# Patient Record
Sex: FEMALE | Race: WHITE | Employment: OTHER | URBAN - METROPOLITAN AREA
[De-identification: names, ages, dates, MRNs, and addresses within clinical notes are randomized per-mention and may not be internally consistent; named-entity substitution may affect disease eponyms.]

---

## 2017-01-04 ENCOUNTER — OFFICE VISIT (OUTPATIENT)
Dept: PHARMACY | Facility: CLINIC | Age: 62
End: 2017-01-04
Payer: COMMERCIAL

## 2017-01-04 VITALS
BODY MASS INDEX: 26.39 KG/M2 | HEART RATE: 72 BPM | DIASTOLIC BLOOD PRESSURE: 70 MMHG | WEIGHT: 122 LBS | SYSTOLIC BLOOD PRESSURE: 122 MMHG

## 2017-01-04 DIAGNOSIS — Z79.4 TYPE 2 DIABETES MELLITUS WITH DIABETIC NEPHROPATHY, WITH LONG-TERM CURRENT USE OF INSULIN (H): ICD-10-CM

## 2017-01-04 DIAGNOSIS — K21.9 GASTROESOPHAGEAL REFLUX DISEASE, ESOPHAGITIS PRESENCE NOT SPECIFIED: ICD-10-CM

## 2017-01-04 DIAGNOSIS — E11.21 TYPE 2 DIABETES MELLITUS WITH DIABETIC NEPHROPATHY, WITH LONG-TERM CURRENT USE OF INSULIN (H): ICD-10-CM

## 2017-01-04 DIAGNOSIS — E78.5 HYPERLIPIDEMIA LDL GOAL <100: Primary | ICD-10-CM

## 2017-01-04 DIAGNOSIS — I15.9 SECONDARY HYPERTENSION WITH GOAL BLOOD PRESSURE LESS THAN 140/90: ICD-10-CM

## 2017-01-04 PROCEDURE — 99607 MTMS BY PHARM ADDL 15 MIN: CPT | Mod: GY | Performed by: PHARMACIST

## 2017-01-04 PROCEDURE — 99606 MTMS BY PHARM EST 15 MIN: CPT | Mod: GY | Performed by: PHARMACIST

## 2017-01-04 RX ORDER — LISINOPRIL 20 MG/1
TABLET ORAL
Qty: 90 TABLET | Refills: 3 | Status: SHIPPED | OUTPATIENT
Start: 2017-01-04 | End: 2017-01-12 | Stop reason: ALTCHOICE

## 2017-01-04 RX ORDER — ATORVASTATIN CALCIUM 40 MG/1
TABLET, FILM COATED ORAL
Qty: 90 TABLET | Refills: 3 | Status: SHIPPED | OUTPATIENT
Start: 2017-01-04 | End: 2018-01-04

## 2017-01-04 NOTE — MR AVS SNAPSHOT
After Visit Summary   1/4/2017    Nathalie Harp    MRN: [id number]           Patient Information     Date Of Birth          1955        Visit Information        Provider Department      1/4/2017 9:00 AM Jovana Loo [facility] MT        Today's Diagnoses     Hyperlipidemia LDL goal <100    -  1     Secondary hypertension with goal blood pressure less than 140/90         Gastroesophageal reflux disease, esophagitis presence not specified         Type 2 diabetes mellitus with diabetic nephropathy, with long-term current use of insulin (H)           Care Instructions    Recommendations from today's MT visit:                                                        1. Please start the Humulin U500 Kwikpen 145units every morning with breakfast and 95units every evening with dinner. Remember that 1 pen will last you 6.3 days (so 4 pens will last you 25.5 days, 5 pens will last you 32 days). Stop the Levemir and Novolog when you start the Humulin U500.    2. I sent refills for lisinopril, omeprazole and atorvastatin to our pharmacy downstairs. The pharmacy will transfer the remaining medications from [facility], please be sure to give the pharmacy [place] address and phone number. Please also be sure to provide the pharmacy with your insurance card.    3. Test your blood sugar in the morning before breakfast and in the evening before dinner (goal of 80-130mg/dL) and anytime you feel it may be low (feeling shaky, dizzy, sweaty, weak). If your blood sugar is below 70mg/dL, have something to eat to bring it up. If your blood sugar is NOT below 70mg/dL, take a 5 minute break, have a glass water, and let the feeling pass. This is your body adjusting to lower and more normal blood sugar levels.    Next MTM visit: Wednesday, February 1st at 9am.    To schedule another MT appointment, please call the clinic directly or you may call the [facility] scheduling line at [phone] or  toll-free at [phone].     My Clinical Pharmacist's contact information:                                                      It was a pleasure seeing you today!  Please feel free to contact me with any questions or concerns you have.      Jovana Loo, Pharm.D., [facility]  Medication Therapy Management Pharmacist  [phone]     You may receive a survey about the [facility] services you received.  I would appreciate your feedback to help me serve you better in the future. Please fill it out and return it when you can. Your comments will be anonymous.      My healthcare goals:                                                      Diabetes Goals:    Home Monitoring of Blood Sugars: Before meals  mg/dL.    Hemoglobin A1C: Less than 7%. Yours is A1C     12.3   12/21/2016.    Blood Pressure: Less than 140/90mmHg. Yours is /70 mmHg  Pulse 72  Wt 122 lb (55.339 kg)  LMP  (LMP Unknown).    Cholesterol: You are taking atorvastatin to help decrease the risk of heart disease.    Things you can do to help lower your blood sugars:    Diet: 3 well balanced meals per day with protein (lean meats, seafood, eggs), healthy fats (olive oil and olives, coconut oil, avacado, nuts and nut butters, seeds) and carbohydrates mostly from fruits and vegetables.    Exercise: 30 minutes per day of anything that will increase your heart rate and make you break a sweat! Gardening, walking, cleaning the house, changing the oil in your car, etc. If you feel like 30 minutes per day is too much, start small. Even lifting canned foods or working your arms with a resistance band in front of the TV can help.                  Follow-ups after your visit        Your next 10 appointments already scheduled     Jan 12, 2017  9:00 AM   [facility] Lockhart MD   [facility]    [address] [place] [id number] [phone]            Bring a current list of meds and any records pertaining to this visit.  For Physicals, please bring immunization records and any forms needing to be filled out.  Please arrive 10 minutes early to complete paperwork.            Feb 01, 2017  9:00 AM   Office Visit with Jovana Loo RPH   [facility] MTM ([facility])    [address] [id number] [phone]           Bring a current list of meds and any records pertaining to this visit.  For Physicals, please bring immunization records and any forms needing to be filled out.  Please arrive 10 minutes early to complete paperwork.              Who to contact     If you have questions or need follow up information about today's clinic visit or your schedule please contact [facility] directly at [phone].  Normal or non-critical lab and imaging results will be communicated to you by MyChart, letter or phone within 4 business days after the clinic has received the results. If you do not hear from us within 7 days, please contact the clinic through MyChart or phone. If you have a critical or abnormal lab result, we will notify you by phone as soon as possible.  Submit refill requests through [employer] or call your pharmacy and they will forward the refill request to us. Please allow 3 business days for your refill to be completed.          Additional Information About Your Visit        Care EveryWhere ID     This is your Care EveryWhere ID. This could be used by other organizations to access your [facility] medical records  [id number]        Your Vitals Were     Pulse Last Period                72 (LMP Unknown)           Blood Pressure from Last 3 Encounters:   01/04/17 122/70   12/21/16 140/78   12/14/16 130/74    Weight from Last 3 Encounters:   01/04/17 122 lb (55.339 kg)   12/21/16 127 lb (57.607 kg)   11/14/16 124 lb (56.246 kg)              Today,  you had the following     No orders found for display         Today's Medication Changes          These changes are accurate as of: 1/4/17 10:00 AM.  If you have any questions, ask your nurse or doctor.               Start taking these medicines.        Dose/Directions    insulin reg HIGH CONC 500 UNIT/ML PEN soln   Commonly known as:  HumuLIN R U-500 KwikPen   Used for:  Type 2 diabetes mellitus with diabetic nephropathy, with long-term current use of insulin (H)        Inject 145units every morning with breakfast and 95units every evening with dinner. Please set patient up with Med Sync.   Quantity:  5 pen   Refills:  11         These medicines have changed or have updated prescriptions.        Dose/Directions    atorvastatin 40 MG tablet   Commonly known as:  LIPITOR   This may have changed:  See the new instructions.   Used for:  Hyperlipidemia LDL goal <100        TAKE 1 TABLET (40 MG) BY MOUTH DAILY   Quantity:  90 tablet   Refills:  3       lisinopril 20 MG tablet   Commonly known as:  PRINIVIL/ZESTRIL   This may have changed:  See the new instructions.   Used for:  Secondary hypertension with goal blood pressure less than 140/90        TAKE 1 TABLET (20 MG) BY MOUTH DAILY   Quantity:  90 tablet   Refills:  3       omeprazole 20 MG CR capsule   Commonly known as:  priLOSEC   This may have changed:  See the new instructions.   Used for:  Gastroesophageal reflux disease, esophagitis presence not specified        TAKE 1 CAPSULE (20 MG) BY MOUTH DAILY   Quantity:  90 capsule   Refills:  1         Stop taking these medicines if you haven't already. Please contact your care team if you have questions.     insulin aspart 100 UNIT/ML injection   Commonly known as:  NovoLOG FLEXPEN           insulin detemir 100 UNIT/ML injection   Commonly known as:  LEVEMIR FLEXPEN/FLEXTOUCH                Where to get your medicines      These medications were sent to [facility] [place], [place] - [facility]  [address]  [address], [facility] [address]     Phone:  [phone]    - atorvastatin 40 MG tablet  - insulin reg HIGH CONC 500 UNIT/ML PEN soln  - lisinopril 20 MG tablet  - omeprazole 20 MG CR capsule             Primary Care Provider Office Phone # Fax #    Eva Lockhart [phone]       [facility] [address]  [place], [place]. \[facility].\"" [id number]        Goals        Patient-Stated    I will check my blood sugars four times per day     Goal Comments - Note created  3/10/2014 10:41 AM by Priti Orozco, RN    As of today's date 3/10/2014 goal is met at 26 - 50%.   Goal Status:  Active      I will continue to work with Diabetic Educator     Goal Comments - Note created  3/10/2014 10:42 AM by Priti Orozco, RN    As of today's date 3/10/2014 goal is met at 26 - 50%.   Goal Status:  Active      I will take insulins as prescribed, including sliding scale novolog     Goal Comments - Note created  3/10/2014 10:43 AM by Priti Orozco, RN    As of today's date 3/10/2014 goal is met at 51 - 75%.   Goal Status:  Active        Thank you!     Thank you for choosing [facility]  for your care. Our goal is always to provide you with excellent care. Hearing back from our patients is one way we can continue to improve our services. Please take a few minutes to complete the written survey that you may receive in the mail after your visit with us. Thank you!             Your Updated Medication List - Protect others around you: Learn how to safely use, store and throw away your medicines at www.disposemymeds.org.          This list is accurate as of: 1/4/17 10:00 AM.  Always use your most recent med list.                   Brand Name Dispense Instructions for use    albuterol 108 (90 BASE) MCG/ACT Inhaler    PROAIR HFA/PROVENTIL HFA/VENTOLIN HFA    3 Inhaler    Inhale 2 puffs into the lungs every 6 hours as needed for shortness of breath / dyspnea  ((Ventolin or preferred albuterol equivalent))       aspirin 81 MG tablet      Take 1 tablet by mouth daily.       atorvastatin 40 MG tablet    LIPITOR    90 tablet    TAKE 1 TABLET (40 MG) BY MOUTH DAILY       blood glucose monitoring test strip    ONE TOUCH ULTRA    300 each    TEST BLOOD SUGARS 3 TIMES DAILY.       celecoxib 200 MG capsule    celeBREX    180 capsule    Take 1 capsule (200 mg) by mouth 2 times daily as needed for moderate pain       EPIPEN 2-MELVIN 0.3 MG/0.3ML injection   Generic drug:  EPINEPHrine     2 each    INJECT 0.3 MLS (0.3 MG) INTO THE MUSCLE ONCE AS NEEDED FOR ANAPHYLAXIS       insulin pen needle 31G X 5 MM    B-D U/F    200 each    Use twice daily as directed.       insulin reg HIGH CONC 500 UNIT/ML PEN soln    HumuLIN R U-500 KwikPen    5 pen    Inject 145units every morning with breakfast and 95units every evening with dinner. Please set patient up with Med Sync.       lisinopril 20 MG tablet    PRINIVIL/ZESTRIL    90 tablet    TAKE 1 TABLET (20 MG) BY MOUTH DAILY       omeprazole 20 MG CR capsule    priLOSEC    90 capsule    TAKE 1 CAPSULE (20 MG) BY MOUTH DAILY       ONETOUCH DELICA LANCETS 33G Misc     300 each    TEST 3 TIMES DAILY.       order for DME     1 each    Equipment being ordered: bath tub grab bars

## 2017-01-04 NOTE — PROGRESS NOTES
SUBJECTIVE/OBJECTIVE:                                                    Nathalie Harp is a 61 year old female coming in for a follow-up visit for Medication Therapy Management.  She was referred to me Eva Lockhart    Chief Complaint: Follow up from our visit on 12/14 and 12/22/2016. Requests refills on lisinopril, atorvastatin, and omeprazole.    Medication Adherence: no issues reported by patient    Diabetes:  Pt currently taking Levemir 80-90units BID, Novolog 40-50units 2-3x/day. Still has not started the Humulin U500 despite our conversation 12/22/2016 that she does have a full months supply at home. She tells me today that she only has 4 pens of Humulin U500 and that this is only an 8 days supply. She continues to use [facility] and is dissatisfied with their service. She declined to use [facility] because she did not want to keep a credit card or checking account information on file. We are trying to find ways to get her medications more convenient. She would be interested in using [facility] if she can get set up with [facility] and  her medications when she is in clinic for visits.  SMBG Ranges (from patient's glucose log): see below.  Symptoms of low blood sugar? none.   Symptoms of high blood sugar? none  ACEi/ARB: Yes: lisinopril 20mg QD. Microalbumin is not < 30 mg/g.   Aspirin: Taking 81mg daily and denies side effects  Statin: Yes: atorvastatin 40mg QD and denies side effects  Date FBG/ 2hours post Dinner /2hours post   1/4 197    1/3 443    1/2 430    1/1 423    12/31 365    12/30 450    12/29 474      415         Current labs include:  BP Readings from Last 3 Encounters:   01/04/17 122/70   12/21/16 140/78   12/14/16 130/74       A1C     12.3   12/21/2016  A1C     11.3   10/7/2015  A1C     11.4   8/31/2015  A1C     12.8   11/4/2014  A1C     12.8   7/11/2014    CHOL      218   11/14/2016  TRIG      380   11/14/2016  HDL       52   11/14/2016  LDL        90   11/14/2016  LDL       85   7/11/2014    Lab Results   Component Value Date    UCRR 22 11/14/2016    MICROL 12 11/14/2016    UMALCR 56.82* 11/14/2016       Last Basic Metabolic Panel:  NA      135   11/14/2016   POTASSIUM      5.0   11/14/2016  CHLORIDE       95   11/14/2016  BUN       24   11/14/2016  BUN     18.8   7/16/2012  CR     0.92   11/14/2016  GFR ESTIMATE   Date Value Ref Range Status   11/14/2016 62 >60 mL/min/1.7m2 Final   10/07/2015 63 >60 mL/min/1.7m2 Final   08/31/2015 73 >60 mL/min/1.7m2 Final     TSH   Date Value Ref Range Status   11/14/2016 0.35* 0.40 - 5.00 mU/L Final     T4 FREE   Date Value Ref Range Status   11/14/2016 1.28 0.70 - 1.85 ng/dL Final     /70 mmHg  Pulse 72  Wt 122 lb (55.339 kg)  LMP  (LMP Unknown)    Most Recent Immunizations   Administered Date(s) Administered     Influenza (High Dose) 3 valent vaccine 10/03/2013     Influenza (IIV3) 08/30/2016     Pneumococcal (PCV 13) 11/14/2016     Pneumococcal 23 valent 11/14/2008     TD (ADULT, 7+) 09/01/2011          ASSESSMENT:                                                    Current medications were reviewed with her today.      Medication Adherence: no issues identified    Diabetes: Needs Improvement. Patient is not meeting A1c goal of < 7%. Self monitoring of blood glucose is not at goal of fasting or pre-prandial  mg/dL. As previously discussed, Pt would benefit from switching to U500 insulin as her current total daily dose of insulin is >200units/day. We reviewed, again, that she currently has at least a 25.5 day supply at home and that each pen will last her 6.3 days. She is interested in switching to [facility] if she can be set up with [facility], as this will be more convenient for her and will allow us to ensure she is provided exactly what she is prescribed.         PLAN:                                                      1. Start Humulin U500 Kwikpen 145units QAM with breakfast and 95units QPM  with dinner. Appropriate use, risks and benefits discussed at length. Rx sent to [facility]. Patient will work with pharmacy to get set up on Med-Sync.  2. Pt will SMBG fasting, prior to dinner, and with symptoms of hypoglycemia.  3. Refills for omeprazole, lisinopril, and atorvastatin sent to pharmacy.    I spent 60 minutes with this patient today.  All changes were made via collaborative practice agreement with Eva Lockhart. A copy of the visit note was provided to the patient's primary care provider.     Will follow up in 1 month, she is seeing PCP next week.    The patient was given a summary of these recommendations as an after visit summary.    Jovana Loo, Pharm.D., [facility]  Medication Therapy Management Pharmacist  [phone]

## 2017-01-04 NOTE — PATIENT INSTRUCTIONS
Recommendations from today's MTM visit:                                                        1. Please start the Humulin U500 Kwikpen 145units every morning with breakfast and 95units every evening with dinner. Remember that 1 pen will last you 6.3 days (so 4 pens will last you 25.5 days, 5 pens will last you 32 days). Stop the Levemir and Novolog when you start the Humulin U500.    2. I sent refills for lisinopril, omeprazole and atorvastatin to our pharmacy downstairs. The pharmacy will transfer the remaining medications from [facility], please be sure to give the pharmacy [place] address and phone number. Please also be sure to provide the pharmacy with your insurance card.    3. Test your blood sugar in the morning before breakfast and in the evening before dinner (goal of 80-130mg/dL) and anytime you feel it may be low (feeling shaky, dizzy, sweaty, weak). If your blood sugar is below 70mg/dL, have something to eat to bring it up. If your blood sugar is NOT below 70mg/dL, take a 5 minute break, have a glass water, and let the feeling pass. This is your body adjusting to lower and more normal blood sugar levels.    Next MTM visit: Wednesday, February 1st at 9am.    To schedule another MTM appointment, please call the clinic directly or you may call the MTM scheduling line at [phone] or toll-free at [phone].     My Clinical Pharmacist's contact information:                                                      It was a pleasure seeing you today!  Please feel free to contact me with any questions or concerns you have.      Jovana Loo, Pharm.D., [facility]  Medication Therapy Management Pharmacist  [phone]     You may receive a survey about the MTM services you received.  I would appreciate your feedback to help me serve you better in the future. Please fill it out and return it when you can. Your comments will be anonymous.      My healthcare goals:                                                       Diabetes Goals:    Home Monitoring of Blood Sugars: Before meals  mg/dL.    Hemoglobin A1C: Less than 7%. Yours is A1C     12.3   12/21/2016.    Blood Pressure: Less than 140/90mmHg. Yours is /70 mmHg  Pulse 72  Wt 122 lb (55.339 kg)  LMP  (LMP Unknown).    Cholesterol: You are taking atorvastatin to help decrease the risk of heart disease.    Things you can do to help lower your blood sugars:    Diet: 3 well balanced meals per day with protein (lean meats, seafood, eggs), healthy fats (olive oil and olives, coconut oil, avacado, nuts and nut butters, seeds) and carbohydrates mostly from fruits and vegetables.    Exercise: 30 minutes per day of anything that will increase your heart rate and make you break a sweat! Gardening, walking, cleaning the house, changing the oil in your car, etc. If you feel like 30 minutes per day is too much, start small. Even lifting canned foods or working your arms with a resistance band in front of the TV can help.

## 2017-01-12 ENCOUNTER — OFFICE VISIT (OUTPATIENT)
Dept: INTERNAL MEDICINE | Facility: CLINIC | Age: 62
End: 2017-01-12
Payer: MEDICARE

## 2017-01-12 VITALS
HEART RATE: 93 BPM | HEIGHT: 56 IN | OXYGEN SATURATION: 96 % | DIASTOLIC BLOOD PRESSURE: 94 MMHG | TEMPERATURE: 96.9 F | WEIGHT: 124 LBS | SYSTOLIC BLOOD PRESSURE: 152 MMHG | BODY MASS INDEX: 27.9 KG/M2

## 2017-01-12 DIAGNOSIS — T46.4X5A ACE-INHIBITOR COUGH: ICD-10-CM

## 2017-01-12 DIAGNOSIS — E11.21 TYPE 2 DIABETES MELLITUS WITH DIABETIC NEPHROPATHY, WITH LONG-TERM CURRENT USE OF INSULIN (H): ICD-10-CM

## 2017-01-12 DIAGNOSIS — J45.20 MILD INTERMITTENT ASTHMA WITHOUT COMPLICATION: ICD-10-CM

## 2017-01-12 DIAGNOSIS — R05.8 ACE-INHIBITOR COUGH: ICD-10-CM

## 2017-01-12 DIAGNOSIS — N18.2 CKD (CHRONIC KIDNEY DISEASE) STAGE 2, GFR 60-89 ML/MIN: ICD-10-CM

## 2017-01-12 DIAGNOSIS — Z79.4 TYPE 2 DIABETES MELLITUS WITH DIABETIC NEPHROPATHY, WITH LONG-TERM CURRENT USE OF INSULIN (H): ICD-10-CM

## 2017-01-12 DIAGNOSIS — Z12.31 VISIT FOR SCREENING MAMMOGRAM: ICD-10-CM

## 2017-01-12 PROCEDURE — 99214 OFFICE O/P EST MOD 30 MIN: CPT | Performed by: INTERNAL MEDICINE

## 2017-01-12 RX ORDER — LOSARTAN POTASSIUM 50 MG/1
50 TABLET ORAL DAILY
Qty: 90 TABLET | Refills: 3 | Status: SHIPPED | OUTPATIENT
Start: 2017-01-12 | End: 2018-01-04

## 2017-01-12 NOTE — MR AVS SNAPSHOT
After Visit Summary   1/12/2017    Nathalie Harp    MRN: [id number]           Patient Information     Date Of Birth          1955        Visit Information        Provider Department      1/12/2017 9:00 AM Eva Lockhart MD [facility]        Today's Diagnoses     Uncontrolled type 2 diabetes mellitus without complication, with long-term current use of insulin (H)    -  1     Mild intermittent asthma without complication         Visit for screening mammogram         ACE-inhibitor cough         CKD (chronic kidney disease) stage 2, GFR 60-89 ml/min         Type 2 diabetes mellitus with diabetic nephropathy, with long-term current use of insulin (H)           Care Instructions    Stop Lisinopril:  This is contributing to your cough    Start Losartan 50 mg daily (instead of lisinopril)    -------------------------    Change the insulin to 120 Units twice daily (morning and supper)    Check sugars 4 time a day     _____________________    Return to clinic 2 weeks :  Follow up cough and see if more inhalers are indicated.  Blood Sugars        Follow-ups after your visit        Your next 10 appointments already scheduled     Feb 01, 2017  9:00 AM   Office Visit with Jovana Loo RPH   [facility] ([facility])    [address] [place] [id number] [phone]           Bring a current list of meds and any records pertaining to this visit.  For Physicals, please bring immunization records and any forms needing to be filled out.  Please arrive 10 minutes early to complete paperwork.              Who to contact     If you have questions or need follow up information about today's clinic visit or your schedule please contact [facility] directly at [phone].  Normal or non-critical lab and imaging results will be communicated to you by MyChart, letter or phone  "within 4 business days after the clinic has received the results. If you do not hear from us within 7 days, please contact the clinic through [employer] or phone. If you have a critical or abnormal lab result, we will notify you by phone as soon as possible.  Submit refill requests through [employer] or call your pharmacy and they will forward the refill request to us. Please allow 3 business days for your refill to be completed.          Additional Information About Your Visit        Care EveryWhere ID     This is your Care EveryWhere ID. This could be used by other organizations to access your [facility] medical records  [id number]        Your Vitals Were     Pulse Temperature Height BMI (Body Mass Index) Pulse Oximetry Last Period    93 96.9  F (36.1  C) (Oral) 4' 8.25\" (1.429 m) 27.54 kg/m2 96% (LMP Unknown)       Blood Pressure from Last 3 Encounters:   01/12/17 152/94   01/04/17 122/70   12/21/16 140/78    Weight from Last 3 Encounters:   01/12/17 124 lb (56.246 kg)   01/04/17 122 lb (55.339 kg)   12/21/16 127 lb (57.607 kg)              Today, you had the following     No orders found for display         Today's Medication Changes          These changes are accurate as of: 1/12/17  9:52 AM.  If you have any questions, ask your nurse or doctor.               Start taking these medicines.        Dose/Directions    losartan 50 MG tablet   Commonly known as:  COZAAR   Used for:  Uncontrolled type 2 diabetes mellitus without complication, with long-term current use of insulin (H), CKD (chronic kidney disease) stage 2, GFR 60-89 ml/min   Started by:  Eva Lockhart MD        Dose:  50 mg   Take 1 tablet (50 mg) by mouth daily   Quantity:  90 tablet   Refills:  3         These medicines have changed or have updated prescriptions.        Dose/Directions    insulin reg HIGH CONC 500 UNIT/ML PEN soln   Commonly known as:  HumuLIN R U-500 KwikPen   This may have changed:  additional instructions   Used for:  Type 2 " diabetes mellitus with diabetic nephropathy, with long-term current use of insulin (H)   Changed by:  Eva Lockhart MD        Inject 120 units every morning with breakfast and 120 units every evening with dinner. Please set patient up with Med Sync.   Refills:  0         Stop taking these medicines if you haven't already. Please contact your care team if you have questions.     lisinopril 20 MG tablet   Commonly known as:  PRINIVIL/ZESTRIL   Stopped by:  Eva Lockhatr MD                Where to get your medicines      These medications were sent to [facility] - [place], [place] - [address]  [address], [facility] [address]     Phone:  [phone]    - losartan 50 MG tablet      Some of these will need a paper prescription and others can be bought over the counter.  Ask your nurse if you have questions.     You don't need a prescription for these medications    - insulin reg HIGH CONC 500 UNIT/ML PEN soln             Primary Care Provider Office Phone # Fax #    Eva Lockhart [phone]       [facility] [address]  [place], [place]. \[facility] Outlook.\"" [id number]        Goals        Patient-Stated    I will check my blood sugars four times per day     Goal Comments - Note created  3/10/2014 10:41 AM by Priti Orozco RN    As of today's date 3/10/2014 goal is met at 26 - 50%.   Goal Status:  Active      I will continue to work with [employer]abetic Educ[employer]     Goal Comments - Note created  3/10/2014 10:42 AM by Priti Orozco RN    As of today's date 3/10/2014 goal is met at 26 - 50%.   Goal Status:  Active      I will take insulins as prescribed, including sliding scale novolog     Goal Comments - Note created  3/10/2014 10:43 AM by Priti Orozco RN    As of today's date 3/10/2014 goal is met at 51 - 75%.   Goal Status:  Active        Thank you!     Thank you for choosing [facility]  for your care. Our goal is  always to provide you with excellent care. Hearing back from our patients is one way we can continue to improve our services. Please take a few minutes to complete the written survey that you may receive in the mail after your visit with us. Thank you!             Your Updated Medication List - Protect others around you: Learn how to safely use, store and throw away your medicines at www.disposemymeds.org.          This list is accurate as of: 1/12/17  9:52 AM.  Always use your most recent med list.                   Brand Name Dispense Instructions for use    albuterol 108 (90 BASE) MCG/ACT Inhaler    PROAIR HFA/PROVENTIL HFA/VENTOLIN HFA    3 Inhaler    Inhale 2 puffs into the lungs every 6 hours as needed for shortness of breath / dyspnea ((Ventolin or preferred albuterol equivalent))       aspirin 81 MG tablet      Take 1 tablet by mouth daily.       atorvastatin 40 MG tablet    LIPITOR    90 tablet    TAKE 1 TABLET (40 MG) BY MOUTH DAILY       blood glucose monitoring test strip    ONE TOUCH ULTRA    300 each    TEST BLOOD SUGARS 3 TIMES DAILY.       celecoxib 200 MG capsule    celeBREX    180 capsule    Take 1 capsule (200 mg) by mouth 2 times daily as needed for moderate pain       EPIPEN 2-MELVIN 0.3 MG/0.3ML injection   Generic drug:  EPINEPHrine     2 each    INJECT 0.3 MLS (0.3 MG) INTO THE MUSCLE ONCE AS NEEDED FOR ANAPHYLAXIS       insulin pen needle 31G X 5 MM    B-D U/F    200 each    Use twice daily as directed.       insulin reg HIGH CONC 500 UNIT/ML PEN soln    HumuLIN R U-500 KwikPen     Inject 120 units every morning with breakfast and 120 units every evening with dinner. Please set patient up with Med Sync.       losartan 50 MG tablet    COZAAR    90 tablet    Take 1 tablet (50 mg) by mouth daily       omeprazole 20 MG CR capsule    priLOSEC    90 capsule    TAKE 1 CAPSULE (20 MG) BY MOUTH DAILY       ONETOUCH DELICA LANCETS 33G Misc     300 each    TEST 3 TIMES DAILY.       order for DME     1  each    Equipment being ordered: bath tub grab bars

## 2017-01-12 NOTE — PATIENT INSTRUCTIONS
Stop Lisinopril:  This is contributing to your cough    Start Losartan 50 mg daily (instead of lisinopril)    -------------------------    Change the insulin to 120 Units twice daily (morning and supper)    Check sugars 4 time a day     _____________________    Return to clinic 2 weeks :  Follow up cough and see if more inhalers are indicated.  Blood Sugars

## 2017-01-12 NOTE — PROGRESS NOTES
SUBJECTIVE:                                                    Nathalie Harp is a 61 year old female who presents to clinic today for the following health issues:      Medication Followup of all meds    Taking Medication as prescribed: yes    Side Effects:  Jittery from the insulin?    Medication Helping Symptoms:  Yes - now has the new insulin and is taking regularly.   Had changed her insulin (lowered in am and pm) due to low BS during afternoon.  But her BS in am are very high (300 - 400's)     Recent tel call reveals she is not using her inhalers correctly. She now realizes the albuterol is for prn. She is not currently wheezing and does not feel she needs to add therapy now that her symptoms have abated.  But still coughs a lot.           Problem list and histories reviewed & adjusted, as indicated.  Additional history: as documented    Patient Active Problem List   Diagnosis     menometrorrhagia     Esophageal reflux     Irritable bowel syndrome     Female stress incontinence     Migraine without aura     Arthritis of knee, right     Disorder of bursae and tendons in shoulder region     Degeneration of thoracic or thoracolumbar intervertebral disc     Degeneration of cervical intervertebral disc     Other hammer toe (acquired)     Allergic rhinitis due to pollen     Menopausal symptoms     Need for SBE (subacute bacterial endocarditis) prophylaxis     Preventive measure     Hyperlipidemia LDL goal <100     Health Care Home     Anemia     Dizziness     Hammer toe     Microalbuminuria     ACP (advance care planning)     Allergic to shellfish     Falls frequently     Balance problems     CKD (chronic kidney disease) stage 2, GFR 60-89 ml/min     Type 2 diabetes mellitus with diabetic nephropathy (H)     Benign essential hypertension     Diarrhea     Iron deficiency anemia, unspecified iron deficiency anemia type     Right axillary hidradenitis     Uncontrolled type 2 diabetes mellitus without  complication, with long-term current use of insulin (H)     Mild intermittent asthma without complication     Past Surgical History   Procedure Laterality Date     Hc excis primary ganglion wrist  1985 and 1987     right wrist     Knee surgery  2008 approx     arthroscopic; Dr. Rivera       Social History   Substance Use Topics     Smoking status: Never Smoker      Smokeless tobacco: Never Used     Alcohol Use: No      Comment: rarely     Family History   Problem Relation Age of Onset     Hypertension Sister      Hypertension Brother      CEREBROVASCULAR DISEASE Maternal Grandmother      Arthritis Maternal Grandmother      Circulatory Sister      Asthma Mother      GASTROINTESTINAL DISEASE Mother      IBS     Alcohol/Drug Mother      Depression Mother      Neurologic Disorder Mother      migraines     Asthma Sister      Asthma Sister      Asthma Brother      GASTROINTESTINAL DISEASE Father      IBS     DIABETES Father      Alcohol/Drug Father      Neurologic Disorder Father      migraines     Obesity Father      GASTROINTESTINAL DISEASE Maternal Grandfather      Ulcers     Alcohol/Drug Maternal Grandfather      Thyroid Disease Sister      Thyroid Disease Sister      DIABETES Paternal Grandmother      Alcohol/Drug Paternal Grandmother      Arthritis Paternal Grandmother      Obesity Paternal Grandmother      Obesity Sister          Current Outpatient Prescriptions   Medication Sig Dispense Refill     losartan (COZAAR) 50 MG tablet Take 1 tablet (50 mg) by mouth daily 90 tablet 3     insulin reg HIGH CONC (HUMULIN R U-500 KWIKPEN) 500 UNIT/ML PEN soln Inject 120 units every morning with breakfast and 120 units every evening with dinner. Please set patient up with Med Sync.       atorvastatin (LIPITOR) 40 MG tablet TAKE 1 TABLET (40 MG) BY MOUTH DAILY 90 tablet 3     omeprazole (PRILOSEC) 20 MG CR capsule TAKE 1 CAPSULE (20 MG) BY MOUTH DAILY 90 capsule 1     albuterol (PROAIR HFA/PROVENTIL HFA/VENTOLIN HFA) 108 (90  BASE) MCG/ACT Inhaler Inhale 2 puffs into the lungs every 6 hours as needed for shortness of breath / dyspnea ((Ventolin or preferred albuterol equivalent)) 3 Inhaler 3     celecoxib (CELEBREX) 200 MG capsule Take 1 capsule (200 mg) by mouth 2 times daily as needed for moderate pain 180 capsule 1     insulin pen needle (B-D U/F) 31G X 5 MM Use twice daily as directed. 200 each 3     blood glucose monitoring (ONE TOUCH ULTRA) test strip TEST BLOOD SUGARS 3 TIMES DAILY. 300 each 1     ONETOUCH DELICA LANCETS 33G MISC TEST 3 TIMES DAILY. 300 each 1     order for DME Equipment being ordered: bath tub grab bars 1 each 0     EPIPEN 2-MELVIN 0.3 MG/0.3ML injection INJECT 0.3 MLS (0.3 MG) INTO THE MUSCLE ONCE AS NEEDED FOR ANAPHYLAXIS 2 each 9     aspirin 81 MG tablet Take 1 tablet by mouth daily.       [DISCONTINUED] insulin reg HIGH CONC (HUMULIN R U-500 KWIKPEN) 500 UNIT/ML PEN soln Inject 145units every morning with breakfast and 95units every evening with dinner. Please set patient up with Med Sync. 5 pen 11     Allergies   Allergen Reactions     Augmentin      Benadryl [Acetaminophen]      Ceclor [Cefaclor]      Codeine      Detrol [Tolterodine Tartrate]      Dust Mites      Latex      Metformin GI Disturbance     Severe diarrhea     Pollen Extract      Septra [Bactrim]      Septra [Sulfamethoxazole W-Trimethoprim]      Shellfish Allergy      Crab       Simvastatin      HA     Sulfa Drugs      Sulfonamide Derivatives      Recent Labs   Lab Test  12/21/16   1003  11/14/16   1004  10/07/15   0931  08/31/15   1144  11/04/14   1119  07/11/14   1038  03/03/14   0906   07/05/13   1159   A1C  12.3*   --   11.3*  11.4*  12.8*  12.8*  12.3*   --   11.2*   LDL   --   90   --   83   --   85   --    --   Cannot estimate LDL when triglyceride exceeds 400 mg/dL  132*   HDL   --   52   --   65   --    --    --    --   47*   TRIG   --   380*   --   301*   --    --    --    --   463*   ALT   --   29   --   31  19  22   --    < >  22  "  CR   --   0.92  0.90  0.80  0.80  0.90   --    < >  0.80   GFRESTIMATED   --   62  63  73  73  64   --    < >  73   GFRESTBLACK   --   75  77  88  88  77   --    < >  89   POTASSIUM   --   5.0  4.1  4.7  4.2  4.2   --    < >  4.5   TSH   --   0.35*   --    --    --    --   0.54   --    --     < > = values in this interval not displayed.      BP Readings from Last 3 Encounters:   01/12/17 152/94   01/04/17 122/70   12/21/16 140/78    Wt Readings from Last 3 Encounters:   01/12/17 124 lb (56.246 kg)   01/04/17 122 lb (55.339 kg)   12/21/16 127 lb (57.607 kg)                  Labs reviewed in EPIC  Problem list, Medication list, Allergies, and Medical/Social/Surgical histories reviewed in [facility] and updated as appropriate.    ROS:  Constitutional, HEENT, cardiovascular, pulmonary, gi and gu systems are negative, except as otherwise noted.    OBJECTIVE:                                                    /94 mmHg  Pulse 93  Temp(Src) 96.9  F (36.1  C) (Oral)  Ht 4' 8.25\" (1.429 m)  Wt 124 lb (56.246 kg)  BMI 27.54 kg/m2  SpO2 96%  LMP  (LMP Unknown)  Body mass index is 27.54 kg/(m^2).  GENERAL: healthy, alert and no distress  EYES: Eyes grossly normal to inspection, PERRL and conjunctivae and sclerae normal  NECK: no adenopathy, no asymmetry, masses, or scars and thyroid normal to palpation  RESP: lungs clear to auscultation - no rales, rhonchi or wheezes  RESP: coughs a lot, dry  CV: regular rate and rhythm, normal S1 S2, no S3 or S4, no murmur, click or rub, no peripheral edema and peripheral pulses strong  MS: no gross musculoskeletal defects noted, no edema  PSYCH: mentation appears normal, affect normal/bright    Diagnostic Test Results:  Results for orders placed or performed in visit on 12/21/16   Hepatitis C Screen Reflex to HCV RNA Quant and Genotype   Result Value Ref Range    Hepatitis C Antibody  NR     Nonreactive   Assay performance characteristics have not been established for newborns,   " infants, and children     Hemoglobin A1c   Result Value Ref Range    Hemoglobin A1C 12.3 (H) 4.3 - 6.0 %        ASSESSMENT/PLAN:                                                            ICD-10-CM    1. Uncontrolled type 2 diabetes mellitus without complication, with long-term current use of insulin (H) E11.65 losartan (COZAAR) 50 MG tablet    Z79.4    2. Mild intermittent asthma without complication J45.20    3. ACE-inhibitor cough R05     T46.4X5A    4. CKD (chronic kidney disease) stage 2, GFR 60-89 ml/min N18.2 losartan (COZAAR) 50 MG tablet   5. Type 2 diabetes mellitus with diabetic nephropathy, with long-term current use of insulin (H) E11.21 insulin reg HIGH CONC (HUMULIN R U-500 KWIKPEN) 500 UNIT/ML PEN soln    Z79.4    6. Visit for screening mammogram Z12.31        Likely ACE Inh cough:  Will change to losartan  Will increase pm insulin to improve her morning BS.  Decrease her am insulin to diminish the lows she has during afternoon.        See Patient Instructions    Eva Lockhart MD  [facility]

## 2017-01-12 NOTE — NURSING NOTE
"Chief Complaint   Patient presents with     Recheck Medication     Health Maintenance     eye,mammo,ACT        Initial /94 mmHg  Pulse 93  Temp(Src) 96.9  F (36.1  C) (Oral)  Ht 4' 8.25\" (1.429 m)  Wt 124 lb (56.246 kg)  BMI 27.54 kg/m2  SpO2 96%  LMP  (LMP Unknown) Estimated body mass index is 27.54 kg/(m^2) as calculated from the following:    Height as of this encounter: 4' 8.25\" (1.429 m).    Weight as of this encounter: 124 lb (56.246 kg).  BP completed using cuff size: regular  Althea Dunham ma      "

## 2017-01-26 ENCOUNTER — OFFICE VISIT (OUTPATIENT)
Dept: INTERNAL MEDICINE | Facility: CLINIC | Age: 62
End: 2017-01-26
Payer: MEDICARE

## 2017-01-26 VITALS
TEMPERATURE: 97.5 F | SYSTOLIC BLOOD PRESSURE: 130 MMHG | DIASTOLIC BLOOD PRESSURE: 70 MMHG | OXYGEN SATURATION: 99 % | HEART RATE: 109 BPM | WEIGHT: 125 LBS | BODY MASS INDEX: 27.77 KG/M2

## 2017-01-26 DIAGNOSIS — E11.21 TYPE 2 DIABETES MELLITUS WITH DIABETIC NEPHROPATHY, WITH LONG-TERM CURRENT USE OF INSULIN (H): Primary | ICD-10-CM

## 2017-01-26 DIAGNOSIS — Z13.5 SCREENING FOR DIABETIC RETINOPATHY: ICD-10-CM

## 2017-01-26 DIAGNOSIS — Z79.4 TYPE 2 DIABETES MELLITUS WITH DIABETIC NEPHROPATHY, WITH LONG-TERM CURRENT USE OF INSULIN (H): Primary | ICD-10-CM

## 2017-01-26 PROCEDURE — 99214 OFFICE O/P EST MOD 30 MIN: CPT | Performed by: INTERNAL MEDICINE

## 2017-01-26 NOTE — MR AVS SNAPSHOT
After Visit Summary   1/26/2017    Nathalie Harp    MRN: [id number]           Patient Information     Date Of Birth          1955        Visit Information        Provider Department      1/26/2017 9:00 AM Eva Lockhart MD [facility]        Today's Diagnoses     Screening for diabetic retinopathy    -  1     Type 2 diabetes mellitus with diabetic nephropathy, with long-term current use of insulin (H)           Care Instructions    Add mealtime insulin at mealtimes:  15 - 25 units at each meal (two or three times daily)    Return to clinic one month (late February)            Follow-ups after your visit        Your next 10 appointments already scheduled     Feb 01, 2017  9:00 AM   Office Visit with Jovana Loo RPH   [facility] ([facility])    [address] [place] [id number] [phone]           Bring a current list of meds and any records pertaining to this visit.  For Physicals, please bring immunization records and any forms needing to be filled out.  Please arrive 10 minutes early to complete paperwork.              Who to contact     If you have questions or need follow up information about today's clinic visit or your schedule please contact [facility] directly at [phone].  Normal or non-critical lab and imaging results will be communicated to you by MyChart, letter or phone within 4 business days after the clinic has received the results. If you do not hear from us within 7 days, please contact the clinic through MyChart or phone. If you have a critical or abnormal lab result, we will notify you by phone as soon as possible.  Submit refill requests through [employer] or call your pharmacy and they will forward the refill request to us. Please allow 3 business days for your refill to be completed.          Additional Information About Your  Visit        Care EveryWhere ID     This is your Care EveryWhere ID. This could be used by other organizations to access your [facility] medical records  [id number]        Your Vitals Were     Pulse Temperature Pulse Oximetry Last Period          109 97.5  F (36.4  C) (Oral) 99% (LMP Unknown)         Blood Pressure from Last 3 Encounters:   01/26/17 130/70   01/12/17 152/94   01/04/17 122/70    Weight from Last 3 Encounters:   01/26/17 125 lb (56.7 kg)   01/12/17 124 lb (56.246 kg)   01/04/17 122 lb (55.339 kg)              Today, you had the following     No orders found for display         Today's Medication Changes          These changes are accurate as of: 1/26/17  9:36 AM.  If you have any questions, ask your nurse or doctor.               Start taking these medicines.        Dose/Directions    insulin lispro 100 UNIT/ML injection   Commonly known as:  HumaLOG KWIKpen   Used for:  Type 2 diabetes mellitus with diabetic nephropathy, with long-term current use of insulin (H)   Started by:  Eva Lockhart MD        15-25 units before breakfast, 15-25 units before lunch, 15-25 units before dinner   Quantity:  30 mL   Refills:  3         These medicines have changed or have updated prescriptions.        Dose/Directions    insulin reg HIGH CONC 500 UNIT/ML PEN soln   Commonly known as:  HumuLIN R U-500 KwikPen   This may have changed:  additional instructions   Used for:  Type 2 diabetes mellitus with diabetic nephropathy, with long-term current use of insulin (H)   Changed by:  Eva Lockhart MD        Inject 100 units every morning with breakfast and 100 units every evening with dinner. Please set patient up with [facility].   Refills:  0            Where to get your medicines      These medications were sent to [facility] - [place], [place] - [address]  [address], [facility] [address]     Phone:  [phone]    - insulin lispro 100 UNIT/ML injection       Some of these will need a paper prescription and others can be bought over the counter.  Ask your nurse if you have questions.     You don't need a prescription for these medications    - insulin reg HIGH CONC 500 UNIT/ML PEN soln             Primary Care Provider Office Phone # Fax #    Eva Lockhart [phone]       [facility] [address]  [facility] [place] [address]        Goals        Patient-Stated    I will check my blood sugars four times per day     Goal Comments - Note created  3/10/2014 10:41 AM by Priti Orozco RN    As of today's date 3/10/2014 goal is met at 26 - 50%.   Goal Status:  Active      I will continue to work with Diabetic Educator     Goal Comments - Note created  3/10/2014 10:42 AM by Priti Orozco RN    As of today's date 3/10/2014 goal is met at 26 - 50%.   Goal Status:  Active      I will take insulins as prescribed, including sliding scale novolog     Goal Comments - Note created  3/10/2014 10:43 AM by Priti Orozco RN    As of today's date 3/10/2014 goal is met at 51 - 75%.   Goal Status:  Active        Thank you!     Thank you for choosing [facility]  for your care. Our goal is always to provide you with excellent care. Hearing back from our patients is one way we can continue to improve our services. Please take a few minutes to complete the written survey that you may receive in the mail after your visit with us. Thank you!             Your Updated Medication List - Protect others around you: Learn how to safely use, store and throw away your medicines at www.disposemymeds.org.          This list is accurate as of: 1/26/17  9:36 AM.  Always use your most recent med list.                   Brand Name Dispense Instructions for use    albuterol 108 (90 BASE) MCG/ACT Inhaler    PROAIR HFA/PROVENTIL HFA/VENTOLIN HFA    3 Inhaler    Inhale 2 puffs into the lungs every 6 hours as needed for shortness of breath /  dyspnea ((Ventolin or preferred albuterol equivalent))       aspirin 81 MG tablet      Take 1 tablet by mouth daily.       atorvastatin 40 MG tablet    LIPITOR    90 tablet    TAKE 1 TABLET (40 MG) BY MOUTH DAILY       blood glucose monitoring test strip    ONE TOUCH ULTRA    300 each    TEST BLOOD SUGARS 3 TIMES DAILY.       celecoxib 200 MG capsule    celeBREX    180 capsule    Take 1 capsule (200 mg) by mouth 2 times daily as needed for moderate pain       EPIPEN 2-MELVIN 0.3 MG/0.3ML injection   Generic drug:  EPINEPHrine     2 each    INJECT 0.3 MLS (0.3 MG) INTO THE MUSCLE ONCE AS NEEDED FOR ANAPHYLAXIS       insulin lispro 100 UNIT/ML injection    HumaLOG KWIKpen    30 mL    15-25 units before breakfast, 15-25 units before lunch, 15-25 units before dinner       insulin pen needle 31G X 5 MM    B-D U/F    200 each    Use twice daily as directed.       insulin reg HIGH CONC 500 UNIT/ML PEN soln    HumuLIN R U-500 KwikPen     Inject 100 units every morning with breakfast and 100 units every evening with dinner. Please set patient up with [facility].       losartan 50 MG tablet    COZAAR    90 tablet    Take 1 tablet (50 mg) by mouth daily       omeprazole 20 MG CR capsule    priLOSEC    90 capsule    TAKE 1 CAPSULE (20 MG) BY MOUTH DAILY       ONETOUCH DELICA LANCETS 33G Misc     300 each    TEST 3 TIMES DAILY.       order for DME     1 each    Equipment being ordered: bath tub grab bars

## 2017-01-26 NOTE — PROGRESS NOTES
SUBJECTIVE:                                                    Nathalie Harp is a 61 year old female who presents to clinic today for the following health issues:    recent MTM visit suggests pt still had not started her new insulin ..  MTM got her set up with [facility] at this pharmacy in order to get around pt preferences (does not want credit card or bank card on file)  The intent was for her to take the high concentration U500 regular at 120 bid... However, patient was compelled to reduce dose to 100 bid. Her sugars are still a bit high, like 300.    She is eating 2 - 3 meals a day, no snacking.      Medication Followup of all meds    Taking Medication as prescribed: NO-cut back on onsulin    Side Effects:  Yes to insulin     Medication Helping Symptoms:  yes       Diabetes Follow-up    Patient is checking blood sugars: twice daily.  Results are as follows:         am - 200-300         Before dinner-     Diabetic concerns: Discuss kidneys     Symptoms of hypoglycemia (low blood sugar): shaky, disoriented     Paresthesias (numbness or burning in feet) or sores: No     Date of last diabetic eye exam: 6/16       Problem list and histories reviewed & adjusted, as indicated.  Additional history: as documented    Patient Active Problem List   Diagnosis     menometrorrhagia     Esophageal reflux     Irritable bowel syndrome     Female stress incontinence     Migraine without aura     Arthritis of knee, right     Disorder of bursae and tendons in shoulder region     Degeneration of thoracic or thoracolumbar intervertebral disc     Degeneration of cervical intervertebral disc     Other hammer toe (acquired)     Allergic rhinitis due to pollen     Menopausal symptoms     Need for SBE (subacute bacterial endocarditis) prophylaxis     Preventive measure     Hyperlipidemia LDL goal <100     Health Care Home     Anemia     Dizziness     Hammer toe     Microalbuminuria     ACP (advance care planning)     Allergic to  shellfish     Falls frequently     Balance problems     CKD (chronic kidney disease) stage 2, GFR 60-89 ml/min     Type 2 diabetes mellitus with diabetic nephropathy (H)     Benign essential hypertension     Diarrhea     Iron deficiency anemia, unspecified iron deficiency anemia type     Right axillary hidradenitis     Uncontrolled type 2 diabetes mellitus without complication, with long-term current use of insulin (H)     Mild intermittent asthma without complication     Past Surgical History   Procedure Laterality Date     Hc excis primary ganglion wrist  1985 and 1987     right wrist     Knee surgery  2008 approx     arthroscopic; Dr. Rivera       Social History   Substance Use Topics     Smoking status: Never Smoker      Smokeless tobacco: Never Used     Alcohol Use: Yes      Comment: rarely     Family History   Problem Relation Age of Onset     Hypertension Sister      Hypertension Brother      CEREBROVASCULAR DISEASE Maternal Grandmother      Arthritis Maternal Grandmother      Circulatory Sister      Asthma Mother      GASTROINTESTINAL DISEASE Mother      IBS     Alcohol/Drug Mother      Depression Mother      Neurologic Disorder Mother      migraines     Asthma Sister      Asthma Sister      Asthma Brother      GASTROINTESTINAL DISEASE Father      IBS     DIABETES Father      Alcohol/Drug Father      Neurologic Disorder Father      migraines     Obesity Father      GASTROINTESTINAL DISEASE Maternal Grandfather      Ulcers     Alcohol/Drug Maternal Grandfather      Thyroid Disease Sister      Thyroid Disease Sister      DIABETES Paternal Grandmother      Alcohol/Drug Paternal Grandmother      Arthritis Paternal Grandmother      Obesity Paternal Grandmother      Obesity Sister          Current Outpatient Prescriptions   Medication Sig Dispense Refill     losartan (COZAAR) 50 MG tablet Take 1 tablet (50 mg) by mouth daily 90 tablet 3     insulin reg HIGH CONC (HUMULIN R U-500 KWIKPEN) 500 UNIT/ML PEN soln  Inject 120 units every morning with breakfast and 120 units every evening with dinner. Please set patient up with [facility]. (Patient taking differently: Inject 100 units every morning with breakfast and 100 units every evening with dinner. Please set patient up with [facility].)       atorvastatin (LIPITOR) 40 MG tablet TAKE 1 TABLET (40 MG) BY MOUTH DAILY 90 tablet 3     omeprazole (PRILOSEC) 20 MG CR capsule TAKE 1 CAPSULE (20 MG) BY MOUTH DAILY 90 capsule 1     albuterol (PROAIR HFA/PROVENTIL HFA/VENTOLIN HFA) 108 (90 BASE) MCG/ACT Inhaler Inhale 2 puffs into the lungs every 6 hours as needed for shortness of breath / dyspnea ((Ventolin or preferred albuterol equivalent)) 3 Inhaler 3     celecoxib (CELEBREX) 200 MG capsule Take 1 capsule (200 mg) by mouth 2 times daily as needed for moderate pain 180 capsule 1     insulin pen needle (B-D U/F) 31G X 5 MM Use twice daily as directed. 200 each 3     blood glucose monitoring (ONE TOUCH ULTRA) test strip TEST BLOOD SUGARS 3 TIMES DAILY. 300 each 1     ONETOUCH DELICA LANCETS 33G MISC TEST 3 TIMES DAILY. 300 each 1     order for DME Equipment being ordered: bath tub grab bars 1 each 0     EPIPEN 2-MELVIN 0.3 MG/0.3ML injection INJECT 0.3 MLS (0.3 MG) INTO THE MUSCLE ONCE AS NEEDED FOR ANAPHYLAXIS 2 each 9     aspirin 81 MG tablet Take 1 tablet by mouth daily.       Allergies   Allergen Reactions     Augmentin      Benadryl [Acetaminophen]      Ceclor [Cefaclor]      Codeine      Detrol [Tolterodine Tartrate]      Dust Mites      Latex      Metformin GI Disturbance     Severe diarrhea     Pollen Extract      Septra [Bactrim]      Septra [Sulfamethoxazole W-Trimethoprim]      Shellfish Allergy      Crab       Simvastatin      HA     Sulfa Drugs      Sulfonamide Derivatives      Recent Labs   Lab Test  12/21/16   1003  11/14/16   1004  10/07/15   0931  08/31/15   1144  11/04/14   1119  07/11/14   1038  03/03/14   0906   07/05/13   1159   A1C  12.3*   --   11.3*  11.4*  12.8*   12.8*  12.3*   --   11.2*   LDL   --   90   --   83   --   85   --    --   Cannot estimate LDL when triglyceride exceeds 400 mg/dL  132*   HDL   --   52   --   65   --    --    --    --   47*   TRIG   --   380*   --   301*   --    --    --    --   463*   ALT   --   29   --   31  19  22   --    < >  22   CR   --   0.92  0.90  0.80  0.80  0.90   --    < >  0.80   GFRESTIMATED   --   62  63  73  73  64   --    < >  73   GFRESTBLACK   --   75  77  88  88  77   --    < >  89   POTASSIUM   --   5.0  4.1  4.7  4.2  4.2   --    < >  4.5   TSH   --   0.35*   --    --    --    --   0.54   --    --     < > = values in this interval not displayed.      BP Readings from Last 3 Encounters:   01/26/17 130/70   01/12/17 152/94   01/04/17 122/70    Wt Readings from Last 3 Encounters:   01/26/17 125 lb (56.7 kg)   01/12/17 124 lb (56.246 kg)   01/04/17 122 lb (55.339 kg)                  Labs reviewed in EPIC  Problem list, Medication list, Allergies, and Medical/Social/Surgical histories reviewed in [facility] and updated as appropriate.    ROS:  Constitutional, HEENT, cardiovascular, pulmonary, gi and gu systems are negative, except as otherwise noted.    OBJECTIVE:                                                    /70 mmHg  Pulse 109  Temp(Src) 97.5  F (36.4  C) (Oral)  Wt 125 lb (56.7 kg)  SpO2 99%  LMP  (LMP Unknown)  Body mass index is 27.77 kg/(m^2).  GENERAL: healthy, alert and no distress. Short stature  ABDOMEN: central obesity  MS: no gross musculoskeletal defects noted, no edema  SKIN: no suspicious lesions or rashes  PSYCH: mentation appears normal, affect normal/bright    Diagnostic Test Results:  Results for orders placed or performed in visit on 12/21/16   Hepatitis C Screen Reflex to HCV RNA Quant and Genotype   Result Value Ref Range    Hepatitis C Antibody  NR     Nonreactive   Assay performance characteristics have not been established for newborns,   infants, and children     Hemoglobin A1c   Result Value  Ref Range    Hemoglobin A1C 12.3 (H) 4.3 - 6.0 %        ASSESSMENT/PLAN:                                                            ICD-10-CM    1. Screening for diabetic retinopathy Z13.5    2. Type 2 diabetes mellitus with diabetic nephropathy, with long-term current use of insulin (H) E11.21 insulin reg HIGH CONC (HUMULIN R U-500 KWIKPEN) 500 UNIT/ML PEN soln    Z79.4 insulin aspart (NOVOLOG FLEXPEN) 100 UNIT/ML injection     DISCONTINUED: insulin lispro (HUMALOG KWIKPEN) 100 UNIT/ML injection       *Add mealtime insulin at mealtimes:  15 - 25 units at each meal (two or three times daily)    Return to clinic one month (late February)    25 minutes face to face time, > 50 %  counseling and coordination of care    Eva Lockhart MD  [facility]

## 2017-01-26 NOTE — Clinical Note
Hi, Jovana.  Still not much improvement.  See my HPI in this note. I did add mealtime insulin.  Please review at your upcoming appointment .  Feel free to make changes you see fit, and I will see her again at end of Feb.  If you could see her in mid Feb again, that would be great.

## 2017-01-26 NOTE — PATIENT INSTRUCTIONS
Add mealtime insulin at mealtimes:  15 - 25 units at each meal (two or three times daily)    Return to clinic one month (late February)

## 2017-01-26 NOTE — NURSING NOTE
"Chief Complaint   Patient presents with     Recheck Medication     Health Maintenance     eye        Initial /87 mmHg  Pulse 109  Temp(Src) 97.5  F (36.4  C) (Oral)  Wt 125 lb (56.7 kg)  SpO2 99%  LMP  (LMP Unknown) Estimated body mass index is 27.77 kg/(m^2) as calculated from the following:    Height as of 1/12/17: 4' 8.25\" (1.429 m).    Weight as of this encounter: 125 lb (56.7 kg).  BP completed using cuff size: regular  Althea Dunham ma      "

## 2017-02-01 ENCOUNTER — OFFICE VISIT (OUTPATIENT)
Dept: PHARMACY | Facility: CLINIC | Age: 62
End: 2017-02-01
Payer: COMMERCIAL

## 2017-02-01 VITALS
WEIGHT: 124 LBS | BODY MASS INDEX: 27.54 KG/M2 | DIASTOLIC BLOOD PRESSURE: 82 MMHG | SYSTOLIC BLOOD PRESSURE: 124 MMHG | HEART RATE: 82 BPM

## 2017-02-01 DIAGNOSIS — Z79.4 TYPE 2 DIABETES MELLITUS WITHOUT COMPLICATION, WITH LONG-TERM CURRENT USE OF INSULIN (H): ICD-10-CM

## 2017-02-01 DIAGNOSIS — E11.21 TYPE 2 DIABETES MELLITUS WITH DIABETIC NEPHROPATHY, WITH LONG-TERM CURRENT USE OF INSULIN (H): Primary | ICD-10-CM

## 2017-02-01 DIAGNOSIS — Z79.4 TYPE 2 DIABETES MELLITUS WITH DIABETIC NEPHROPATHY, WITH LONG-TERM CURRENT USE OF INSULIN (H): Primary | ICD-10-CM

## 2017-02-01 DIAGNOSIS — E11.9 TYPE 2 DIABETES MELLITUS WITHOUT COMPLICATION, WITH LONG-TERM CURRENT USE OF INSULIN (H): ICD-10-CM

## 2017-02-01 PROCEDURE — 99607 MTMS BY PHARM ADDL 15 MIN: CPT | Mod: GY | Performed by: PHARMACIST

## 2017-02-01 PROCEDURE — 99606 MTMS BY PHARM EST 15 MIN: CPT | Mod: GY | Performed by: PHARMACIST

## 2017-02-01 NOTE — MR AVS SNAPSHOT
After Visit Summary   2/1/2017    Nathalie Harp    MRN: [id number]           Patient Information     Date Of Birth          1955        Visit Information        Provider Department      2/1/2017 9:00 AM Jovana Loo [facility]        Today's Diagnoses     Type 2 diabetes mellitus with diabetic nephropathy, with long-term current use of insulin (H)    -  1     Type 2 diabetes mellitus without complication, with long-term current use of insulin (H)           Care Instructions    Recommendations from today's MT visit:                                                        1. Don't worry about starting the meal-time insulin - as we discussed today, you don't need it when you take U500, because the higher concentration actually acts as both a long and short acting insulin.    2. Increase your evening dose of U500 to 110units with dinner. Continue taking 100units every morning. We are already starting to see progress with the new insulin -- awesome!    3. I sent an updated prescription for your insulin and also refill of test strips to the pharmacy downstairs.    4. Test your blood sugar in the morning before breakfast and the evening before dinner (goal of 80-130mg/dL) and anytime you feel it may be low (feeling shaky, dizzy, sweaty, weak). If your blood sugar is below 70mg/dL, have something to eat to bring it up. If your blood sugar is NOT below 70mg/dL, take a 5 minute break, have a glass water, and let the feeling pass. This is your body adjusting to lower and more normal blood sugar levels. We discussed that drinking a full bottle of the DEX solution is reasonable to increase your blood sugar when it is low.      Next MTM visit: Friday, February 24th at 9:30am with Jovana and 10:20am with Dr. Lockhart.    To schedule another MT appointment, please call the clinic directly or you may call the MT scheduling line at [phone] or toll-free at  [phone].     My Clinical Pharmacist's contact information:                                                      It was a pleasure seeing you today!  Please feel free to contact me with any questions or concerns you have.      Jovana Loo, Pharm.D., [facility]  Medication Therapy Management Pharmacist  [phone]      You may receive a survey about the [facility] services you received.  I would appreciate your feedback to help me serve you better in the future. Please fill it out and return it when you can. Your comments will be anonymous.      My healthcare goals:                                                      Diabetes Goals:    Home Monitoring of Blood Sugars:Fasting  mg/dL and 2 hours after a meal less than 180 mg/dL.    Hemoglobin A1C: Less than 8%. Yours is A1C     12.3   12/21/2016.    Blood Pressure: Less than 140/90mmHg. Yours is /82 mmHg  Pulse 82  Wt 124 lb (56.246 kg)  LMP  (LMP Unknown).    Cholesterol: You are taking atorvastatin to help decrease the risk of heart disease.    Things you can do to help lower your blood sugars:    Diet: 3 well balanced meals per day with protein (lean meats, seafood, eggs), healthy fats (olive oil and olives, coconut oil, avacado, nuts and nut butters, seeds) and carbohydrates mostly from fruits and vegetables.    Exercise: 30 minutes per day of anything that will increase your heart rate and make you break a sweat! Gardening, walking, cleaning the house, changing the oil in your car, etc. If you feel like 30 minutes per day is too much, start small. Even lifting canned foods or working your arms with a resistance band in front of the TV can help.                  Follow-ups after your visit        Your next 10 appointments already scheduled     Feb 24, 2017  9:30 AM   Office Visit with Jovana Loo [facility] ([facility])    [address] [place] [address]  [id number] [phone]           Bring a current list of meds and any records pertaining to this visit.  For Physicals, please bring immunization records and any forms needing to be filled out.  Please arrive 10 minutes early to complete paperwork.            Feb 24, 2017 10:20 AM   [facility]    [address] [facility] [id number] [phone]           Bring a current list of meds and any records pertaining to this visit.  For Physicals, please bring immunization records and any forms needing to be filled out.  Please arrive 10 minutes early to complete paperwork.              Who to contact     If you have questions or need follow up information about today's clinic visit or your schedule please contact [facility] MTM directly at [phone].  Normal or non-critical lab and imaging results will be communicated to you by MyChart, letter or phone within 4 business days after the clinic has received the results. If you do not hear from us within 7 days, please contact the clinic through MyChart or phone. If you have a critical or abnormal lab result, we will notify you by phone as soon as possible.  Submit refill requests through [employer] or call your pharmacy and they will forward the refill request to us. Please allow 3 business days for your refill to be completed.          Additional Information About Your Visit        Care EveryWhere ID     This is your Care EveryWhere ID. This could be used by other organizations to access your [place] medical records  [id number]        Your Vitals Were     Pulse Last Period                82 (LMP Unknown)           Blood Pressure from Last 3 Encounters:   02/01/17 124/82   01/26/17 130/70   01/12/17 152/94    Weight from Last 3 Encounters:   02/01/17 124 lb (56.246 kg)   01/26/17 125 lb (56.7 kg)   01/12/17 124 lb  (56.246 kg)              Today, you had the following     No orders found for display         Today's Medication Changes          These changes are accurate as of: 2/1/17  9:41 AM.  If you have any questions, ask your nurse or doctor.               These medicines have changed or have updated prescriptions.        Dose/Directions    insulin reg HIGH CONC 500 UNIT/ML PEN soln   Commonly known as:  HumuLIN R U-500 KwikPen   This may have changed:  additional instructions   Used for:  Type 2 diabetes mellitus with diabetic nephropathy, with long-term current use of insulin (H)        Inject 100 units every morning with breakfast and 110 units every evening with dinner.   Quantity:  60 mL   Refills:  3            Where to get your medicines      These medications were sent to [facility] [place], [place] - [address]  [address], [facility] [address]     Phone:  [phone]    - blood glucose monitoring test strip  - insulin reg HIGH CONC 500 UNIT/ML PEN soln             Primary Care Provider Office Phone # Fax #    Eva Lockhart [phone]       [facility] [address] [facility] [address]        Goals        Patient-Stated    I will check my blood sugars four times per day     Goal Comments - Note created  3/10/2014 10:41 AM by Priti Orozco RN    As of today's date 3/10/2014 goal is met at 26 - 50%.   Goal Status:  Active      I will continue to work with Diabetic Educator     Goal Comments - Note created  3/10/2014 10:42 AM by Priti Orozco RN    As of today's date 3/10/2014 goal is met at 26 - 50%.   Goal Status:  Active      I will take insulins as prescribed, including sliding scale novolog     Goal Comments - Note created  3/10/2014 10:43 AM by Priti Orozco RN    As of today's date 3/10/2014 goal is met at 51 - 75%.   Goal Status:  Active        Thank you!     Thank you for choosing [facility]  [facility]  for your care. Our goal is always to provide you with excellent care. Hearing back from our patients is one way we can continue to improve our services. Please take a few minutes to complete the written survey that you may receive in the mail after your visit with us. Thank you!             Your Updated Medication List - Protect others around you: Learn how to safely use, store and throw away your medicines at www.disposemymeds.org.          This list is accurate as of: 2/1/17  9:41 AM.  Always use your most recent med list.                   Brand Name Dispense Instructions for use    albuterol 108 (90 BASE) MCG/ACT Inhaler    PROAIR HFA/PROVENTIL HFA/VENTOLIN HFA    3 Inhaler    Inhale 2 puffs into the lungs every 6 hours as needed for shortness of breath / dyspnea ((Ventolin or preferred albuterol equivalent))       aspirin 81 MG tablet      Take 1 tablet by mouth daily.       atorvastatin 40 MG tablet    LIPITOR    90 tablet    TAKE 1 TABLET (40 MG) BY MOUTH DAILY       blood glucose monitoring test strip    ONE TOUCH ULTRA    300 each    TEST BLOOD SUGARS 3 TIMES DAILY.       celecoxib 200 MG capsule    celeBREX    180 capsule    Take 1 capsule (200 mg) by mouth 2 times daily as needed for moderate pain       EPIPEN 2-MELVIN 0.3 MG/0.3ML injection   Generic drug:  EPINEPHrine     2 each    INJECT 0.3 MLS (0.3 MG) INTO THE MUSCLE ONCE AS NEEDED FOR ANAPHYLAXIS       insulin pen needle 31G X 5 MM    B-D U/F    200 each    Use twice daily as directed.       insulin reg HIGH CONC 500 UNIT/ML PEN soln    HumuLIN R U-500 KwikPen    60 mL    Inject 100 units every morning with breakfast and 110 units every evening with dinner.       losartan 50 MG tablet    COZAAR    90 tablet    Take 1 tablet (50 mg) by mouth daily       omeprazole 20 MG CR capsule    priLOSEC    90 capsule    TAKE 1 CAPSULE (20 MG) BY MOUTH DAILY       ONETOUCH DELICA LANCETS 33G Misc     300 each    TEST 3 TIMES DAILY.       order  for DME     1 each    Equipment being ordered: bath tub grab bars

## 2017-02-01 NOTE — PROGRESS NOTES
SUBJECTIVE/OBJECTIVE:                                                    Nathalie Harp is a 61 year old female coming in for a follow-up visit for Medication Therapy Management.  She was referred to me from Eva Lockhart     Chief Complaint: Follow up from our visit on 1/4/2017.  Diabetes follow-up. Also saw Dr. Lockhart 1/12 and 1/26 and was started on Novolog.    Tobacco: No tobacco use   Alcohol: rarely    Medication Adherence: issues found and discussed below    Diabetes:  Pt currently taking Humulin U500 100units BID, usually before meals, sometimes during or immediately after. Was prescribed meal time insulin at last visit with CS, but pharmacy has not been able to fill this for her.  SMBG Ranges (based on glucometer readings): see below  Symptoms of low blood sugar? shaky, sweaty when she first started the U500. On 1/19 had 73, 156, 63, 67mg/dL; on 1/12 had a 78mg/dL after dinner, 1/9 had a 152mg/dL after dinner and had symptoms of low  Symptoms of high blood sugar? None, maybe blurry vision but also reports she has cataracts  ACEi/ARB: Yes: losartan 25mg QD. Microalbumin is not < 30 mg/g.   Aspirin: Taking 81mg daily and denies side effects  Statin: Yes: atorvastatin 40mg and denies side effects  Date FBG/ 2hours post Lunch/2hours post Dinner /2hours post   2/1 261     1/31 382 202 116   1/30 258 437    1/29 258     1/28 334     1/27 335     1/26 310 265          Current labs include:  BP Readings from Last 3 Encounters:   01/26/17 130/70   01/12/17 152/94   01/04/17 122/70     Today's Vitals: /82 mmHg  Pulse 82  Wt 124 lb (56.246 kg)  LMP  (LMP Unknown)    A1C     12.3   12/21/2016  A1C     11.3   10/7/2015  A1C     11.4   8/31/2015  A1C     12.8   11/4/2014  A1C     12.8   7/11/2014    CHOL      218   11/14/2016  TRIG      380   11/14/2016  HDL       52   11/14/2016  LDL       90   11/14/2016  LDL       85   7/11/2014    Liver Function Studies -   Recent Labs   Lab Test  11/14/16    1004   PROTTOTAL  7.5   ALBUMIN  4.2   BILITOTAL  0.6   ALKPHOS  109   AST  14   ALT  29       Lab Results   Component Value Date    UCRR 22 11/14/2016    MICROL 12 11/14/2016    UMALCR 56.82* 11/14/2016       Last Basic Metabolic Panel:  NA      135   11/14/2016   POTASSIUM      5.0   11/14/2016  CHLORIDE       95   11/14/2016  BUN       24   11/14/2016  BUN     18.8   7/16/2012  CR     0.92   11/14/2016  GFR ESTIMATE   Date Value Ref Range Status   11/14/2016 62 >60 mL/min/1.7m2 Final   10/07/2015 63 >60 mL/min/1.7m2 Final   08/31/2015 73 >60 mL/min/1.7m2 Final     TSH   Date Value Ref Range Status   11/14/2016 0.35* 0.40 - 5.00 mU/L Final     T4 FREE   Date Value Ref Range Status   11/14/2016 1.28 0.70 - 1.85 ng/dL Final      Most Recent Immunizations   Administered Date(s) Administered     Influenza (High Dose) 3 valent vaccine 10/03/2013     Influenza (IIV3) 08/30/2016     Pneumococcal (PCV 13) 11/14/2016     Pneumococcal 23 valent 11/14/2008     TD (ADULT, 7+) 09/01/2011     ASSESSMENT:                                                    Current medications were reviewed today as discussed above.      Medication Adherence: needs improvement - see below    Diabetes: Needs Improvement. Patient is not meeting A1c goal of < 8%. Self monitoring of blood glucose is not at goal of fasting and pre-prandial  mg/dL, however is much improved since starting Humulin U500. She wasn't able to start Novolog - but this is ok given the high concentration insulin actually acts as both prandial and basal insulin (mean time of onset of action of less than 15 minutes at both doses and a mean duration of action of 21 hours (range 13-24 hours). The time action characteristics reflect both prandial and basal activity, consistent with clinical experience). Pt would benefit from Basal Insulin (U500) :  increase dose prior to dinner, which will help to lower fasting BG levels; continue on current morning dose due to some experiences  of hypoglycemia later in the day.       PLAN:                                                      1. Increase the evening dose of U500 to 110units and continue taking 100units every morning.  2. D/C Novolog.  3. Continue to SMBG fasting, pre-prandial, and with symptoms of hypoglycemia.      I spent 45 minutes with this patient today.  All changes were made via collaborative practice agreement with Eva Lockhart. A copy of the visit note was provided to the patient's primary care provider.     Will follow up in 3 weeks.    The patient was given a summary of these recommendations as an after visit summary.    Jovana Loo, Pharm.D., BCACP  Medication Therapy Management Pharmacist  [phone]

## 2017-02-01 NOTE — PATIENT INSTRUCTIONS
Recommendations from today's MTM visit:                                                        1. Don't worry about starting the meal-time insulin - as we discussed today, you don't need it when you take U500, because the higher concentration actually acts as both a long and short acting insulin.    2. Increase your evening dose of U500 to 110units with dinner. Continue taking 100units every morning. We are already starting to see progress with the new insulin -- awesome!    3. I sent an updated prescription for your insulin and also refill of test strips to the pharmacy downstairs.    4. Test your blood sugar in the morning before breakfast and the evening before dinner (goal of 80-130mg/dL) and anytime you feel it may be low (feeling shaky, dizzy, sweaty, weak). If your blood sugar is below 70mg/dL, have something to eat to bring it up. If your blood sugar is NOT below 70mg/dL, take a 5 minute break, have a glass water, and let the feeling pass. This is your body adjusting to lower and more normal blood sugar levels. We discussed that drinking a full bottle of the DEX solution is reasonable to increase your blood sugar when it is low.      Next MTM visit: Friday, February 24th at 9:30am with Jovana and 10:20am with Dr. Lockhart.    To schedule another MTM appointment, please call the clinic directly or you may call the MTM scheduling line at [phone] or toll-free at [phone].     My Clinical Pharmacist's contact information:                                                      It was a pleasure seeing you today!  Please feel free to contact me with any questions or concerns you have.      Jovana Loo, Pharm.D., [facility]  Medication Therapy Management Pharmacist  [phone]      You may receive a survey about the MTM services you received.  I would appreciate your feedback to help me serve you better in the future. Please fill it out and return it when you can. Your comments will be anonymous.       My healthcare goals:                                                      Diabetes Goals:    Home Monitoring of Blood Sugars:Fasting  mg/dL and 2 hours after a meal less than 180 mg/dL.    Hemoglobin A1C: Less than 8%. Yours is A1C     12.3   12/21/2016.    Blood Pressure: Less than 140/90mmHg. Yours is /82 mmHg  Pulse 82  Wt 124 lb (56.246 kg)  LMP  (LMP Unknown).    Cholesterol: You are taking atorvastatin to help decrease the risk of heart disease.    Things you can do to help lower your blood sugars:    Diet: 3 well balanced meals per day with protein (lean meats, seafood, eggs), healthy fats (olive oil and olives, coconut oil, avacado, nuts and nut butters, seeds) and carbohydrates mostly from fruits and vegetables.    Exercise: 30 minutes per day of anything that will increase your heart rate and make you break a sweat! Gardening, walking, cleaning the house, changing the oil in your car, etc. If you feel like 30 minutes per day is too much, start small. Even lifting canned foods or working your arms with a resistance band in front of the TV can help.

## 2017-02-24 ENCOUNTER — OFFICE VISIT (OUTPATIENT)
Dept: PHARMACY | Facility: CLINIC | Age: 62
End: 2017-02-24
Payer: COMMERCIAL

## 2017-02-24 ENCOUNTER — OFFICE VISIT (OUTPATIENT)
Dept: INTERNAL MEDICINE | Facility: CLINIC | Age: 62
End: 2017-02-24
Payer: MEDICARE

## 2017-02-24 VITALS
HEART RATE: 93 BPM | WEIGHT: 118 LBS | SYSTOLIC BLOOD PRESSURE: 127 MMHG | TEMPERATURE: 97.1 F | DIASTOLIC BLOOD PRESSURE: 81 MMHG | BODY MASS INDEX: 26.22 KG/M2 | OXYGEN SATURATION: 95 %

## 2017-02-24 VITALS
HEART RATE: 93 BPM | BODY MASS INDEX: 26.22 KG/M2 | WEIGHT: 118 LBS | DIASTOLIC BLOOD PRESSURE: 80 MMHG | SYSTOLIC BLOOD PRESSURE: 144 MMHG

## 2017-02-24 DIAGNOSIS — Z79.4 TYPE 2 DIABETES MELLITUS WITH DIABETIC NEPHROPATHY, WITH LONG-TERM CURRENT USE OF INSULIN (H): Primary | ICD-10-CM

## 2017-02-24 DIAGNOSIS — E11.21 TYPE 2 DIABETES MELLITUS WITH DIABETIC NEPHROPATHY, WITH LONG-TERM CURRENT USE OF INSULIN (H): Primary | ICD-10-CM

## 2017-02-24 DIAGNOSIS — H04.123 DRY EYES: ICD-10-CM

## 2017-02-24 DIAGNOSIS — Z79.4 TYPE 2 DIABETES MELLITUS WITH DIABETIC NEPHROPATHY, WITH LONG-TERM CURRENT USE OF INSULIN (H): ICD-10-CM

## 2017-02-24 DIAGNOSIS — H81.10 BPPV (BENIGN PAROXYSMAL POSITIONAL VERTIGO), UNSPECIFIED LATERALITY: Primary | ICD-10-CM

## 2017-02-24 DIAGNOSIS — E11.21 TYPE 2 DIABETES MELLITUS WITH DIABETIC NEPHROPATHY, WITH LONG-TERM CURRENT USE OF INSULIN (H): ICD-10-CM

## 2017-02-24 PROCEDURE — 99606 MTMS BY PHARM EST 15 MIN: CPT | Performed by: PHARMACIST

## 2017-02-24 PROCEDURE — 99607 MTMS BY PHARM ADDL 15 MIN: CPT | Performed by: PHARMACIST

## 2017-02-24 PROCEDURE — 99214 OFFICE O/P EST MOD 30 MIN: CPT | Performed by: INTERNAL MEDICINE

## 2017-02-24 RX ORDER — MECLIZINE HYDROCHLORIDE 25 MG/1
25 TABLET ORAL EVERY 6 HOURS PRN
Qty: 30 TABLET | Refills: 1 | Status: SHIPPED | OUTPATIENT
Start: 2017-02-24 | End: 2018-03-14

## 2017-02-24 NOTE — NURSING NOTE
"Chief Complaint   Patient presents with     Diabetes     Health Maintenance     eye      Referrals/continued Care/healthcare     ALLERGY,ent, dERMATOLOGY      Dizziness       Initial /81 (BP Location: Right arm, Patient Position: Chair, Cuff Size: Adult Regular)  Pulse 93  Temp 97.1  F (36.2  C) (Oral)  Wt 118 lb (53.5 kg)  LMP  (LMP Unknown)  SpO2 95%  BMI 26.22 kg/m2 Estimated body mass index is 26.22 kg/(m^2) as calculated from the following:    Height as of 1/12/17: 4' 8.25\" (1.429 m).    Weight as of this encounter: 118 lb (53.5 kg).  Medication Reconciliation: complete   Althea Dunham ma      "

## 2017-02-24 NOTE — PATIENT INSTRUCTIONS
Recommendations from today's MTM visit:                                                        1. Increase your Humulin U500 insulin by 5units at breakfast and dinner every 5 days. When you start seeing fasting and pre-dinner blood sugar levels between 80-130mg/dL, you can stop increasing your dose.    2. Test your blood sugar in the morning before breakfast and dinner (goal of 80-130mg/dL) and anytime you feel it may be low (feeling shaky, dizzy, sweaty, weak). If your blood sugar is below 70mg/dL, have something to eat to bring it up. If your blood sugar is NOT below 70mg/dL, take a 5 minute break, have a glass water, and let the feeling pass. This is your body adjusting to lower and more normal blood sugar levels.    3. Talk to Dr. Lockhart today about the dizziness you have been feeling the last 2 weeks.      Next MTM visit: Friday, March 24th at 10:30am. Please bring your blood sugar meter with you. We may also check your A1c before the visit. You do not need to be fasting for that lab.    To schedule another MTM appointment, please call the clinic directly or you may call the MTM scheduling line at [phone] or toll-free at [phone].     My Clinical Pharmacist's contact information:                                                      It was a pleasure seeing you today!  Please feel free to contact me with any questions or concerns you have.      Jovana Loo, Pharm.D., [facility]  Medication Therapy Management Pharmacist  [phone]      You may receive a survey about the MT services you received.  I would appreciate your feedback to help me serve you better in the future. Please fill it out and return it when you can. Your comments will be anonymous.

## 2017-02-24 NOTE — PROGRESS NOTES
"SUBJECTIVE/OBJECTIVE:                                                    Nathalie Harp is a 61 year old female coming in for a follow-up visit for Medication Therapy Management.  She was referred to me from Eva Lockhart     Chief Complaint: Follow up from our visit on 2/1/2017.  Has been really dizzy lately, thinks maybe having vertigo over the last 2 weeks. States she has had it off and on for a long time, but recently has been worse. Is seeing PCP immediately after our visit today.    Tobacco: No tobacco use   Alcohol: none    Medication Adherence: no issues reported    Diabetes:  Pt currently taking Humulin U500 100units QAM and 110units QPM, usually before meals, sometimes during or immediately after.  SMBG Ranges (per patient report): says 300-500's in last 2 weeks; prior to that says it was getting better. Doesn't think she has missed any doses of insulin.   Symptoms of low blood sugar? None since last visit.  Symptoms of high blood sugar? None, maybe blurry vision but also reports she has cataracts. Actually feels she is having less \"brain fog\" since starting Humulin U500.  ACEi/ARB: Yes: losartan 50mg QD. Microalbumin is not < 30 mg/g.   Aspirin: Taking 81mg daily and denies side effects  Statin: Yes: atorvastatin 40mg and denies side effects      Current labs include:  BP Readings from Last 3 Encounters:   02/01/17 124/82   01/26/17 130/70   01/12/17 (!) 152/94     Today's Vitals: /80  Pulse 93  Wt 118 lb (53.5 kg)  LMP  (LMP Unknown)  BMI 26.22 kg/m2     Lab Results   Component Value Date    A1C 12.3 12/21/2016    A1C 11.3 10/07/2015    A1C 11.4 08/31/2015    A1C 12.8 11/04/2014    A1C 12.8 07/11/2014       Lab Results   Component Value Date    CHOL 218 11/14/2016     Lab Results   Component Value Date    TRIG 380 11/14/2016     Lab Results   Component Value Date    HDL 52 11/14/2016     Lab Results   Component Value Date    LDL 90 11/14/2016    LDL 85 07/11/2014       Liver Function " Studies -   Recent Labs   Lab Test  11/14/16   1004   PROTTOTAL  7.5   ALBUMIN  4.2   BILITOTAL  0.6   ALKPHOS  109   AST  14   ALT  29       Lab Results   Component Value Date    UCRR 22 11/14/2016    MICROL 12 11/14/2016    UMALCR 56.82 (H) 11/14/2016       Last Basic Metabolic Panel:  Lab Results   Component Value Date     11/14/2016      Lab Results   Component Value Date    POTASSIUM 5.0 11/14/2016     Lab Results   Component Value Date    CHLORIDE 95 11/14/2016     Lab Results   Component Value Date    BUN 24 11/14/2016    BUN 18.8 07/16/2012     Lab Results   Component Value Date    CR 0.92 11/14/2016     GFR Estimate   Date Value Ref Range Status   11/14/2016 62 >60 mL/min/1.7m2 Final   10/07/2015 63 >60 mL/min/1.7m2 Final   08/31/2015 73 >60 mL/min/1.7m2 Final     GFR Estimate If Black   Date Value Ref Range Status   11/14/2016 75 >60 mL/min/1.7m2 Final   10/07/2015 77 >60 mL/min/1.7m2 Final   08/31/2015 88 >60 mL/min/1.7m2 Final     TSH   Date Value Ref Range Status   11/14/2016 0.35 (L) 0.40 - 5.00 mU/L Final   ]    Most Recent Immunizations   Administered Date(s) Administered     Influenza (High Dose) 3 valent vaccine 10/03/2013     Influenza (IIV3) 08/30/2016     Pneumococcal (PCV 13) 11/14/2016     Pneumococcal 23 valent 11/14/2008     TD (ADULT, 7+) 09/01/2011     ASSESSMENT:                                                    Current medications were reviewed today as discussed above.      Medication Adherence: no issues identified    Diabetes: Needs Improvement. Patient is not meeting A1c goal of < 8%. Self monitoring of blood glucose is not at goal of fasting and pre-prandial  mg/dL per her report. Pt would benefit from Basal Insulin (Humulin U500) :  increase dose.       PLAN:                                                      1. Increase Humulin U500 by 5units at breakfast and dinner every 5 days until FBG and pre-prandial BG at goal 80-130mg/dL.  2. CS also added 50units before  lunch.    I spent 30 minutes with this patient today.  All changes were made via collaborative practice agreement with Eva Lockhart. A copy of the visit note was provided to the patient's primary care provider.     Will follow up in 1 month.    The patient was given a summary of these recommendations as an after visit summary.    Jovana Loo, Pharm.D., [facility]  Medication Therapy Management Pharmacist  [phone]

## 2017-02-24 NOTE — PATIENT INSTRUCTIONS
Therapy will call you to schedule Vestibular Therapy for your vertigo    Meclizine trial :  Take at bedtime, see if it helps with your vertigo.     Increase the U-500 to 100 Units with breakfast, 50 Units at lunch, 110 Units at dinner.       : [facility] [address] [phone] -- call to schedule eye exam.  You are due.     Return to clinic 6 weeks (early/mid April).

## 2017-02-24 NOTE — MR AVS SNAPSHOT
After Visit Summary   2/24/2017    Nathalie Harp    MRN: [id number]           Patient Information     Date Of Birth          1955        Visit Information        Provider Department      2/24/2017 9:30 AM Jovana Loo, [facility]        Care Instructions    Recommendations from today's MTM visit:                                                        1. Increase your Humulin U500 insulin by 5units at breakfast and dinner every 5 days. When you start seeing fasting and pre-dinner blood sugar levels between 80-130mg/dL, you can stop increasing your dose.    2. Test your blood sugar in the morning before breakfast and dinner (goal of 80-130mg/dL) and anytime you feel it may be low (feeling shaky, dizzy, sweaty, weak). If your blood sugar is below 70mg/dL, have something to eat to bring it up. If your blood sugar is NOT below 70mg/dL, take a 5 minute break, have a glass water, and let the feeling pass. This is your body adjusting to lower and more normal blood sugar levels.    3. Talk to Dr. Lockhart today about the dizziness you have been feeling the last 2 weeks.      Next MTM visit: Friday, March 24th at 10:30am. Please bring your blood sugar meter with you. We may also check your A1c before the visit. You do not need to be fasting for that lab.    To schedule another MTM appointment, please call the clinic directly or you may call the MTM scheduling line at [phone] or toll-free at [phone].     My Clinical Pharmacist's contact information:                                                      It was a pleasure seeing you today!  Please feel free to contact me with any questions or concerns you have.      Jovana Loo, Pharm.D., [facility]  Medication Therapy Management Pharmacist  [phone]      You may receive a survey about the MT services you received.  I would appreciate your feedback to help me serve you better in the future.  "Please fill it out and return it when you can. Your comments will be anonymous.                Follow-ups after your visit        Your next 10 appointments already scheduled     Feb 24, 2017 10:20 AM CST   [facility]    [address] [id number] [phone]           Bring a current list of meds and any records pertaining to this visit.  For Physicals, please bring immunization records and any forms needing to be filled out.  Please arrive 10 minutes early to complete paperwork.            Mar 24, 2017 10:30 AM CDT   SHORT with Jovana Loo RPH   [facility] ([facility])    [address] [address] [phone]              Who to contact     If you have questions or need follow up information about today's clinic visit or your schedule please contact [facility] directly at [phone].  Normal or non-critical lab and imaging results will be communicated to you by MyChart, letter or phone within 4 business days after the clinic has received the results. If you do not hear from us within 7 days, please contact the clinic through [employer] or phone. If you have a critical or abnormal lab result, we will notify you by phone as soon as possible.  Submit refill requests through [employer] or call your pharmacy and they will forward the refill request to us. Please allow 3 business days for your refill to be completed.          Additional Information About Your Visit        [employer] Information     [employer] lets you send messages to your doctor, view your test results, renew your prescriptions, schedule appointments and more. To sign up, go to [email or web address] . Click on \"Log in\" on the left side of the screen, which will take you to the Welcome page. Then " "click on \"Sign up Now\" on the right side of the page.     You will be asked to enter the access code listed below, as well as some personal information. Please follow the directions to create your username and password.     Your access code is: [email or web address]  Expires: 2017 10:05 AM     Your access code will  in 90 days. If you need help or a new code, please call your [facility] or [phone].        Care EveryWhere ID     This is your Care EveryWhere ID. This could be used by other organizations to access your [place] medical records  [id number]        Your Vitals Were     Pulse Last Period BMI (Body Mass Index)             93 (LMP Unknown) 26.22 kg/m2          Blood Pressure from Last 3 Encounters:   17 144/80   17 124/82   17 130/70    Weight from Last 3 Encounters:   17 118 lb (53.5 kg)   17 124 lb (56.2 kg)   17 125 lb (56.7 kg)              Today, you had the following     No orders found for display       Primary Care Provider Office Phone # Fax #    Eva Lockhart [phone]       [address] [address]        Goals        General    I will check my blood sugars four times per day (pt-stated)     Notes - Note created  3/10/2014 10:41 AM by Priti Orozco RN    As of today's date 3/10/2014 goal is met at 26 - 50%.   Goal Status:  Active      I will continue to work with Diabetic Educator (pt-stated)     Notes - Note created  3/10/2014 10:42 AM by Priti Orozco RN    As of today's date 3/10/2014 goal is met at 26 - 50%.   Goal Status:  Active      I will take insulins as prescribed, including sliding scale novolog (pt-stated)     Notes - Note created  3/10/2014 10:43 AM by Priti Orozco, RN    As of today's date 3/10/2014 goal is met at 51 - 75%.   Goal Status:  Active        Thank you!     Thank you for choosing [facility]  for your care. Our goal is always to " provide you with excellent care. Hearing back from our patients is one way we can continue to improve our services. Please take a few minutes to complete the written survey that you may receive in the mail after your visit with us. Thank you!             Your Updated Medication List - Protect others around you: Learn how to safely use, store and throw away your medicines at www.disposemymeds.org.          This list is accurate as of: 2/24/17 10:05 AM.  Always use your most recent med list.                   Brand Name Dispense Instructions for use    albuterol 108 (90 BASE) MCG/ACT Inhaler    PROAIR HFA/PROVENTIL HFA/VENTOLIN HFA    3 Inhaler    Inhale 2 puffs into the lungs every 6 hours as needed for shortness of breath / dyspnea ((Ventolin or preferred albuterol equivalent))       aspirin 81 MG tablet      Take 1 tablet by mouth daily.       atorvastatin 40 MG tablet    LIPITOR    90 tablet    TAKE 1 TABLET (40 MG) BY MOUTH DAILY       blood glucose monitoring test strip    ONE TOUCH ULTRA    300 each    TEST BLOOD SUGARS 3 TIMES DAILY.       celecoxib 200 MG capsule    celeBREX    180 capsule    Take 1 capsule (200 mg) by mouth 2 times daily as needed for moderate pain       EPIPEN 2-MELVIN 0.3 MG/0.3ML injection   Generic drug:  EPINEPHrine     2 each    INJECT 0.3 MLS (0.3 MG) INTO THE MUSCLE ONCE AS NEEDED FOR ANAPHYLAXIS       insulin pen needle 31G X 5 MM    B-D U/F    200 each    Use twice daily as directed.       insulin reg HIGH CONC 500 UNIT/ML PEN soln    HumuLIN R U-500 KwikPen    60 mL    Inject 100 units every morning with breakfast and 110 units every evening with dinner.       losartan 50 MG tablet    COZAAR    90 tablet    Take 1 tablet (50 mg) by mouth daily       omeprazole 20 MG CR capsule    priLOSEC    90 capsule    TAKE 1 CAPSULE (20 MG) BY MOUTH DAILY       ONETOUCH DELICA LANCETS 33G Misc     300 each    TEST 3 TIMES DAILY.       order for DME     1 each    Equipment being ordered: bath tub  grab bars

## 2017-02-24 NOTE — PROGRESS NOTES
SUBJECTIVE:                                                    Nathalie Harp is a 61 year old female who presents to clinic today for the following health issues:      Diabetes Follow-up    Patient is checking blood sugars: twice daily.    Blood sugar testing frequency justification:   Results are as follows:         am - 400-500    Past 2 weeks.. Prior to that was more like 200 - 400              Diabetic concerns: None and blood sugar frequently over 200     Symptoms of hypoglycemia (low blood sugar): none     Paresthesias (numbness or burning in feet) or sores: Yes      Date of last diabetic eye exam: 6/15       Amount of exercise or physical activity: None    Problems taking medications regularly: No    Medication side effects: none  Diet: regular (no restrictions)    She thinks that her BS are much better until 2 weeks ago, she has URI symptoms.     Dizziness  Duration of complaint: 2 weeks  .    Feels she has had vertigo.  Room spinning when gets out of bed.  Had this before but never this severe.   She is a lot less active due to these symptoms.         Problem list and histories reviewed & adjusted, as indicated.  Additional history: as documented    Patient Active Problem List   Diagnosis     menometrorrhagia     Esophageal reflux     Irritable bowel syndrome     Female stress incontinence     Migraine without aura     Arthritis of knee, right     Disorder of bursae and tendons in shoulder region     Degeneration of thoracic or thoracolumbar intervertebral disc     Degeneration of cervical intervertebral disc     Other hammer toe (acquired)     Allergic rhinitis due to pollen     Menopausal symptoms     Need for SBE (subacute bacterial endocarditis) prophylaxis     Preventive measure     Hyperlipidemia LDL goal <100     Health Care Home     Anemia     Dizziness     Hammer toe     Microalbuminuria     ACP (advance care planning)     Allergic to shellfish     Falls frequently     Balance problems      strip TEST BLOOD SUGARS 3 TIMES DAILY. 300 each 3     losartan (COZAAR) 50 MG tablet Take 1 tablet (50 mg) by mouth daily 90 tablet 3     atorvastatin (LIPITOR) 40 MG tablet TAKE 1 TABLET (40 MG) BY MOUTH DAILY 90 tablet 3     omeprazole (PRILOSEC) 20 MG CR capsule TAKE 1 CAPSULE (20 MG) BY MOUTH DAILY 90 capsule 1     albuterol (PROAIR HFA/PROVENTIL HFA/VENTOLIN HFA) 108 (90 BASE) MCG/ACT Inhaler Inhale 2 puffs into the lungs every 6 hours as needed for shortness of breath / dyspnea ((Ventolin or preferred albuterol equivalent)) 3 Inhaler 3     celecoxib (CELEBREX) 200 MG capsule Take 1 capsule (200 mg) by mouth 2 times daily as needed for moderate pain 180 capsule 1     insulin pen needle (B-D U/F) 31G X 5 MM Use twice daily as directed. 200 each 3     ONETOUCH DELICA LANCETS 33G MISC TEST 3 TIMES DAILY. 300 each 1     EPIPEN 2-MELVIN 0.3 MG/0.3ML injection INJECT 0.3 MLS (0.3 MG) INTO THE MUSCLE ONCE AS NEEDED FOR ANAPHYLAXIS 2 each 9     aspirin 81 MG tablet Take 1 tablet by mouth daily.       order for DME Equipment being ordered: bath tub grab bars (Patient not taking: Reported on 2/24/2017) 1 each 0     Allergies   Allergen Reactions     Augmentin      Benadryl [Acetaminophen]      Ceclor [Cefaclor]      Codeine      Detrol [Tolterodine Tartrate]      Dust Mites      Latex      Metformin GI Disturbance     Severe diarrhea     Pollen Extract      Septra [Bactrim]      Septra [Sulfamethoxazole W-Trimethoprim]      Shellfish Allergy      Crab       Simvastatin      HA     Sulfa Drugs      Sulfonamide Derivatives      Recent Labs   Lab Test  12/21/16   1003  11/14/16   1004  10/07/15   0931  08/31/15   1144  11/04/14   1119  07/11/14   1038  03/03/14   0906   07/05/13   1159   A1C  12.3*   --   11.3*  11.4*  12.8*  12.8*  12.3*   --   11.2*   LDL   --   90   --   83   --   85   --    --   Cannot estimate LDL when triglyceride exceeds 400 mg/dL  132*   HDL   --   52   --   65   --    --    --    --   47*   TRIG   --    380*   --   301*   --    --    --    --   463*   ALT   --   29   --   31  19  22   --    < >  22   CR   --   0.92  0.90  0.80  0.80  0.90   --    < >  0.80   GFRESTIMATED   --   62  63  73  73  64   --    < >  73   GFRESTBLACK   --   75  77  88  88  77   --    < >  89   POTASSIUM   --   5.0  4.1  4.7  4.2  4.2   --    < >  4.5   TSH   --   0.35*   --    --    --    --   0.54   --    --     < > = values in this interval not displayed.      BP Readings from Last 3 Encounters:   02/24/17 127/81   02/24/17 144/80   02/01/17 124/82    Wt Readings from Last 3 Encounters:   02/24/17 118 lb (53.5 kg)   02/24/17 118 lb (53.5 kg)   02/01/17 124 lb (56.2 kg)                  Labs reviewed in EPIC  Problem list, Medication list, Allergies, and Medical/Social/Surgical histories reviewed in [facility] and updated as appropriate.    ROS:  Constitutional, HEENT, cardiovascular, pulmonary, gi and gu systems are negative, except as otherwise noted.    OBJECTIVE:                                                    /81 (BP Location: Right arm, Patient Position: Chair, Cuff Size: Adult Regular)  Pulse 93  Temp 97.1  F (36.2  C) (Oral)  Wt 118 lb (53.5 kg)  LMP  (LMP Unknown)  SpO2 95%  BMI 26.22 kg/m2  Body mass index is 26.22 kg/(m^2).  GENERAL: healthy, alert and no distress  MS: no gross musculoskeletal defects noted, no edema  SKIN: no suspicious lesions or rashes  NEURO: Normal strength and tone, mentation intact and speech normal  PSYCH: mentation appears normal, affect normal/bright  Diabetic foot exam: normal DP and PT pulses, no trophic changes or ulcerative lesions and normal sensory exam  Hallpike/Baranay negative, but she is very apprehensive during exam.      Diagnostic Test Results:  Results for orders placed or performed in visit on 12/21/16   Hepatitis C Screen Reflex to HCV RNA Quant and Genotype   Result Value Ref Range    Hepatitis C Antibody  NR     Nonreactive   Assay performance characteristics have not been  established for newborns,   infants, and children     Hemoglobin A1c   Result Value Ref Range    Hemoglobin A1C 12.3 (H) 4.3 - 6.0 %        ASSESSMENT/PLAN:                                                    {          ICD-10-CM    1. BPPV (benign paroxysmal positional vertigo), unspecified laterality H81.10 ERIC PT, HAND, AND CHIROPRACTIC REFERRAL     meclizine (ANTIVERT) 25 MG tablet   2. Type 2 diabetes mellitus with diabetic nephropathy, with long-term current use of insulin (H) E11.21 insulin reg HIGH CONC (HUMULIN R U-500 KWIKPEN) 500 UNIT/ML PEN soln    Z79.4 OPHTHALMOLOGY ADULT REFERRAL   3. Dry eyes H04.123 OPHTHALMOLOGY ADULT REFERRAL     Patient Instructions   Therapy will call you to schedule Vestibular Therapy for your vertigo    Meclizine trial :  Take at bedtime, see if it helps with your vertigo.     Increase the U-500 to 100 Units with breakfast, 50 Units at lunch, 110 Units at dinner.       : [facility] [place] [phone] -- call to schedule eye exam.  You are due.     Return to clinic 6 weeks (early/mid April).       Eva Lockhart MD  [facility]

## 2017-02-24 NOTE — MR AVS SNAPSHOT
After Visit Summary   2/24/2017    Nathalie Harp    MRN: [id number]           Patient Information     Date Of Birth          1955        Visit Information        Provider Department      2/24/2017 10:20 AM Eva Lockhart MD [facility]        Today's Diagnoses     BPPV (benign paroxysmal positional vertigo), unspecified laterality    -  1    Type 2 diabetes mellitus with diabetic nephropathy, with long-term current use of insulin (H)        Dry eyes          Care Instructions    Therapy will call you to schedule Vestibular Therapy for your vertigo    Meclizine trial :  Take at bedtime, see if it helps with your vertigo.     Increase the U-500 to 100 Units with breakfast, 50 Units at lunch, 110 Units at dinner.       : [facility] [phone] -- call to schedule eye exam.  You are due.     Return to clinic 6 weeks (early/mid April).          Follow-ups after your visit        Additional Services     ERIC PT, HAND, AND CHIROPRACTIC REFERRAL       **This order will print in the [facility] Scheduling Office**    Physical Therapy, Hand Therapy and Chiropractic Care are available through:    *[facility]  *[facility]  *[facility]    Call one number to schedule at any of the above locations: [phone].    Your provider has referred you to: Physical Therapy at [facility] or [facility]    Indication/Reason for Referral: vertigo    Onset of Illness: 2 weeks.   Therapy Orders: Evaluate and Treat  Special Programs: None and vestibular therapy  Special Request:  Vestibular therapy    Praveen Morton      Additional Comments for the Therapist or Chiropractor:      Please be aware that coverage of these services is subject to the terms and limitations of your health insurance plan.  Call member services at your health plan with any benefit or coverage questions.      Please bring the following to your  appointment:    *Your personal calendar for scheduling future appointments  *Comfortable clothing            OPHTHALMOLOGY ADULT REFERRAL       Your provider has [facility] you [facility] [phone]   [email or web address]    Please be aware that coverage of these services is subject to the terms and limitations of your health insurance plan.  Call member services at your health plan with any benefit or coverage questions.      Please bring the following with you to your appointment:    (1) Any X-Rays, CTs or MRIs which have been performed.  Contact the facility where they were done to arrange for  prior to your scheduled appointment.    (2) List of current medications  (3) This referral request   (4) Any documents/labs given to you for this referral                  Follow-up notes from your care team     Return in about 6 weeks (around 4/7/2017) for diabetes follow up, recheck vertigo.      Your next 10 appointments already scheduled     Mar 24, 2017 10:30 AM CDT   SHORT with Jovana Loo RPH   [facility]    [address] [address] [phone]              Who to contact     If you have questions or need follow up information about today's clinic visit or your schedule please contact [facility] directly at [phone].  Normal or non-critical lab and imaging results will be communicated to you by MyChart, letter or phone within 4 business days after the clinic has received the results. If you do not hear from us within 7 days, please contact the clinic through MyChart or phone. If you have a critical or abnormal lab result, we will notify you by phone as soon as possible.  Submit refill requests through [employer] or call your pharmacy and they will forward the refill request to us. Please allow 3 business days for your  "refill to be completed.          Additional Information About Your Visit        [employer] Information     [employer] lets you send messages to your doctor, view your test results, renew your prescriptions, schedule appointments and more. To sign up, go to [email or web address] . Click on \"Log in\" on the left side of the screen, which will take you to the Welcome page. Then click on \"Sign up Now\" on the right side of the page.     You will be asked to enter the access code listed below, as well as some personal information. Please follow the directions to create your username and password.     Your access code is: [email or web address]  Expires: 2017 10:05 AM     Your access code will  in 90 days. If you need help or a new code, please call your [place] clinic or [phone].        Care EveryWhere ID     This is your Care EveryWhere ID. This could be used by other organizations to access your [place] medical records  [id number]        Your Vitals Were     Pulse Temperature Last Period Pulse Oximetry BMI (Body Mass Index)       93 97.1  F (36.2  C) (Oral) (LMP Unknown) 95% 26.22 kg/m2        Blood Pressure from Last 3 Encounters:   17 127/81   17 144/80   17 124/82    Weight from Last 3 Encounters:   17 118 lb (53.5 kg)   17 118 lb (53.5 kg)   17 124 lb (56.2 kg)              We Performed the Following     ERIC PT, HAND, AND CHIROPRACTIC REFERRAL     OPHTHALMOLOGY ADULT REFERRAL          Today's Medication Changes          These changes are accurate as of: 17 11:04 AM.  If you have any questions, ask your nurse or doctor.               Start taking these medicines.        Dose/Directions    meclizine 25 MG tablet   Commonly known as:  ANTIVERT   Used for:  BPPV (benign paroxysmal positional vertigo), unspecified laterality   Started by:  Eva Lockhart MD        Dose:  25 mg   Take 1 tablet (25 mg) by mouth every 6 hours as needed for dizziness   Quantity:  30 tablet "   Refills:  1         These medicines have changed or have updated prescriptions.        Dose/Directions    insulin reg HIGH CONC 500 UNIT/ML PEN soln   Commonly known as:  HumuLIN R U-500 KwikPen   This may have changed:  additional instructions   Used for:  Type 2 diabetes mellitus with diabetic nephropathy, with long-term current use of insulin (H)   Changed by:  Eva Lockhart MD        Inject 100 units every morning with breakfast, 50 Units every lunch time,  and 110 units every evening with dinner.   Quantity:  120 mL   Refills:  3            Where to get your medicines      These medications were sent to [facility] [place], [place] - [address]  [address], [facility] [address]     Phone:  [phone]     insulin reg HIGH CONC 500 UNIT/ML PEN soln    meclizine 25 MG tablet                Primary Care Provider Office Phone # Fax #    Eva Lockhart [phone]       [facility] [address]  [facility] [address]        Goals        General    I will check my blood sugars four times per day (pt-stated)     Notes - Note created  3/10/2014 10:41 AM by Priti Orozco RN    As of today's date 3/10/2014 goal is met at 26 - 50%.   Goal Status:  Active      I will continue to work with Diabetic Educator (pt-stated)     Notes - Note created  3/10/2014 10:42 AM by Priti Orozco, RN    As of today's date 3/10/2014 goal is met at 26 - 50%.   Goal Status:  Active      I will take insulins as prescribed, including sliding scale novolog (pt-stated)     Notes - Note created  3/10/2014 10:43 AM by Priti Orozco RN    As of today's date 3/10/2014 goal is met at 51 - 75%.   Goal Status:  Active        Thank you!     Thank you for choosing [facility]  for your care. Our goal is always to provide you with excellent care. Hearing back from our patients is one way we can continue to improve our services.  Please take a few minutes to complete the written survey that you may receive in the mail after your visit with us. Thank you!             Your Updated Medication List - Protect others around you: Learn how to safely use, store and throw away your medicines at www.disposemymeds.org.          This list is accurate as of: 2/24/17 11:04 AM.  Always use your most recent med list.                   Brand Name Dispense Instructions for use    albuterol 108 (90 BASE) MCG/ACT Inhaler    PROAIR HFA/PROVENTIL HFA/VENTOLIN HFA    3 Inhaler    Inhale 2 puffs into the lungs every 6 hours as needed for shortness of breath / dyspnea ((Ventolin or preferred albuterol equivalent))       aspirin 81 MG tablet      Take 1 tablet by mouth daily.       atorvastatin 40 MG tablet    LIPITOR    90 tablet    TAKE 1 TABLET (40 MG) BY MOUTH DAILY       blood glucose monitoring test strip    ONE TOUCH ULTRA    300 each    TEST BLOOD SUGARS 3 TIMES DAILY.       celecoxib 200 MG capsule    celeBREX    180 capsule    Take 1 capsule (200 mg) by mouth 2 times daily as needed for moderate pain       EPIPEN 2-MELVIN 0.3 MG/0.3ML injection   Generic drug:  EPINEPHrine     2 each    INJECT 0.3 MLS (0.3 MG) INTO THE MUSCLE ONCE AS NEEDED FOR ANAPHYLAXIS       insulin pen needle 31G X 5 MM    B-D U/F    200 each    Use twice daily as directed.       insulin reg HIGH CONC 500 UNIT/ML PEN soln    HumuLIN R U-500 KwikPen    120 mL    Inject 100 units every morning with breakfast, 50 Units every lunch time,  and 110 units every evening with dinner.       losartan 50 MG tablet    COZAAR    90 tablet    Take 1 tablet (50 mg) by mouth daily       meclizine 25 MG tablet    ANTIVERT    30 tablet    Take 1 tablet (25 mg) by mouth every 6 hours as needed for dizziness       omeprazole 20 MG CR capsule    priLOSEC    90 capsule    TAKE 1 CAPSULE (20 MG) BY MOUTH DAILY       ONETOUCH DELICA LANCETS 33G Misc     300 each    TEST 3 TIMES DAILY.       order for DME     1  each    Equipment being ordered: bath tub grab bars

## 2017-02-25 ASSESSMENT — ASTHMA QUESTIONNAIRES: ACT_TOTALSCORE: 12

## 2017-03-27 ENCOUNTER — TRANSFERRED RECORDS (OUTPATIENT)
Dept: HEALTH INFORMATION MANAGEMENT | Facility: CLINIC | Age: 62
End: 2017-03-27

## 2017-03-28 LAB — EJECTION FRACTION: 63

## 2017-03-31 ENCOUNTER — TELEPHONE (OUTPATIENT)
Dept: INTERNAL MEDICINE | Facility: CLINIC | Age: 62
End: 2017-03-31

## 2017-03-31 NOTE — TELEPHONE ENCOUNTER
Received faxed request from [facility] Ph.[phone] Fax.[phone]     Pharmacy requesting 90-Day Rx Request to Improve Adherence for Humulin R INJ U-500. Patient currently has script on file at [facility]. TC called patient's home number listed and patient's daughter answered stating that patient is hospitalized in the ICU at [facility]. TC contacted [facility] and updated them that request will not be addressed at this time due to patient being hospitalized.

## 2017-03-31 NOTE — TELEPHONE ENCOUNTER
TC called patient to clarify a refill request that was received. Patient's daughter answered and stated patient is currently hospitalized at [facility]. She states patient is in the ICU on a ventilator. She was admitted with DKA, flu, and pneumonia. Routing to provider as an FYI.

## 2017-04-21 ENCOUNTER — OFFICE VISIT (OUTPATIENT)
Dept: INTERNAL MEDICINE | Facility: CLINIC | Age: 62
End: 2017-04-21
Payer: MEDICARE

## 2017-04-21 VITALS
HEART RATE: 74 BPM | BODY MASS INDEX: 22.22 KG/M2 | DIASTOLIC BLOOD PRESSURE: 82 MMHG | SYSTOLIC BLOOD PRESSURE: 131 MMHG | WEIGHT: 100 LBS | OXYGEN SATURATION: 98 % | TEMPERATURE: 98.5 F

## 2017-04-21 DIAGNOSIS — Z71.89 ACP (ADVANCE CARE PLANNING): Chronic | ICD-10-CM

## 2017-04-21 DIAGNOSIS — Z09 HOSPITAL DISCHARGE FOLLOW-UP: Primary | ICD-10-CM

## 2017-04-21 DIAGNOSIS — E11.21 TYPE 2 DIABETES MELLITUS WITH DIABETIC NEPHROPATHY, WITH LONG-TERM CURRENT USE OF INSULIN (H): ICD-10-CM

## 2017-04-21 DIAGNOSIS — Z79.4 TYPE 2 DIABETES MELLITUS WITHOUT COMPLICATION, WITH LONG-TERM CURRENT USE OF INSULIN (H): ICD-10-CM

## 2017-04-21 DIAGNOSIS — E11.9 TYPE 2 DIABETES MELLITUS WITHOUT COMPLICATION, WITH LONG-TERM CURRENT USE OF INSULIN (H): ICD-10-CM

## 2017-04-21 DIAGNOSIS — Z79.4 TYPE 2 DIABETES MELLITUS WITH DIABETIC NEPHROPATHY, WITH LONG-TERM CURRENT USE OF INSULIN (H): ICD-10-CM

## 2017-04-21 LAB
ANION GAP SERPL CALCULATED.3IONS-SCNC: 16 MMOL/L (ref 3–14)
BUN SERPL-MCNC: 11 MG/DL (ref 7–30)
CALCIUM SERPL-MCNC: 9.7 MG/DL (ref 8.5–10.1)
CHLORIDE SERPL-SCNC: 98 MMOL/L (ref 94–109)
CO2 SERPL-SCNC: 23 MMOL/L (ref 20–32)
CREAT SERPL-MCNC: 0.55 MG/DL (ref 0.52–1.04)
ERYTHROCYTE [DISTWIDTH] IN BLOOD BY AUTOMATED COUNT: 12.9 % (ref 10–15)
GFR SERPL CREATININE-BSD FRML MDRD: ABNORMAL ML/MIN/1.7M2
GLUCOSE BLD-MCNC: 383 MG/DL (ref 70–99)
GLUCOSE SERPL-MCNC: 365 MG/DL (ref 70–99)
HCT VFR BLD AUTO: 39.6 % (ref 35–47)
HGB BLD-MCNC: 14 G/DL (ref 11.7–15.7)
MCH RBC QN AUTO: 33.1 PG (ref 26.5–33)
MCHC RBC AUTO-ENTMCNC: 35.4 G/DL (ref 31.5–36.5)
MCV RBC AUTO: 94 FL (ref 78–100)
PLATELET # BLD AUTO: 207 10E9/L (ref 150–450)
POTASSIUM SERPL-SCNC: 3.5 MMOL/L (ref 3.4–5.3)
RBC # BLD AUTO: 4.23 10E12/L (ref 3.8–5.2)
SODIUM SERPL-SCNC: 137 MMOL/L (ref 133–144)
WBC # BLD AUTO: 3.6 10E9/L (ref 4–11)

## 2017-04-21 PROCEDURE — 80048 BASIC METABOLIC PNL TOTAL CA: CPT | Performed by: NURSE PRACTITIONER

## 2017-04-21 PROCEDURE — 99214 OFFICE O/P EST MOD 30 MIN: CPT | Performed by: NURSE PRACTITIONER

## 2017-04-21 PROCEDURE — 82947 ASSAY GLUCOSE BLOOD QUANT: CPT | Performed by: NURSE PRACTITIONER

## 2017-04-21 PROCEDURE — 36415 COLL VENOUS BLD VENIPUNCTURE: CPT | Performed by: NURSE PRACTITIONER

## 2017-04-21 PROCEDURE — 85027 COMPLETE CBC AUTOMATED: CPT | Performed by: NURSE PRACTITIONER

## 2017-04-21 ASSESSMENT — PAIN SCALES - GENERAL: PAINLEVEL: NO PAIN (0)

## 2017-04-21 NOTE — MR AVS SNAPSHOT
After Visit Summary   4/21/2017    Nathalie Harp    MRN: [id number]           Patient Information     Date Of Birth          1955        Visit Information        Provider Department      4/21/2017 11:00 AM Mally Leung APRN [facility]        Today's Diagnoses     Uncontrolled type 2 diabetes mellitus without complication, with long-term current use of insulin (H)    -  1    Type 2 diabetes mellitus without complication, with long-term current use of insulin (H)        Type 2 diabetes mellitus with diabetic nephropathy, with long-term current use of insulin (H)          Care Instructions    Schedule follow up with Jovana Loo        Follow-ups after your visit        Additional Services     ERIC PT, HAND, AND CHIROPRACTIC REFERRAL       **This order will print in the [facility] Scheduling Office**    Physical Therapy, Hand Therapy and Chiropractic Care are available through:    *[facility]  *[facility]  *[facility]    Call one number to schedule at any of the above locations: [phone].    Your provider has referred you to: Physical Therapy at [facility] or [facility]    Indication/Reason for Referral: balance  Onset of Illness: Was in ICU at [facility] Orders: Evaluate and Treat  Special Programs: None  Special Request: None    Praveen Morton      Additional Comments for the Therapist or Chiropractor: concern for poor balance and weakness since discharge from hospital. Was receiving PHYSICAL THERAPY at [facility] but she left [place]    Please be aware that coverage of these services is subject to the terms and limitations of your health insurance plan.  Call member services at your health plan with any benefit or coverage questions.      Please bring the following to your appointment:    *Your personal calendar for scheduling future appointments  *Comfortable clothing                  Who to  "contact     If you have questions or need follow up information about today's clinic visit or your schedule please contact [facility] directly at [phone].  Normal or non-critical lab and imaging results will be communicated to you by MyChart, letter or phone within 4 business days after the clinic has received the results. If you do not hear from us within 7 days, please contact the clinic through MyChart or phone. If you have a critical or abnormal lab result, we will notify you by phone as soon as possible.  Submit refill requests through [employer] or call your pharmacy and they will forward the refill request to us. Please allow 3 business days for your refill to be completed.          Additional Information About Your Visit        [employer] Information     [employer] lets you send messages to your doctor, view your test results, renew your prescriptions, schedule appointments and more. To sign up, go to [email or web address] . Click on \"Log in\" on the left side of the screen, which will take you to the Welcome page. Then click on \"Sign up Now\" on the right side of the page.     You will be asked to enter the access code listed below, as well as some personal information. Please follow the directions to create your username and password.     Your access code is: [email or web address]  Expires: 2017 11:05 AM     Your access code will  in 90 days. If you need help or a new code, please call your [place] clinic or [phone].        Care EveryWhere ID     This is your Care EveryWhere ID. This could be used by other organizations to access your [place] medical records  [id number]        Your Vitals Were     Pulse Temperature Last Period Pulse Oximetry Breastfeeding? BMI (Body Mass Index)    74 98.5  F (36.9  C) (Oral) (LMP Unknown) 98% No 22.22 kg/m2       Blood Pressure from Last 3 Encounters:   17 131/82   17 127/81   17 144/80    Weight from Last 3 Encounters: "   04/21/17 100 lb (45.4 kg)   02/24/17 118 lb (53.5 kg)   02/24/17 118 lb (53.5 kg)              We Performed the Following     Basic metabolic panel     CBC with platelets     Glucose, whole blood     ERIC PT, HAND, AND CHIROPRACTIC REFERRAL          Today's Medication Changes          These changes are accurate as of: 4/21/17 11:08 AM.  If you have any questions, ask your nurse or doctor.               Start taking these medicines.        Dose/Directions    blood glucose lancets standard   Commonly known as:  no brand specified   Used for:  Type 2 diabetes mellitus without complication, with long-term current use of insulin (H)   Started by:  Mally Leung APRN CNP        Use to test blood sugar 3 times daily or as directed.   Quantity:  1 Box   Refills:  3       blood glucose monitoring meter device kit   Commonly known as:  no brand specified   Used for:  Type 2 diabetes mellitus without complication, with long-term current use of insulin (H)   Started by:  Mally Leung APRN CNP        Use to test blood sugar 3 times daily or as directed   Quantity:  1 kit   Refills:  0         These medicines have changed or have updated prescriptions.        Dose/Directions    insulin reg HIGH CONC 500 UNIT/ML PEN soln   Commonly known as:  HumuLIN R U-500 KwikPen   This may have changed:  additional instructions   Used for:  Type 2 diabetes mellitus with diabetic nephropathy, with long-term current use of insulin (H)   Changed by:  Mally Leung APRN CNP        Inject 70 units every morning with breakfast, 35 Units every lunch time,  and 75 units every evening with dinner.   Quantity:  120 mL   Refills:  3            Where to get your medicines      These medications were sent to [id number] - [place], [place] - [address] [place] [address]  [address] [place] [address]     Phone:  [phone]     blood glucose lancets standard    blood glucose monitoring meter device kit    blood  glucose monitoring test strip    insulin pen needle 31G X 5 MM    insulin reg HIGH CONC 500 UNIT/ML PEN soln                Primary Care Provider Office Phone # Fax #    Eva Lockhart [phone]       [facility] [address]  [facility] [place] [address]        Goals        General    I will check my blood sugars four times per day (pt-stated)     Notes - Note created  3/10/2014 10:41 AM by Priti Orozco RN    As of today's date 3/10/2014 goal is met at 26 - 50%.   Goal Status:  Active      I will continue to work with Diabetic Educator (pt-stated)     Notes - Note created  3/10/2014 10:42 AM by Priti Orozco RN    As of today's date 3/10/2014 goal is met at 26 - 50%.   Goal Status:  Active      I will take insulins as prescribed, including sliding scale novolog (pt-stated)     Notes - Note created  3/10/2014 10:43 AM by Priti Orozco RN    As of today's date 3/10/2014 goal is met at 51 - 75%.   Goal Status:  Active        Thank you!     Thank you for choosing [facility]  for your care. Our goal is always to provide you with excellent care. Hearing back from our patients is one way we can continue to improve our services. Please take a few minutes to complete the written survey that you may receive in the mail after your visit with us. Thank you!             Your Updated Medication List - Protect others around you: Learn how to safely use, store and throw away your medicines at www.disposemymeds.org.          This list is accurate as of: 4/21/17 11:08 AM.  Always use your most recent med list.                   Brand Name Dispense Instructions for use    albuterol 108 (90 BASE) MCG/ACT Inhaler    PROAIR HFA/PROVENTIL HFA/VENTOLIN HFA    3 Inhaler    Inhale 2 puffs into the lungs every 6 hours as needed for shortness of breath / dyspnea ((Ventolin or preferred albuterol equivalent))       aspirin 81 MG tablet      Take 1 tablet by mouth daily Reported  on 4/21/2017       atorvastatin 40 MG tablet    LIPITOR    90 tablet    TAKE 1 TABLET (40 MG) BY MOUTH DAILY       blood glucose lancets standard    no brand specified    1 Box    Use to test blood sugar 3 times daily or as directed.       blood glucose monitoring meter device kit    no brand specified    1 kit    Use to test blood sugar 3 times daily or as directed       blood glucose monitoring test strip    ONE TOUCH ULTRA    300 each    TEST BLOOD SUGARS 3 TIMES DAILY.       celecoxib 200 MG capsule    celeBREX    180 capsule    Take 1 capsule (200 mg) by mouth 2 times daily as needed for moderate pain       EPIPEN 2-MELVIN 0.3 MG/0.3ML injection   Generic drug:  EPINEPHrine     2 each    INJECT 0.3 MLS (0.3 MG) INTO THE MUSCLE ONCE AS NEEDED FOR ANAPHYLAXIS       insulin pen needle 31G X 5 MM    B-D U/F    200 each    Use twice daily as directed.       insulin reg HIGH CONC 500 UNIT/ML PEN soln    HumuLIN R U-500 KwikPen    120 mL    Inject 70 units every morning with breakfast, 35 Units every lunch time,  and 75 units every evening with dinner.       losartan 50 MG tablet    COZAAR    90 tablet    Take 1 tablet (50 mg) by mouth daily       meclizine 25 MG tablet    ANTIVERT    30 tablet    Take 1 tablet (25 mg) by mouth every 6 hours as needed for dizziness       omeprazole 20 MG CR capsule    priLOSEC    90 capsule    TAKE 1 CAPSULE (20 MG) BY MOUTH DAILY       ONETOUCH DELICA LANCETS 33G Misc     300 each    TEST 3 TIMES DAILY.       order for DME     1 each    Equipment being ordered: bath tub grab bars

## 2017-04-21 NOTE — NURSING NOTE
"Chief Complaint   Patient presents with     Hospital F/U       Initial /82 (BP Location: Right arm, Patient Position: Chair, Cuff Size: Adult Regular)  Pulse 74  Temp 98.5  F (36.9  C) (Oral)  Wt 100 lb (45.4 kg)  LMP  (LMP Unknown)  SpO2 98%  Breastfeeding? No  BMI 22.22 kg/m2 Estimated body mass index is 22.22 kg/(m^2) as calculated from the following:    Height as of 1/12/17: 4' 8.25\" (1.429 m).    Weight as of this encounter: 100 lb (45.4 kg).  Medication Reconciliation: complete.  FATOU Bardales MA      "

## 2017-04-21 NOTE — PROGRESS NOTES
SUBJECTIVE:                                                    Nathalie Harp is a 61 year old female who presents to clinic today for the following health issues:      Hospital Follow-up Visit:    Hospital/Nursing Home/IP Rehab Facility: [facility]  Date of Admission: 3/27/17  Date of Discharge: 4/10/17  Reason(s) for Admission: Pneumonia and Influenza    Admitted with DKA and influenza B. She was intubated for sepsis and respiratory failure. On pressors. Encephalopathy, CT negative. Troponins elevated, EHCO normal. Had episodes of hypoglycemia so Lantus was discontinued and she was switched to sliding scale.      Discharged to TCU. Discharged from TCU on 4/10/17. She left [facility].        Problems taking medications regularly:  Yes, her daughter has thrown most of them out.       Medication changes since discharge: Yes, she states she was told not to take her medication until she see her PCP.       Problems adhering to non-medication therapy:  Yes    Summary of hospitalization:  [facility] discharge summary reviewed  CareEverywhere information obtained and reviewed  Diagnostic Tests/Treatments reviewed.  Follow up needed: Follow up with cardiology 4 weeks post discharge with [facility], for elevated troponin  Other Healthcare Providers Involved in Patient s Care:         Physical Therapy and occupational therapy  Update since discharge: improved. Strength has improved but her balance is still poor. She is sleeping better now at home. Diarrhea resolved prior to discharge from hospital. She has not been checking her blood sugars. She lost her glucometer. She lives in a house with her daughter. She reports when she got home her insulin, test strips and glucometer were gone.   Her daughter is a vulnerable adult. She reports she is trying to get her daughter a     Post Discharge Medication Reconciliation: discharge medications reconciled and changed, per  note/orders (see AVS).  Plan of care communicated with patient     Coding guidelines for this visit:  Type of Medical   Decision Making Face-to-Face Visit       within 7 Days of discharge Face-to-Face Visit        within 14 days of discharge   Moderate Complexity [id number]   High Complexity [id number]            Problem list and histories reviewed & adjusted, as indicated.  Additional history: none    Patient Active Problem List   Diagnosis     menometrorrhagia     Esophageal reflux     Irritable bowel syndrome     Female stress incontinence     Migraine without aura     Arthritis of knee, right     Disorder of bursae and tendons in shoulder region     Degeneration of thoracic or thoracolumbar intervertebral disc     Degeneration of cervical intervertebral disc     Other hammer toe (acquired)     Allergic rhinitis due to pollen     Menopausal symptoms     Need for SBE (subacute bacterial endocarditis) prophylaxis     Preventive measure     Hyperlipidemia LDL goal <100     Health Care Home     Anemia     Dizziness     Hammer toe     Microalbuminuria     ACP (advance care planning)     Allergic to shellfish     Falls frequently     Balance problems     CKD (chronic kidney disease) stage 2, GFR 60-89 ml/min     Type 2 diabetes mellitus with diabetic nephropathy (H)     Benign essential hypertension     Diarrhea     Iron deficiency anemia, unspecified iron deficiency anemia type     Right axillary hidradenitis     Uncontrolled type 2 diabetes mellitus without complication, with long-term current use of insulin (H)     Mild intermittent asthma without complication     Past Surgical History:   Procedure Laterality Date     HC EXCIS PRIMARY GANGLION WRIST  1985 and 1987    right wrist     KNEE SURGERY  2008 approx    arthroscopic; Dr. Rivera       Social History   Substance Use Topics     Smoking status: Never Smoker     Smokeless tobacco: Never Used     Alcohol use Yes      Comment: rarely     Family History   Problem  Relation Age of Onset     CEREBROVASCULAR DISEASE Maternal Grandmother      Arthritis Maternal Grandmother      Asthma Mother      GASTROINTESTINAL DISEASE Mother      IBS     Alcohol/Drug Mother      Depression Mother      Neurologic Disorder Mother      migraines     GASTROINTESTINAL DISEASE Father      IBS     DIABETES Father      Alcohol/Drug Father      Neurologic Disorder Father      migraines     Obesity Father      GASTROINTESTINAL DISEASE Maternal Grandfather      Ulcers     Alcohol/Drug Maternal Grandfather      DIABETES Paternal Grandmother      Alcohol/Drug Paternal Grandmother      Arthritis Paternal Grandmother      Obesity Paternal Grandmother      Hypertension Sister      Hypertension Brother      Circulatory Sister      Asthma Sister      Asthma Sister      Asthma Brother      Thyroid Disease Sister      Thyroid Disease Sister      Obesity Sister          Current Outpatient Prescriptions   Medication Sig Dispense Refill     blood glucose monitoring (ONE TOUCH ULTRA) test strip TEST BLOOD SUGARS 3 TIMES DAILY. 300 each 3     insulin reg HIGH CONC (HUMULIN R U-500 KWIKPEN) 500 UNIT/ML PEN soln Inject 70 units every morning with breakfast, 35 Units every lunch time,  and 75 units every evening with dinner. 120 mL 3     insulin pen needle (B-D U/F) 31G X 5 MM Use twice daily as directed. 200 each 3     blood glucose monitoring (NO BRAND SPECIFIED) meter device kit Use to test blood sugar 3 times daily or as directed 1 kit 0     blood glucose (NO BRAND SPECIFIED) lancets standard Use to test blood sugar 3 times daily or as directed. 1 Box 3     losartan (COZAAR) 50 MG tablet Take 1 tablet (50 mg) by mouth daily 90 tablet 3     atorvastatin (LIPITOR) 40 MG tablet TAKE 1 TABLET (40 MG) BY MOUTH DAILY 90 tablet 3     omeprazole (PRILOSEC) 20 MG CR capsule TAKE 1 CAPSULE (20 MG) BY MOUTH DAILY 90 capsule 1     celecoxib (CELEBREX) 200 MG capsule Take 1 capsule (200 mg) by mouth 2 times daily as needed for  moderate pain 180 capsule 1     meclizine (ANTIVERT) 25 MG tablet Take 1 tablet (25 mg) by mouth every 6 hours as needed for dizziness (Patient not taking: Reported on 4/21/2017) 30 tablet 1     [DISCONTINUED] insulin reg HIGH CONC (HUMULIN R U-500 KWIKPEN) 500 UNIT/ML PEN soln Inject 100 units every morning with breakfast, 50 Units every lunch time,  and 110 units every evening with dinner. (Patient not taking: Reported on 4/21/2017) 120 mL 3     albuterol (PROAIR HFA/PROVENTIL HFA/VENTOLIN HFA) 108 (90 BASE) MCG/ACT Inhaler Inhale 2 puffs into the lungs every 6 hours as needed for shortness of breath / dyspnea ((Ventolin or preferred albuterol equivalent)) (Patient not taking: Reported on 4/21/2017) 3 Inhaler 3     [DISCONTINUED] insulin pen needle (B-D U/F) 31G X 5 MM Use twice daily as directed. 200 each 3     order for DME Equipment being ordered: bath tub grab bars (Patient not taking: Reported on 2/24/2017) 1 each 0     EPIPEN 2-MELVIN 0.3 MG/0.3ML injection INJECT 0.3 MLS (0.3 MG) INTO THE MUSCLE ONCE AS NEEDED FOR ANAPHYLAXIS (Patient not taking: Reported on 4/21/2017) 2 each 9     aspirin 81 MG tablet Take 1 tablet by mouth daily Reported on 4/21/2017       Recent Labs   Lab Test  12/21/16   1003  11/14/16   1004  10/07/15   0931  08/31/15   1144  11/04/14   1119  07/11/14   1038  03/03/14   0906   07/05/13   1159   A1C  12.3*   --   11.3*  11.4*  12.8*  12.8*  12.3*   --   11.2*   LDL   --   90   --   83   --   85   --    --   Cannot estimate LDL when triglyceride exceeds 400 mg/dL  132*   HDL   --   52   --   65   --    --    --    --   47*   TRIG   --   380*   --   301*   --    --    --    --   463*   ALT   --   29   --   31  19  22   --    < >  22   CR   --   0.92  0.90  0.80  0.80  0.90   --    < >  0.80   GFRESTIMATED   --   62  63  73  73  64   --    < >  73   GFRESTBLACK   --   75  77  88  88  77   --    < >  89   POTASSIUM   --   5.0  4.1  4.7  4.2  4.2   --    < >  4.5   TSH   --   0.35*   --    --     --    --   0.54   --    --     < > = values in this interval not displayed.        Reviewed and updated as needed this visit by clinical staff  Tobacco  Allergies  Meds  Med Hx  Surg Hx  Fam Hx  Soc Hx      Reviewed and updated as needed this visit by Provider         ROS:  Constitutional, HEENT, cardiovascular, pulmonary, gi and gu systems are negative, except as otherwise noted.    OBJECTIVE:                                                    /82 (BP Location: Right arm, Patient Position: Chair, Cuff Size: Adult Regular)  Pulse 74  Temp 98.5  F (36.9  C) (Oral)  Wt 100 lb (45.4 kg)  LMP  (LMP Unknown)  SpO2 98%  Breastfeeding? No  BMI 22.22 kg/m2  Body mass index is 22.22 kg/(m^2).  GENERAL: healthy, alert and no distress  RESP: lungs clear to auscultation - no rales, rhonchi or wheezes  CV: regular rate and rhythm, normal S1 S2, no S3 or S4, no murmur, click or rub, no peripheral edema and peripheral pulses strong  ABDOMEN: soft, nontender, no hepatosplenomegaly, no masses and bowel sounds normal  PSYCH: tangential, judgement and insight impaired and appearance well groomed    Diagnostic Test Results:  Gluc: 382     ASSESSMENT/PLAN:                                                        ICD-10-CM    1. Hospital discharge follow-up Z09    2. Uncontrolled type 2 diabetes mellitus without complication, with long-term current use of insulin (H) E11.65 Glucose, whole blood    Z79.4 Basic metabolic panel     CBC with platelets     ERIC PT, HAND, AND CHIROPRACTIC REFERRAL   3. Type 2 diabetes mellitus without complication, with long-term current use of insulin (H) E11.9 blood glucose monitoring (ONE TOUCH ULTRA) test strip    Z79.4 blood glucose monitoring (NO BRAND SPECIFIED) meter device kit     blood glucose (NO BRAND SPECIFIED) lancets standard   4. Type 2 diabetes mellitus with diabetic nephropathy, with long-term current use of insulin (H) E11.21 insulin reg HIGH CONC (HUMULIN R U-500 KWIKPEN)  500 UNIT/ML PEN soln    Z79.4 insulin pen needle (B-D U/F) 31G X 5 MM       Patient with poorly controlled diabetes and long standing history of non adherence. Discharge from hospital and has stopped all diabetes medications.  Will restart insulin at 75% previous doses in lieu of hypoglycemic episodes in the hospital. Glucometer ordered. She is to begin checking blood sugar three times daily before meals. Follow up with Jovana ERNANDEZ next week.   She is supposed to follow up with cardiology which was arranged through [facility]  Insight appears very impaired. She does not seem concerned about her blood pressure. She keeps talking about the meal she is going to make in her slow cooker. She reports she is safe at home. Declines referral to our care coordinator.     LÁZARO Mendez CNP  [facility]      This encounter has been reviewed.  The patient was not examined by me.  SHANITA LAM M.D.   Supervising Physician in Internal Medicine, [place].

## 2017-04-24 ENCOUNTER — TELEPHONE (OUTPATIENT)
Dept: INTERNAL MEDICINE | Facility: CLINIC | Age: 62
End: 2017-04-24

## 2017-04-24 NOTE — TELEPHONE ENCOUNTER
Forms received from: [facility]   Phone number listed: [phone]   Fax listed: [phone]  Date received: 04/24/2017  Form description: Medicare New Presription Order for Diabetic Testing Supplies  Once forms are completed, please return to [facility] via fax.  Is patient requesting to be contacted when forms are completed: NA    Form placed: In provider's basket  Sandra Mendiola

## 2017-04-28 ENCOUNTER — OFFICE VISIT (OUTPATIENT)
Dept: PHARMACY | Facility: CLINIC | Age: 62
End: 2017-04-28
Payer: COMMERCIAL

## 2017-04-28 VITALS
HEART RATE: 76 BPM | WEIGHT: 109.4 LBS | BODY MASS INDEX: 24.31 KG/M2 | SYSTOLIC BLOOD PRESSURE: 152 MMHG | DIASTOLIC BLOOD PRESSURE: 86 MMHG

## 2017-04-28 DIAGNOSIS — Z79.4 TYPE 2 DIABETES MELLITUS WITHOUT COMPLICATION, WITH LONG-TERM CURRENT USE OF INSULIN (H): ICD-10-CM

## 2017-04-28 DIAGNOSIS — E11.9 TYPE 2 DIABETES MELLITUS WITHOUT COMPLICATION, WITH LONG-TERM CURRENT USE OF INSULIN (H): ICD-10-CM

## 2017-04-28 PROCEDURE — 99606 MTMS BY PHARM EST 15 MIN: CPT | Performed by: PHARMACIST

## 2017-04-28 PROCEDURE — 99607 MTMS BY PHARM ADDL 15 MIN: CPT | Performed by: PHARMACIST

## 2017-04-28 NOTE — PROGRESS NOTES
SUBJECTIVE/OBJECTIVE:                                                    Nathalie Harp is a 61 year old female coming in for a follow-up visit for Medication Therapy Management.  She was referred to me from Eva Lockhart     Chief Complaint: Follow up from our visit on 2/24/2017.  Hospitalized at [facility] 3/27-4/8/17 with DKA and influenza B and pneumonia.    Tobacco: No tobacco use   Alcohol: none    Medication Adherence: issues found and discussed below    Diabetes:  In the hospital, she was transitioned to Lantus and Novolog. She has several episodes of hypoglycemia and Lantus was D/C'd. PTA she was taking Humulin U500. Per hospital notes it appears she was not discharged with any insulin. She saw Mally Leung CNP for hospital follow-up last week, 4/21 and restarted on 75% dose of PTA Humulin U500: 70units with breakfast, 35units with lunch, 75units with dinner. Rx was sent to [facility] pharmacy and patient has not been able to go there and  insulin or diabetes testing supplies. She tells me she has not taken insulin since discharge on 4/8 and has not been testing BG, because does not have a working meter at home. She did find a very old meter and reports her BG was 450mg/dL. She had previously been receiving her insulin and other Rx medications (besides testing supplies -- these cannot be filled at our pharmacy d/t medicare restrictions) at our pharmacy downstairs through the [facility] program. Per pharmacy, she has been picking up her meds regularly.  SMBG Ranges (patient reported): As above, has not been testing. Has not been able to  new testing supplies at [facility] pharmacy.  Symptoms of low blood sugar? none.   Symptoms of high blood sugar? vision changes and fatigue  ACEi/ARB: Yes: losartan 50mg QD. Microalbumin is not < 30 mg/g.   Aspirin: Taking 81mg daily and denies side effects  Statin: Yes: atorvastatin and denies side effects    Current labs include:  BP Readings from Last 3 Encounters:    04/21/17 131/82   02/24/17 127/81   02/24/17 144/80     Today's Vitals: /86  Pulse 76  Wt 109 lb 6.4 oz (49.6 kg)  LMP  (LMP Unknown)  BMI 24.31 kg/m2     A1c per hospital records = 15.3%  Lab Results   Component Value Date    A1C 12.3 12/21/2016    A1C 11.3 10/07/2015    A1C 11.4 08/31/2015    A1C 12.8 11/04/2014    A1C 12.8 07/11/2014       Lab Results   Component Value Date    A1C 12.3 12/21/2016   .  Lab Results   Component Value Date    CHOL 218 11/14/2016     Lab Results   Component Value Date    TRIG 380 11/14/2016     Lab Results   Component Value Date    HDL 52 11/14/2016     Lab Results   Component Value Date    LDL 90 11/14/2016       Liver Function Studies -   Recent Labs   Lab Test  11/14/16   1004   PROTTOTAL  7.5   ALBUMIN  4.2   BILITOTAL  0.6   ALKPHOS  109   AST  14   ALT  29       Lab Results   Component Value Date    UCRR 22 11/14/2016    MICROL 12 11/14/2016    UMALCR 56.82 (H) 11/14/2016       Last Basic Metabolic Panel:  Lab Results   Component Value Date     04/21/2017      Lab Results   Component Value Date    POTASSIUM 3.5 04/21/2017     Lab Results   Component Value Date    CHLORIDE 98 04/21/2017     Lab Results   Component Value Date    BUN 11 04/21/2017     Lab Results   Component Value Date    CR 0.55 04/21/2017     GFR Estimate   Date Value Ref Range Status   04/21/2017 >90  Non  GFR Calc   >60 mL/min/1.7m2 Final   11/14/2016 62 >60 mL/min/1.7m2 Final   10/07/2015 63 >60 mL/min/1.7m2 Final     TSH   Date Value Ref Range Status   11/14/2016 0.35 (L) 0.40 - 5.00 mU/L Final   ]    Most Recent Immunizations   Administered Date(s) Administered     Influenza (High Dose) 3 valent vaccine 10/03/2013     Influenza (IIV3) 08/30/2016     Pneumococcal (PCV 13) 11/14/2016     Pneumococcal 23 valent 11/14/2008     TD (ADULT, 7+) 09/01/2011     ASSESSMENT:                                                    Current medications were reviewed today as discussed above.       Medication Adherence: needs improvement - see below    Diabetes: Needs Improvement. Patient is not meeting A1c goal of < 8%. Self monitoring of blood glucose is not at goal of being checked. Pt would benefit from:   Basal/Bolus Insulin (Humulin U500) :  Restart at 75% dose as prescribed by Mally Leung. Needs Rx transferred back to [facility] so she can pick this up today.  SMBG: Restart.       PLAN:                                                      1.  Humulin U500 today from pharmacy downstairs. Restart at dose: 70units before breakfast, 35units before lunch, 75units before dinner. Will titrate as appropriate.  2. One Touch Mini provided to patient in clinic today. She needs to find non- test strips at home to restart SMBG over the weekend. She needs to  new strips and testing supplies from [facility].    I spent 60 minutes with this patient today.  All changes were made via collaborative practice agreement with Eva Lockhart. A copy of the visit note was provided to the patient's primary care provider.     Will follow up in 3 weeks.    The patient was given a summary of these recommendations as an after visit summary.    Jovana Loo, Pharm.D., BCACP  Medication Therapy Management Pharmacist  [phone]

## 2017-04-28 NOTE — MR AVS SNAPSHOT
After Visit Summary   4/28/2017    Nathalie Harp    MRN: [id number]           Patient Information     Date Of Birth          1955        Visit Information        Provider Department      4/28/2017 10:30 AM Jovana Loo RPH [facility]        Today's Diagnoses     Type 2 diabetes mellitus without complication, with long-term current use of insulin (H)          Care Instructions    1. Check for One Touch test strips at home, to use with the meter I gave you today. [facility] is working on filling new One Touch testing supplies for you. Hopefully you can pick this up from them soon.    2. I had your Humulin U500 insulin switched back to the pharmacy downstairs. Please pick this up today and restart your insulin at 70units every morning with breakfast, 35units with lunch and 75units with dinner.    Return to clinic Wednesday, May 17th at 2pm. Please bring your blood sugar meter with you.    Jovana Loo, Pharm.D., [facility]  Medication Therapy Management Pharmacist  [phone]          Follow-ups after your visit        Your next 10 appointments already scheduled     May 17, 2017  2:00 PM CDT   Office Visit with Jovana Loo RPH   [facility] ([facility])    [address] [facility] [address] [phone]           Bring a current list of meds and any records pertaining to this visit.  For Physicals, please bring immunization records and any forms needing to be filled out.  Please arrive 10 minutes early to complete paperwork.              Who to contact     If you have questions or need follow up information about today's clinic visit or your schedule please contact [facility] directly at [phone].  Normal or non-critical lab and imaging results will be communicated to you by MyChart, letter or phone within 4 business days after the clinic has  "received the results. If you do not hear from us within 7 days, please contact the clinic through [employer] or phone. If you have a critical or abnormal lab result, we will notify you by phone as soon as possible.  Submit refill requests through [employer] or call your pharmacy and they will forward the refill request to us. Please allow 3 business days for your refill to be completed.          Additional Information About Your Visit        [employer] Information     [employer] lets you send messages to your doctor, view your test results, renew your prescriptions, schedule appointments and more. To sign up, go to [email or web address] . Click on \"Log in\" on the left side of the screen, which will take you to the Welcome page. Then click on \"Sign up Now\" on the right side of the page.     You will be asked to enter the access code listed below, as well as some personal information. Please follow the directions to create your username and password.     Your access code is: [email or web address]  Expires: 2017 11:05 AM     Your access code will  in 90 days. If you need help or a new code, please call your [place] clinic or [phone].        Care EveryWhere ID     This is your Care EveryWhere ID. This could be used by other organizations to access your [place] medical records  [id number]        Your Vitals Were     Pulse Last Period BMI (Body Mass Index)             76 (LMP Unknown) 24.31 kg/m2          Blood Pressure from Last 3 Encounters:   17 152/86   17 131/82   17 127/81    Weight from Last 3 Encounters:   17 109 lb 6.4 oz (49.6 kg)   17 100 lb (45.4 kg)   17 118 lb (53.5 kg)              Today, you had the following     No orders found for display       Primary Care Provider Office Phone # Fax #    Eva Lockhart [phone]       [facility] [address]  [facility] [address]        Goals        General    I will check my blood " sugars four times per day (pt-stated)     Notes - Note created  3/10/2014 10:41 AM by Priti Orozco RN    As of today's date 3/10/2014 goal is met at 26 - 50%.   Goal Status:  Active      I will continue to work with Diabetic Educator (pt-stated)     Notes - Note created  3/10/2014 10:42 AM by Priti Orozco RN    As of today's date 3/10/2014 goal is met at 26 - 50%.   Goal Status:  Active      I will take insulins as prescribed, including sliding scale novolog (pt-stated)     Notes - Note created  3/10/2014 10:43 AM by Priti Orozco RN    As of today's date 3/10/2014 goal is met at 51 - 75%.   Goal Status:  Active        Thank you!     Thank you for choosing [facility]  for your care. Our goal is always to provide you with excellent care. Hearing back from our patients is one way we can continue to improve our services. Please take a few minutes to complete the written survey that you may receive in the mail after your visit with us. Thank you!             Your Updated Medication List - Protect others around you: Learn how to safely use, store and throw away your medicines at www.disposemymeds.org.          This list is accurate as of: 4/28/17 11:34 AM.  Always use your most recent med list.                   Brand Name Dispense Instructions for use    albuterol 108 (90 BASE) MCG/ACT Inhaler    PROAIR HFA/PROVENTIL HFA/VENTOLIN HFA    3 Inhaler    Inhale 2 puffs into the lungs every 6 hours as needed for shortness of breath / dyspnea ((Ventolin or preferred albuterol equivalent))       aspirin 81 MG tablet      Take 1 tablet by mouth daily Reported on 4/21/2017       atorvastatin 40 MG tablet    LIPITOR    90 tablet    TAKE 1 TABLET (40 MG) BY MOUTH DAILY       blood glucose lancets standard    no brand specified    1 Box    Use to test blood sugar 3 times daily or as directed.       blood glucose monitoring meter device kit    no brand specified    1 kit    Use to test blood sugar 3 times  daily or as directed       blood glucose monitoring test strip    ONE TOUCH ULTRA    300 each    TEST BLOOD SUGARS 3 TIMES DAILY.       celecoxib 200 MG capsule    celeBREX    180 capsule    Take 1 capsule (200 mg) by mouth 2 times daily as needed for moderate pain       EPIPEN 2-MELVIN 0.3 MG/0.3ML injection   Generic drug:  EPINEPHrine     2 each    INJECT 0.3 MLS (0.3 MG) INTO THE MUSCLE ONCE AS NEEDED FOR ANAPHYLAXIS       insulin pen needle 31G X 5 MM    B-D U/F    200 each    Use twice daily as directed.       insulin reg HIGH CONC 500 UNIT/ML PEN soln    HumuLIN R U-500 KwikPen    120 mL    Inject 70 units every morning with breakfast, 35 Units every lunch time,  and 75 units every evening with dinner.       losartan 50 MG tablet    COZAAR    90 tablet    Take 1 tablet (50 mg) by mouth daily       meclizine 25 MG tablet    ANTIVERT    30 tablet    Take 1 tablet (25 mg) by mouth every 6 hours as needed for dizziness       omeprazole 20 MG CR capsule    priLOSEC    90 capsule    TAKE 1 CAPSULE (20 MG) BY MOUTH DAILY       order for DME     1 each    Equipment being ordered: bath tub grab bars

## 2017-04-28 NOTE — PATIENT INSTRUCTIONS
1. Check for One Touch test strips at home, to use with the meter I gave you today. [facility] is working on filling new One Touch testing supplies for you. Hopefully you can pick this up from them soon.    2. I had your Humulin U500 insulin switched back to the pharmacy downstairs. Please pick this up today and restart your insulin at 70units every morning with breakfast, 35units with lunch and 75units with dinner.    Return to clinic Wednesday, May 17th at 2pm. Please bring your blood sugar meter with you.    Jovana Loo, Pharm.D., [facility]  Medication Therapy Management Pharmacist  [phone]

## 2017-05-01 ENCOUNTER — TELEPHONE (OUTPATIENT)
Dept: INTERNAL MEDICINE | Facility: CLINIC | Age: 62
End: 2017-05-01

## 2017-05-01 NOTE — TELEPHONE ENCOUNTER
Forms received from: [facility]   Phone number listed: [phone]   Fax listed: [phone]  Date received: 05/01/2017  Form description: diabetic testing supplies  Once forms are completed, please return to [facility] via fax.  Form placed: in providers folder  Luciana Ariza

## 2017-05-17 ENCOUNTER — OFFICE VISIT (OUTPATIENT)
Dept: PHARMACY | Facility: CLINIC | Age: 62
End: 2017-05-17
Payer: COMMERCIAL

## 2017-05-17 VITALS
HEART RATE: 92 BPM | WEIGHT: 110.2 LBS | SYSTOLIC BLOOD PRESSURE: 129 MMHG | DIASTOLIC BLOOD PRESSURE: 83 MMHG | BODY MASS INDEX: 24.49 KG/M2

## 2017-05-17 DIAGNOSIS — E11.21 TYPE 2 DIABETES MELLITUS WITH DIABETIC NEPHROPATHY, WITH LONG-TERM CURRENT USE OF INSULIN (H): ICD-10-CM

## 2017-05-17 DIAGNOSIS — K21.9 GASTROESOPHAGEAL REFLUX DISEASE, ESOPHAGITIS PRESENCE NOT SPECIFIED: ICD-10-CM

## 2017-05-17 DIAGNOSIS — Z79.4 TYPE 2 DIABETES MELLITUS WITH DIABETIC NEPHROPATHY, WITH LONG-TERM CURRENT USE OF INSULIN (H): ICD-10-CM

## 2017-05-17 PROCEDURE — 99607 MTMS BY PHARM ADDL 15 MIN: CPT | Performed by: PHARMACIST

## 2017-05-17 PROCEDURE — 99606 MTMS BY PHARM EST 15 MIN: CPT | Performed by: PHARMACIST

## 2017-05-17 NOTE — PROGRESS NOTES
SUBJECTIVE/OBJECTIVE:                Nathalie Harp is a 62 year old female coming in for a follow-up visit for Medication Therapy Management.  She was referred to me from Eva Lockhart     Chief Complaint: Follow up from our visit on 4/28/2017.  Reports she is still covered under [employer].    Tobacco: No tobacco use   Alcohol: none    Medication Adherence: no issues reported by patient; states she may have forgot a few lunch time doses but is generally good a remembering to take her insulin.    Diabetes:  Pt currently taking Humulin U500 70units before breakfast, 35units before lunch, 75units before dinner. Reports she has been taking 100units QAM, 50-75units(can't remember exactly) before lunch, 100units before dinner -- but could only take 70units last night and ran out. She has not taken any insulin today, will be picking up from pharmacy downstairs today before she leaves.  SMBG Ranges (based on glucometer readings): Got a new Relion meter from [employer]; see readings below.  Symptoms of low blood sugar? none.   Symptoms of high blood sugar? polydipsia and fatigue, but also notes she hasn't been able to get to PT lately d/t transportation issues and feels this has contributed to fatigue.  Eye exam: due  Foot exam: up to date  ACEi/ARB: Yes: losartan 50mg QD. Microalbumin is not < 30 mg/g.   Aspirin: Taking 81mg daily and denies side effects  Statin: Yes: atorvastatin 40mg QD and denies side effects  Date FBG/ 2hours post Lunch/2hours post   5/17 357    5/15 326    5/14 572 567   5/13 338    5/12 390    5/10 441 550   5/9 430    5/8 485    5/7 460    5/6 361    5/5 403 431   5/4 371          Current labs include:  BP Readings from Last 3 Encounters:   04/28/17 152/86   04/21/17 131/82   02/24/17 127/81     Today's Vitals: /83  Pulse 92  Wt 110 lb 3.2 oz (50 kg)  LMP  (LMP Unknown)  BMI 24.49 kg/m2     Lab Results   Component Value Date    A1C 12.3 12/21/2016    A1C 11.3 10/07/2015    A1C  11.4 08/31/2015    A1C 12.8 11/04/2014    A1C 12.8 07/11/2014       Lab Results   Component Value Date    CHOL 218 11/14/2016     Lab Results   Component Value Date    TRIG 380 11/14/2016     Lab Results   Component Value Date    HDL 52 11/14/2016     Lab Results   Component Value Date    LDL 90 11/14/2016       Liver Function Studies -   Recent Labs   Lab Test  11/14/16   1004   PROTTOTAL  7.5   ALBUMIN  4.2   BILITOTAL  0.6   ALKPHOS  109   AST  14   ALT  29       Lab Results   Component Value Date    UCRR 22 11/14/2016    MICROL 12 11/14/2016    UMALCR 56.82 (H) 11/14/2016       Last Basic Metabolic Panel:  Lab Results   Component Value Date     04/21/2017      Lab Results   Component Value Date    POTASSIUM 3.5 04/21/2017     Lab Results   Component Value Date    CHLORIDE 98 04/21/2017     Lab Results   Component Value Date    BUN 11 04/21/2017     Lab Results   Component Value Date    CR 0.55 04/21/2017     GFR Estimate   Date Value Ref Range Status   04/21/2017 >90  Non  GFR Calc   >60 mL/min/1.7m2 Final   11/14/2016 62 >60 mL/min/1.7m2 Final   10/07/2015 63 >60 mL/min/1.7m2 Final     TSH   Date Value Ref Range Status   11/14/2016 0.35 (L) 0.40 - 5.00 mU/L Final     T4 Free   Date Value Ref Range Status   11/14/2016 1.28 0.70 - 1.85 ng/dL Final       Most Recent Immunizations   Administered Date(s) Administered     Influenza (High Dose) 3 valent vaccine 10/03/2013     Influenza (IIV3) 08/30/2016     Pneumococcal (PCV 13) 11/14/2016     Pneumococcal 23 valent 11/14/2008     TD (ADULT, 7+) 09/01/2011       ASSESSMENT:              Current medications were reviewed today as discussed above.      Medication Adherence: no issues identified    Diabetes: Needs Improvement. Patient is not meeting A1c goal of < 8%. Self monitoring of blood glucose is not at goal of fasting  mg/dL. Pt would benefit from Basal Insulin (Humulin U500) :  increase dose at each meal by 15% according to   guidelines.    PLAN:                  1. Increase Humulin U500 to 115units before breakfast, 60units before lunch and 115units before dinner. Updated Rx sent to pharmacy.  2. Continue to SMBG pre-prandial and with symptoms of hypoglycemia.    I spent 30 minutes with this patient today.  All changes were made via collaborative practice agreement with Eva Lockhart. A copy of the visit note was provided to the patient's primary care provider.     Will follow up in 1 month.    The patient was given a summary of these recommendations as an after visit summary.    Jovana Loo, Pharm.D., [facility]  Medication Therapy Management Pharmacist  [phone]

## 2017-05-17 NOTE — TELEPHONE ENCOUNTER
omeprazole (PRILOSEC) 20 MG CR capsule      Last Written Prescription Date: 1/4/17  Last Fill Quantity: 90,  # refills: 1   Last Office Visit with [facility], [facility] or [facility] prescribing provider: 4/21/17                                         Next 5 appointments (look out 90 days)     Jun 14, 2017  2:00 PM CDT   Office Visit with Jovana Loo RPH   [facility]    [address] [place] [address] [phone]

## 2017-05-17 NOTE — PATIENT INSTRUCTIONS
Recommendations from today's MTM visit:                                                        1. Increase your Humulin U500 to 115units before breakfast, 60units before lunch, 115units before dinner. I sent a new prescription down to the pharmacy so they should be able to get this filled for you today.    2. Test your blood sugar in the morning before breakfast (goal of 80-130mg/dL), 2 hours after a meal (goal less than 180mg/dL), and anytime you feel it may be low (feeling shaky, dizzy, sweaty, weak). If your blood sugar is below 70mg/dL, have something to eat to bring it up. If your blood sugar is NOT below 70mg/dL, take a 5 minute break, have a glass water, and let the feeling pass. This is your body adjusting to lower and more normal blood sugar levels.      Next MTM visit: Wednesday, June 14th at 2pm. Please bring your blood sugar meter with you.    To schedule another MTM appointment, please call the clinic directly or you may call the MTM scheduling line at [phone] or toll-free at [phone].     My Clinical Pharmacist's contact information:                                                      It was a pleasure seeing you today!  Please feel free to contact me with any questions or concerns you have.      Jovana Loo, Pharm.D., [facility]  Medication Therapy Management Pharmacist  [phone]      You may receive a survey about the MT services you received.  I would appreciate your feedback to help me serve you better in the future. Please fill it out and return it when you can. Your comments will be anonymous.      My healthcare goals:                                                      Diabetes Goals:    Home Monitoring of Blood Sugars:Fasting  mg/dL and 2 hours after a meal less than 180 mg/dL.    Hemoglobin A1C: Less than 8%. Yours is   Lab Results   Component Value Date    A1C 12.3 12/21/2016   .    Blood Pressure: Less than 140/90mmHg. Yours is /83  Pulse 92  Wt 110 lb  3.2 oz (50 kg)  LMP  (LMP Unknown)  BMI 24.49 kg/m2.    Cholesterol: You are taking atorvastatin to help decrease the risk of heart disease.

## 2017-05-17 NOTE — TELEPHONE ENCOUNTER
Prescription approved per [facility] Refill Protocol.    Syeda Gay RN  [facility]

## 2017-05-17 NOTE — MR AVS SNAPSHOT
After Visit Summary   5/17/2017    Nathalie Harp    MRN: [id number]           Patient Information     Date Of Birth          1955        Visit Information        Provider Department      5/17/2017 2:00 PM Jovana Loo, [facility]        Today's Diagnoses     Type 2 diabetes mellitus with diabetic nephropathy, with long-term current use of insulin (H)          Care Instructions    Recommendations from today's MT visit:                                                        1. Increase your Humulin U500 to 115units before breakfast, 60units before lunch, 115units before dinner. I sent a new prescription down to the pharmacy so they should be able to get this filled for you today.    2. Test your blood sugar in the morning before breakfast (goal of 80-130mg/dL), 2 hours after a meal (goal less than 180mg/dL), and anytime you feel it may be low (feeling shaky, dizzy, sweaty, weak). If your blood sugar is below 70mg/dL, have something to eat to bring it up. If your blood sugar is NOT below 70mg/dL, take a 5 minute break, have a glass water, and let the feeling pass. This is your body adjusting to lower and more normal blood sugar levels.      Next MTM visit: Wednesday, June 14th at 2pm. Please bring your blood sugar meter with you.    To schedule another MT appointment, please call the clinic directly or you may call the MTM scheduling line at [phone] or toll-free at [phone].     My Clinical Pharmacist's contact information:                                                      It was a pleasure seeing you today!  Please feel free to contact me with any questions or concerns you have.      Jovana Loo, Pharm.D., [facility]  Medication Therapy Management Pharmacist  [phone]      You may receive a survey about the [facility] services you received.  I would appreciate your feedback to help me serve you better in the future. Please fill it out  and return it when you can. Your comments will be anonymous.      My healthcare goals:                                                      Diabetes Goals:    Home Monitoring of Blood Sugars:Fasting  mg/dL and 2 hours after a meal less than 180 mg/dL.    Hemoglobin A1C: Less than 8%. Yours is   Lab Results   Component Value Date    A1C 12.3 12/21/2016   .    Blood Pressure: Less than 140/90mmHg. Yours is /83  Pulse 92  Wt 110 lb 3.2 oz (50 kg)  LMP  (LMP Unknown)  BMI 24.49 kg/m2.    Cholesterol: You are taking atorvastatin to help decrease the risk of heart disease.          Follow-ups after your visit        Your next 10 appointments already scheduled     Jun 14, 2017  2:00 PM CDT   Office Visit with Jovana Loo RPH   [facility] ([address] [address] [phone]           Bring a current list of meds and any records pertaining to this visit.  For Physicals, please bring immunization records and any forms needing to be filled out.  Please arrive 10 minutes early to complete paperwork.              Who to contact     If you have questions or need follow up information about today's clinic visit or your schedule please contact [facility] directly at [phone].  Normal or non-critical lab and imaging results will be communicated to you by MyChart, letter or phone within 4 business days after the clinic has received the results. If you do not hear from us within 7 days, please contact the clinic through [facility] or phone. If you have a critical or abnormal lab result, we will notify you by phone as soon as possible.  Submit refill requests through [employer] or call your pharmacy and they will forward the refill request to us. Please allow 3 business days for your refill to be completed.          Additional Information About Your Visit        MyChart Information  "    [employer] lets you send messages to your doctor, view your test results, renew your prescriptions, schedule appointments and more. To sign up, go to [email or web address] . Click on \"Log in\" on the left side of the screen, which will take you to the Welcome page. Then click on \"Sign up Now\" on the right side of the page.     You will be asked to enter the access code listed below, as well as some personal information. Please follow the directions to create your username and password.     Your access code is: [email or web address]  Expires: 2017 11:05 AM     Your access code will  in 90 days. If you need help or a new code, please call your [facility] clinic or [phone].        Care EveryWhere ID     This is your Care EveryWhere ID. This could be used by other organizations to access your [facility] medical records  [id number]        Your Vitals Were     Pulse Last Period BMI (Body Mass Index)             92 (LMP Unknown) 24.49 kg/m2          Blood Pressure from Last 3 Encounters:   17 129/83   17 152/86   17 131/82    Weight from Last 3 Encounters:   17 110 lb 3.2 oz (50 kg)   17 109 lb 6.4 oz (49.6 kg)   17 100 lb (45.4 kg)              Today, you had the following     No orders found for display         Today's Medication Changes          These changes are accurate as of: 17  2:38 PM.  If you have any questions, ask your nurse or doctor.               These medicines have changed or have updated prescriptions.        Dose/Directions    insulin reg HIGH CONC 500 UNIT/ML PEN soln   Commonly known as:  HumuLIN R U-500 KwikPen   This may have changed:  additional instructions   Used for:  Type 2 diabetes mellitus with diabetic nephropathy, with long-term current use of insulin (H)   Changed by:  Jovana Loo [facility]        Inject 115 units every morning with breakfast, 60Units every lunch time,  and 115units every evening with dinner.   Quantity:  120 mL "   Refills:  3            Where to get your medicines      These medications were sent to [facility] - [place], [place] - [address]  [address], [facility] [address]     Phone:  [phone]     insulin reg HIGH CONC 500 UNIT/ML PEN soln                Primary Care Provider Office Phone # Fax #    Eva Lockhart [phone]       [facility] [address]  [facility] [address]        Goals        General    I will check my blood sugars four times per day (pt-stated)     Notes - Note created  3/10/2014 10:41 AM by Priti Orozco, RN    As of today's date 3/10/2014 goal is met at 26 - 50%.   Goal Status:  Active      I will continue to work with Diabetic Educator (pt-stated)     Notes - Note created  3/10/2014 10:42 AM by Priti Orozco, RN    As of today's date 3/10/2014 goal is met at 26 - 50%.   Goal Status:  Active      I will take insulins as prescribed, including sliding scale novolog (pt-stated)     Notes - Note created  3/10/2014 10:43 AM by Priti Orozco, RN    As of today's date 3/10/2014 goal is met at 51 - 75%.   Goal Status:  Active        Thank you!     Thank you for choosing [facility]  for your care. Our goal is always to provide you with excellent care. Hearing back from our patients is one way we can continue to improve our services. Please take a few minutes to complete the written survey that you may receive in the mail after your visit with us. Thank you!             Your Updated Medication List - Protect others around you: Learn how to safely use, store and throw away your medicines at www.disposemymeds.org.          This list is accurate as of: 5/17/17  2:38 PM.  Always use your most recent med list.                   Brand Name Dispense Instructions for use    albuterol 108 (90 BASE) MCG/ACT Inhaler    PROAIR HFA/PROVENTIL HFA/VENTOLIN HFA    3 Inhaler    Inhale 2 puffs into  the lungs every 6 hours as needed for shortness of breath / dyspnea ((Ventolin or preferred albuterol equivalent))       aspirin 81 MG tablet      Take 1 tablet by mouth daily Reported on 4/21/2017       atorvastatin 40 MG tablet    LIPITOR    90 tablet    TAKE 1 TABLET (40 MG) BY MOUTH DAILY       blood glucose lancets standard    no brand specified    1 Box    Use to test blood sugar 3 times daily or as directed.       blood glucose monitoring meter device kit    no brand specified    1 kit    Use to test blood sugar 3 times daily or as directed       blood glucose monitoring test strip    ONE TOUCH ULTRA    300 each    TEST BLOOD SUGARS 3 TIMES DAILY.       celecoxib 200 MG capsule    celeBREX    180 capsule    Take 1 capsule (200 mg) by mouth 2 times daily as needed for moderate pain       EPIPEN 2-MELVIN 0.3 MG/0.3ML injection   Generic drug:  EPINEPHrine     2 each    INJECT 0.3 MLS (0.3 MG) INTO THE MUSCLE ONCE AS NEEDED FOR ANAPHYLAXIS       insulin pen needle 31G X 5 MM    B-D U/F    200 each    Use twice daily as directed.       insulin reg HIGH CONC 500 UNIT/ML PEN soln    HumuLIN R U-500 KwikPen    120 mL    Inject 115 units every morning with breakfast, 60Units every lunch time,  and 115units every evening with dinner.       losartan 50 MG tablet    COZAAR    90 tablet    Take 1 tablet (50 mg) by mouth daily       meclizine 25 MG tablet    ANTIVERT    30 tablet    Take 1 tablet (25 mg) by mouth every 6 hours as needed for dizziness       omeprazole 20 MG CR capsule    priLOSEC    90 capsule    TAKE 1 CAPSULE (20 MG) BY MOUTH DAILY       order for DME     1 each    Equipment being ordered: bath tub grab bars

## 2017-06-08 ENCOUNTER — TELEPHONE (OUTPATIENT)
Dept: FAMILY MEDICINE | Facility: CLINIC | Age: 62
End: 2017-06-08

## 2017-06-08 NOTE — TELEPHONE ENCOUNTER
Reason for Call:  Form, our goal is to have forms completed with 72 hours, however, some forms may require a visit or additional information.    Type of letter, form or note:  medical    Who is the form from?: [facility] medical Pads 20 [employer]    Where did the form come from: form was faxed in    What clinic location was the form placed at?: [facility]    Where the form was placed: 's Box: Oneil Morse MD    What number is listed as a contact on the form?: [phone]       Additional comments: [facility] medical Pads 20 [address]    Call taken on 6/8/2017 at 10:21 AM by Bijal Campa

## 2017-06-14 ENCOUNTER — OFFICE VISIT (OUTPATIENT)
Dept: PHARMACY | Facility: CLINIC | Age: 62
End: 2017-06-14
Payer: COMMERCIAL

## 2017-06-14 VITALS
SYSTOLIC BLOOD PRESSURE: 124 MMHG | BODY MASS INDEX: 23.38 KG/M2 | DIASTOLIC BLOOD PRESSURE: 77 MMHG | WEIGHT: 105.2 LBS | HEART RATE: 92 BPM

## 2017-06-14 DIAGNOSIS — Z79.4 TYPE 2 DIABETES MELLITUS WITH DIABETIC NEPHROPATHY, WITH LONG-TERM CURRENT USE OF INSULIN (H): Primary | ICD-10-CM

## 2017-06-14 DIAGNOSIS — E11.21 TYPE 2 DIABETES MELLITUS WITH DIABETIC NEPHROPATHY, WITH LONG-TERM CURRENT USE OF INSULIN (H): Primary | ICD-10-CM

## 2017-06-14 PROCEDURE — 99607 MTMS BY PHARM ADDL 15 MIN: CPT | Performed by: PHARMACIST

## 2017-06-14 PROCEDURE — 99606 MTMS BY PHARM EST 15 MIN: CPT | Performed by: PHARMACIST

## 2017-06-14 RX ORDER — PIOGLITAZONEHYDROCHLORIDE 30 MG/1
30 TABLET ORAL DAILY
Qty: 30 TABLET | Refills: 1 | Status: SHIPPED | OUTPATIENT
Start: 2017-06-14 | End: 2017-07-19 | Stop reason: DRUGHIGH

## 2017-06-14 NOTE — MR AVS SNAPSHOT
After Visit Summary   6/14/2017    Nathalie Harp    MRN: [id number]           Patient Information     Date Of Birth          1955        Visit Information        Provider Department      6/14/2017 2:00 PM Jovana Loo, [facility] MTM        Today's Diagnoses     Type 2 diabetes mellitus with diabetic nephropathy, with long-term current use of insulin (H)    -  1      Care Instructions    Recommendations from today's MTM visit:                                                        1. Continue taking your current doses of insulin. Work on remembering your dose with lunch.    2. Start pioglitazone 30mg 1 tablet daily. We discussed that this medication may help improve insulin resistance. You have been on lower doses of this medication in the past. We'll start with the 30mg tablet today, and if no improvement to the blood sugar when you come back to see Dr. Lockhart on the 3rd, we'll increase the dose to 45mg tablets.    3. No labs today - I am going to wait until you see Dr. Lockhart, in case she wants to check anything else.    Next MTM visit: Wednesday, July 19th at 1pm    To schedule another MTM appointment, please call the clinic directly or you may call the MTM scheduling line at [phone] or toll-free at [phone].     My Clinical Pharmacist's contact information:                                                      It was a pleasure seeing you today!  Please feel free to contact me with any questions or concerns you have.      Jovana Loo, Pharm.D., [facility]  Medication Therapy Management Pharmacist  [phone]    You may receive a survey about the MTM services you received.  I would appreciate your feedback to help me serve you better in the future. Please fill it out and return it when you can. Your comments will be anonymous.      My healthcare goals:                                                      Diabetes Goals:    Home  Monitoring of Blood Sugars:Fasting  mg/dL and 2 hours after a meal less than 180 mg/dL.    Hemoglobin A1C: Less than 8%. Yours is   Lab Results   Component Value Date    A1C 12.3 12/21/2016   .    Blood Pressure: Less than 140/90mmHg. Yours is /77  Pulse 92  Wt 105 lb 3.2 oz (47.7 kg)  LMP  (LMP Unknown)  BMI 23.38 kg/m2.    Things you can do to help lower your blood sugars:    Diet: 3 meals and 1-2 snacks per day with 45-60 grams of carbohydrates per meal and 15-30 grams of carbohydrates per snack. Try to fill your plate at least half-full with vegetables, fill one-quarter full with lean meats or protein, and also make sure you get at least some carbohydrate with every meal.    Exercise: 30 minutes per day of anything that will increase your heart rate and make you break a sweat! Gardening, walking, cleaning the house, changing the oil in your car, etc. If you feel like 30 minutes per day is too much, start small. Even lifting canned foods or working your arms with a resistance band in front of the TV can help.                  Follow-ups after your visit        Your next 10 appointments already scheduled     Jul 03, 2017  9:40 AM CDT   [facility]   [facility]    [address] [place] [id number]   [phone]           Bring a current list of meds and any records pertaining to this visit.  For Physicals, please bring immunization records and any forms needing to be filled out.  Please arrive 10 minutes early to complete paperwork.            Jul 19, 2017  1:00 PM CDT   Office Visit with Jovana Loo HÉCTOR   [facility] ([facility])    [address] [place] [id number]   [phone]           Bring a current list of meds and any records pertaining to this visit.  For Physicals, please bring  "immunization records and any forms needing to be filled out.  Please arrive 10 minutes early to complete paperwork.              Who to contact     If you have questions or need follow up information about today's clinic visit or your schedule please contact [facility] directly at [phone].  Normal or non-critical lab and imaging results will be communicated to you by MyChart, letter or phone within 4 business days after the clinic has received the results. If you do not hear from us within 7 days, please contact the clinic through MyChart or phone. If you have a critical or abnormal lab result, we will notify you by phone as soon as possible.  Submit refill requests through [employer] [place] or call your pharmacy and they will forward the refill request to us. Please allow 3 business days for your refill to be completed.          Additional Information About Your Visit        [facility] Information     [facility] [place] lets you send messages to your doctor, view your test results, renew your prescriptions, schedule appointments and more. To sign up, go to [email or web address] . Click on \"Log in\" on the left side of the screen, which will take you to the Welcome page. Then click on \"Sign up Now\" on the right side of the page.     You will be asked to enter the access code listed below, as well as some personal information. Please follow the directions to create your username and password.     Your access code is: [email or web address]  Expires: 9/3/2017  2:51 PM     Your access code will  in 90 days. If you need help or a new code, please call your [facility] clinic or [phone].        Care EveryWhere ID     This is your Care EveryWhere ID. This could be used by other organizations to access your [facility] medical records  [id number]        Your Vitals Were     Pulse Last Period BMI (Body Mass Index)             92 (LMP Unknown) 23.38 kg/m2          Blood Pressure from Last 3 Encounters:   17 124/77 "   05/17/17 129/83   04/28/17 152/86    Weight from Last 3 Encounters:   06/14/17 105 lb 3.2 oz (47.7 kg)   05/17/17 110 lb 3.2 oz (50 kg)   04/28/17 109 lb 6.4 oz (49.6 kg)              Today, you had the following     No orders found for display         Today's Medication Changes          These changes are accurate as of: 6/14/17  2:40 PM.  If you have any questions, ask your nurse or doctor.               Start taking these medicines.        Dose/Directions    pioglitazone 30 MG tablet   Commonly known as:  ACTOS   Used for:  Type 2 diabetes mellitus with diabetic nephropathy, with long-term current use of insulin (H)        Dose:  30 mg   Take 1 tablet (30 mg) by mouth daily   Quantity:  30 tablet   Refills:  1            Where to get your medicines      These medications were sent to [facility] [address]  [address], [facility] [address]     Phone:  [phone]     pioglitazone 30 MG tablet                Primary Care Provider Office Phone # Fax #    Eva Lockhart [phone]       [facility] [address] [facility] [address]        Goals        General    I will check my blood sugars four times per day (pt-stated)     Notes - Note created  3/10/2014 10:41 AM by Priti Orozco RN    As of today's date 3/10/2014 goal is met at 26 - 50%.   Goal Status:  Active      I will continue to work with Diabetic Educator (pt-stated)     Notes - Note created  3/10/2014 10:42 AM by Priti Orozco RN    As of today's date 3/10/2014 goal is met at 26 - 50%.   Goal Status:  Active      I will take insulins as prescribed, including sliding scale novolog (pt-stated)     Notes - Note created  3/10/2014 10:43 AM by Priti Orozco RN    As of today's date 3/10/2014 goal is met at 51 - 75%.   Goal Status:  Active        Thank you!     Thank you for choosing [facility]  for your care. Our  goal is always to provide you with excellent care. Hearing back from our patients is one way we can continue to improve our services. Please take a few minutes to complete the written survey that you may receive in the mail after your visit with us. Thank you!             Your Updated Medication List - Protect others around you: Learn how to safely use, store and throw away your medicines at www.disposemymeds.org.          This list is accurate as of: 6/14/17  2:40 PM.  Always use your most recent med list.                   Brand Name Dispense Instructions for use    albuterol 108 (90 BASE) MCG/ACT Inhaler    PROAIR HFA/PROVENTIL HFA/VENTOLIN HFA    3 Inhaler    Inhale 2 puffs into the lungs every 6 hours as needed for shortness of breath / dyspnea ((Ventolin or preferred albuterol equivalent))       aspirin 81 MG tablet      Take 1 tablet by mouth daily Reported on 4/21/2017       atorvastatin 40 MG tablet    LIPITOR    90 tablet    TAKE 1 TABLET (40 MG) BY MOUTH DAILY       blood glucose lancets standard    no brand specified    1 Box    Use to test blood sugar 3 times daily or as directed.       blood glucose monitoring meter device kit    no brand specified    1 kit    Use to test blood sugar 3 times daily or as directed       blood glucose monitoring test strip    ONE TOUCH ULTRA    300 each    TEST BLOOD SUGARS 3 TIMES DAILY.       celecoxib 200 MG capsule    celeBREX    180 capsule    Take 1 capsule (200 mg) by mouth 2 times daily as needed for moderate pain       EPIPEN 2-MELVIN 0.3 MG/0.3ML injection   Generic drug:  EPINEPHrine     2 each    INJECT 0.3 MLS (0.3 MG) INTO THE MUSCLE ONCE AS NEEDED FOR ANAPHYLAXIS       insulin pen needle 31G X 5 MM    B-D U/F    200 each    Use twice daily as directed.       insulin reg HIGH CONC 500 UNIT/ML PEN soln    HumuLIN R U-500 KwikPen    120 mL    Inject 115 units every morning with breakfast, 60Units every lunch time,  and 115units every evening with dinner.        losartan 50 MG tablet    COZAAR    90 tablet    Take 1 tablet (50 mg) by mouth daily       meclizine 25 MG tablet    ANTIVERT    30 tablet    Take 1 tablet (25 mg) by mouth every 6 hours as needed for dizziness       omeprazole 20 MG CR capsule    priLOSEC    90 capsule    TAKE ONE CAPSULE BY MOUTH EVERY DAY       order for DME     1 each    Equipment being ordered: bath tub grab bars       pioglitazone 30 MG tablet    ACTOS    30 tablet    Take 1 tablet (30 mg) by mouth daily

## 2017-06-14 NOTE — PATIENT INSTRUCTIONS
Recommendations from today's MTM visit:                                                        1. Continue taking your current doses of insulin. Work on remembering your dose with lunch.    2. Start pioglitazone 30mg 1 tablet daily. We discussed that this medication may help improve insulin resistance. You have been on lower doses of this medication in the past. We'll start with the 30mg tablet today, and if no improvement to the blood sugar when you come back to see Dr. Lockhart on the 3rd, we'll increase the dose to 45mg tablets.    3. No labs today - I am going to wait until you see Dr. Lockhart, in case she wants to check anything else.    Next MTM visit: Wednesday, July 19th at 1pm    To schedule another MTM appointment, please call the clinic directly or you may call the MTM scheduling line at [phone] or toll-free at [phone].     My Clinical Pharmacist's contact information:                                                      It was a pleasure seeing you today!  Please feel free to contact me with any questions or concerns you have.      Jovana Loo, Pharm.D., [facility]  Medication Therapy Management Pharmacist  [phone]    You may receive a survey about the MTM services you received.  I would appreciate your feedback to help me serve you better in the future. Please fill it out and return it when you can. Your comments will be anonymous.      My healthcare goals:                                                      Diabetes Goals:    Home Monitoring of Blood Sugars:Fasting  mg/dL and 2 hours after a meal less than 180 mg/dL.    Hemoglobin A1C: Less than 8%. Yours is   Lab Results   Component Value Date    A1C 12.3 12/21/2016   .    Blood Pressure: Less than 140/90mmHg. Yours is /77  Pulse 92  Wt 105 lb 3.2 oz (47.7 kg)  LMP  (LMP Unknown)  BMI 23.38 kg/m2.    Things you can do to help lower your blood sugars:    Diet: 3 meals and 1-2 snacks per day with 45-60 grams  of carbohydrates per meal and 15-30 grams of carbohydrates per snack. Try to fill your plate at least half-full with vegetables, fill one-quarter full with lean meats or protein, and also make sure you get at least some carbohydrate with every meal.    Exercise: 30 minutes per day of anything that will increase your heart rate and make you break a sweat! Gardening, walking, cleaning the house, changing the oil in your car, etc. If you feel like 30 minutes per day is too much, start small. Even lifting canned foods or working your arms with a resistance band in front of the TV can help.

## 2017-06-14 NOTE — PROGRESS NOTES
SUBJECTIVE/OBJECTIVE:                Nathalie Harp is a 62 year old female coming in for a follow-up visit for Medication Therapy Management.  She was referred to me from Eva Lockhart     Chief Complaint: Follow up from our visit on 5/17/2017. Reports continuing issues with not feeling good - really congested, sinus pain/nasal passage pain, having trouble swallowing and also reduced appetite, low energy levels. Has appointment scheduled with  7/3 for further assessment.    Tobacco: No tobacco use   Alcohol: none    Medication Adherence: no issues reported by patient; states she may have forgot a few lunch time doses but is generally good a remembering to take her insulin.    Diabetes:  Pt currently taking Humulin U500 115units before breakfast, 60units before lunch, 115units before dinner. States she has been taking 100-115units QAM, 70-75units Qlunch, 115units Qdinner. I checked with the pharmacy and she received a one month supply of her insulin 5/17/17, so should be due for a refill in about 3 days.  SMBG Ranges (based on glucometer readings): 14d avg (n11) 430  Symptoms of low blood sugar? none.   Symptoms of high blood sugar? fatigue.  Eye exam: due  Foot exam: up to date  ACEi/ARB: Yes: losartan 50mg QD. Microalbumin is not < 30 mg/g.   Aspirin: Taking 81mg daily and denies side effects  Statin: Yes: atorvastatin 40mg QD and denies side effects  Diet/Exercise: Not active currently  Date FBG/ 2hours post   6/14 508   6/13 462   6/12 508   6/11 449   6/9 446   6/8 488   6/7 559   6/4 422   6/3 328   6/2 281   6/1 277   5/31 283       Current labs include:  BP Readings from Last 3 Encounters:   05/17/17 129/83   04/28/17 152/86   04/21/17 131/82     Wt Readings from Last 3 Encounters:   06/14/17 105 lb 3.2 oz (47.7 kg)   05/17/17 110 lb 3.2 oz (50 kg)   04/28/17 109 lb 6.4 oz (49.6 kg)       Today's Vitals: /77  Pulse 92  Wt 105 lb 3.2 oz (47.7 kg)  LMP  (LMP Unknown)  BMI 23.38 kg/m2      Lab Results   Component Value Date    A1C 12.3 12/21/2016    A1C 11.3 10/07/2015    A1C 11.4 08/31/2015    A1C 12.8 11/04/2014    A1C 12.8 07/11/2014       Lab Results   Component Value Date    CHOL 218 11/14/2016     Lab Results   Component Value Date    TRIG 380 11/14/2016     Lab Results   Component Value Date    HDL 52 11/14/2016     Lab Results   Component Value Date    LDL 90 11/14/2016       Liver Function Studies -   Recent Labs   Lab Test  11/14/16   1004   PROTTOTAL  7.5   ALBUMIN  4.2   BILITOTAL  0.6   ALKPHOS  109   AST  14   ALT  29       Lab Results   Component Value Date    UCRR 22 11/14/2016    MICROL 12 11/14/2016    UMALCR 56.82 (H) 11/14/2016       Last Basic Metabolic Panel:  Lab Results   Component Value Date     04/21/2017      Lab Results   Component Value Date    POTASSIUM 3.5 04/21/2017     Lab Results   Component Value Date    CHLORIDE 98 04/21/2017     Lab Results   Component Value Date    BUN 11 04/21/2017     Lab Results   Component Value Date    CR 0.55 04/21/2017     GFR Estimate   Date Value Ref Range Status   04/21/2017 >90  Non  GFR Calc   >60 mL/min/1.7m2 Final   11/14/2016 62 >60 mL/min/1.7m2 Final   10/07/2015 63 >60 mL/min/1.7m2 Final     TSH   Date Value Ref Range Status   11/14/2016 0.35 (L) 0.40 - 5.00 mU/L Final     T4 Free   Date Value Ref Range Status   11/14/2016 1.28 0.70 - 1.85 ng/dL Final       Most Recent Immunizations   Administered Date(s) Administered     Influenza (High Dose) 3 valent vaccine 10/03/2013     Influenza (IIV3) 08/30/2016     Pneumococcal (PCV 13) 11/14/2016     Pneumococcal 23 valent 11/14/2008     TD (ADULT, 7+) 09/01/2011     ASSESSMENT:              Current medications were reviewed today as discussed above.      Medication Adherence: no issues identified    Diabetes: Needs Improvement. Patient is not meeting A1c goal of < 8%. Self monitoring of blood glucose is not at goal of fasting  mg/dL. I have questioned her  compliance, but according to the patient and pharmacy records she is taking her insulin regularly. This, and her history indicate severe insulin resistance. She has been on pioglitazone in the past (2014), but only 15mg and was never titrated up. She may benefit from rechallenge of pioglitazone with titration if indicated. Will wait to increase insulin dose to see if pioglitazone helps BG's. Additionally, will wait to check any labs today until PCP visit in 2 weeks.     PLAN:                  1. Start pioglitazone 30mg QD. Will plan to have her on this dose until 7/3 visit with CS, then increase to 45mg QD if no improvement to BG. Reviewed appropriate use, benefits, risks and monitoring at length. Rx sent to pharmacy.  2. Continue to SMBG pre-prandial and with symptoms of hypoglycemia.    I spent 60 minutes with this patient today.  All changes were made via collaborative practice agreement with Eva Lockhart. A copy of the visit note was provided to the patient's primary care provider.     Will follow up in 1 month, she is seeing PCP in 2 weeks.    The patient was given a summary of these recommendations as an after visit summary.    Jovana Loo, Pharm.D., [facility]  Medication Therapy Management Pharmacist  [phone]

## 2017-06-20 ENCOUNTER — TELEPHONE (OUTPATIENT)
Dept: FAMILY MEDICINE | Facility: CLINIC | Age: 62
End: 2017-06-20

## 2017-06-20 NOTE — TELEPHONE ENCOUNTER
Panel Management Review      Patient has the following on her problem list:     Asthma review     ACT Total Scores 2/24/2017   ACT TOTAL SCORE (Goal Greater than or Equal to 20) 12   In the past 12 months, how many times did you visit the emergency room for your asthma without being admitted to the hospital? 0   In the past 12 months, how many times were you hospitalized overnight because of your asthma? 0      1. Is Asthma diagnosis on the Problem List? Yes    2. Is Asthma listed on Health Maintenance? Yes    3. Patient is due for:  none      IVD   ASA: Passed    Last LDL:    Lab Results   Component Value Date    CHOL 218 11/14/2016     Lab Results   Component Value Date    HDL 52 11/14/2016     Lab Results   Component Value Date    LDL 90 11/14/2016     Lab Results   Component Value Date    TRIG 380 11/14/2016        Lab Results   Component Value Date    CHOLHDLRATIO 3.2 08/31/2015        Is the patient on a Statin? YES   Is the patient on Aspirin? YES                  Medications     HMG CoA Reductase Inhibitors    atorvastatin (LIPITOR) 40 MG tablet    Salicylates    aspirin 81 MG tablet          Last three blood pressure readings:  BP Readings from Last 3 Encounters:   06/14/17 124/77   05/17/17 129/83   04/28/17 152/86        Tobacco History:     History   Smoking Status     Never Smoker   Smokeless Tobacco     Never Used         Composite cancer screening  Chart review shows that this patient is due/due soon for the following Mammogram  Summary:    Patient is due/failing the following:   MAMMOGRAM    Action needed:   mammogram    Type of outreach:    mammogram -patient declined     Questions for provider review:    None                                                                                                                                    Randi Valle MA       Chart routed to Care Team .

## 2017-06-22 ENCOUNTER — TELEPHONE (OUTPATIENT)
Dept: FAMILY MEDICINE | Facility: CLINIC | Age: 62
End: 2017-06-22

## 2017-06-22 NOTE — TELEPHONE ENCOUNTER
Reason for Call:  Form, our goal is to have forms completed with 72 hours, however, some forms may require a visit or additional information.    Type of letter, form or note:  medical    Who is the form from?: [facility]     Where did the form come from: form was faxed in    What clinic location was the form placed at?: [facility]    Where the form was placed: 's Box: Oneil Morse MD    What number is listed as a contact on the form?: Fax # [phone]       Additional comments: [employer] (Initial Date: 6/21/17)    Call taken on 6/22/2017 at 11:04 AM by Kyle Fenton

## 2017-07-03 ENCOUNTER — OFFICE VISIT (OUTPATIENT)
Dept: FAMILY MEDICINE | Facility: CLINIC | Age: 62
End: 2017-07-03
Payer: MEDICARE

## 2017-07-03 VITALS
OXYGEN SATURATION: 96 % | DIASTOLIC BLOOD PRESSURE: 81 MMHG | TEMPERATURE: 97.9 F | SYSTOLIC BLOOD PRESSURE: 130 MMHG | HEART RATE: 88 BPM | BODY MASS INDEX: 22.87 KG/M2 | HEIGHT: 57 IN | WEIGHT: 106 LBS

## 2017-07-03 DIAGNOSIS — E11.21 TYPE 2 DIABETES MELLITUS WITH DIABETIC NEPHROPATHY, WITH LONG-TERM CURRENT USE OF INSULIN (H): ICD-10-CM

## 2017-07-03 DIAGNOSIS — L97.511 TOE ULCER, RIGHT, LIMITED TO BREAKDOWN OF SKIN (H): ICD-10-CM

## 2017-07-03 DIAGNOSIS — Z79.4 TYPE 2 DIABETES MELLITUS WITH DIABETIC NEPHROPATHY, WITH LONG-TERM CURRENT USE OF INSULIN (H): ICD-10-CM

## 2017-07-03 DIAGNOSIS — J01.01 ACUTE RECURRENT MAXILLARY SINUSITIS: ICD-10-CM

## 2017-07-03 DIAGNOSIS — L73.2 RIGHT AXILLARY HIDRADENITIS: ICD-10-CM

## 2017-07-03 LAB
HBA1C MFR BLD: 13.8 % (ref 4.3–6)
INSULIN SERPL-ACNC: 5 MU/L (ref 3–25)

## 2017-07-03 PROCEDURE — 83036 HEMOGLOBIN GLYCOSYLATED A1C: CPT | Performed by: INTERNAL MEDICINE

## 2017-07-03 PROCEDURE — 36415 COLL VENOUS BLD VENIPUNCTURE: CPT | Performed by: INTERNAL MEDICINE

## 2017-07-03 PROCEDURE — 86256 FLUORESCENT ANTIBODY TITER: CPT | Mod: 90 | Performed by: INTERNAL MEDICINE

## 2017-07-03 PROCEDURE — 83525 ASSAY OF INSULIN: CPT | Performed by: INTERNAL MEDICINE

## 2017-07-03 PROCEDURE — 99214 OFFICE O/P EST MOD 30 MIN: CPT | Performed by: INTERNAL MEDICINE

## 2017-07-03 PROCEDURE — 99000 SPECIMEN HANDLING OFFICE-LAB: CPT | Performed by: INTERNAL MEDICINE

## 2017-07-03 PROCEDURE — 86255 FLUORESCENT ANTIBODY SCREEN: CPT | Mod: 90 | Performed by: INTERNAL MEDICINE

## 2017-07-03 ASSESSMENT — PAIN SCALES - GENERAL: PAINLEVEL: NO PAIN (0)

## 2017-07-03 NOTE — PATIENT INSTRUCTIONS
FM[facility]: [facility] [phone]   [email or web address]  --- ENT referral     [facility] [phone]  -- EYE CLINIC    PODIATRY ([facility]) will call you to schedule    Change your insulin to 135 Units twice daily.       See Jovana Loo in 3 weeks (late July)    See me in 6 weeks (Mid to Late August)

## 2017-07-03 NOTE — MR AVS SNAPSHOT
After Visit Summary   7/3/2017    Nathalie Harp    MRN: [id number]           Patient Information     Date Of Birth          1955        Visit Information        Provider Department      7/3/2017 9:40 AM Eva Lockhart MD [facility]        Today's Diagnoses     Uncontrolled type 2 diabetes mellitus without complication, with long-term current use of insulin (H)    -  1    Acute recurrent maxillary sinusitis        Right axillary hidradenitis        Type 2 diabetes mellitus with diabetic nephropathy, with long-term current use of insulin (H)        Toe ulcer, right, limited to breakdown of skin (H)          Care Instructions    FM[facility]: [facility] [phone]   [email or web address]  --- ENT referral     [facility] [phone]  -- EYE CLINIC    PODIATRY (Foot Doctor) will call you to schedule    Change your insulin to 135 Units twice daily.       See Jovana Loo in 3 weeks (late July)    See me in 6 weeks (Mid to Late August)              Follow-ups after your visit        Additional Services     OPHTHALMOLOGY ADULT REFERRAL       Your provider has referred you to: FM[facility]: [facility] [phone]   [email or web address]    Please be aware that coverage of these services is subject to the terms and limitations of your health insurance plan.  Call member services at your health plan with any benefit or coverage questions.      Please bring the following with you to your appointment:    (1) Any X-Rays, CTs or MRIs which have been performed.  Contact the facility where they were done to arrange for  prior to your scheduled appointment.    (2) List of current medications  (3) This referral request   (4) Any documents/labs given to you for this referral            ORTHO  REFERRAL       [facility] is referring you to the Orthopedic   Services at [facility].       The  Representative will assist you in the coordination of your Orthopedic and Musculoskeletal Care as prescribed by your physician.    The  Representative will call you within 1 business day to help schedule your appointment, or you may contact the  Representative at:    All areas ~ [phone]     Type of Referral : [facility]       Timeframe requested: Within 2 weeks    Coverage of these services is subject to the terms and limitations of your health insurance plan.  Please call member services at your health plan with any benefit or coverage questions.      If X-rays, CT or MRI's have been performed, please contact the facility where they were done to arrange for , prior to your scheduled appointment.  Please bring this referral request to your appointment and present it to your specialist.            OTOLARYNGOLOGY REFERRAL       Your provider has referred you to: FM[facility]: [facility] [place] [phone]   [email or web address]    Please be aware that coverage of these services is subject to the terms and limitations of your health insurance plan.  Call member services at your health plan with any benefit or coverage questions.      Please bring the following with you to your appointment:    (1) Any X-Rays, CTs or MRIs which have been performed.  Contact the facility where they were done to arrange for  prior to your scheduled appointment.   (2) List of current medications  (3) This referral request   (4) Any documents/labs given to you for this referral                  Follow-up notes from your care team     Return in about 6 weeks (around 8/14/2017) for diabetes follow up.      Your next 10 appointments already scheduled     Jul 19, 2017  1:00 PM CDT   [facility]    [address] [id number] [phone]           Bring a current list of meds and any records pertaining to this visit.  For Physicals, please bring immunization records and any forms needing to be filled out.  Please arrive 10 minutes early to complete paperwork.              Who to contact     If you have questions or need follow up information about today's clinic visit or your schedule please contact [facility] directly at [phone].  Normal or non-critical lab and imaging results will be communicated to you by MyChart, letter or phone within 4 business days after the clinic has received the results. If you do not hear from us within 7 days, please contact the clinic through [facility] or phone. If you have a critical or abnormal lab result, we will notify you by phone as soon as possible.  Submit refill requests through [employer] or call your pharmacy and they will forward the refill request to us. Please allow 3 business days for your refill to be completed.          Additional Information About Your Visit        [facility] Information     [employer] lets you send messages to your doctor, view your test results, renew your prescriptions, schedule appointments and more. To sign up, go to [email or web address] . Click on \"Log in\" on the left side of the screen, which will take you to the Welcome page. Then click on \"Sign up Now\" on the right side of the page.     You will be asked to enter the access code listed below, as well as some personal information. Please follow the directions to create your username and password.     Your access code is: [email or web address]  Expires: 9/3/2017  2:51 PM     Your access code will  in 90 days. If you need help or a new code, please call your [facility] or [phone].        Care EveryWhere ID     This is your Care EveryWhere ID. This could be used by other organizations to " "access your [place] medical records  [id number]        Your Vitals Were     Pulse Temperature Height Last Period Pulse Oximetry BMI (Body Mass Index)    88 97.9  F (36.6  C) (Oral) 4' 8.5\" (1.435 m) (LMP Unknown) 96% 23.35 kg/m2       Blood Pressure from Last 3 Encounters:   07/03/17 130/81   06/14/17 124/77   05/17/17 129/83    Weight from Last 3 Encounters:   07/03/17 106 lb (48.1 kg)   06/14/17 105 lb 3.2 oz (47.7 kg)   05/17/17 110 lb 3.2 oz (50 kg)              We Performed the Following     Antineutrophil cytoplasmic Elinor IgG     Hemoglobin A1c     OPHTHALMOLOGY ADULT REFERRAL     ORTHO  REFERRAL     OTOLARYNGOLOGY REFERRAL          Today's Medication Changes          These changes are accurate as of: 7/3/17 10:21 AM.  If you have any questions, ask your nurse or doctor.               These medicines have changed or have updated prescriptions.        Dose/Directions    insulin reg HIGH CONC 500 UNIT/ML PEN soln   Commonly known as:  HumuLIN R U-500 KwikPen   This may have changed:  additional instructions   Used for:  Type 2 diabetes mellitus with diabetic nephropathy, with long-term current use of insulin (H)   Changed by:  Eva Lockhart MD        Inject 135 units every morning with breakfast,  and 135units every evening with dinner.   Quantity:  120 mL   Refills:  3            Where to get your medicines      Some of these will need a paper prescription and others can be bought over the counter.  Ask your nurse if you have questions.     Bring a paper prescription for each of these medications     insulin reg HIGH CONC 500 UNIT/ML PEN soln                Primary Care Provider Office Phone # Fax #    Eva Lockhart [phone]       [facility] [address] [facility] [address]        Goals        General    I will check my blood sugars four times per day (pt-stated)     Notes - Note created  3/10/2014 10:41 AM by Priti Orozco RN    As of " today's date 3/10/2014 goal is met at 26 - 50%.   Goal Status:  Active      I will continue to work with Diabetic Educator (pt-stated)     Notes - Note created  3/10/2014 10:42 AM by Priti Orozco RN    As of today's date 3/10/2014 goal is met at 26 - 50%.   Goal Status:  Active      I will take insulins as prescribed, including sliding scale novolog (pt-stated)     Notes - Note created  3/10/2014 10:43 AM by Priti Orozco RN    As of today's date 3/10/2014 goal is met at 51 - 75%.   Goal Status:  Active        Equal Access to Services     [facility]: Hadii aad ku hadasho Soomaali, waaxda luqadaha, qaybta kaalmada adeegyada, waxay idiin hayaan adepham charles . So [facility] [phone].    ATENCIÓN: Si habla español, tiene a tompkins disposición servicios gratuitos de asistencia lingüística. Llame al [phone].    We comply with applicable federal civil rights laws and [place] laws. We do not discriminate on the basis of race, color, national origin, age, disability sex, sexual orientation or gender identity.            Thank you!     Thank you for choosing [facility]  for your care. Our goal is always to provide you with excellent care. Hearing back from our patients is one way we can continue to improve our services. Please take a few minutes to complete the written survey that you may receive in the mail after your visit with us. Thank you!             Your Updated Medication List - Protect others around you: Learn how to safely use, store and throw away your medicines at www.disposemymeds.org.          This list is accurate as of: 7/3/17 10:21 AM.  Always use your most recent med list.                   Brand Name Dispense Instructions for use Diagnosis    albuterol 108 (90 BASE) MCG/ACT Inhaler    PROAIR HFA/PROVENTIL HFA/VENTOLIN HFA    3 Inhaler    Inhale 2 puffs into the lungs every 6 hours as needed for shortness of breath / dyspnea ((Ventolin or preferred albuterol equivalent))     Mild intermittent asthma without complication       aspirin 81 MG tablet      Take 1 tablet by mouth daily Reported on 4/21/2017        atorvastatin 40 MG tablet    LIPITOR    90 tablet    TAKE 1 TABLET (40 MG) BY MOUTH DAILY    Hyperlipidemia LDL goal <100       blood glucose lancets standard    no brand specified    1 Box    Use to test blood sugar 3 times daily or as directed.    Type 2 diabetes mellitus without complication, with long-term current use of insulin (H)       blood glucose monitoring meter device kit    no brand specified    1 kit    Use to test blood sugar 3 times daily or as directed    Type 2 diabetes mellitus without complication, with long-term current use of insulin (H)       blood glucose monitoring test strip    ONE TOUCH ULTRA    300 each    TEST BLOOD SUGARS 3 TIMES DAILY.    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    Type 2 diabetes mellitus with diabetic nephropathy, with long-term current use of insulin (H)       insulin reg HIGH CONC 500 UNIT/ML PEN soln    HumuLIN R U-500 KwikPen    120 mL    Inject 135 units every morning with breakfast,  and 135units every evening with dinner.    Type 2 diabetes mellitus with diabetic nephropathy, with long-term current use of insulin (H)       losartan 50 MG tablet    COZAAR    90 tablet    Take 1 tablet (50 mg) by mouth daily    Uncontrolled type 2 diabetes mellitus without complication, with long-term current use of insulin (H), CKD (chronic kidney  disease) stage 2, GFR 60-89 ml/min       meclizine 25 MG tablet    ANTIVERT    30 tablet    Take 1 tablet (25 mg) by mouth every 6 hours as needed for dizziness    BPPV (benign paroxysmal positional vertigo), unspecified laterality       omeprazole 20 MG CR capsule    priLOSEC    90 capsule    TAKE ONE CAPSULE BY MOUTH EVERY DAY    Gastroesophageal reflux disease, esophagitis presence not specified       order for DME     1 each    Equipment being ordered: bath tub grab bars    Arthritis of knee, right, Disorder of bursae and tendons in shoulder region, Falls frequently, Balance problems       pioglitazone 30 MG tablet    ACTOS    30 tablet    Take 1 tablet (30 mg) by mouth daily    Type 2 diabetes mellitus with diabetic nephropathy, with long-term current use of insulin (H)

## 2017-07-03 NOTE — NURSING NOTE
"Chief Complaint   Patient presents with     Sinus Problem     Health Maintenance     eye,A1C       Initial /81 (BP Location: Left arm, Patient Position: Chair, Cuff Size: Adult Regular)  Pulse 88  Temp 97.9  F (36.6  C) (Oral)  Ht 4' 8.5\" (1.435 m)  Wt 106 lb (48.1 kg)  LMP  (LMP Unknown)  SpO2 96%  BMI 23.35 kg/m2 Estimated body mass index is 23.35 kg/(m^2) as calculated from the following:    Height as of this encounter: 4' 8.5\" (1.435 m).    Weight as of this encounter: 106 lb (48.1 kg).  Medication Reconciliation: complete   Charla Lima MA      "

## 2017-07-03 NOTE — LETTER
[facility]  [address] [place]  [address]  [phone]        July 7, 2017    Nathalie Harp  [address]  [facility] [address]        Dear Nathalie,    One of the inflammatory tests I have checked is abnormal.  This can sometimes be a cause of the sinusitis you keep complaining about.  I would like you to see a rheumatologist as well as the ENT referral.     Results for orders placed or performed in visit on 07/03/17   Antineutrophil cytoplasmic Elinor IgG   Result Value Ref Range    Neutrophil Cytoplasmic IgG Antibody (A)      1:80  Reference range: <1:20  (Note)  Atypical perinuclear ANCA (atypical p-ANCA) staining  pattern observed. Presence of p-ANCA ruled out on  formalin-fixed neutrophils. This staining pattern is  associated with inflammatory bowel diseases, particularly  ulcerative colitis. It may also be seen in primary  sclerosis cholangitis.  INTERPRETIVE INFORMATION: Anti-Neutrophil Cyto Ab, IgG  Neutrophil Cytoplasmic Antibodies (C-ANCA = granular  cytoplasmic staining, P-ANCA = perinuclear staining) are  found in the serum of over 90 percent of patients with  certain necrotizing systemic vasculitides, and usually in  less than 5 percent of patients with collagen vascular  disease or arthritis.  Performed by [facility],  [address] [place] [id number] [phone]  www.[employer], Gadiel Haley MD, Lab. Director     Hemoglobin A1c   Result Value Ref Range    Hemoglobin A1C 13.8 (H) 4.3 - 6.0 %   Insulin level   Result Value Ref Range    Insulin 5.0 3 - 25 mU/L       If you have any questions please call the clinic at [phone].    Sincerely,    Eva Lockhart MD  SKL

## 2017-07-03 NOTE — PROGRESS NOTES
"  SUBJECTIVE:                                                    Nathalie Harp is a 62 year old female who presents to clinic today for the following health issues:    63 y/o F with diabetes... On 4/28/17 pharm D started her on tid R-500..she has high insulin resitstnce. . 6/2017 pharm D started pioglitazone... No edema... pt compliance has been in question but per recent pharmacy records, likely more compliant lately.  consider increase insulin.  Today, patient reports she takes the insulin bid, but often forgets the lunch dose, in part because she \"gets busy\" during the day.     BS are still high.  300 - 500.    No nocturnal lows in a month         FOR SINUS Complaints, consider check PANCA/CANCA as part of work up       ENT Symptoms             Symptoms: cc Present Absent Comment   Fever/Chills   x    Fatigue  x     Muscle Aches  x     Eye Irritation  x     Sneezing  x     Nasal Gustabo/Drg  x     Sinus Pressure/Pain  x     Loss of smell  x     Dental pain       Sore Throat   x    Swollen Glands   x    Ear Pain/Fullness  x     Cough  x     Wheeze  x     Chest Pain   x    Shortness of breath   x    Rash   x    Other         Symptom duration:  Got worse last fall 2016   Symptom severity:  Varies form mild to severe   Treatments tried:  None recently   Contacts:  None     Due for A1c.    Patient was hospitalized for influenza in March.   Reports no recurrent hydradenitis.           Problem list and histories reviewed & adjusted, as indicated.  Additional history: as documented    Patient Active Problem List   Diagnosis     Esophageal reflux     Irritable bowel syndrome     Female stress incontinence     Migraine without aura     Arthritis of knee, right     Disorder of bursae and tendons in shoulder region     Degeneration of thoracic or thoracolumbar intervertebral disc     Degeneration of cervical intervertebral disc     Other hammer toe (acquired)     Allergic rhinitis due to pollen     Menopausal symptoms "     Need for SBE (subacute bacterial endocarditis) prophylaxis     Preventive measure     Hyperlipidemia LDL goal <100     Health Care Home     Anemia     Dizziness     Hammer toe     Microalbuminuria     ACP (advance care planning)     Allergic to shellfish     Falls frequently     Balance problems     CKD (chronic kidney disease) stage 2, GFR 60-89 ml/min     Type 2 diabetes mellitus with diabetic nephropathy (H)     Diarrhea     Iron deficiency anemia, unspecified iron deficiency anemia type     Right axillary hidradenitis     Uncontrolled type 2 diabetes mellitus without complication, with long-term current use of insulin (H)     Mild intermittent asthma without complication     Past Surgical History:   Procedure Laterality Date     HC EXCIS PRIMARY GANGLION WRIST  1985 and 1987    right wrist     KNEE SURGERY  2008 approx    arthroscopic; Dr. Rivera       Social History   Substance Use Topics     Smoking status: Never Smoker     Smokeless tobacco: Never Used     Alcohol use Yes      Comment: rarely     Family History   Problem Relation Age of Onset     CEREBROVASCULAR DISEASE Maternal Grandmother      Arthritis Maternal Grandmother      Asthma Mother      GASTROINTESTINAL DISEASE Mother      IBS     Alcohol/Drug Mother      Depression Mother      Neurologic Disorder Mother      migraines     GASTROINTESTINAL DISEASE Father      IBS     DIABETES Father      Alcohol/Drug Father      Neurologic Disorder Father      migraines     Obesity Father      GASTROINTESTINAL DISEASE Maternal Grandfather      Ulcers     Alcohol/Drug Maternal Grandfather      DIABETES Paternal Grandmother      Alcohol/Drug Paternal Grandmother      Arthritis Paternal Grandmother      Obesity Paternal Grandmother      Hypertension Sister      Hypertension Brother      Circulatory Sister      Asthma Sister      Asthma Sister      Asthma Brother      Thyroid Disease Sister      Thyroid Disease Sister      Obesity Sister          Current  Outpatient Prescriptions   Medication Sig Dispense Refill     pioglitazone (ACTOS) 30 MG tablet Take 1 tablet (30 mg) by mouth daily 30 tablet 1     omeprazole (PRILOSEC) 20 MG CR capsule TAKE ONE CAPSULE BY MOUTH EVERY DAY 90 capsule 1     insulin reg HIGH CONC (HUMULIN R U-500 KWIKPEN) 500 UNIT/ML PEN soln Inject 115 units every morning with breakfast, 60Units every lunch time,  and 115units every evening with dinner. 120 mL 3     blood glucose monitoring (ONE TOUCH ULTRA) test strip TEST BLOOD SUGARS 3 TIMES DAILY. 300 each 3     insulin pen needle (B-D U/F) 31G X 5 MM Use twice daily as directed. 200 each 3     blood glucose monitoring (NO BRAND SPECIFIED) meter device kit Use to test blood sugar 3 times daily or as directed 1 kit 0     blood glucose (NO BRAND SPECIFIED) lancets standard Use to test blood sugar 3 times daily or as directed. 1 Box 3     losartan (COZAAR) 50 MG tablet Take 1 tablet (50 mg) by mouth daily 90 tablet 3     atorvastatin (LIPITOR) 40 MG tablet TAKE 1 TABLET (40 MG) BY MOUTH DAILY 90 tablet 3     celecoxib (CELEBREX) 200 MG capsule Take 1 capsule (200 mg) by mouth 2 times daily as needed for moderate pain 180 capsule 1     aspirin 81 MG tablet Take 1 tablet by mouth daily Reported on 4/21/2017       meclizine (ANTIVERT) 25 MG tablet Take 1 tablet (25 mg) by mouth every 6 hours as needed for dizziness (Patient not taking: Reported on 7/3/2017) 30 tablet 1     albuterol (PROAIR HFA/PROVENTIL HFA/VENTOLIN HFA) 108 (90 BASE) MCG/ACT Inhaler Inhale 2 puffs into the lungs every 6 hours as needed for shortness of breath / dyspnea ((Ventolin or preferred albuterol equivalent)) (Patient not taking: Reported on 4/21/2017) 3 Inhaler 3     order for DME Equipment being ordered: bath tub grab bars (Patient not taking: Reported on 2/24/2017) 1 each 0     EPIPEN 2-MELVIN 0.3 MG/0.3ML injection INJECT 0.3 MLS (0.3 MG) INTO THE MUSCLE ONCE AS NEEDED FOR ANAPHYLAXIS (Patient not taking: Reported on  4/21/2017) 2 each 9     Allergies   Allergen Reactions     Amoxicillin-Pot Clavulanate [[id number]+Fd&C Red #40 [facility]]      Headache, nausea and vomiting     Augmentin      Beesix [Pyridoxine]      Benadryl [Acetaminophen]      Ceclor [Cefaclor]      Cefaclor      Rash       Cephalexin      Cephalosporins      Codeine      Detrol [Tolterodine Tartrate]      Dust Mites      Latex      Metformin GI Disturbance     Severe diarrhea     Pollen Extract      Septra [Bactrim]      Septra [Sulfamethoxazole W-Trimethoprim]      Shellfish Allergy      Crab       Simvastatin      HA     Sorbitol Diarrhea     Sulfa Drugs      Sulfonamide Derivatives      Recent Labs   Lab Test  04/21/17   1040  12/21/16   1003  11/14/16   1004  10/07/15   0931  08/31/15   1144  11/04/14   1119  07/11/14   1038  03/03/14   0906   07/05/13   1159   A1C   --   12.3*   --   11.3*  11.4*  12.8*  12.8*  12.3*   --   11.2*   LDL   --    --   90   --   83   --   85   --    --   Cannot estimate LDL when triglyceride exceeds 400 mg/dL  132*   HDL   --    --   52   --   65   --    --    --    --   47*   TRIG   --    --   380*   --   301*   --    --    --    --   463*   ALT   --    --   29   --   31  19  22   --    < >  22   CR  0.55   --   0.92  0.90  0.80  0.80  0.90   --    < >  0.80   GFRESTIMATED  >90  Non  GFR Calc     --   62  63  73  73  64   --    < >  73   GFRESTBLACK  >90   GFR Calc     --   75  77  88  88  77   --    < >  89   POTASSIUM  3.5   --   5.0  4.1  4.7  4.2  4.2   --    < >  4.5   TSH   --    --   0.35*   --    --    --    --   0.54   --    --     < > = values in this interval not displayed.      BP Readings from Last 3 Encounters:   07/03/17 130/81   06/14/17 124/77   05/17/17 129/83    Wt Readings from Last 3 Encounters:   07/03/17 106 lb (48.1 kg)   06/14/17 105 lb 3.2 oz (47.7 kg)   05/17/17 110 lb 3.2 oz (50 kg)                  Labs reviewed in EPIC    Reviewed and updated as needed this visit  "by clinical staff       Reviewed and updated as needed this visit by Provider         ROS:  Constitutional, HEENT, cardiovascular, pulmonary, gi and gu systems are negative, except as otherwise noted.    OBJECTIVE:     /81 (BP Location: Left arm, Patient Position: Chair, Cuff Size: Adult Regular)  Pulse 88  Temp 97.9  F (36.6  C) (Oral)  Ht 4' 8.5\" (1.435 m)  Wt 106 lb (48.1 kg)  LMP  (LMP Unknown)  SpO2 96%  BMI 23.35 kg/m2  Body mass index is 23.35 kg/(m^2).  GENERAL: Short stature, alert and no distress  EYES: Eyes grossly normal to inspection, PERRL and conjunctivae and sclerae normal  HEENT:  Saddle nose?  Vs baseline.    NECK: no adenopathy, no asymmetry, masses, or scars and thyroid normal to palpation  MS: no gross musculoskeletal defects noted, no edema.    Axilla:  No hydradenitis.   SKIN: no suspicious lesions or rashes  PSYCH: mentation appears normal, affect normal/bright  PSYCH: content of speech reflects stress with her daughter, being treated for mental illness.   Diabetic foot exam: normal DP and PT pulses, normal sensory exam and ulceration at tip of right middle toe    Diagnostic Test Results:  Results for orders placed or performed in visit on 04/21/17   Glucose, whole blood   Result Value Ref Range    Glucose Whole Blood 383 (H) 70 - 99 mg/dL   Basic metabolic panel   Result Value Ref Range    Sodium 137 133 - 144 mmol/L    Potassium 3.5 3.4 - 5.3 mmol/L    Chloride 98 94 - 109 mmol/L    Carbon Dioxide 23 20 - 32 mmol/L    Anion Gap 16 (H) 3 - 14 mmol/L    Glucose 365 (H) 70 - 99 mg/dL    Urea Nitrogen 11 7 - 30 mg/dL    Creatinine 0.55 0.52 - 1.04 mg/dL    GFR Estimate >90  Non  GFR Calc   >60 mL/min/1.7m2    GFR Estimate If Black >90   GFR Calc   >60 mL/min/1.7m2    Calcium 9.7 8.5 - 10.1 mg/dL   CBC with platelets   Result Value Ref Range    WBC 3.6 (L) 4.0 - 11.0 10e9/L    RBC Count 4.23 3.8 - 5.2 10e12/L    Hemoglobin 14.0 11.7 - 15.7 g/dL    " Hematocrit 39.6 35.0 - 47.0 %    MCV 94 78 - 100 fl    MCH 33.1 (H) 26.5 - 33.0 pg    MCHC 35.4 31.5 - 36.5 g/dL    RDW 12.9 10.0 - 15.0 %    Platelet Count 207 150 - 450 10e9/L     Will order labs today:  A1C and Panca/Canca    ASSESSMENT/PLAN:             ICD-10-CM    1. Uncontrolled type 2 diabetes mellitus without complication, with long-term current use of insulin (H) E11.65 Hemoglobin A1c    Z79.4 OPHTHALMOLOGY ADULT REFERRAL     Insulin level   2. Acute recurrent maxillary sinusitis J01.01 Antineutrophil cytoplasmic Elinor IgG     OTOLARYNGOLOGY REFERRAL   3. Right axillary hidradenitis L73.2    4. Type 2 diabetes mellitus with diabetic nephropathy, with long-term current use of insulin (H) E11.21 insulin reg HIGH CONC (HUMULIN R U-500 KWIKPEN) 500 UNIT/ML PEN soln    Z79.4 ORTHO  REFERRAL   5. Toe ulcer, right, limited to breakdown of skin (H) L97.511 ORTHO  REFERRAL       FOR SINUS Complaints, will check PANCA/CANCA as part of work up.  Patient is requesting referral for ENT as well.   Due for eye exam due to diabetes.      Her compliance with mid day insulin is very poor.  She is agreeable to changing to BID dosing.  Will change insulin to 135 Units bid (From  115-, but she was probably never taking the mid day dose)  She will see Pharm D soon.   LATER ENTRY:  TODAY's A1C is 13.8. .. Would anticipate increase further in insulin at follow up visit as long as no lows.        Added insulin level to her labs.     Eva Lockhart MD  [facility]

## 2017-07-06 ENCOUNTER — TELEPHONE (OUTPATIENT)
Dept: FAMILY MEDICINE | Facility: CLINIC | Age: 62
End: 2017-07-06

## 2017-07-06 DIAGNOSIS — J01.01 ACUTE RECURRENT MAXILLARY SINUSITIS: ICD-10-CM

## 2017-07-06 DIAGNOSIS — R76.8 ABNORMAL ANCA (ANTINEUTROPHIL CYTOPLASMIC ANTIBODY): Primary | ICD-10-CM

## 2017-07-06 LAB — ANCA IGG TITR SER IF: ABNORMAL {TITER}

## 2017-07-07 NOTE — PROGRESS NOTES
Nathalie Harp    One of the inflammatory tests I have checked is abnormal.  This can sometimes be a cause of the sinusitis you keep complaining about.  I would like you to see a rheumatologist as well as the ENT referral.     Sincerely,     SHANITA LAM M.D.

## 2017-07-07 NOTE — TELEPHONE ENCOUNTER
Call patient with the content.  Then, offer to help schedule consultation with ENT and Rheumatology.  Ideally she would see ENT a week or so prior to Rheum (in case a biopsy is done, which would help rheumatology...    Once scheduled, tell me which ENT she is seeing so I can shoot them a message/heads up.  ENT referral placed during a previous visit, she did not schedule yet...

## 2017-07-07 NOTE — TELEPHONE ENCOUNTER
Called and spoke with patient, advised of message as below.  She is agreeable to seeing ENT and Rheum.  She will call us once she has scheduled.      Syeda Gay RN  [facility]

## 2017-07-14 ENCOUNTER — OFFICE VISIT (OUTPATIENT)
Dept: PODIATRY | Facility: CLINIC | Age: 62
End: 2017-07-14
Payer: MEDICARE

## 2017-07-14 VITALS — BODY MASS INDEX: 23.35 KG/M2 | WEIGHT: 106 LBS | OXYGEN SATURATION: 97 % | HEART RATE: 86 BPM

## 2017-07-14 DIAGNOSIS — L84 CALLUS: ICD-10-CM

## 2017-07-14 DIAGNOSIS — M20.41 HAMMER TOE OF RIGHT FOOT: ICD-10-CM

## 2017-07-14 PROCEDURE — 99203 OFFICE O/P NEW LOW 30 MIN: CPT | Mod: 25 | Performed by: PODIATRIST

## 2017-07-14 PROCEDURE — 11055 PARING/CUTG B9 HYPRKER LES 1: CPT | Performed by: PODIATRIST

## 2017-07-14 NOTE — PATIENT INSTRUCTIONS
We wish you continued good healing. If you have any questions or concerns, please do not hesitate to contact us at [phone]      Please remember to call and schedule a follow up appointment if one was recommended at your earliest convenience.   PODIATRY CLINIC HOURS  TELEPHONE NUMBER    Dr. Bubba Cohen D.P.M [facility]    Clinics:  [facility]        Jonna Ambrose MA  Medical Assistant  Tuesday 1PM-6PM  [facility]  Wednesday 7AM-2PM  [place]  Thursday 10AM-6PM  [place] Friday 7AM-345PM  [place]  Specialty schedulers:   [phone] to make an appointment with any Specialty Provider.        Urgent Care locations:    [facility] Monday-Friday 5 pm - 9 pm. Saturday-Sunday 9 am -5pm    Monday-Friday 11 am - 9 pm Saturday 9 am - 5 pm     Monday-Sunday 12 noon-8PM [phone]     If you need a medication refill, please contact us you may need lab work and/or a follow up visit prior to your refill (i.e. Antifungal medications).    [employer] (secure e-mail communication and access to your chart) to send a message or to make an appointment.    If MRI needed please call Juvencio Aguilar at [phone]        Weight management plan: Patient was referred to their PCP to discuss a diet and exercise plan.

## 2017-07-14 NOTE — NURSING NOTE
"Chief Complaint   Patient presents with     Diabetes     Bunion     Hammer Toe     Ulcer     right third toe       Initial Pulse 86  Wt 48.1 kg (106 lb)  LMP  (LMP Unknown)  SpO2 97%  BMI 23.35 kg/m2 Estimated body mass index is 23.35 kg/(m^2) as calculated from the following:    Height as of 7/3/17: 1.435 m (4' 8.5\").    Weight as of this encounter: 48.1 kg (106 lb).  Medication Reconciliation: complete  "

## 2017-07-14 NOTE — PROGRESS NOTES
Subjective:    Pt is seen today as a new pt for a diabetic foot exam.  Patient has right third toe callus that is painful.  Had this for a few years and getting worse.  The pain is aggravated by activity and relieved by rest.  Points to end of toe as to where pain is.  She has a history of right 2/3 hammertoe surgery.  Patient has uncontrolled diabetes.      ROS:  No hx of wound healing problems, denies numbness, erythema, or fever and chills.  Denies foot ulcers.       Allergies   Allergen Reactions     Amoxicillin-Pot Clavulanate [[id number]+Fd&C Red #40 [facility]]      Headache, nausea and vomiting     Augmentin      Beesix [Pyridoxine]      Benadryl [Acetaminophen]      Ceclor [Cefaclor]      Cefaclor      Rash       Cephalexin      Cephalosporins      Codeine      Detrol [Tolterodine Tartrate]      Dust Mites      Latex      Metformin GI Disturbance     Severe diarrhea     Pollen Extract      Septra [Bactrim]      Septra [Sulfamethoxazole W-Trimethoprim]      Shellfish Allergy      Crab       Simvastatin      HA     Sorbitol Diarrhea     Sulfa Drugs      Sulfonamide Derivatives        Current Outpatient Prescriptions   Medication Sig Dispense Refill     insulin reg HIGH CONC (HUMULIN R U-500 KWIKPEN) 500 UNIT/ML PEN soln Inject 135 units every morning with breakfast,  and 135units every evening with dinner. 120 mL 3     pioglitazone (ACTOS) 30 MG tablet Take 1 tablet (30 mg) by mouth daily 30 tablet 1     omeprazole (PRILOSEC) 20 MG CR capsule TAKE ONE CAPSULE BY MOUTH EVERY DAY 90 capsule 1     blood glucose monitoring (ONE TOUCH ULTRA) test strip TEST BLOOD SUGARS 3 TIMES DAILY. 300 each 3     insulin pen needle (B-D U/F) 31G X 5 MM Use twice daily as directed. 200 each 3     blood glucose monitoring (NO BRAND SPECIFIED) meter device kit Use to test blood sugar 3 times daily or as directed 1 kit 0     blood glucose (NO BRAND SPECIFIED) lancets standard Use to test blood sugar 3 times daily or as directed. 1 Box  3     meclizine (ANTIVERT) 25 MG tablet Take 1 tablet (25 mg) by mouth every 6 hours as needed for dizziness 30 tablet 1     losartan (COZAAR) 50 MG tablet Take 1 tablet (50 mg) by mouth daily 90 tablet 3     atorvastatin (LIPITOR) 40 MG tablet TAKE 1 TABLET (40 MG) BY MOUTH DAILY 90 tablet 3     albuterol (PROAIR HFA/PROVENTIL HFA/VENTOLIN HFA) 108 (90 BASE) MCG/ACT Inhaler Inhale 2 puffs into the lungs every 6 hours as needed for shortness of breath / dyspnea ((Ventolin or preferred albuterol equivalent)) 3 Inhaler 3     celecoxib (CELEBREX) 200 MG capsule Take 1 capsule (200 mg) by mouth 2 times daily as needed for moderate pain 180 capsule 1     order for DME Equipment being ordered: bath tub grab bars 1 each 0     EPIPEN 2-MELVIN 0.3 MG/0.3ML injection INJECT 0.3 MLS (0.3 MG) INTO THE MUSCLE ONCE AS NEEDED FOR ANAPHYLAXIS 2 each 9     aspirin 81 MG tablet Take 1 tablet by mouth daily Reported on 4/21/2017         Patient Active Problem List   Diagnosis     Esophageal reflux     Irritable bowel syndrome     Female stress incontinence     Migraine without aura     Arthritis of knee, right     Disorder of bursae and tendons in shoulder region     Degeneration of thoracic or thoracolumbar intervertebral disc     Degeneration of cervical intervertebral disc     Other hammer toe (acquired)     Allergic rhinitis due to pollen     Menopausal symptoms     Need for SBE (subacute bacterial endocarditis) prophylaxis     Preventive measure     Hyperlipidemia LDL goal <100     Health Care Home     Anemia     Dizziness     Hammer toe     Microalbuminuria     ACP (advance care planning)     Allergic to shellfish     Falls frequently     Balance problems     CKD (chronic kidney disease) stage 2, GFR 60-89 ml/min     Type 2 diabetes mellitus with diabetic nephropathy (H)     Diarrhea     Iron deficiency anemia, unspecified iron deficiency anemia type     Right axillary hidradenitis     Uncontrolled type 2 diabetes mellitus without  complication, with long-term current use of insulin (H)     Mild intermittent asthma without complication       Past Medical History:   Diagnosis Date     Acute stomach ulcer NOS, obst      Allergic state      Essential hypertension, benign      History of blood transfusion      Menopausal symptoms     per pt prempro ordered by GYN, Dr. Sherie Cody     Migraines      Mild intermittent asthma      Pure hypercholesterolemia      Type 2 diabetes, HbA1C goal < 8% (H) 7/5/2013    Dx 2004 per pt     Type II or unspecified type diabetes mellitus without mention of complication, not stated as uncontrolled     dx 2005 per pt       Past Surgical History:   Procedure Laterality Date     HC EXCIS PRIMARY GANGLION WRIST  1985 and 1987    right wrist     KNEE SURGERY  2008 approx    arthroscopic; Dr. Rivera       Family History   Problem Relation Age of Onset     CEREBROVASCULAR DISEASE Maternal Grandmother      Arthritis Maternal Grandmother      Asthma Mother      GASTROINTESTINAL DISEASE Mother      IBS     Alcohol/Drug Mother      Depression Mother      Neurologic Disorder Mother      migraines     GASTROINTESTINAL DISEASE Father      IBS     DIABETES Father      Alcohol/Drug Father      Neurologic Disorder Father      migraines     Obesity Father      GASTROINTESTINAL DISEASE Maternal Grandfather      Ulcers     Alcohol/Drug Maternal Grandfather      DIABETES Paternal Grandmother      Alcohol/Drug Paternal Grandmother      Arthritis Paternal Grandmother      Obesity Paternal Grandmother      Hypertension Sister      Hypertension Brother      Circulatory Sister      Asthma Sister      Asthma Sister      Asthma Brother      Thyroid Disease Sister      Thyroid Disease Sister      Obesity Sister        Social History   Substance Use Topics     Smoking status: Never Smoker     Smokeless tobacco: Never Used     Alcohol use Yes      Comment: rarely         Exam:    Pulse 86  Wt 48.1 kg (106 lb)  LMP  (LMP Unknown)  SpO2 97%   BMI 23.35 kg/m2.  Patient a good historian.  A&O X 3.  Pulses DP 2/4, PT 2/4 b/l.  CRT < 3 seconds X 10 digits.  No ankle edema or varicosities noted.  5.07 monofilament  intact b/l.  Reflexes 2/4 b/l.  Skin healthy with  hair growth noted b/l.  Normal arch with weightbearing.  Right 2/3 hammertoe deformities noted.  MS 5/5 all compartments.  Normal ROM all fore foot and rearfoot joints.   Patient has callus on the end of right third toe.  No masses or breakdown in the skin bilaterally.  No erythema or ecchymosis noted bilateral.     A/P  Diabetes mellitus with PAD  Right third hammertoe  Callus       Callus debrided with a fifteen blade.   Crest pad to offload end of toe.  Gave patient instructions on daily foot examination and wearing good shoes at all times. Encouraged good sugar control.  Discussed with patient  that I do not do this in my practice unless patient at significant risk of limb loss.  Will refer to foot care service/nurse.     Thank you for allowing me participate in the care of this patient.        Bubba Cohen DPM, FACFAS

## 2017-07-14 NOTE — MR AVS SNAPSHOT
After Visit Summary   7/14/2017    Nathalie Harp    MRN: [id number]           Patient Information     Date Of Birth          1955        Visit Information        Provider Department      7/14/2017 12:00 PM Bubba Cohen DPM [facility]        Care Instructions    We wish you continued good healing. If you have any questions or concerns, please do not hesitate to contact us at [phone]      Please remember to call and schedule a follow up appointment if one was recommended at your earliest convenience.   PODIATRY CLINIC HOURS  TELEPHONE NUMBER    Dr. Bubba Cohen D.P.M FAC FAS    Clinics:  [facility]        Jonna Ambrose MA  Medical Assistant  Tuesday 1PM-6PM  [facility]  Wednesday 7AM-2PM  [place]  Thursday 10AM-6PM  [place] Friday 7AM-345PM  [place]  Specialty schedulers:   [phone] to make an appointment with any Specialty Provider.        Urgent Care locations:    [facility] Monday-Friday 5 pm - 9 pm. Saturday-Sunday 9 am -5pm    Monday-Friday 11 am - 9 pm Saturday 9 am - 5 pm     Monday-Sunday 12 noon-8PM [phone]     If you need a medication refill, please contact us you may need lab work and/or a follow up visit prior to your refill (i.e. Antifungal medications).    [employer] (secure e-mail communication and access to your chart) to send a message or to make an appointment.    If MRI needed please call Juvencio Aguilar at [phone]        Weight management plan: Patient was referred to their PCP to discuss a diet and exercise plan.            Follow-ups after your visit        Your next 10 appointments already scheduled     Jul 14, 2017 12:00 PM CDT   New Visit with Bubba Cohen DPM   [facility] ([facility]    [address] [place] [address]  "[facility] [address]   [phone]            2017  1:00 PM CDT   Office Visit with Jovana Loo RPH   [facility] ([facility])    [address] [facility] [address]   [phone]           Bring a current list of meds and any records pertaining to this visit.  For Physicals, please bring immunization records and any forms needing to be filled out.  Please arrive 10 minutes early to complete paperwork.              Who to contact     If you have questions or need follow up information about today's clinic visit or your schedule please contact [facility] directly at [phone].  Normal or non-critical lab and imaging results will be communicated to you by MyChart, letter or phone within 4 business days after the clinic has received the results. If you do not hear from us within 7 days, please contact the clinic through [employer] or phone. If you have a critical or abnormal lab result, we will notify you by phone as soon as possible.  Submit refill requests through [employer] or call your pharmacy and they will forward the refill request to us. Please allow 3 business days for your refill to be completed.          Additional Information About Your Visit        [employer] Information     [employer] lets you send messages to your doctor, view your test results, renew your prescriptions, schedule appointments and more. To sign up, go to [email or web address] . Click on \"Log in\" on the left side of the screen, which will take you to the Welcome page. Then click on \"Sign up Now\" on the right side of the page.     You will be asked to enter the access code listed below, as well as some personal information. Please follow the directions to create your username and password.     Your access code is: [email or web address]  Expires: 9/3/2017  2:51 PM     Your access code will  in 90 days. If you need " help or a new code, please call your [place] clinic or [phone].        Care EveryWhere ID     This is your Care EveryWhere ID. This could be used by other organizations to access your [place] medical records  [id number]        Your Vitals Were     Pulse Last Period Pulse Oximetry BMI (Body Mass Index)          86 (LMP Unknown) 97% 23.35 kg/m2         Blood Pressure from Last 3 Encounters:   07/03/17 130/81   06/14/17 124/77   05/17/17 129/83    Weight from Last 3 Encounters:   07/14/17 48.1 kg (106 lb)   07/03/17 48.1 kg (106 lb)   06/14/17 47.7 kg (105 lb 3.2 oz)              Today, you had the following     No orders found for display       Primary Care Provider Office Phone # Fax #    Eva Lockhart [phone]       [facility] [address] [facility] [address]        Goals        General    I will check my blood sugars four times per day (pt-stated)     Notes - Note created  3/10/2014 10:41 AM by Priti Orozco, RN    As of today's date 3/10/2014 goal is met at 26 - 50%.   Goal Status:  Active      I will continue to work with Diabetic Educator (pt-stated)     Notes - Note created  3/10/2014 10:42 AM by Priti Orozco, RN    As of today's date 3/10/2014 goal is met at 26 - 50%.   Goal Status:  Active      I will take insulins as prescribed, including sliding scale novolog (pt-stated)     Notes - Note created  3/10/2014 10:43 AM by Priti Orozco, RN    As of today's date 3/10/2014 goal is met at 51 - 75%.   Goal Status:  Active        Equal Access to Services     LORE LOPEZ : Narcisa Cano, daniel anderson, liset kaalmada any, sandra waller. So [facility] [phone].    ATENCIÓN: Si habla español, tiene a tompkins disposición servicios gratuitos de asistencia lingüística. Llame al [phone].    We comply with applicable federal civil rights laws and [place] laws. We do not discriminate on the basis of race,  color, national origin, age, disability sex, sexual orientation or gender identity.            Thank you!     Thank you for choosing [facility]  for your care. Our goal is always to provide you with excellent care. Hearing back from our patients is one way we can continue to improve our services. Please take a few minutes to complete the written survey that you may receive in the mail after your visit with us. Thank you!             Your Updated Medication List - Protect others around you: Learn how to safely use, store and throw away your medicines at www.disposemymeds.org.          This list is accurate as of: 7/14/17 11:49 AM.  Always use your most recent med list.                   Brand Name Dispense Instructions for use Diagnosis    albuterol 108 (90 BASE) MCG/ACT Inhaler    PROAIR HFA/PROVENTIL HFA/VENTOLIN HFA    3 Inhaler    Inhale 2 puffs into the lungs every 6 hours as needed for shortness of breath / dyspnea ((Ventolin or preferred albuterol equivalent))    Mild intermittent asthma without complication       aspirin 81 MG tablet      Take 1 tablet by mouth daily Reported on 4/21/2017        atorvastatin 40 MG tablet    LIPITOR    90 tablet    TAKE 1 TABLET (40 MG) BY MOUTH DAILY    Hyperlipidemia LDL goal <100       blood glucose lancets standard    no brand specified    1 Box    Use to test blood sugar 3 times daily or as directed.    Type 2 diabetes mellitus without complication, with long-term current use of insulin (H)       blood glucose monitoring meter device kit    no brand specified    1 kit    Use to test blood sugar 3 times daily or as directed    Type 2 diabetes mellitus without complication, with long-term current use of insulin (H)       blood glucose monitoring test strip    ONE TOUCH ULTRA    300 each    TEST BLOOD SUGARS 3 TIMES DAILY.    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    Type 2 diabetes mellitus with diabetic nephropathy, with long-term current use of insulin (H)       insulin reg HIGH CONC 500 UNIT/ML PEN soln    HumuLIN R U-500 KwikPen    120 mL    Inject 135 units every morning with breakfast,  and 135units every evening with dinner.    Type 2 diabetes mellitus with diabetic nephropathy, with long-term current use of insulin (H)       losartan 50 MG tablet    COZAAR    90 tablet    Take 1 tablet (50 mg) by mouth daily    Uncontrolled type 2 diabetes mellitus without complication, with long-term current use of insulin (H), CKD (chronic kidney disease) stage 2, GFR 60-89 ml/min       meclizine 25 MG tablet    ANTIVERT    30 tablet    Take 1 tablet (25 mg) by mouth every 6 hours as needed for dizziness    BPPV (benign paroxysmal positional vertigo), unspecified laterality       omeprazole 20 MG CR capsule    priLOSEC    90 capsule    TAKE ONE CAPSULE BY MOUTH EVERY DAY    Gastroesophageal reflux disease, esophagitis presence not specified       order for DME     1 each    Equipment being ordered: bath tub grab bars    Arthritis of knee, right, Disorder of bursae and tendons in shoulder region, Falls frequently, Balance problems       pioglitazone 30 MG tablet    ACTOS    30 tablet    Take 1 tablet (30 mg) by mouth daily    Type 2 diabetes mellitus with diabetic nephropathy, with long-term current use of insulin (H)

## 2017-07-19 ENCOUNTER — OFFICE VISIT (OUTPATIENT)
Dept: PHARMACY | Facility: CLINIC | Age: 62
End: 2017-07-19
Payer: COMMERCIAL

## 2017-07-19 VITALS
SYSTOLIC BLOOD PRESSURE: 134 MMHG | WEIGHT: 104 LBS | HEART RATE: 74 BPM | BODY MASS INDEX: 22.91 KG/M2 | DIASTOLIC BLOOD PRESSURE: 74 MMHG

## 2017-07-19 DIAGNOSIS — E11.21 TYPE 2 DIABETES MELLITUS WITH DIABETIC NEPHROPATHY, WITH LONG-TERM CURRENT USE OF INSULIN (H): Primary | ICD-10-CM

## 2017-07-19 DIAGNOSIS — Z79.4 TYPE 2 DIABETES MELLITUS WITH DIABETIC NEPHROPATHY, WITH LONG-TERM CURRENT USE OF INSULIN (H): Primary | ICD-10-CM

## 2017-07-19 PROCEDURE — 99607 MTMS BY PHARM ADDL 15 MIN: CPT | Performed by: PHARMACIST

## 2017-07-19 PROCEDURE — 99606 MTMS BY PHARM EST 15 MIN: CPT | Performed by: PHARMACIST

## 2017-07-19 RX ORDER — PIOGLITAZONEHYDROCHLORIDE 45 MG/1
45 TABLET ORAL DAILY
Qty: 30 TABLET | Refills: 11 | Status: SHIPPED | OUTPATIENT
Start: 2017-07-19 | End: 2018-01-10

## 2017-07-19 NOTE — PROGRESS NOTES
SUBJECTIVE/OBJECTIVE:                Nathalie Harp is a 62 year old female coming in for a follow-up visit for Medication Therapy Management.  She was referred to me from Eva Lockhart     Chief Complaint: Follow up from our visit on 6/14/2017.     Tobacco: No tobacco use   Alcohol: none    Medication Adherence: no issues reported by patient    Diabetes:  Pt currently taking Humulin U500 135units BID before breakfast and dinner - this was changed recently from TID because she was often forgetting lunch time dose. She was also started pioglitazone 30mg QD by me at our last appointment.  SMBG Ranges (based on glucometer readings): 14d avg (n9) 414  Symptoms of low blood sugar? none.   Symptoms of high blood sugar? fatigue.  Eye exam: due  Foot exam: up to date  ACEi/ARB: Yes: losartan 50mg QD. Microalbumin is not < 30 mg/g.   Aspirin: Taking 81mg daily and denies side effects  Statin: Yes: atorvastatin 40mg QD and denies side effects  Diet/Exercise: Rode stationary bike once last week x1 mile  Date FBG/ 2hours post   7/19 532   7/18 423   7/16 391   7/15 415   7/14 442   7/12 425   7/11 391   7/10 332   7/8 379   7/4 429   7/2 562   6/30 344       Current labs include:  BP Readings from Last 3 Encounters:   07/03/17 130/81   06/14/17 124/77   05/17/17 129/83     Today's Vitals: LMP  (LMP Unknown)     Lab Results   Component Value Date    A1C 13.8 07/03/2017    A1C 12.3 12/21/2016    A1C 11.3 10/07/2015    A1C 11.4 08/31/2015    A1C 12.8 11/04/2014       Lab Results   Component Value Date    CHOL 218 11/14/2016     Lab Results   Component Value Date    TRIG 380 11/14/2016     Lab Results   Component Value Date    HDL 52 11/14/2016     Lab Results   Component Value Date    LDL 90 11/14/2016       Liver Function Studies -   Recent Labs   Lab Test  11/14/16   1004   PROTTOTAL  7.5   ALBUMIN  4.2   BILITOTAL  0.6   ALKPHOS  109   AST  14   ALT  29       Lab Results   Component Value Date    UCRR 22 11/14/2016     MICROL 12 11/14/2016    UMALCR 56.82 (H) 11/14/2016       Last Basic Metabolic Panel:  Lab Results   Component Value Date     04/21/2017      Lab Results   Component Value Date    POTASSIUM 3.5 04/21/2017     Lab Results   Component Value Date    CHLORIDE 98 04/21/2017     Lab Results   Component Value Date    BUN 11 04/21/2017     Lab Results   Component Value Date    CR 0.55 04/21/2017     GFR Estimate   Date Value Ref Range Status   04/21/2017 >90  Non  GFR Calc   >60 mL/min/1.7m2 Final   11/14/2016 62 >60 mL/min/1.7m2 Final   10/07/2015 63 >60 mL/min/1.7m2 Final       TSH   Date Value Ref Range Status   11/14/2016 0.35 (L) 0.40 - 5.00 mU/L Final     T4 Free   Date Value Ref Range Status   11/14/2016 1.28 0.70 - 1.85 ng/dL Final     Most Recent Immunizations   Administered Date(s) Administered     Influenza (High Dose) 3 valent vaccine 10/03/2013     Influenza (IIV3) 08/30/2016     Pneumococcal (PCV 13) 11/14/2016     Pneumococcal 23 valent 11/14/2008     TD (ADULT, 7+) 09/01/2011       ASSESSMENT:              Current medications were reviewed today as discussed above.      Medication Adherence: no issues identified    Diabetes: Needs Improvement. Patient is not meeting A1c goal of < 8%. Self monitoring of blood glucose is not at goal of fasting  mg/dL. So far, not a lot of improvement with addition of pioglitazone, but will titrate to max and watch for effect. Pt would benefit from:  Basal Insulin (Humulin U500) :  increase dose, as her pre-breakfast BG above goal.  Pioglitazone:  increase dose.  SMBG: needs to also be monitoring pre-dinner to appropriately assess her insulin doses.    PLAN:                  1. Increase pioglitazone to 45mg QD. Rx sent to pharmacy.  2. Increase Humulin U500 to 155units QAM before breakfast, continue on 135units QPM before dinner. Updated Rx sent to pharmacy.  3. Pt to SMBG before breakfast AND dinner and with symptoms of hypoglycemia.    I spent  60 minutes with this patient today.  All changes were made via collaborative practice agreement with Eva Lockhart. A copy of the visit note was provided to the patient's primary care provider.     Will follow up in 1 month, she is seeing PCP in 2 weeks.    The patient was given a summary of these recommendations as an after visit summary.    Jovana Loo, Pharm.D., [facility]  Medication Therapy Management Pharmacist  [phone]

## 2017-07-19 NOTE — PATIENT INSTRUCTIONS
Recommendations from today's MTM visit:                                                        1. We are increasing the pioglitazone to the 45mg tablet once daily. I have sent a new prescription to the pharmacy. Let them know you don't need the 30mg tablets anymore.    2. Increase the Humulin U500 to 155units before breakfast and continue taking 135units before dinner. I let the pharmacy know we have changed your dose.    3. Please test your blood sugar before breakfast and before dinner every day. This gives us more information about how well your current doses of insulin are working.    Next MTM visit: Friday, August 11th at 11:30am    To schedule another MTM appointment, please call the clinic directly or you may call the MTM scheduling line at [phone] or toll-free at [phone].     My Clinical Pharmacist's contact information:                                                      It was a pleasure seeing you today!  Please feel free to contact me with any questions or concerns you have.      Jovana Loo, Pharm.D., [place] [facility]  Medication Therapy Management Pharmacist  [phone]    You may receive a survey about the MTM services you received.  I would appreciate your feedback to help me serve you better in the future. Please fill it out and return it when you can. Your comments will be anonymous.      My healthcare goals:                                                      Diabetes Goals:    Home Monitoring of Blood Sugars:Fasting  mg/dL and 2 hours after a meal less than 180 mg/dL.    Hemoglobin A1C: Less than 8%. Yours is   Lab Results   Component Value Date    A1C 13.8 07/03/2017   .    Blood Pressure: Less than 140/90mmHg. Yours is /74  Pulse 74  Wt 104 lb (47.2 kg)  LMP  (LMP Unknown)  BMI 22.91 kg/m2.    Cholesterol: You are taking atorvastatin to help decrease the risk of heart disease.    Things you can do to help lower your blood sugars:    Diet: 3 meals and 1-2 snacks  per day with 45-60 grams of carbohydrates per meal and 15 grams of carbohydrates per snack. Try to fill your plate at least half-full with vegetables, fill one-quarter full with lean meats or protein, and also make sure you get at least some carbohydrate with every meal.    Exercise: 30 minutes per day of anything that will increase your heart rate and make you break a sweat! Gardening, walking, cleaning the house, changing the oil in your car, etc. If you feel like 30 minutes per day is too much, start small. Even lifting canned foods or working your arms with a resistance band in front of the TV can help.

## 2017-07-19 NOTE — MR AVS SNAPSHOT
After Visit Summary   7/19/2017    Nathalie Harp    MRN: [id number]           Patient Information     Date Of Birth          1955        Visit Information        Provider Department      7/19/2017 1:00 PM Jovana Loo, [facility] MTM        Today's Diagnoses     Type 2 diabetes mellitus with diabetic nephropathy, with long-term current use of insulin (H)    -  1      Care Instructions    Recommendations from today's MTM visit:                                                        1. We are increasing the pioglitazone to the 45mg tablet once daily. I have sent a new prescription to the pharmacy. Let them know you don't need the 30mg tablets anymore.    2. Increase the Humulin U500 to 155units before breakfast and continue taking 135units before dinner. I let the pharmacy know we have changed your dose.    3. Please test your blood sugar before breakfast and before dinner every day. This gives us more information about how well your current doses of insulin are working.    Next MTM visit: Friday, August 11th at 11:30am    To schedule another MTM appointment, please call the clinic directly or you may call the MTM scheduling line at [phone] or toll-free at [phone].     My Clinical Pharmacist's contact information:                                                      It was a pleasure seeing you today!  Please feel free to contact me with any questions or concerns you have.      Jovana Loo, Pharm.D., [facility]  Medication Therapy Management Pharmacist  [phone]    You may receive a survey about the MTM services you received.  I would appreciate your feedback to help me serve you better in the future. Please fill it out and return it when you can. Your comments will be anonymous.      My healthcare goals:                                                      Diabetes Goals:    Home Monitoring of Blood Sugars:Fasting  mg/dL and 2  hours after a meal less than 180 mg/dL.    Hemoglobin A1C: Less than 8%. Yours is   Lab Results   Component Value Date    A1C 13.8 07/03/2017   .    Blood Pressure: Less than 140/90mmHg. Yours is /74  Pulse 74  Wt 104 lb (47.2 kg)  LMP  (LMP Unknown)  BMI 22.91 kg/m2.    Cholesterol: You are taking atorvastatin to help decrease the risk of heart disease.    Things you can do to help lower your blood sugars:    Diet: 3 meals and 1-2 snacks per day with 45-60 grams of carbohydrates per meal and 15 grams of carbohydrates per snack. Try to fill your plate at least half-full with vegetables, fill one-quarter full with lean meats or protein, and also make sure you get at least some carbohydrate with every meal.    Exercise: 30 minutes per day of anything that will increase your heart rate and make you break a sweat! Gardening, walking, cleaning the house, changing the oil in your car, etc. If you feel like 30 minutes per day is too much, start small. Even lifting canned foods or working your arms with a resistance band in front of the TV can help.                  Follow-ups after your visit        Your next 10 appointments already scheduled     Jul 28, 2017 11:20 AM CDT   [facility] with [facility] Lockhart MD   [facility]    [address] [place] [id number]   [phone]            Aug 11, 2017 11:30 AM CDT   Office Visit with Jovana Loo RPH   [facility] ([facility])    [address] [place] [id number]   [phone]           Bring a current list of meds and any records pertaining to this visit.  For Physicals, please bring immunization records and any forms needing to be filled out.  Please arrive 10 minutes early to complete paperwork.              Who to contact     If you have questions or need follow up information about today's  "clinic visit or your schedule please contact [facility] MTM directly at [phone].  Normal or non-critical lab and imaging results will be communicated to you by MyChart, letter or phone within 4 business days after the clinic has received the results. If you do not hear from us within 7 days, please contact the clinic through MyChart or phone. If you have a critical or abnormal lab result, we will notify you by phone as soon as possible.  Submit refill requests through [employer] or call your pharmacy and they will forward the refill request to us. Please allow 3 business days for your refill to be completed.          Additional Information About Your Visit        MyChart Information     [employer] lets you send messages to your doctor, view your test results, renew your prescriptions, schedule appointments and more. To sign up, go to [email or web address] . Click on \"Log in\" on the left side of the screen, which will take you to the Welcome page. Then click on \"Sign up Now\" on the right side of the page.     You will be asked to enter the access code listed below, as well as some personal information. Please follow the directions to create your username and password.     Your access code is: [email or web address]  Expires: 9/3/2017  2:51 PM     Your access code will  in 90 days. If you need help or a new code, please call your [facility] clinic or [phone].        Care EveryWhere ID     This is your Care EveryWhere ID. This could be used by other organizations to access your [facility] medical records  [id number]        Your Vitals Were     Pulse Last Period BMI (Body Mass Index)             74 (LMP Unknown) 22.91 kg/m2          Blood Pressure from Last 3 Encounters:   17 134/74   17 130/81   17 124/77    Weight from Last 3 Encounters:   17 104 lb (47.2 kg)   17 106 lb (48.1 kg)   17 106 lb (48.1 kg)              Today, you had the following     No orders " found for display         Today's Medication Changes          These changes are accurate as of: 7/19/17  1:56 PM.  If you have any questions, ask your nurse or doctor.               These medicines have changed or have updated prescriptions.        Dose/Directions    insulin reg HIGH CONC 500 UNIT/ML PEN soln   Commonly known as:  HumuLIN R U-500 KwikPen   This may have changed:  additional instructions   Used for:  Type 2 diabetes mellitus with diabetic nephropathy, with long-term current use of insulin (H)        Inject 155 units every morning with breakfast,  and 135units every evening with dinner. This is a dose increase.   Quantity:  18 mL   Refills:  11       pioglitazone 45 MG tablet   Commonly known as:  ACTOS   This may have changed:    - medication strength  - how much to take  - additional instructions   Used for:  Type 2 diabetes mellitus with diabetic nephropathy, with long-term current use of insulin (H)        Dose:  45 mg   Take 1 tablet (45 mg) by mouth daily For pharmacy: Please discontinue the 30mg tablets   Quantity:  30 tablet   Refills:  11            Where to get your medicines      These medications were sent to [facility] [address]  [address] [facility] [address]     Phone:  [phone]     insulin reg HIGH CONC 500 UNIT/ML PEN soln    pioglitazone 45 MG tablet                Primary Care Provider Office Phone # Fax #    Eva Lockhart [phone]       [facility] [address] [facility] [address]        Goals        General    I will check my blood sugars four times per day (pt-stated)     Notes - Note created  3/10/2014 10:41 AM by Priti Orozco RN    As of today's date 3/10/2014 goal is met at 26 - 50%.   Goal Status:  Active      I will continue to work with Diabetic Educator (pt-stated)     Notes - Note created  3/10/2014 10:42 AM by Priti Orozco RN    As of  today's date 3/10/2014 goal is met at 26 - 50%.   Goal Status:  Active      I will take insulins as prescribed, including sliding scale novolog (pt-stated)     Notes - Note created  3/10/2014 10:43 AM by Priti Orozco RN    As of today's date 3/10/2014 goal is met at 51 - 75%.   Goal Status:  Active        Equal Access to Services     [facility]: Hadii aad ku hadasho Soomaali, waaxda luqadaha, qaybta kaalmada adeegyada, waxay idiin hayaan adeeg kharash la'aan ah. So [facility] [place] [phone].    ATENCIÓN: Si habla español, tiene a tompkins disposición servicios gratuitos de asistencia lingüística. Llame al [phone].    We comply with applicable federal civil rights laws and [place] laws. We do not discriminate on the basis of race, color, national origin, age, disability sex, sexual orientation or gender identity.            Thank you!     Thank you for choosing [facility]  for your care. Our goal is always to provide you with excellent care. Hearing back from our patients is one way we can continue to improve our services. Please take a few minutes to complete the written survey that you may receive in the mail after your visit with us. Thank you!             Your Updated Medication List - Protect others around you: Learn how to safely use, store and throw away your medicines at www.disposemymeds.org.          This list is accurate as of: 7/19/17  1:56 PM.  Always use your most recent med list.                   Brand Name Dispense Instructions for use Diagnosis    albuterol 108 (90 BASE) MCG/ACT Inhaler    PROAIR HFA/PROVENTIL HFA/VENTOLIN HFA    3 Inhaler    Inhale 2 puffs into the lungs every 6 hours as needed for shortness of breath / dyspnea ((Ventolin or preferred albuterol equivalent))    Mild intermittent asthma without complication       aspirin 81 MG tablet      Take 1 tablet by mouth daily Reported on 4/21/2017        atorvastatin 40 MG tablet    LIPITOR    90 tablet    TAKE 1 TABLET  (40 MG) BY MOUTH DAILY    Hyperlipidemia LDL goal <100       blood glucose lancets standard    no brand specified    1 Box    Use to test blood sugar 3 times daily or as directed.    Type 2 diabetes mellitus without complication, with long-term current use of insulin (H)       blood glucose monitoring meter device kit    no brand specified    1 kit    Use to test blood sugar 3 times daily or as directed    Type 2 diabetes mellitus without complication, with long-term current use of insulin (H)       blood glucose monitoring test strip    ONE TOUCH ULTRA    300 each    TEST BLOOD SUGARS 3 TIMES DAILY.    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    Type 2 diabetes mellitus with diabetic nephropathy, with long-term current use of insulin (H)       insulin reg HIGH CONC 500 UNIT/ML PEN soln    HumuLIN R U-500 KwikPen    18 mL    Inject 155 units every morning with breakfast,  and 135units every evening with dinner. This is a dose increase.    Type 2 diabetes mellitus with diabetic nephropathy, with long-term current use of insulin (H)       losartan 50 MG tablet    COZAAR    90 tablet    Take 1 tablet (50 mg) by mouth daily    Uncontrolled type 2 diabetes mellitus without complication, with long-term current use of insulin (H), CKD (chronic kidney disease) stage 2, GFR 60-89 ml/min       meclizine 25 MG tablet    ANTIVERT    30 tablet    Take 1 tablet (25 mg) by mouth every 6 hours as needed for dizziness    BPPV (benign  paroxysmal positional vertigo), unspecified laterality       omeprazole 20 MG CR capsule    priLOSEC    90 capsule    TAKE ONE CAPSULE BY MOUTH EVERY DAY    Gastroesophageal reflux disease, esophagitis presence not specified       order for DME     1 each    Equipment being ordered: bath tub grab bars    Arthritis of knee, right, Disorder of bursae and tendons in shoulder region, Falls frequently, Balance problems       pioglitazone 45 MG tablet    ACTOS    30 tablet    Take 1 tablet (45 mg) by mouth daily For pharmacy: Please discontinue the 30mg tablets    Type 2 diabetes mellitus with diabetic nephropathy, with long-term current use of insulin (H)

## 2017-07-28 ENCOUNTER — OFFICE VISIT (OUTPATIENT)
Dept: FAMILY MEDICINE | Facility: CLINIC | Age: 62
End: 2017-07-28
Payer: MEDICARE

## 2017-07-28 VITALS
WEIGHT: 106 LBS | BODY MASS INDEX: 23.35 KG/M2 | OXYGEN SATURATION: 95 % | SYSTOLIC BLOOD PRESSURE: 132 MMHG | HEART RATE: 78 BPM | TEMPERATURE: 97.9 F | DIASTOLIC BLOOD PRESSURE: 84 MMHG

## 2017-07-28 DIAGNOSIS — Z13.5 SCREENING FOR DIABETIC RETINOPATHY: ICD-10-CM

## 2017-07-28 DIAGNOSIS — J45.20 MILD INTERMITTENT ASTHMA WITHOUT COMPLICATION: ICD-10-CM

## 2017-07-28 DIAGNOSIS — Z91.013 ALLERGIC TO SHELLFISH: ICD-10-CM

## 2017-07-28 PROCEDURE — 99214 OFFICE O/P EST MOD 30 MIN: CPT | Performed by: INTERNAL MEDICINE

## 2017-07-28 RX ORDER — ALBUTEROL SULFATE 90 UG/1
2 AEROSOL, METERED RESPIRATORY (INHALATION) EVERY 6 HOURS PRN
Qty: 3 INHALER | Refills: 3 | Status: SHIPPED | OUTPATIENT
Start: 2017-07-28 | End: 2018-10-02

## 2017-07-28 RX ORDER — EPINEPHRINE 0.3 MG/.3ML
INJECTION SUBCUTANEOUS
Qty: 6 ML | Refills: 1 | Status: SHIPPED | OUTPATIENT
Start: 2017-07-28 | End: 2018-04-04

## 2017-07-28 NOTE — MR AVS SNAPSHOT
"              After Visit Summary   7/28/2017    Nathalie Harp    MRN: [id number]           Patient Information     Date Of Birth          1955        Visit Information        Provider Department      7/28/2017 11:20 AM Eva Lockhart MD [facility]        Today's Diagnoses     Uncontrolled type 2 diabetes mellitus without complication, with long-term current use of insulin (H)    -  1    Screening for diabetic retinopathy          Care Instructions    [facility] [place] [phone]  ---- OPHTHALMOLOGY appointment    ENdocrine consult  ______________________________________________  Diabetes education will call you      See me again at end of August          Follow-ups after your visit        Additional Services     DIABETES EDUCATOR REFERRAL       DIABETES SELF MANAGEMENT TRAINING (DSMT)      Your provider has referred you to Diabetes Education: FMG: Diabetes Education - All [facility] [phone]   [email or web address]    Type of training and number of hours:  This patient needs 1;1  She can not see well:  Needs to have meds written big and bold... Have her come with all her diabetes supplies and watch her.  Help her do it correctly. \"I don't know if I should prime the pen or not\"--could she be \"missing\" and not taking her insulin (for example)?\"    Medicare covers: 10 hours of initial DSMT in 12 month period from the time of first visit, plus 2 hours of follow-up DSMT annually, and additional hours as requested for insulin training.    Diabetes Type: Type 2 - On Insulin             Diabetes Co-Morbidities: other very poor control and probably poor insight.  and retinopathy               A1C Goal:  <8.0       A1C is: Lab Results       Component                Value               Date                       A1C                      13.8                07/03/2017              If an urgent visit is needed " or A1C is above 12, Care Team to call the Diabetes Education Team at [phone] or send an In Basket message to the Diabetes Education Pool (P DIAB ED-PATIENT CARE).    Diabetes Education Topics: Other: see above.  SOmething is amiss with her insulin adminstration.  Please also write out her insulin schedule with big bold print    Special Educational Needs Requiring Individual DSMT: Cognitive Impairment (at least mild) and Vision - Degree of Deficit: Marked       MEDICAL NUTRITION THERAPY (MNT) for Diabetes    Medical Nutrition Therapy with a Registered Dietitian can be provided in coordination with Diabetes Self-Management Training to assist in achieving optimal diabetes management.    MNT Type and Hours: Do not initiate MNT at this time.                       Medicare will cover: 3 hours initial MNT in 12 month period after first visit, plus 2 hours of follow-up MNT annually    Please be aware that coverage of these services is subject to the terms and limitations of your health insurance plan.  Call member services at your health plan to determine Diabetes Self-Management Training benefits and ask which blood glucose monitor brands are covered by your plan.      Please bring the following with you to your appointment:    (1)  List of current medications   (2)  List of Blood Glucose Monitor brands that are covered by your insurance plan  (3)  Blood Glucose Monitor and log book  (4)   Food records for the 3 days prior to your visit    The Certified Diabetes Educator may make diabetes medication adjustments per the CDE Protocol and Collaborative Practice Agreement.            OPTOMETRY REFERRAL       Your provider has referred you to:  FMG: [facility] [place] [phone]    [email or web address]    Please be aware that coverage of these services is subject to the terms and limitations of your health insurance plan.  Call member services at your health plan with any benefit  or coverage questions.      Please bring the following to your appointment:  >>   Any x-rays, CTs or MRIs which have been performed.  Contact the facility where they were done to arrange for  prior to your scheduled appointment.  Any new CT, MRI or other procedures ordered by your specialist must be performed at a [place] facility or coordinated by your clinic's referral office.    >>   List of current medications   >>   This referral request   >>   Any documents/labs given to you for this referral                  Follow-up notes from your care team     Return in about 4 weeks (around 8/25/2017) for Routine Visit, diabetes follow up.      Your next 10 appointments already scheduled     Aug 11, 2017 11:30 AM CDT   Office Visit with Jovana Loo RPH   [facility] ([facility])    [address] [place] [id number] [phone]           Bring a current list of meds and any records pertaining to this visit. For Physicals, please bring immunization records and any forms needing to be filled out. Please arrive 10 minutes early to complete paperwork.              Who to contact     If you have questions or need follow up information about today's clinic visit or your schedule please contact [facility] directly at [phone].  Normal or non-critical lab and imaging results will be communicated to you by MyChart, letter or phone within 4 business days after the clinic has received the results. If you do not hear from us within 7 days, please contact the clinic through [employer] or phone. If you have a critical or abnormal lab result, we will notify you by phone as soon as possible.  Submit refill requests through [employer] or call your pharmacy and they will forward the refill request to us. Please allow 3 business days for your refill to be completed.          Additional Information About Your Visit       "  MyChart Information     [facility] lets you send messages to your doctor, view your test results, renew your prescriptions, schedule appointments and more. To sign up, go to [email or web address] . Click on \"Log in\" on the left side of the screen, which will take you to the Welcome page. Then click on \"Sign up Now\" on the right side of the page.     You will be asked to enter the access code listed below, as well as some personal information. Please follow the directions to create your username and password.     Your access code is: [email or web address]  Expires: 9/3/2017  2:51 PM     Your access code will  in 90 days. If you need help or a new code, please call your [facility] clinic or [phone].        Care EveryWhere ID     This is your Care EveryWhere ID. This could be used by other organizations to access your [facility] medical records  [id number]        Your Vitals Were     Pulse Temperature Last Period Pulse Oximetry BMI (Body Mass Index)       78 97.9  F (36.6  C) (Oral) (LMP Unknown) 95% 23.35 kg/m2        Blood Pressure from Last 3 Encounters:   17 132/84   17 134/74   17 130/81    Weight from Last 3 Encounters:   17 106 lb (48.1 kg)   17 104 lb (47.2 kg)   17 106 lb (48.1 kg)              We Performed the Following     DIABETES EDUCATOR REFERRAL     OPTOMETRY REFERRAL        Primary Care Provider Office Phone # Fax #    Eva Lockhart [phone]       [facility] [address] [place], [place]. \[facility] Outlook.\"" [id number]        Goals        General    I will check my blood sugars four times per day (pt-stated)     Notes - Note created  3/10/2014 10:41 AM by Priti Orozco RN    As of today's date 3/10/2014 goal is met at 26 - 50%.   Goal Status:  Active      I will continue to work with Diabetic Educator (pt-stated)     Notes - Note created  3/10/2014 10:42 AM by Priti Orozco RN    As of today's date 3/10/2014 goal is met at 26 - 50%.   Goal " Status:  Active      I will take insulins as prescribed, including sliding scale novolog (pt-stated)     Notes - Note created  3/10/2014 10:43 AM by Priti Orozco RN    As of today's date 3/10/2014 goal is met at 51 - 75%.   Goal Status:  Active        Equal Access to Services     MIHAILORE JOHN : Hadii aad ku hadasho Soomaali, waaxda luqadaha, qaybta kaalmada adeegyada, waxay idiin haysherryn besspham betts laandreia . So [facility] [phone].    ATENCIÓN: Si habla español, tiene a tompkins disposición servicios gratuitos de asistencia lingüística. Llame al [phone].    We comply with applicable federal civil rights laws and [place] laws. We do not discriminate on the basis of race, color, national origin, age, disability sex, sexual orientation or gender identity.            Thank you!     Thank you for choosing [facility]  for your care. Our goal is always to provide you with excellent care. Hearing back from our patients is one way we can continue to improve our services. Please take a few minutes to complete the written survey that you may receive in the mail after your visit with us. Thank you!             Your Updated Medication List - Protect others around you: Learn how to safely use, store and throw away your medicines at www.disposemymeds.org.          This list is accurate as of: 7/28/17 12:14 PM.  Always use your most recent med list.                   Brand Name Dispense Instructions for use Diagnosis    albuterol 108 (90 BASE) MCG/ACT Inhaler    PROAIR HFA/PROVENTIL HFA/VENTOLIN HFA    3 Inhaler    Inhale 2 puffs into the lungs every 6 hours as needed for shortness of breath / dyspnea ((Ventolin or preferred albuterol equivalent))    Mild intermittent asthma without complication       aspirin 81 MG tablet      Take 1 tablet by mouth daily Reported on 4/21/2017        atorvastatin 40 MG tablet    LIPITOR    90 tablet    TAKE 1 TABLET (40 MG) BY MOUTH DAILY    Hyperlipidemia LDL goal <100        blood glucose lancets standard    no brand specified    1 Box    Use to test blood sugar 3 times daily or as directed.    Type 2 diabetes mellitus without complication, with long-term current use of insulin (H)       blood glucose monitoring meter device kit    no brand specified    1 kit    Use to test blood sugar 3 times daily or as directed    Type 2 diabetes mellitus without complication, with long-term current use of insulin (H)       blood glucose monitoring test strip    ONE TOUCH ULTRA    300 each    TEST BLOOD SUGARS 3 TIMES DAILY.    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    Type 2 diabetes mellitus with diabetic nephropathy, with long-term current use of insulin (H)       insulin reg HIGH CONC 500 UNIT/ML PEN soln    HumuLIN R U-500 KwikPen    18 mL    Inject 155 units every morning with breakfast,  and 135units every evening with dinner. This is a dose increase.    Type 2 diabetes mellitus with diabetic nephropathy, with long-term current use of insulin (H)       losartan 50 MG tablet    COZAAR    90 tablet    Take 1 tablet (50 mg) by mouth daily    Uncontrolled type 2 diabetes mellitus without complication, with long-term current use of insulin (H), CKD (chronic kidney disease) stage 2, GFR 60-89 ml/min       meclizine 25 MG tablet    ANTIVERT    30 tablet    Take 1 tablet (25 mg) by mouth every 6 hours as needed for dizziness    BPPV (benign paroxysmal positional vertigo), unspecified laterality        omeprazole 20 MG CR capsule    priLOSEC    90 capsule    TAKE ONE CAPSULE BY MOUTH EVERY DAY    Gastroesophageal reflux disease, esophagitis presence not specified       order for DME     1 each    Equipment being ordered: bath tub grab bars    Arthritis of knee, right, Disorder of bursae and tendons in shoulder region, Falls frequently, Balance problems       pioglitazone 45 MG tablet    ACTOS    30 tablet    Take 1 tablet (45 mg) by mouth daily For pharmacy: Please discontinue the 30mg tablets    Type 2 diabetes mellitus with diabetic nephropathy, with long-term current use of insulin (H)

## 2017-07-28 NOTE — NURSING NOTE
"Chief Complaint   Patient presents with     Diabetes     Health Maintenance     ACT,eye       Initial /84 (BP Location: Right arm, Patient Position: Chair, Cuff Size: Adult Regular)  Pulse 78  Temp 97.9  F (36.6  C) (Oral)  Wt 106 lb (48.1 kg)  LMP  (LMP Unknown)  SpO2 95%  BMI 23.35 kg/m2 Estimated body mass index is 23.35 kg/(m^2) as calculated from the following:    Height as of 7/3/17: 4' 8.5\" (1.435 m).    Weight as of this encounter: 106 lb (48.1 kg).  Medication Reconciliation: complete   Althea Dunham ma      "

## 2017-07-28 NOTE — PROGRESS NOTES
"  SUBJECTIVE:                                                    Nathalie Harp is a 62 year old female who presents to clinic today for the following health issues:    seen by MTM: \" Increase pioglitazone to 45mg QD. Rx sent to pharmacy.  2. Increase Humulin U500 to 155units QAM before breakfast, continue on 135units QPM before dinner. Updated Rx sent to pharmacy.  3. Pt to SMBG before breakfast AND dinner and with symptoms of hypoglycemia.\"    Patient very ill last month with \"Flu and double pneumonia\".   She has lost weight  She just saw MTM and some changes were made. She IS able to reiterate the current insulin dose as MTM recommended today    Diabetes Follow-up    Patient is checking blood sugars: twice daily.    Blood sugar testing frequency justification:   Results are as follows:       am - 400          Diabetic concerns: None and blood sugar frequently over 200     Symptoms of hypoglycemia (low blood sugar): shaky, dizzy     Paresthesias (numbness or burning in feet) or sores: Yes    Date of last diabetic eye exam: 1 year ago       Amount of exercise or physical activity: None    Problems taking medications regularly: No    Medication side effects: none  Diet: regular (no restrictions) and limited because of teeth and food allergies           Problem list and histories reviewed & adjusted, as indicated.  Additional history: as documented    Patient Active Problem List   Diagnosis     Esophageal reflux     Irritable bowel syndrome     Female stress incontinence     Migraine without aura     Arthritis of knee, right     Disorder of bursae and tendons in shoulder region     Degeneration of thoracic or thoracolumbar intervertebral disc     Degeneration of cervical intervertebral disc     Other hammer toe (acquired)     Allergic rhinitis due to pollen     Menopausal symptoms     Need for SBE (subacute bacterial endocarditis) prophylaxis     Preventive measure     Hyperlipidemia LDL goal <100     Health " Care Home     Anemia     Dizziness     Hammer toe     Microalbuminuria     ACP (advance care planning)     Allergic to shellfish     Falls frequently     Balance problems     CKD (chronic kidney disease) stage 2, GFR 60-89 ml/min     Type 2 diabetes mellitus with diabetic nephropathy (H)     Diarrhea     Iron deficiency anemia, unspecified iron deficiency anemia type     Right axillary hidradenitis     Uncontrolled type 2 diabetes mellitus without complication, with long-term current use of insulin (H)     Mild intermittent asthma without complication     Past Surgical History:   Procedure Laterality Date     HC EXCIS PRIMARY GANGLION WRIST  1985 and 1987    right wrist     KNEE SURGERY  2008 approx    arthroscopic; Dr. Rivera       Social History   Substance Use Topics     Smoking status: Never Smoker     Smokeless tobacco: Never Used     Alcohol use Yes      Comment: rarely     Family History   Problem Relation Age of Onset     CEREBROVASCULAR DISEASE Maternal Grandmother      Arthritis Maternal Grandmother      Asthma Mother      GASTROINTESTINAL DISEASE Mother      IBS     Alcohol/Drug Mother      Depression Mother      Neurologic Disorder Mother      migraines     GASTROINTESTINAL DISEASE Father      IBS     DIABETES Father      Alcohol/Drug Father      Neurologic Disorder Father      migraines     Obesity Father      GASTROINTESTINAL DISEASE Maternal Grandfather      Ulcers     Alcohol/Drug Maternal Grandfather      DIABETES Paternal Grandmother      Alcohol/Drug Paternal Grandmother      Arthritis Paternal Grandmother      Obesity Paternal Grandmother      Hypertension Sister      Hypertension Brother      Circulatory Sister      Asthma Sister      Asthma Sister      Asthma Brother      Thyroid Disease Sister      Thyroid Disease Sister      Obesity Sister          Current Outpatient Prescriptions   Medication Sig Dispense Refill     pioglitazone (ACTOS) 45 MG tablet Take 1 tablet (45 mg) by mouth daily For  pharmacy: Please discontinue the 30mg tablets 30 tablet 11     insulin reg HIGH CONC (HUMULIN R U-500 KWIKPEN) 500 UNIT/ML PEN soln Inject 155 units every morning with breakfast,  and 135units every evening with dinner. This is a dose increase. 18 mL 11     omeprazole (PRILOSEC) 20 MG CR capsule TAKE ONE CAPSULE BY MOUTH EVERY DAY 90 capsule 1     blood glucose monitoring (ONE TOUCH ULTRA) test strip TEST BLOOD SUGARS 3 TIMES DAILY. 300 each 3     insulin pen needle (B-D U/F) 31G X 5 MM Use twice daily as directed. 200 each 3     blood glucose monitoring (NO BRAND SPECIFIED) meter device kit Use to test blood sugar 3 times daily or as directed 1 kit 0     blood glucose (NO BRAND SPECIFIED) lancets standard Use to test blood sugar 3 times daily or as directed. 1 Box 3     losartan (COZAAR) 50 MG tablet Take 1 tablet (50 mg) by mouth daily 90 tablet 3     atorvastatin (LIPITOR) 40 MG tablet TAKE 1 TABLET (40 MG) BY MOUTH DAILY 90 tablet 3     celecoxib (CELEBREX) 200 MG capsule Take 1 capsule (200 mg) by mouth 2 times daily as needed for moderate pain 180 capsule 1     EPIPEN 2-MELVIN 0.3 MG/0.3ML injection INJECT 0.3 MLS (0.3 MG) INTO THE MUSCLE ONCE AS NEEDED FOR ANAPHYLAXIS 2 each 9     meclizine (ANTIVERT) 25 MG tablet Take 1 tablet (25 mg) by mouth every 6 hours as needed for dizziness (Patient not taking: Reported on 7/28/2017) 30 tablet 1     albuterol (PROAIR HFA/PROVENTIL HFA/VENTOLIN HFA) 108 (90 BASE) MCG/ACT Inhaler Inhale 2 puffs into the lungs every 6 hours as needed for shortness of breath / dyspnea ((Ventolin or preferred albuterol equivalent)) (Patient not taking: Reported on 7/28/2017) 3 Inhaler 3     order for DME Equipment being ordered: bath tub grab bars (Patient not taking: Reported on 7/28/2017) 1 each 0     aspirin 81 MG tablet Take 1 tablet by mouth daily Reported on 4/21/2017       Allergies   Allergen Reactions     Amoxicillin-Pot Clavulanate [[id number]+Fd&C Red #40 Al Tristan]      Headache,  nausea and vomiting     Augmentin      Beesix [Pyridoxine]      Benadryl [Acetaminophen]      Ceclor [Cefaclor]      Cefaclor      Rash       Cephalexin      Cephalosporins      Codeine      Detrol [Tolterodine Tartrate]      Dust Mites      Latex      Metformin GI Disturbance     Severe diarrhea     Pollen Extract      Septra [Bactrim]      Septra [Sulfamethoxazole W-Trimethoprim]      Shellfish Allergy      Crab       Simvastatin      HA     Sorbitol Diarrhea     Sulfa Drugs      Sulfonamide Derivatives      Recent Labs   Lab Test  07/03/17   1030  04/21/17   1040  12/21/16   1003  11/14/16   1004  10/07/15   0931  08/31/15   1144  11/04/14   1119  07/11/14   1038  03/03/14   0906   07/05/13   1159   A1C  13.8*   --   12.3*   --   11.3*  11.4*  12.8*  12.8*  12.3*   --   11.2*   LDL   --    --    --   90   --   83   --   85   --    --   Cannot estimate LDL when triglyceride exceeds 400 mg/dL  132*   HDL   --    --    --   52   --   65   --    --    --    --   47*   TRIG   --    --    --   380*   --   301*   --    --    --    --   463*   ALT   --    --    --   29   --   31  19  22   --    < >  22   CR   --   0.55   --   0.92  0.90  0.80  0.80  0.90   --    < >  0.80   GFRESTIMATED   --   >90  Non  GFR Calc     --   62  63  73  73  64   --    < >  73   GFRESTBLACK   --   >90   GFR Calc     --   75  77  88  88  77   --    < >  89   POTASSIUM   --   3.5   --   5.0  4.1  4.7  4.2  4.2   --    < >  4.5   TSH   --    --    --   0.35*   --    --    --    --   0.54   --    --     < > = values in this interval not displayed.      BP Readings from Last 3 Encounters:   07/28/17 132/84   07/19/17 134/74   07/03/17 130/81    Wt Readings from Last 3 Encounters:   07/28/17 106 lb (48.1 kg)   07/19/17 104 lb (47.2 kg)   07/14/17 106 lb (48.1 kg)                  Labs reviewed in EPIC          Reviewed and updated as needed this visit by clinical staffTobacco  Allergies  Med Hx  Surg Hx  Fam Hx   Soc Hx      Reviewed and updated as needed this visit by Provider         ROS:  Constitutional, HEENT, cardiovascular, pulmonary, gi and gu systems are negative, except as otherwise noted.  Neuropathy symptoms.     OBJECTIVE:   /84 (BP Location: Right arm, Patient Position: Chair, Cuff Size: Adult Regular)  Pulse 78  Temp 97.9  F (36.6  C) (Oral)  Wt 106 lb (48.1 kg)  LMP  (LMP Unknown)  SpO2 95%  BMI 23.35 kg/m2  Body mass index is 23.35 kg/(m^2).  GENERAL: healthy, alert and no distress  EYES: Eyes grossly normal to inspection, PERRL and conjunctivae and sclerae normal  MS: no gross musculoskeletal defects noted, no edema  SKIN: no suspicious lesions or rashes  PSYCH: mentation appears normal, affect appropriate.  Content of speech re: her many barriers,       Diagnostic Test Results:  Results for orders placed or performed in visit on 07/03/17   Antineutrophil cytoplasmic Elinor IgG   Result Value Ref Range    Neutrophil Cytoplasmic IgG Antibody (A)      1:80  Reference range: <1:20  (Note)  Atypical perinuclear ANCA (atypical p-ANCA) staining  pattern observed. Presence of p-ANCA ruled out on  formalin-fixed neutrophils. This staining pattern is  associated with inflammatory bowel diseases, particularly  ulcerative colitis. It may also be seen in primary  sclerosis cholangitis.  INTERPRETIVE INFORMATION: Anti-Neutrophil Cyto Ab, IgG  Neutrophil Cytoplasmic Antibodies (C-ANCA = granular  cytoplasmic staining, P-ANCA = perinuclear staining) are  found in the serum of over 90 percent of patients with  certain necrotizing systemic vasculitides, and usually in  less than 5 percent of patients with collagen vascular  disease or arthritis.  Performed by [facility],  [address] [place] [id number] [phone]  [email or web address], Gadiel Haley MD, Lab. Director     Hemoglobin A1c   Result Value Ref Range    Hemoglobin A1C 13.8 (H) 4.3 - 6.0 %   Insulin level   Result Value Ref Range    Insulin 5.0 3 - 25  mU/L       ASSESSMENT/PLAN:        ICD-10-CM    1. Uncontrolled type 2 diabetes mellitus without complication, with long-term current use of insulin (H) E11.65 ENDOCRINOLOGY ADULT REFERRAL    Z79.4    2. Screening for diabetic retinopathy Z13.5 OPTOMETRY REFERRAL     DIABETES EDUCATOR REFERRAL   3. Allergic to shellfish Z91.013 EPINEPHrine (EPIPEN 2-MELVIN) 0.3 MG/0.3ML injection 2-pack   4. Mild intermittent asthma without complication J45.20 albuterol (PROAIR HFA/PROVENTIL HFA/VENTOLIN HFA) 108 (90 BASE) MCG/ACT Inhaler         Suspect some error in med administration, maybe insulin?  She has poor vision and poor insight.  Hard to put my finger on it.  She is definitely very insulin resistant.  Will ask DM ed to observe her. ...   She has never seen an Endocrinologist.  Will ask for evaluation, next available appointment.      25 minutes face to face time, > 50 %  counseling and coordination of care        Eva Lockhart MD  [facility]

## 2017-07-28 NOTE — PATIENT INSTRUCTIONS
Moe Hidalgo [facility] Gwen [phone]  ---- OPHTHALMOLOGY appointment    ENdocrine consult  ______________________________________________  Diabetes education will call you      See me again at end of August

## 2017-07-29 ASSESSMENT — ASTHMA QUESTIONNAIRES: ACT_TOTALSCORE: 18

## 2017-08-23 ENCOUNTER — OFFICE VISIT (OUTPATIENT)
Dept: PHARMACY | Facility: CLINIC | Age: 62
End: 2017-08-23
Payer: COMMERCIAL

## 2017-08-23 VITALS
BODY MASS INDEX: 23.26 KG/M2 | SYSTOLIC BLOOD PRESSURE: 130 MMHG | HEART RATE: 80 BPM | WEIGHT: 105.6 LBS | DIASTOLIC BLOOD PRESSURE: 70 MMHG

## 2017-08-23 DIAGNOSIS — Z79.4 TYPE 2 DIABETES MELLITUS WITH DIABETIC NEPHROPATHY, WITH LONG-TERM CURRENT USE OF INSULIN (H): ICD-10-CM

## 2017-08-23 DIAGNOSIS — E11.21 TYPE 2 DIABETES MELLITUS WITH DIABETIC NEPHROPATHY, WITH LONG-TERM CURRENT USE OF INSULIN (H): ICD-10-CM

## 2017-08-23 PROCEDURE — 99607 MTMS BY PHARM ADDL 15 MIN: CPT | Performed by: PHARMACIST

## 2017-08-23 PROCEDURE — 99606 MTMS BY PHARM EST 15 MIN: CPT | Performed by: PHARMACIST

## 2017-08-23 NOTE — MR AVS SNAPSHOT
After Visit Summary   8/23/2017    Nathalie Harp    MRN: [id number]           Patient Information     Date Of Birth          1955        Visit Information        Provider Department      8/23/2017 1:30 PM Jovana Loo RPH [facility]        Today's Diagnoses     Type 2 diabetes mellitus with diabetic nephropathy, with long-term current use of insulin (H)           Follow-ups after your visit        Your next 10 appointments already scheduled     Sep 13, 2017  3:45 PM CDT   (Arrive by 3:30 PM)   NEW DIABETES with Saqib Ambriz MD   [facility] Endocrinology ([facility])    [address]  3rd Floor  [facility] [address]   [phone]            Sep 14, 2017  1:00 PM CDT   Diabetic Education with  DIABETIC ED RESOURCE   [facility] ([facility])    [address] [id number] [phone]            Sep 20, 2017 10:00 AM CDT   Office Visit with Jovana Loo RPH   [facility] ([facility])    [address] [id number] [phone]           Bring a current list of meds and any records pertaining to this visit. For Physicals, please bring immunization records and any forms needing to be filled out. Please arrive 10 minutes early to complete paperwork.              Who to contact     If you have questions or need follow up information about today's clinic visit or your schedule please contact [facility] directly at [phone].  Normal or non-critical lab and imaging results will be communicated to you by MyChart, letter or phone within 4 business days after the clinic has received the results. If you do not hear from us within 7 days, please contact the clinic through MyChart or phone. If you have a critical or abnormal  "lab result, we will notify you by phone as soon as possible.  Submit refill requests through Ed[employer] or call your pharmacy and they will forward the refill request to us. Please allow 3 business days for your refill to be completed.          Additional Information About Your Visit        Netevenhart Information     Edicy lets you send messages to your doctor, view your test results, renew your prescriptions, schedule appointments and more. To sign up, go to [email or web address] . Click on \"Log in\" on the left side of the screen, which will take you to the Welcome page. Then click on \"Sign up Now\" on the right side of the page.     You will be asked to enter the access code listed below, as well as some personal information. Please follow the directions to create your username and password.     Your access code is: [email or web address]  Expires: 9/3/2017  2:51 PM     Your access code will  in 90 days. If you need help or a new code, please call your [facility] clinic or [phone].        Care EveryWhere ID     This is your Care EveryWhere ID. This could be used by other organizations to access your [facility] medical records  [id number]        Your Vitals Were     Pulse Last Period BMI (Body Mass Index)             80 (LMP Unknown) 23.26 kg/m2          Blood Pressure from Last 3 Encounters:   17 130/70   17 132/84   17 134/74    Weight from Last 3 Encounters:   17 105 lb 9.6 oz (47.9 kg)   17 106 lb (48.1 kg)   17 104 lb (47.2 kg)              Today, you had the following     No orders found for display         Today's Medication Changes          These changes are accurate as of: 17 11:59 PM.  If you have any questions, ask your nurse or doctor.               These medicines have changed or have updated prescriptions.        Dose/Directions    insulin reg HIGH CONC 500 UNIT/ML PEN soln   Commonly known as:  HumuLIN R U-500 KwikPen   This may have changed:  additional " instructions   Used for:  Type 2 diabetes mellitus with diabetic nephropathy, with long-term current use of insulin (H)        Inject 155 units every morning with breakfast and 155units every evening with dinner. This is a dose increase.   Quantity:  18 mL   Refills:  11            Where to get your medicines      These medications were sent to [facility] - [place], [place] - [address]  [address], [facility] [address]     Phone:  [phone]     insulin reg HIGH CONC 500 UNIT/ML PEN soln                Primary Care Provider Office Phone # Fax #    Eva Lockhart [phone]       [address]  [facility] [address]        Goals        General    I will check my blood sugars four times per day (pt-stated)     Notes - Note created  3/10/2014 10:41 AM by Priti Orozco RN    As of today's date 3/10/2014 goal is met at 26 - 50%.   Goal Status:  Active      I will continue to work with Diabetic Educator (pt-stated)     Notes - Note created  3/10/2014 10:42 AM by Priti Orozco RN    As of today's date 3/10/2014 goal is met at 26 - 50%.   Goal Status:  Active      I will take insulins as prescribed, including sliding scale novolog (pt-stated)     Notes - Note created  3/10/2014 10:43 AM by Priti Orozco RN    As of today's date 3/10/2014 goal is met at 51 - 75%.   Goal Status:  Active        Equal Access to Services     [facility]: Hadii tracey berman hadasho Soomaali, waaxda luqadaha, qaybta kaalmada adeegyada, sandra kelly adepham charles . So [facility] [phone].    ATENCIÓN: Si habla español, tiene a tompkins disposición servicios gratuitos de asistencia lingüística. Llame al [phone].    We comply with applicable federal civil rights laws and [place] laws. We do not discriminate on the basis of race, color, national origin, age, disability sex, sexual orientation or gender identity.            Thank you!     Thank you for  choosing [facility]  for your care. Our goal is always to provide you with excellent care. Hearing back from our patients is one way we can continue to improve our services. Please take a few minutes to complete the written survey that you may receive in the mail after your visit with us. Thank you!             Your Updated Medication List - Protect others around you: Learn how to safely use, store and throw away your medicines at www.disposemymeds.org.          This list is accurate as of: 8/23/17 11:59 PM.  Always use your most recent med list.                   Brand Name Dispense Instructions for use Diagnosis    albuterol 108 (90 BASE) MCG/ACT Inhaler    PROAIR HFA/PROVENTIL HFA/VENTOLIN HFA    3 Inhaler    Inhale 2 puffs into the lungs every 6 hours as needed for shortness of breath / dyspnea ((Ventolin or preferred albuterol equivalent))    Mild intermittent asthma without complication       aspirin 81 MG tablet      Take 1 tablet by mouth daily Reported on 4/21/2017        atorvastatin 40 MG tablet    LIPITOR    90 tablet    TAKE 1 TABLET (40 MG) BY MOUTH DAILY    Hyperlipidemia LDL goal <100       blood glucose lancets standard    no brand specified    1 Box    Use to test blood sugar 3 times daily or as directed.    Type 2 diabetes mellitus without complication, with long-term current use of insulin (H)       blood glucose monitoring meter device kit    no brand specified    1 kit    Use to test blood sugar 3 times daily or as directed    Type 2 diabetes mellitus without complication, with long-term current use of insulin (H)       blood glucose monitoring test strip    ONE TOUCH ULTRA    300 each    TEST BLOOD SUGARS 3 TIMES DAILY.    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    Type 2 diabetes mellitus with diabetic nephropathy, with long-term current use of insulin (H)       insulin reg HIGH CONC 500 UNIT/ML PEN soln    HumuLIN R U-500 KwikPen    18 mL    Inject 155 units every morning with breakfast and 155units every evening with dinner. This is a dose increase.    Type 2 diabetes mellitus with diabetic nephropathy, with long-term current use of insulin (H)       losartan 50 MG tablet    COZAAR    90 tablet    Take 1 tablet (50 mg) by mouth daily    Uncontrolled type 2 diabetes mellitus without complication, with long-term current use of insulin (H), CKD (chronic kidney disease) stage 2, GFR 60-89 ml/min       meclizine 25 MG tablet    ANTIVERT    30 tablet    Take 1 tablet (25 mg) by mouth every 6 hours as needed for dizziness    BPPV (benign paroxysmal positional vertigo), unspecified laterality       omeprazole 20 MG CR capsule    priLOSEC    90 capsule    TAKE ONE CAPSULE BY MOUTH EVERY DAY    Gastroesophageal reflux disease, esophagitis presence not specified       order for DME     1 each    Equipment being ordered: bath tub grab bars    Arthritis of knee, right, Disorder of bursae and tendons in shoulder region, Falls frequently, Balance problems       pioglitazone 45 MG tablet    ACTOS    30 tablet    Take 1 tablet (45 mg) by mouth daily For pharmacy: Please discontinue the 30mg tablets    Type 2 diabetes mellitus with diabetic nephropathy, with long-term current use of insulin (H)

## 2017-08-23 NOTE — PROGRESS NOTES
SUBJECTIVE/OBJECTIVE:                Nathalie Harp is a 62 year old female coming in for a follow-up visit for Medication Therapy Management.  She was referred to me from Eva Lockhart     Chief Complaint: Follow up from our visit on 7/19/2017. Insulin titration. She is also scheduled to see Endo in a few weeks.    Tobacco: No tobacco use   Alcohol: none    Medication Adherence: no issues reported by patient    Diabetes:  Pt currently taking Humulin U500 155units QAM and 135units QPM - this was changed from TID a few months ago because she was often forgetting lunch time dose. She also increased pioglitazone 45mg QAM by me at our last appointment.  SMBG Ranges (based on glucometer readings): 14d avg (n13) 466mg/dL  Symptoms of low blood sugar? none.   Symptoms of high blood sugar? fatigue.  Eye exam: due  Foot exam: up to date  ACEi/ARB: Yes: losartan 50mg QD. Microalbumin is not < 30 mg/g.   Aspirin: Taking 81mg daily and denies side effects  Statin: Yes: atorvastatin 40mg QD and denies side effects  Diet/Exercise: Occasionally riding stationary bike, but not much formal exercise.  Date FBG/ 2hours post    8/23 505 Missed insulin last night   8/21 510    8/20 431    8/18 495 (8:44am), 547 (9:18am)    8/17 434    8/16 410    8/15 412    8/14 452    8/13 492    8/12 353    8/11 487    8/10 526      Current labs include:  BP Readings from Last 3 Encounters:   08/23/17 130/70   07/28/17 132/84   07/19/17 134/74     Wt Readings from Last 3 Encounters:   08/23/17 105 lb 9.6 oz (47.9 kg)   07/28/17 106 lb (48.1 kg)   07/19/17 104 lb (47.2 kg)     Today's Vitals: /70  Pulse 80  Wt 105 lb 9.6 oz (47.9 kg)  LMP  (LMP Unknown)  BMI 23.26 kg/m2     Lab Results   Component Value Date    A1C 13.8 07/03/2017    A1C 12.3 12/21/2016    A1C 11.3 10/07/2015    A1C 11.4 08/31/2015    A1C 12.8 11/04/2014     Lab Results   Component Value Date    CHOL 218 11/14/2016     Lab Results   Component Value Date    TRIG  380 11/14/2016     Lab Results   Component Value Date    HDL 52 11/14/2016     Lab Results   Component Value Date    LDL 90 11/14/2016       Liver Function Studies -   Recent Labs   Lab Test  11/14/16   1004   PROTTOTAL  7.5   ALBUMIN  4.2   BILITOTAL  0.6   ALKPHOS  109   AST  14   ALT  29       Lab Results   Component Value Date    UCRR 22 11/14/2016    MICROL 12 11/14/2016    UMALCR 56.82 (H) 11/14/2016       Last Basic Metabolic Panel:  Lab Results   Component Value Date     04/21/2017      Lab Results   Component Value Date    POTASSIUM 3.5 04/21/2017     Lab Results   Component Value Date    CHLORIDE 98 04/21/2017     Lab Results   Component Value Date    BUN 11 04/21/2017     Lab Results   Component Value Date    CR 0.55 04/21/2017     GFR Estimate   Date Value Ref Range Status   04/21/2017 >90  Non  GFR Calc   >60 mL/min/1.7m2 Final   11/14/2016 62 >60 mL/min/1.7m2 Final   10/07/2015 63 >60 mL/min/1.7m2 Final     TSH   Date Value Ref Range Status   11/14/2016 0.35 (L) 0.40 - 5.00 mU/L Final     T4 Free   Date Value Ref Range Status   11/14/2016 1.28 0.70 - 1.85 ng/dL Final       Most Recent Immunizations   Administered Date(s) Administered     Influenza (High Dose) 3 valent vaccine 10/03/2013     Influenza (IIV3) 08/30/2016     Pneumococcal (PCV 13) 11/14/2016     Pneumococcal 23 valent 11/14/2008     TD (ADULT, 7+) 09/01/2011       ASSESSMENT:              Current medications were reviewed today as discussed above.      Medication Adherence: no issues identified    Diabetes: Needs Improvement. Patient is not meeting A1c goal of < 8%. Self monitoring of blood glucose is not at goal of fasting  mg/dL. So far, not a lot of improvement with addition of pioglitazone, despite titration to maximum dose. Pt would benefit from:  Basal Insulin (Humulin U500) :  increase pre-dinner dose by 15%, as her pre-breakfast BG above goal.  Pioglitazone:  Stay on for now; will consider d/c in the  future as it hasn't had much effect on BG.  SMBG: needs to also be monitoring pre-dinner to appropriately assess her insulin doses.    PLAN:                  1. Increase Humulin U500 to 155units QPM before dinner and continue on 155units before breakfast. Updated Rx sent to pharmacy.  3. Pt to SMBG before breakfast AND dinner and with symptoms of hypoglycemia.    I spent 60 minutes with this patient today.  All changes were made via collaborative practice agreement with Eva Lockhart. A copy of the visit note was provided to the patient's primary care provider.     Will follow up in 1 month, she is seeing Endo in 3 weeks.    The patient was given a summary of these recommendations as an after visit summary.    Jovana Loo, Pharm.D., [facility]  Medication Therapy Management Pharmacist  [phone]

## 2017-09-06 ENCOUNTER — APPOINTMENT (OUTPATIENT)
Dept: GENERAL RADIOLOGY | Facility: CLINIC | Age: 62
End: 2017-09-06
Attending: FAMILY MEDICINE
Payer: MEDICARE

## 2017-09-06 ENCOUNTER — HOSPITAL ENCOUNTER (EMERGENCY)
Facility: CLINIC | Age: 62
Discharge: HOME | End: 2017-09-06
Attending: FAMILY MEDICINE | Admitting: FAMILY MEDICINE
Payer: MEDICARE

## 2017-09-06 VITALS
OXYGEN SATURATION: 96 % | TEMPERATURE: 97.9 F | SYSTOLIC BLOOD PRESSURE: 164 MMHG | HEART RATE: 81 BPM | DIASTOLIC BLOOD PRESSURE: 120 MMHG | RESPIRATION RATE: 16 BRPM

## 2017-09-06 DIAGNOSIS — S82.839A AVULSION FRACTURE OF DISTAL FIBULA: ICD-10-CM
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
## 2017-09-06 DIAGNOSIS — S82.831A CLOSED FRACTURE OF RIGHT DISTAL FIBULA: ICD-10-CM

## 2017-09-06 PROCEDURE — 73630 X-RAY EXAM OF FOOT: CPT | Mod: RT

## 2017-09-06 PROCEDURE — 27786 TREATMENT OF ANKLE FRACTURE: CPT | Mod: 54 | Performed by: FAMILY MEDICINE

## 2017-09-06 PROCEDURE — 27786 TREATMENT OF ANKLE FRACTURE: CPT

## 2017-09-06 PROCEDURE — 99284 EMERGENCY DEPT VISIT MOD MDM: CPT | Mod: 25

## 2017-09-06 PROCEDURE — 73610 X-RAY EXAM OF ANKLE: CPT | Mod: RT

## 2017-09-06 PROCEDURE — 99283 EMERGENCY DEPT VISIT LOW MDM: CPT | Mod: 25 | Performed by: FAMILY MEDICINE

## 2017-09-06 RX ORDER — OXYCODONE AND ACETAMINOPHEN 5; 325 MG/1; MG/1
1-2 TABLET ORAL EVERY 4 HOURS PRN
Qty: 15 TABLET | Refills: 0 | Status: SHIPPED | OUTPATIENT
Start: 2017-09-06 | End: 2018-01-10

## 2017-09-06 ASSESSMENT — ENCOUNTER SYMPTOMS
ABDOMINAL PAIN: 0
FEVER: 0
SHORTNESS OF BREATH: 0

## 2017-09-06 NOTE — ED NOTES
Blood pressure remains elevated.  Dr Sherman informed.  Patient instructed to monitor BP and to follow up with her clinic if it remains elevated.  Has not taken bp med today, reports take in the evening and did take it yesterday.  Discharge instructions given verbalizes understanding.  Discharged home

## 2017-09-06 NOTE — ED AVS SNAPSHOT
[facility], Emergency Department    [address]    [facility] [address]    Phone:  [phone]    Fax:  [phone]                                       Nathalie Harp   MRN: [id number]    Department:  [facility], Emergency Department   Date of Visit:  9/6/2017           After Visit Summary Signature Page     I have received my discharge instructions, and my questions have been answered. I have discussed any challenges I see with this plan with the nurse or doctor.    ..........................................................................................................................................  Patient/Patient Representative Signature      ..........................................................................................................................................  Patient Representative Print Name and Relationship to Patient    ..................................................               ................................................  Date                                            Time    ..........................................................................................................................................  Reviewed by Signature/Title    ...................................................              ..............................................  Date                                                            Time

## 2017-09-06 NOTE — ED AVS SNAPSHOT
[facility], Emergency Department    [address]    [place] [address]    Phone:  [phone]    Fax:  [phone]                                       Nathalie Harp   MRN: [id number]    Department:  [facility], Emergency Department   Date of Visit:  9/6/2017           Patient Information     Date Of Birth          1955        Your diagnoses for this visit were:     Avulsion fracture of distal fibula        You were seen by David Sherman MD.        Discharge Instructions       Thank you for choosing [facility].     Please closely monitor for further symptoms. Return to the Emergency Department if you develop any new or worsening signs or symptoms.    If you received any opiate pain medications or sedatives during your visit, please do not drive for at least 8 hours.     Labs, cultures or final xray interpretations may still need to be reviewed.  We will call you if your plan of care needs to be changed.    Please follow up with your primary care physician or clinic.      Ankle Fracture, Distal Fibula  You have a fracture, or broken bone, of the end of the fibula bone. The fibula is one of two bones that support the ankle joint.    Home care    You will be given a splint, cast, or special boot to prevent movement at the injury site. Do not put weight on a splint. It will break. Follow your healthcare provider s advice about when to begin bearing weight on a cast or boot.    Keep your leg elevated when sitting or lying down. When sleeping, place a pillow under the injured leg. When sitting, support the injured leg so it is level with your waist. This is very important during the first 48 hours.    Keep the cast or splint completely dry at all times. When bathing, protect the cast or splint with 2 large plastic bags. Place 1 bag outside of the other. Tape each bag with duct tape at the top end. Water can still leak in even when the foot is covered. So it's  best to keep the cast, splint, or boot away from water. If a fiberglass cast or splint gets wet, dry it with a hair dryer on a cool setting.    Place an ice pack over the injured area for no more than 15 to 20 minutes. Do this every 3 to 6 hours for the first 24 to 48 hours. Continue this 3 to 4 times a day as needed. To make an ice pack, put ice cubes in a plastic bag that seals at the top. Wrap the bag in a clean, thin towel or cloth. Never put ice or an ice pack directly on the skin. The ice pack can be put right on the cast or splint. As the ice melts, be careful that the cast or splint doesn t get wet.    You may use over-the-counter pain medicine to control pain, unless another pain medicine was prescribed. If you have chronic liver or kidney disease or ever had a stomach ulcer or GI bleeding, talk with your provider before using these medicines.  Follow-up care  Follow up with your healthcare provider in 1 week, or as advised. This is to be sure the bone is healing properly. If you were given a splint, it may be changed to a cast after the swelling goes down.  If X-rays were taken, you will be told of any new findings that may affect your care.  When to seek medical advice  Call your healthcare provider right away if any of these occur:    The plaster cast or splint becomes wet or soft    The fiberglass cast or splint stays wet for more than 24 hours    There is increased tightness or pain under the cast or splint    Your toes become swollen, cold, blue, numb, or tingly    The cast becomes loose    The cast has a bad smell    Sore areas develop under the cast    The cast develops cracks or breaks   Date Last Reviewed: 11/23/2015 2000-2017 [employer]. [address] [place] [address], [place] [address]. All rights reserved. This information is not intended as a substitute for professional medical care. Always follow your healthcare professional's instructions.          Future Appointments         Provider Department Dept Phone Center    9/13/2017 3:45 PM Saqib Ambriz MD [facility] Endocrinology [phone] [facility]    9/14/2017 1:00 PM CP DIABETIC ED RESOURCE [facility] at [phone] [facility]    9/20/2017 10:00 AM Jovana Loo RPH, RPH [facility] [phone] [facility]      24 Hour Appointment Hotline       To make an appointment at any [facility], call [phone] ([phone]). If you don't have a family doctor or clinic, we will help you find one. [facility] are conveniently located to serve the needs of you and your family.          ED Discharge Orders     Aircast stirrup                    Review of your medicines      START taking        Dose / Directions Last dose taken    oxyCODONE-acetaminophen 5-325 MG per tablet   Commonly known as:  PERCOCET   Dose:  1-2 tablet   Quantity:  15 tablet        Take 1-2 tablets by mouth every 4 hours as needed for pain   Refills:  0          Our records show that you are taking the medicines listed below. If these are incorrect, please call your family doctor or clinic.        Dose / Directions Last dose taken    albuterol 108 (90 BASE) MCG/ACT Inhaler   Commonly known as:  PROAIR HFA/PROVENTIL HFA/VENTOLIN HFA   Dose:  2 puff   Quantity:  3 Inhaler        Inhale 2 puffs into the lungs every 6 hours as needed for shortness of breath / dyspnea ((Ventolin or preferred albuterol equivalent))   Refills:  3        aspirin 81 MG tablet   Dose:  1 tablet        Take 1 tablet by mouth daily Reported on 4/21/2017   Refills:  0        atorvastatin 40 MG tablet   Commonly known as:  LIPITOR   Quantity:  90 tablet        TAKE 1 TABLET (40 MG) BY MOUTH DAILY   Refills:  3        blood glucose lancets standard   Commonly known as:  no brand specified   Quantity:  1 Box        Use to test blood sugar 3 times daily or as directed.   Refills:  3        blood glucose monitoring meter device kit    Commonly known as:  no brand specified   Quantity:  1 kit        Use to test blood sugar 3 times daily or as directed   Refills:  0        blood glucose monitoring test strip   Commonly known as:  ONE TOUCH ULTRA   Quantity:  300 each        TEST BLOOD SUGARS 3 TIMES DAILY.   Refills:  3        celecoxib 200 MG capsule   Commonly known as:  celeBREX   Dose:  200 mg   Quantity:  180 capsule        Take 1 capsule (200 mg) by mouth 2 times daily as needed for moderate pain   Refills:  1        EPINEPHrine 0.3 MG/0.3ML injection 2-pack   Commonly known as:  EPIPEN 2-MELVIN   Quantity:  6 mL        INJECT 0.3 MLS (0.3 MG) INTO THE MUSCLE ONCE AS NEEDED FOR ANAPHYLAXIS   Refills:  1        insulin pen needle 31G X 5 MM   Commonly known as:  B-D U/F   Quantity:  200 each        Use twice daily as directed.   Refills:  3        insulin reg HIGH CONC 500 UNIT/ML PEN soln   Commonly known as:  HumuLIN R U-500 KwikPen   Quantity:  18 mL        Inject 155 units every morning with breakfast and 155units every evening with dinner. This is a dose increase.   Refills:  11        losartan 50 MG tablet   Commonly known as:  COZAAR   Dose:  50 mg   Quantity:  90 tablet        Take 1 tablet (50 mg) by mouth daily   Refills:  3        meclizine 25 MG tablet   Commonly known as:  ANTIVERT   Dose:  25 mg   Quantity:  30 tablet        Take 1 tablet (25 mg) by mouth every 6 hours as needed for dizziness   Refills:  1        omeprazole 20 MG CR capsule   Commonly known as:  priLOSEC   Quantity:  90 capsule        TAKE ONE CAPSULE BY MOUTH EVERY DAY   Refills:  1        order for DME   Quantity:  1 each        Equipment being ordered: bath tub grab bars   Refills:  0        pioglitazone 45 MG tablet   Commonly known as:  ACTOS   Dose:  45 mg   Quantity:  30 tablet        Take 1 tablet (45 mg) by mouth daily For pharmacy: Please discontinue the 30mg tablets   Refills:  11                Prescriptions were sent or printed at these locations (1  "Prescription)                   Other Prescriptions                Printed at Department/Unit printer (1 of 1)         oxyCODONE-acetaminophen (PERCOCET) 5-325 MG per tablet                Procedures and tests performed during your visit     Ankle XR, G/E 3 views, right    Foot  XR, G/E 3 views, right      Orders Needing Specimen Collection     None      Pending Results     No orders found from 2017 to 2017.            Pending Culture Results     No orders found from 2017 to 2017.            Pending Results Instructions     If you had any lab results that were not finalized at the time of your Discharge, you can call the ED Lab Result RN at [phone]. You will be contacted by this team for any positive Lab results or changes in treatment. The nurses are available 7 days a week from 10A to 6:30P.  You can leave a message 24 hours per day and they will return your call.        Thank you for choosing [facility]       Thank you for choosing [facility] for your care. Our goal is always to provide you with excellent care. Hearing back from our patients is one way we can continue to improve our services. Please take a few minutes to complete the written survey that you may receive in the mail after you visit with us. Thank you!        Social [employer] Information     [employer] lets you send messages to your doctor, view your test results, renew your prescriptions, schedule appointments and more. To sign up, go to [email or web address] . Click on \"Log in\" on the left side of the screen, which will take you to the Welcome page. Then click on \"Sign up Now\" on the right side of the page.     You will be asked to enter the access code listed below, as well as some personal information. Please follow the directions to create your username and password.     Your access code is: [email or web address]  Expires: 2017 11:41 AM     Your access code will  in 90 days. If you need help or a new code, please call your [facility] or [phone].        Care EveryWhere ID     This is your Care EveryWhere ID. This could be used by other organizations to access your [facility] medical records  [id number]        Equal Access to Services     ADI LOPEZ : Narcisa Cano, wamaryluda luqadaha, qaybta kaalmada any, sandra waller. So [facility] [phone].    ATENCIÓN: Si habla español, tiene a tompkins disposición servicios gratuitos de asistencia lingüística. Llame al [phone].    We comply with applicable federal civil rights laws and [place] laws. We do not discriminate on the basis of race, color, national origin, age, disability sex, sexual orientation or gender identity.            After Visit Summary       This is your record. Keep this with you and show to your community pharmacist(s) and doctor(s) at your next visit.

## 2017-09-06 NOTE — ED PROVIDER NOTES
History     Chief Complaint   Patient presents with     Fall     trpped and fell injuring body,shoulder ,knee and foot pain     HPI  Nathalie Harp is a 62 year old female with a history of IBS, HLD, CKD, DM Type II, and migraines who presents to the Emergency Department for evaluation of a fall. The patient reports she was crossing the street on Sunday (3 days ago) when she fell forward. She states she was able to stand, but has since experienced increased pain, swelling, and bruising in her right ankle. She states she also has mild pain in her shoulders bilaterally. She says she has been able to ambulate with some difficulty. She denies any other injury.    Past Medical History:   Diagnosis Date     Acute stomach ulcer NOS, obst      Allergic state      Essential hypertension, benign      History of blood transfusion      Menopausal symptoms     per pt prempro ordered by GYN, Dr. Sherie Cody     Migraines      Mild intermittent asthma      Pure hypercholesterolemia      Type 2 diabetes, HbA1C goal < 8% (H) 7/5/2013    Dx 2004 per pt     Type II or unspecified type diabetes mellitus without mention of complication, not stated as uncontrolled     dx 2005 per pt       Past Surgical History:   Procedure Laterality Date     HC EXCIS PRIMARY GANGLION WRIST  1985 and 1987    right wrist     KNEE SURGERY  2008 approx    arthroscopic; Dr. Rivera       Family History   Problem Relation Age of Onset     CEREBROVASCULAR DISEASE Maternal Grandmother      Arthritis Maternal Grandmother      Asthma Mother      GASTROINTESTINAL DISEASE Mother      IBS     Alcohol/Drug Mother      Depression Mother      Neurologic Disorder Mother      migraines     GASTROINTESTINAL DISEASE Father      IBS     DIABETES Father      Alcohol/Drug Father      Neurologic Disorder Father      migraines     Obesity Father      GASTROINTESTINAL DISEASE Maternal Grandfather      Ulcers     Alcohol/Drug Maternal Grandfather      DIABETES Paternal  Grandmother      Alcohol/Drug Paternal Grandmother      Arthritis Paternal Grandmother      Obesity Paternal Grandmother      Hypertension Sister      Hypertension Brother      Circulatory Sister      Asthma Sister      Asthma Sister      Asthma Brother      Thyroid Disease Sister      Thyroid Disease Sister      Obesity Sister        Social History   Substance Use Topics     Smoking status: Never Smoker     Smokeless tobacco: Never Used     Alcohol use Yes      Comment: rarely       No current facility-administered medications for this encounter.      Current Outpatient Prescriptions   Medication     oxyCODONE-acetaminophen (PERCOCET) 5-325 MG per tablet     insulin reg HIGH CONC (HUMULIN R U-500 KWIKPEN) 500 UNIT/ML PEN soln     EPINEPHrine (EPIPEN 2-MELVIN) 0.3 MG/0.3ML injection 2-pack     albuterol (PROAIR HFA/PROVENTIL HFA/VENTOLIN HFA) 108 (90 BASE) MCG/ACT Inhaler     pioglitazone (ACTOS) 45 MG tablet     omeprazole (PRILOSEC) 20 MG CR capsule     blood glucose monitoring (ONE TOUCH ULTRA) test strip     insulin pen needle (B-D U/F) 31G X 5 MM     blood glucose monitoring (NO BRAND SPECIFIED) meter device kit     blood glucose (NO BRAND SPECIFIED) lancets standard     meclizine (ANTIVERT) 25 MG tablet     losartan (COZAAR) 50 MG tablet     atorvastatin (LIPITOR) 40 MG tablet     celecoxib (CELEBREX) 200 MG capsule     order for DME     aspirin 81 MG tablet        Allergies   Allergen Reactions     Amoxicillin-Pot Clavulanate [[id number]+Fd&C Red #40 [facility]]      Headache, nausea and vomiting     Augmentin      Beesix [Pyridoxine]      Benadryl [Acetaminophen]      Ceclor [Cefaclor]      Cefaclor      Rash       Cephalexin      Cephalosporins      Codeine      Detrol [Tolterodine Tartrate]      Dust Mites      Latex      Metformin GI Disturbance     Severe diarrhea     Pollen Extract      Septra [Bactrim]      Septra [Sulfamethoxazole W-Trimethoprim]      Shellfish Allergy      Crab       Simvastatin      HA      Sorbitol Diarrhea     Sulfa Drugs      Sulfonamide Derivatives          I have reviewed the Medications, Allergies, Past Medical and Surgical History, and Social History in the Epic system.    Review of Systems   Constitutional: Negative for fever.   Respiratory: Negative for shortness of breath.    Cardiovascular: Negative for chest pain.   Gastrointestinal: Negative for abdominal pain.   Musculoskeletal:        Positive for right ankle and foot pain   All other systems reviewed and are negative.      Physical Exam   BP: (!) 156/116  Pulse: 90  Temp: 97.9  F (36.6  C)  Resp: 16  SpO2: 98 %  Physical Exam   Constitutional: She is oriented to person, place, and time. She appears well-developed and well-nourished.   HENT:   Head: Normocephalic and atraumatic.   Mouth/Throat: Oropharynx is clear and moist.   Eyes: EOM are normal. Pupils are equal, round, and reactive to light.   Neck: Normal range of motion. Neck supple. Muscular tenderness present. No spinous process tenderness present. No tracheal deviation present. No thyromegaly present.   Cardiovascular: Normal rate, regular rhythm, normal heart sounds and intact distal pulses.  Exam reveals no gallop and no friction rub.    No murmur heard.  Pulmonary/Chest: Effort normal and breath sounds normal. She exhibits no tenderness.   Abdominal: Soft. Bowel sounds are normal. She exhibits no distension and no mass. There is no tenderness.   Musculoskeletal: She exhibits no edema or tenderness.        Feet:    She has full range of motion of both wrists and both shoulders.  There is no ecchymosis, abrasion, swelling or focal tenderness.   Neurological: She is alert and oriented to person, place, and time. She has normal strength. No cranial nerve deficit or sensory deficit. Coordination normal.   Skin: Skin is warm and dry. No rash noted.   Psychiatric: She has a normal mood and affect. Her behavior is normal.   Nursing note and vitals reviewed.      ED Course     ED  Course     Procedures             Critical Care time:  none             Labs Ordered and Resulted from Time of ED Arrival Up to the Time of Departure from the ED - No data to display         Assessments & Plan (with Medical Decision Making)   Patient suffered a mechanical fall 2 days ago.  She complained of numerous areas injured during the fall but most prominently in the right foot and ankle was noted to be swollen, ecchymotic and painful to bear weight upon.  Her exam is remarkable only for tenderness over the distal fibula with some bruising and swelling.  Distal CMS intact.  Her other portions of the trauma exam were normal.  X-ray confirms small evulsion fracture of the distal fibula.  She was treated symptomatically and provided analgesia.  Discussed expected course, need for follow up, and indications for return with the patient.  See discharge instructions.      I have reviewed the nursing notes.    I have reviewed the findings, diagnosis, plan and need for follow up with the patient.    New Prescriptions    OXYCODONE-ACETAMINOPHEN (PERCOCET) 5-325 MG PER TABLET    Take 1-2 tablets by mouth every 4 hours as needed for pain       Final diagnoses:   Avulsion fracture of distal fibula   I, Andrade Hough, am serving as a trained medical scribe to document services personally performed by Frankie Sherman MD, based on the provider's statements to me.      IFrankie MD, was physically present and have reviewed and verified the accuracy of this note documented by Andrade Hough.       9/6/2017   [facility], [place], EMERGENCY DEPARTMENT     David Sherman MD  09/06/17 [id number]

## 2017-09-06 NOTE — DISCHARGE INSTRUCTIONS
Thank you for choosing [facility].     Please closely monitor for further symptoms. Return to the Emergency Department if you develop any new or worsening signs or symptoms.    If you received any opiate pain medications or sedatives during your visit, please do not drive for at least 8 hours.     Labs, cultures or final xray interpretations may still need to be reviewed.  We will call you if your plan of care needs to be changed.    Please follow up with your primary care physician or clinic.      Ankle Fracture, Distal Fibula  You have a fracture, or broken bone, of the end of the fibula bone. The fibula is one of two bones that support the ankle joint.    Home care    You will be given a splint, cast, or special boot to prevent movement at the injury site. Do not put weight on a splint. It will break. Follow your healthcare provider s advice about when to begin bearing weight on a cast or boot.    Keep your leg elevated when sitting or lying down. When sleeping, place a pillow under the injured leg. When sitting, support the injured leg so it is level with your waist. This is very important during the first 48 hours.    Keep the cast or splint completely dry at all times. When bathing, protect the cast or splint with 2 large plastic bags. Place 1 bag outside of the other. Tape each bag with duct tape at the top end. Water can still leak in even when the foot is covered. So it's best to keep the cast, splint, or boot away from water. If a fiberglass cast or splint gets wet, dry it with a hair dryer on a cool setting.    Place an ice pack over the injured area for no more than 15 to 20 minutes. Do this every 3 to 6 hours for the first 24 to 48 hours. Continue this 3 to 4 times a day as needed. To make an ice pack, put ice cubes in a plastic bag that seals at the top. Wrap the bag in a clean, thin towel or cloth. Never put ice or an ice pack directly on the skin. The ice pack can be put  right on the cast or splint. As the ice melts, be careful that the cast or splint doesn t get wet.    You may use over-the-counter pain medicine to control pain, unless another pain medicine was prescribed. If you have chronic liver or kidney disease or ever had a stomach ulcer or GI bleeding, talk with your provider before using these medicines.  Follow-up care  Follow up with your healthcare provider in 1 week, or as advised. This is to be sure the bone is healing properly. If you were given a splint, it may be changed to a cast after the swelling goes down.  If X-rays were taken, you will be told of any new findings that may affect your care.  When to seek medical advice  Call your healthcare provider right away if any of these occur:    The plaster cast or splint becomes wet or soft    The fiberglass cast or splint stays wet for more than 24 hours    There is increased tightness or pain under the cast or splint    Your toes become swollen, cold, blue, numb, or tingly    The cast becomes loose    The cast has a bad smell    Sore areas develop under the cast    The cast develops cracks or breaks   Date Last Reviewed: 11/23/2015 2000-2017 [employer]. [address] [place] [address], [place] [address]. All rights reserved. This information is not intended as a substitute for professional medical care. Always follow your healthcare professional's instructions.

## 2017-09-14 ENCOUNTER — ALLIED HEALTH/NURSE VISIT (OUTPATIENT)
Dept: EDUCATION SERVICES | Facility: CLINIC | Age: 62
End: 2017-09-14
Payer: MEDICARE

## 2017-09-14 VITALS — BODY MASS INDEX: 23.79 KG/M2 | WEIGHT: 108 LBS

## 2017-09-14 PROCEDURE — G0108 DIAB MANAGE TRN  PER INDIV: HCPCS

## 2017-09-14 NOTE — Clinical Note
Kannan Hutchinson!  I saw this pt today, who Jovana had been following. She has a long history of insulin use and ongoing dose increases without improvement. She's scheduled to f/u with Jovana next week, so you'll be seeing her.  I found today that Nathalie's been reusing her Matt-fine Auto-cover needles for up to 1 month and not actually getting insulin most of the time, as the auto-cover needles retract after first use. I huddled with Dr. Lockhart and since Nathalie's been getting her insulin very irregularly and the doses were so high, she ordered to switch back to Levemir at 20 units.  Nathalie likely doesn't need to f/u with us both for titration, but Dr. Lockhart would like her to f/u for BG review weekly for dose titration. Would you prefer to continue to follow her or have me follow her weekly in Jovana's absence?  Thanks! Kiah Fuchs, MPH, RD, LD, CDE  [facility]

## 2017-09-14 NOTE — PATIENT INSTRUCTIONS
Take 20 units of Levemir every morning    Use a new needle for EVERY injection to ensure you are getting your insulin    Prime the pen with 2 units before every injection    Kiah will call you on Monday - midday    Follow-up with Jasper, the pharmacist covering for Jovana, on 9/20, as planned

## 2017-09-14 NOTE — PROGRESS NOTES
Diabetes Self Management Training: Individual Review Visit    Nathalie Harp presents today for education and evaluation of glucose control related to Type 2 diabetes.    She is accompanied by self    Patient's diabetes management related comments/concerns: Frequent pain, higher blood sugars related to constant pain, and broke ankle, so more pain lately. Was diagnosed with diabetes in March 2005 and thinks she had diabetes for likely a year before. Fridge wasn't working recently - wonders if this affected how well insulin is working; and worries about temperature of insulin she carries with her (in pocket, sometimes gets hot when she's walking to the bus or when she is laying on a heating pad), so wonders if insulin is actually working. Takes varying doses based on what she eats, doesn't prime pen before each use (only with first use of new pen), uses same needle for 1 month (Matt-fine Auto-cover); gets to [employer] only rarely to  more test strips, gets them only at [facility] as these are the most cost-effective option for her. Reports BG have ranged from 30 - 800 mg/dL in her lifetime.    Patient's emotional response to diabetes: expresses readiness to learn, anxiety, concern for health and well-being and frustration    Patient would like this visit to be focused around the following diabetes-related behaviors and goals: Monitoring, Taking Medication and Problem Solving    ASSESSMENT:  Patient Problem List and Family Medical History reviewed for relevant medical history, current medical status, and diabetes risk factors.    Patient has not been getting the majority of the insulin she has thought she was taking. Uses Matt-fine Auto-cover pen needles and has been reusing them for 1 week to 1 month. When used once, needle retracts and cannot be used to inject again, so insulin is leaking out of needle and not getting under her skin when patient thinks she is injecting. Concern that if patient is getting  minimal insulin at this time, currently prescribed high doses of U500 will cause hypoglycemia. Huddled with Dr. Lockhart - plan to stop U500 insulin and start Levemir at 20 units daily. Verbal order from Dr. Lockhart at 1:45 pm, 9/14/17, to start Levemir 20 units/day and follow-up weekly for dose adjustment. This provides 0.4 u/kg/day.     Current Diabetes Management per Patient:  Taking diabetes medications?   Yes - Humulin R U500 155-0-155-0 and 45 units Actos daily.      Reports she usually takes her insulin just before eating, maybe a couple minutes earlier, occasionally takes after eating.    *Abbreviated insulin dose documentation key: Insulin Trade Name (pgyghlbet-ohmob-jqwsgw-bedtime) - i.e. Humalog 5-5-5-0 (Humalog 5 units at breakfast, 5 units at lunch, and 5 units at dinner).    Past Diabetes Education: Yes    Patient glucose self monitoring as follows: one time daily.   BG meter: Reli-On meter  BG results:      BG values are: Not in goal  Patient's most recent   Lab Results   Component Value Date    A1C 13.8 07/03/2017    is not meeting goal of <7.0    Nutrition:  Patient eats 2-3 meals per day    Breakfast - not assessed  Lunch - out to eat, today had a lot of walter   Dinner - Glucerna or out to eat  Snacks - zucchini brownies    Beverages: not assessed    Cultural/Yazidism diet restrictions: No     Biggest Challenge to Healthy Eating: reports BG is high no matter what she eats, so doesn't pay a lot of attention to this right now    Physical Activity:    Type: walking - uses city buses for transportation and does a lot of walking from bus stops and to change buses  Limitations: back and knee pain and fractured ankle    Diabetes Risk Factors:  age over 45 years and inactivity    Diabetes Complications:  Acute Complications: At risk for hypoglycemia? yes  Symptoms of low blood sugar? sweating, shaking and weakness  Frequency of hypoglycemia: 1-2 times/month  Patient carries a carbohydrate source with  "them regularly: Yes   Type of carbohydrate: Reli-On glucose drink    Symptoms of hyperglycemia? blurred vision and feeling drowsy/tired    Vitals:  Wt 49 kg (108 lb)  LMP  (LMP Unknown)  BMI 23.79 kg/m2  Estimated body mass index is 23.79 kg/(m^2) as calculated from the following:    Height as of 7/3/17: 1.435 m (4' 8.5\").    Weight as of this encounter: 49 kg (108 lb).   Last 3 BP:   BP Readings from Last 3 Encounters:   09/06/17 (!) 164/120   08/23/17 130/70   07/28/17 132/84       History   Smoking Status     Never Smoker   Smokeless Tobacco     Never Used       Labs:  Lab Results   Component Value Date    A1C 13.8 07/03/2017     Lab Results   Component Value Date     04/21/2017     Lab Results   Component Value Date    LDL 90 11/14/2016     HDL Cholesterol   Date Value Ref Range Status   11/14/2016 52 >49 mg/dL Final   ]  GFR Estimate   Date Value Ref Range Status   04/21/2017 >90  Non  GFR Calc   >60 mL/min/1.7m2 Final     GFR Estimate If Black   Date Value Ref Range Status   04/21/2017 >90   GFR Calc   >60 mL/min/1.7m2 Final     Lab Results   Component Value Date    CR 0.55 04/21/2017     No results found for: MICROALBUMIN    Socio/Economic Considerations:    Support system: children, friend(s) and neighbor(s)    Health Beliefs and Attitudes:   Patient Activation Measure Survey Score:  RIVER Score (Last Two) 11/25/2014 4/16/2015   RIVER Raw Score 39 47   Activation Score 56.4 77.5   RIVER Level 3 4       Stage of Change: CONTEMPLATION (Considering change and yet undecided)      Diabetes knowledge and skills assessment:     Patient is knowledgeable in diabetes management concepts related to: Reducing Risks    Patient needs further education on the following diabetes management concepts: Healthy Eating, Being Active, Monitoring, Taking Medication, Problem Solving, Reducing Risks and Healthy Coping    Barriers to Learning Assessment: No Barriers identified    Based on learning " assessment above, most appropriate setting for further diabetes education would be: Individual setting.    INTERVENTION:     Stop U500 insulin.  Start Levemir 20 units in the morning.  Change pen needle with every injection.  Prime insulin pen prior to each use.    Education provided today on:  AADE Self-Care Behaviors:  Monitoring: log and interpret results, individual blood glucose targets and frequency of monitoring  Taking Medication: action of prescribed medication, administering and storing injectable diabetes medications, proper site selection and rotation for injections and when to take medications  Problem Solving: high blood glucose - causes, signs/symptoms, treatment and prevention, low blood glucose - causes, signs/symptoms, treatment and prevention and carrying a carbohydrate source at all times    Opportunities for ongoing education and support in diabetes-self management were discussed.    Pt verbalized understanding of concepts discussed and recommendations provided today.       Education Materials Provided:  No new materials provided today    PLAN:  See Patient Instructions for co-developed, patient-stated behavior change goals.  AVS printed and provided to patient today.    FOLLOW-UP:  Follow-up phone call with CDE on 9/18/17 to review BG and insulin doses. Has follow-up appointment scheduled on 9/20/17 with MTM.  Chart routed to referring provider and MTM, Jasper Fernando.    Ongoing plan for education and support: Follow-up visit with diabetes educator in 4 days, future diabetes education follow-up to be determined at that time. Follow-up with primary care provider and MTM, as scheduled/directed.    Kiah Fuchs, MPH, RD, LD, CDE   Time Spent: 75 minutes  Encounter Type: Individual    Any diabetes medication dose changes were made via the CDE Protocol and Collaborative Practice Agreement with the patient's referring provider. A copy of this encounter was shared with the provider.

## 2017-09-14 NOTE — MR AVS SNAPSHOT
After Visit Summary   9/14/2017    Nathalie Harp    MRN: [id number]           Patient Information     Date Of Birth          1955        Visit Information        Provider Department      9/14/2017 1:00 PM CP DIABETIC ED RESOURCE [facility]        Today's Diagnoses     Uncontrolled type 2 diabetes mellitus without complication, with long-term current use of insulin (H)    -  1      Care Instructions    Take 20 units of Levemir every morning    Use a new needle for EVERY injection to ensure you are getting your insulin    Prime the pen with 2 units before every injection    Kiah will call you on Monday - midday    Follow-up with Jasper, the pharmacist covering for Jovana, on 9/20, as planned                  Follow-ups after your visit        Your next 10 appointments already scheduled     Sep 20, 2017 10:00 AM CDT   Office Visit with Kandice Fernando RPH   [facility] [place] ([facility])    [address] [id number] [phone]           Bring a current list of meds and any records pertaining to this visit. For Physicals, please bring immunization records and any forms needing to be filled out. Please arrive 10 minutes early to complete paperwork.              Who to contact     If you have questions or need follow up information about today's clinic visit or your schedule please contact [facility] directly at [phone].  Normal or non-critical lab and imaging results will be communicated to you by MyChart, letter or phone within 4 business days after the clinic has received the results. If you do not hear from us within 7 days, please contact the clinic through MyChart or phone. If you have a critical or abnormal lab result, we will notify you by phone as soon as possible.  Submit refill requests through [employer] or call your pharmacy and they will forward the  "refill request to us. Please allow 3 business days for your refill to be completed.          Additional Information About Your Visit        [employer] Information     [employer] lets you send messages to your doctor, view your test results, renew your prescriptions, schedule appointments and more. To sign up, go to [email or web address] . Click on \"Log in\" on the left side of the screen, which will take you to the Welcome page. Then click on \"Sign up Now\" on the right side of the page.     You will be asked to enter the access code listed below, as well as some personal information. Please follow the directions to create your username and password.     Your access code is: [email or web address]  Expires: 2017 11:41 AM     Your access code will  in 90 days. If you need help or a new code, please call your [place] [facility] clinic or [phone].        Care EveryWhere ID     This is your Care EveryWhere ID. This could be used by other organizations to access your [facility] medical records  [id number]        Your Vitals Were     Last Period BMI (Body Mass Index)                (LMP Unknown) 23.79 kg/m2           Blood Pressure from Last 3 Encounters:   17 (!) 164/120   17 130/70   17 132/84    Weight from Last 3 Encounters:   17 49 kg (108 lb)   17 47.9 kg (105 lb 9.6 oz)   17 48.1 kg (106 lb)              Today, you had the following     No orders found for display         Today's Medication Changes          These changes are accurate as of: 17  2:10 PM.  If you have any questions, ask your nurse or doctor.               Start taking these medicines.        Dose/Directions    insulin detemir 100 UNIT/ML injection   Commonly known as:  LEVEMIR FLEXPEN/FLEXTOUCH   Used for:  Uncontrolled type 2 diabetes mellitus without complication, with long-term current use of insulin (H)        20 units once daily - Levemir or covered basal insulin equivalent   Quantity:  15 mL   Refills:  3       "   These medicines have changed or have updated prescriptions.        Dose/Directions    * insulin pen needle 31G X 5 MM   Commonly known as:  B-D U/F   This may have changed:  Another medication with the same name was added. Make sure you understand how and when to take each.   Used for:  Type 2 diabetes mellitus with diabetic nephropathy, with long-term current use of insulin (H)        Use twice daily as directed.   Quantity:  200 each   Refills:  3       * insulin pen needle 30G X 8 MM   This may have changed:  You were already taking a medication with the same name, and this prescription was added. Make sure you understand how and when to take each.   Used for:  Uncontrolled type 2 diabetes mellitus without complication, with long-term current use of insulin (H)        Matt-Fine Auto-Cover needles. Use 2 pen needles daily for U500 KwikPen.   Quantity:  100 each   Refills:  3       * Notice:  This list has 2 medication(s) that are the same as other medications prescribed for you. Read the directions carefully, and ask your doctor or other care provider to review them with you.         Where to get your medicines      These medications were sent to [facility] [address]  [address] [place]. \[facility]"" [id number]     Phone:  [phone]     insulin detemir 100 UNIT/ML injection    insulin pen needle 30G X 8 MM                Primary Care Provider Office Phone # Fax #    Eva Lockhart [phone]       [address] [facility] [address]        Goals        General    I will check my blood sugars four times per day (pt-stated)     Notes - Note created  3/10/2014 10:41 AM by Priti Orozco RN    As of today's date 3/10/2014 goal is met at 26 - 50%.   Goal Status:  Active      I will continue to work with Diabetic Educator (pt-stated)     Notes - Note created  3/10/2014 10:42 AM by Priti Orozco RN    As of today's date  3/10/2014 goal is met at 26 - 50%.   Goal Status:  Active      I will take insulins as prescribed, including sliding scale novolog (pt-stated)     Notes - Note created  3/10/2014 10:43 AM by Priti Orozco RN    As of today's date 3/10/2014 goal is met at 51 - 75%.   Goal Status:  Active        Equal Access to Services     [facility]: Hadii aad ku hadasho Soomaali, waaxda luqadaha, qaybta kaalmada adeegyada, waxay idiin hayaan adeeg roxane laSeleneaan . So [facility] [phone].    ATENCIÓN: Si habla español, tiene a tompkins disposición servicios gratuitos de asistencia lingüística. Llame al [phone].    We comply with applicable federal civil rights laws and [place] laws. We do not discriminate on the basis of race, color, national origin, age, disability sex, sexual orientation or gender identity.            Thank you!     Thank you for choosing [facility]  for your care. Our goal is always to provide you with excellent care. Hearing back from our patients is one way we can continue to improve our services. Please take a few minutes to complete the written survey that you may receive in the mail after your visit with us. Thank you!             Your Updated Medication List - Protect others around you: Learn how to safely use, store and throw away your medicines at www.disposemymeds.org.          This list is accurate as of: 9/14/17  2:10 PM.  Always use your most recent med list.                   Brand Name Dispense Instructions for use Diagnosis    albuterol 108 (90 BASE) MCG/ACT Inhaler    PROAIR HFA/PROVENTIL HFA/VENTOLIN HFA    3 Inhaler    Inhale 2 puffs into the lungs every 6 hours as needed for shortness of breath / dyspnea ((Ventolin or preferred albuterol equivalent))    Mild intermittent asthma without complication       aspirin 81 MG tablet      Take 1 tablet by mouth daily Reported on 4/21/2017        atorvastatin 40 MG tablet    LIPITOR    90 tablet    TAKE 1 TABLET (40 MG) BY MOUTH  DAILY    Hyperlipidemia LDL goal <100       blood glucose lancets standard    no brand specified    1 Box    Use to test blood sugar 3 times daily or as directed.    Type 2 diabetes mellitus without complication, with long-term current use of insulin (H)       blood glucose monitoring meter device kit    no brand specified    1 kit    Use to test blood sugar 3 times daily or as directed    Type 2 diabetes mellitus without complication, with long-term current use of insulin (H)       blood glucose monitoring test strip    ONE TOUCH ULTRA    300 each    TEST BLOOD SUGARS 3 TIMES DAILY.    Type 2 diabetes mellitus without complication, with long-term current use of insulin (H)       celecoxib 200 MG capsule    celeBREX    180 capsule    Take 1 capsule (200 mg) by mouth 2 times daily as needed for moderate pain    Primary osteoarthritis of both knees       EPINEPHrine 0.3 MG/0.3ML injection 2-pack    EPIPEN 2-MELVIN    6 mL    INJECT 0.3 MLS (0.3 MG) INTO THE MUSCLE ONCE AS NEEDED FOR ANAPHYLAXIS    Allergic to shellfish       insulin detemir 100 UNIT/ML injection    LEVEMIR FLEXPEN/FLEXTOUCH    15 mL    20 units once daily - Levemir or covered basal insulin equivalent    Uncontrolled type 2 diabetes mellitus without complication, with long-term current use of insulin (H)       * insulin pen needle 31G X 5 MM    B-D U/F    200 each    Use twice daily as directed.    Type 2 diabetes mellitus with diabetic nephropathy, with long-term current use of insulin (H)       * insulin pen needle 30G X 8 MM     100 each    Matt-Fine Auto-Cover needles. Use 2 pen needles daily for U500 KwikPen.    Uncontrolled type 2 diabetes mellitus without complication, with long-term current use of insulin (H)       insulin reg HIGH CONC 500 UNIT/ML PEN soln    HumuLIN R U-500 KwikPen    18 mL    Inject 155 units every morning with breakfast and 155units every evening with dinner. This is a dose increase.    Type 2 diabetes mellitus with diabetic  nephropathy, with long-term current use of insulin (H)       losartan 50 MG tablet    COZAAR    90 tablet    Take 1 tablet (50 mg) by mouth daily    Uncontrolled type 2 diabetes mellitus without complication, with long-term current use of insulin (H), CKD (chronic kidney disease) stage 2, GFR 60-89 ml/min       meclizine 25 MG tablet    ANTIVERT    30 tablet    Take 1 tablet (25 mg) by mouth every 6 hours as needed for dizziness    BPPV (benign paroxysmal positional vertigo), unspecified laterality       omeprazole 20 MG CR capsule    priLOSEC    90 capsule    TAKE ONE CAPSULE BY MOUTH EVERY DAY    Gastroesophageal reflux disease, esophagitis presence not specified       order for DME     1 each    Equipment being ordered: bath tub grab bars    Arthritis of knee, right, Disorder of bursae and tendons in shoulder region, Falls frequently, Balance problems       oxyCODONE-acetaminophen 5-325 MG per tablet    PERCOCET    15 tablet    Take 1-2 tablets by mouth every 4 hours as needed for pain        pioglitazone 45 MG tablet    ACTOS    30 tablet    Take 1 tablet (45 mg) by mouth daily For pharmacy: Please discontinue the 30mg tablets    Type 2 diabetes mellitus with diabetic nephropathy, with long-term current use of insulin (H)       * Notice:  This list has 2 medication(s) that are the same as other medications prescribed for you. Read the directions carefully, and ask your doctor or other care provider to review them with you.

## 2017-09-18 ENCOUNTER — TELEPHONE (OUTPATIENT)
Dept: EDUCATION SERVICES | Facility: CLINIC | Age: 62
End: 2017-09-18

## 2017-09-18 NOTE — TELEPHONE ENCOUNTER
Diabetes Follow-up    Subjective/Objective:    Nathalie Harp sent in blood glucose log for review. Last date of communication was: 9/14/17.    Diabetes is being managed with Oral Medications: Actos - Dose: 45 mg, daily, Injectable Medications: Levemir 20-0-0-0    BG/Food Log:   Reports BG have been 400-500's since change to Levemir last week.    Assessment:    BG not in goal with 20 units of Levemir.     Plan/Response:  Recommend increase to insulin - begin 25 units Levemir daily in the morning.    Pt verbalized understanding of concepts discussed and recommendations provided.       Discussed that writer communicated with MTM pharmacist covering for Jovana Loo, PharmD, and at this time no need to have both MTM and CDE following for insulin titration. Determined further diabetes education may better meet patient's needs at this time. MTM appointment cancelled for 9/20 and diabetes education appointment made instead.    Follow-up with CDE on Thursday, 9/21/17 at 3:30 pm at the [facility].    Kiah Fuchs, MPH, RD, LD, CDE     Any diabetes medication dose changes were made via the CDE Protocol and Collaborative Practice Agreement with the patient's primary care provider. A copy of this encounter was shared with the provider.

## 2017-09-21 ENCOUNTER — ALLIED HEALTH/NURSE VISIT (OUTPATIENT)
Dept: EDUCATION SERVICES | Facility: CLINIC | Age: 62
End: 2017-09-21
Payer: MEDICARE

## 2017-09-21 PROCEDURE — G0108 DIAB MANAGE TRN  PER INDIV: HCPCS

## 2017-09-21 NOTE — MR AVS SNAPSHOT
"              After Visit Summary   9/21/2017    Nathalie Harp    MRN: [id number]           Patient Information     Date Of Birth          1955        Visit Information        Provider Department      9/21/2017 3:30 PM CP DIABETIC ED RESOURCE [facility]        Care Instructions    Increase Levemir to 30 units    Kiah will call you on Monday, 9/25 to follow-up on BG    Follow-up on Thursday, 9/28 at 2:30 pm          Follow-ups after your visit        Your next 10 appointments already scheduled     Sep 28, 2017  2:30 PM CDT   Diabetic Education with CP DIABETIC ED RESOURCE   [facility] ([facility])    [address] [facility] [id number] [phone]              Who to contact     If you have questions or need follow up information about today's clinic visit or your schedule please contact [facility] directly at [phone].  Normal or non-critical lab and imaging results will be communicated to you by MyChart, letter or phone within 4 business days after the clinic has received the results. If you do not hear from us within 7 days, please contact the clinic through [employer] or phone. If you have a critical or abnormal lab result, we will notify you by phone as soon as possible.  Submit refill requests through [employer] or call your pharmacy and they will forward the refill request to us. Please allow 3 business days for your refill to be completed.          Additional Information About Your Visit        [employer] Information     [employer] lets you send messages to your doctor, view your test results, renew your prescriptions, schedule appointments and more. To sign up, go to [email or web address] . Click on \"Log in\" on the left side of the screen, which will take you to the Welcome page. Then click on \"Sign up Now\" on the right side of the page.     You will be asked to enter the access " code listed below, as well as some personal information. Please follow the directions to create your username and password.     Your access code is: [email or web address]  Expires: 2017 11:41 AM     Your access code will  in 90 days. If you need help or a new code, please call your [place] clinic or [phone].        Care EveryWhere ID     This is your Care EveryWhere ID. This could be used by other organizations to access your [place] medical records  [id number]        Your Vitals Were     Last Period                   (LMP Unknown)            Blood Pressure from Last 3 Encounters:   17 (!) 164/120   17 130/70   17 132/84    Weight from Last 3 Encounters:   17 49 kg (108 lb)   17 47.9 kg (105 lb 9.6 oz)   17 48.1 kg (106 lb)              Today, you had the following     No orders found for display       Primary Care Provider Office Phone # Fax #    Eva Lockhart [phone]       [address] [address]        Goals        General    I will check my blood sugars four times per day (pt-stated)     Notes - Note created  3/10/2014 10:41 AM by Priti Orozco RN    As of today's date 3/10/2014 goal is met at 26 - 50%.   Goal Status:  Active      I will continue to work with Diabetic Educator (pt-stated)     Notes - Note created  3/10/2014 10:42 AM by Priti Orozco, RN    As of today's date 3/10/2014 goal is met at 26 - 50%.   Goal Status:  Active      I will take insulins as prescribed, including sliding scale novolog (pt-stated)     Notes - Note created  3/10/2014 10:43 AM by Priti Orozco RN    As of today's date 3/10/2014 goal is met at 51 - 75%.   Goal Status:  Active        Equal Access to Services     ADI LOPEZ AH: Narcisa Cano, waxander luqadaha, qaybta kaalmanatividad agarwal, sandra waller. So [facility] [phone].    ATENCIÓN: Si habla español, tiene a tompkins disposición servicios gratuitos  de asistencia lingüística. Tatiana yepez [phone].    We comply with applicable federal civil rights laws and [place] laws. We do not discriminate on the basis of race, color, national origin, age, disability sex, sexual orientation or gender identity.            Thank you!     Thank you for choosing [facility]  for your care. Our goal is always to provide you with excellent care. Hearing back from our patients is one way we can continue to improve our services. Please take a few minutes to complete the written survey that you may receive in the mail after your visit with us. Thank you!             Your Updated Medication List - Protect others around you: Learn how to safely use, store and throw away your medicines at www.disposemymeds.org.          This list is accurate as of: 9/21/17  4:08 PM.  Always use your most recent med list.                   Brand Name Dispense Instructions for use Diagnosis    albuterol 108 (90 BASE) MCG/ACT Inhaler    PROAIR HFA/PROVENTIL HFA/VENTOLIN HFA    3 Inhaler    Inhale 2 puffs into the lungs every 6 hours as needed for shortness of breath / dyspnea ((Ventolin or preferred albuterol equivalent))    Mild intermittent asthma without complication       aspirin 81 MG tablet      Take 1 tablet by mouth daily Reported on 4/21/2017        atorvastatin 40 MG tablet    LIPITOR    90 tablet    TAKE 1 TABLET (40 MG) BY MOUTH DAILY    Hyperlipidemia LDL goal <100       blood glucose lancets standard    no brand specified    1 Box    Use to test blood sugar 3 times daily or as directed.    Type 2 diabetes mellitus without complication, with long-term current use of insulin (H)       blood glucose monitoring meter device kit    no brand specified    1 kit    Use to test blood sugar 3 times daily or as directed    Type 2 diabetes mellitus without complication, with long-term current use of insulin (H)       blood glucose monitoring test strip    ONE TOUCH ULTRA    300  each    TEST BLOOD SUGARS 3 TIMES DAILY.    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 Use 2 pen needles daily for U500 KwikPen.    Uncontrolled type 2 diabetes mellitus without complication, with long-term current use of insulin (H)       losartan 50 MG tablet    COZAAR    90 tablet    Take 1 tablet (50 mg) by mouth daily    Uncontrolled type 2 diabetes mellitus without complication, with long-term current use of insulin (H), CKD (chronic kidney disease) stage 2, GFR 60-89 ml/min       meclizine 25 MG tablet    ANTIVERT    30 tablet    Take 1 tablet (25 mg) by mouth every 6 hours as needed for dizziness    BPPV (benign paroxysmal positional vertigo), unspecified laterality       omeprazole 20 MG CR capsule    priLOSEC    90 capsule    TAKE ONE CAPSULE BY MOUTH EVERY DAY    Gastroesophageal reflux disease, esophagitis presence not specified       order for DME     1 each    Equipment being ordered: bath tub grab bars    Arthritis of knee, right, Disorder of bursae and tendons in shoulder region, Falls frequently, Balance problems       oxyCODONE-acetaminophen 5-325 MG per tablet    PERCOCET    15 tablet    Take 1-2 tablets by mouth every 4 hours as needed for pain        pioglitazone 45 MG tablet     ACTOS    30 tablet    Take 1 tablet (45 mg) by mouth daily For pharmacy: Please discontinue the 30mg tablets    Type 2 diabetes mellitus with diabetic nephropathy, with long-term current use of insulin (H)

## 2017-09-21 NOTE — PROGRESS NOTES
"Diabetes Self Management Training: Follow-up Visit    Nathalie Harp presents today for education and evaluation of glucose control related to Type 2 diabetes.    She is accompanied by self    Patient's diabetes management related comments/concerns: blood sugar a little lower today, may have been what she ate last night in addition to higher dose of insulin. Feels like she was less \"foggy\" when she was on Humulin R U500 - wonders if it would be appropriate to go back to this insulin.    Patient would like this visit to be focused around the following diabetes-related behaviors and goals: Assistance with making lifestyle changes, Healthy Eating and Taking Medication    ASSESSMENT:  Patient Problem List reviewed for relevant medical history and current medical status.    BG are still elevated, but improving. Patient would benefit from dose increase of Levemir - increase to 30 units. Patient will likely need to add NovoLog back to regimen. Could consider switch to Tresiba for longer and more consistent basal coverage. Only fasting BG are available at this time, so unable to determine how BG are running throughout the day. Patient would benefit from professional CGM - once basal/bolus insulin regimen is started, this would help us to optimize regimen.     Current Diabetes Management per Patient:  Taking diabetes medications?   yes:     Diabetes Medication(s)     Insulin Sig    insulin detemir (LEVEMIR FLEXPEN/FLEXTOUCH) 100 UNIT/ML injection 25 units once daily - Levemir or covered basal insulin equivalent    Insulin Sensitizing Agents Sig    pioglitazone (ACTOS) 45 MG tablet Take 1 tablet (45 mg) by mouth daily For pharmacy: Please discontinue the 30mg tablets        Changing needle with every injection    Patient glucose self monitoring as follows: one time daily.   BG meter: Reli-On meter  BG results:      BG values are: Not in goal  Patient's most recent   Lab Results   Component Value Date    A1C 13.8 " "07/03/2017    is not meeting goal of <7.0    Nutrition:  Patient eats 3 meals per day, tries to cook in batches and freezes portions and microwaves; tries to have protein rich foods; baked beans with ham - 3 oz; scalloped corn - 3 oz; tries to avoid white rice and bread - now uses 100% whole wheat, brown rice; uses potatoes    Bad stomach, prone to diarrhea; takes omeprazole - stomach and bowels always hurt; massive food allergies - pomegranate, spinach, kale, asparagus; top dentures don't fit well and bottom ones don't fit at all; shops at [employer] most of the time or [employer] when she can get a ride    Breakfast - within 30-60 minutes of getting up - eggs (\"in a basket\", or scrambled with half and half), 100% whole wheat toast  Lunch - chili with kidney beans and hamburger    Dinner - chili with kidney beans and hamburger; potatoes or sweet potatoes with butter with ham; lasagna OR tacos OR hamburgers  Snacks - cheesecake    Beverages: Juice rarely, Pop (Diet) occasionally, Coffee weak - sometimes and Water throughout the day    Cultural/Hindu diet restrictions: No     Biggest Challenge to Healthy Eating: poor dentition, finances, food allergies, transportation to grocery stores    Physical Activity:    Type: walking - uses metro transit for transportation - walks to and from bus stops many days    Diabetes Complications:  Acute Complications: At risk for hypoglycemia? yes  Symptoms of low blood sugar? sweating, shaking and weakness  Frequency of hypoglycemia: 1-2 times/month  Patient carries a carbohydrate source with them regularly: Yes   Type of carbohydrate: glucose shot    Symptoms of hyperglycemia? Feeling \"foggy\"    Vitals:  Estimated body mass index is 23.79 kg/(m^2) as calculated from the following:    Height as of 7/3/17: 1.435 m (4' 8.5\").    Weight as of 9/14/17: 49 kg (108 lb).   Last 3 BP:   BP Readings from Last 3 Encounters:   09/06/17 (!) 164/120   08/23/17 130/70   07/28/17 132/84       History "   Smoking Status     Never Smoker   Smokeless Tobacco     Never Used       Labs:  Lab Results   Component Value Date    A1C 13.8 07/03/2017     Lab Results   Component Value Date     04/21/2017     Lab Results   Component Value Date    LDL 90 11/14/2016     HDL Cholesterol   Date Value Ref Range Status   11/14/2016 52 >49 mg/dL Final   ]  GFR Estimate   Date Value Ref Range Status   04/21/2017 >90  Non  GFR Calc   >60 mL/min/1.7m2 Final     GFR Estimate If Black   Date Value Ref Range Status   04/21/2017 >90   GFR Calc   >60 mL/min/1.7m2 Final     Lab Results   Component Value Date    CR 0.55 04/21/2017     No results found for: MICROALBUMIN    Health Beliefs and Attitudes:   Patient Activation Measure Survey Score:  RIVER Score (Last Two) 11/25/2014 4/16/2015   RIVER Raw Score 39 47   Activation Score 56.4 77.5   RIVER Level 3 4       Stage of Change: ACTION (Actively working towards change)    Diabetes knowledge and skills assessment:     Patient is knowledgeable in diabetes management concepts related to: Reducing Risks    Patient needs further education on the following diabetes management concepts: Healthy Eating, Being Active, Monitoring, Taking Medication, Problem Solving, Reducing Risks and Healthy Coping    Barriers to Learning Assessment: No Barriers identified    Based on learning assessment above, most appropriate setting for further diabetes education would be: Individual setting.    INTERVENTION:  Increase Levemir to 30 units.  Discussed likely need to add NovoLog at meals. Patient is agreeable.    Education provided today on:  AADE Self-Care Behaviors:  Healthy Eating: consistency in amount, composition, and timing of food intake  Taking Medication: administering and storing injectable diabetes medications and benefit of adding back rapid acting insulin    Opportunities for ongoing education and support in diabetes-self management were discussed.    Pt verbalized  understanding of concepts discussed and recommendations provided today.       Education Materials Provided:  No new materials provided today    PLAN:  See Patient Instructions for co-developed, patient-stated behavior change goals.  AVS printed and provided to patient today.    FOLLOW-UP:  Follow-up appointment scheduled on 9/28/17 .  Chart routed to referring provider.    Ongoing plan for education and support: Follow-up visit with diabetes educator in 1 week.    Kiah Fuchs, MPH, RD, LD, CDE   Time Spent: 40 minutes  Encounter Type: Individual    Any diabetes medication dose changes were made via the CDE Protocol and Collaborative Practice Agreement with the patient's primary care provider. A copy of this encounter was shared with the provider.

## 2017-09-21 NOTE — PATIENT INSTRUCTIONS
Increase Levemir to 30 units    Kiah will call you on Monday, 9/25 to follow-up on BG    Follow-up on Thursday, 9/28 at 2:30 pm

## 2017-09-25 ENCOUNTER — TELEPHONE (OUTPATIENT)
Dept: EDUCATION SERVICES | Facility: CLINIC | Age: 62
End: 2017-09-25

## 2017-09-25 NOTE — TELEPHONE ENCOUNTER
Call to patient to follow-up on BG since insulin dose increase at our appointment last week. No answer, left message to call back. Phone number provided.    Kiah Fuchs, MPH, RD, LD, CDE

## 2017-09-28 ENCOUNTER — ALLIED HEALTH/NURSE VISIT (OUTPATIENT)
Dept: EDUCATION SERVICES | Facility: CLINIC | Age: 62
End: 2017-09-28
Payer: MEDICARE

## 2017-09-28 ENCOUNTER — TELEPHONE (OUTPATIENT)
Dept: FAMILY MEDICINE | Facility: CLINIC | Age: 62
End: 2017-09-28

## 2017-09-28 PROCEDURE — G0108 DIAB MANAGE TRN  PER INDIV: HCPCS

## 2017-09-28 RX ORDER — INSULIN ASPART 100 [IU]/ML
5 INJECTION, SOLUTION INTRAVENOUS; SUBCUTANEOUS
Qty: 15 ML | Refills: 3 | Status: SHIPPED | OUTPATIENT
Start: 2017-09-28 | End: 2018-01-10

## 2017-09-28 NOTE — TELEPHONE ENCOUNTER
See CC'ed chart note from diabetes educator:    Message  Received: Today       Kiah Fuchs, RD  P Cp Team 1                     It looks like Dr. Lockhart is out of the office - this patient of hers would benefit from a start of NovoLog with meals. BG are running in the 300-400's with 30 units of Levemir (0.61 u/kg/day). I've pended a prescription for NovoLog 5 units TID - wondering if Mally or Dr. Perez would be able to review and sign, if in agreement?     Thanks!   Kiah           Routed to provider/covering pool to address.  Pended Rx would be in the diabetic ed encounter today.    Dawn Davila RN  [facility]        Dawn Davila RN  [facility]

## 2017-09-28 NOTE — PATIENT INSTRUCTIONS
Start NovoLog 5 units before each meal, three meals a day    Follow-up on Thursday, October 5 at 1:30 pm

## 2017-09-28 NOTE — Clinical Note
It looks like Dr. Lockhart is out of the office - this patient of hers would benefit from a start of NovoLog with meals. BG are running in the 300-400's with 30 units of Levemir (0.61 u/kg/day). I've pended a prescription for NovoLog 5 units TID - are you able to review and sign, if in agreement?  Thanks! Kiah

## 2017-09-28 NOTE — PROGRESS NOTES
Diabetes Self Management Training: Follow-up Visit    Nathalie Harp presents today for education and evaluation of glucose control related to Type 2 diabetes.    She is accompanied by self    Patient's diabetes management related comments/concerns: BG are improving, but still not where they need to be.    Patient would like this visit to be focused around the following diabetes-related behaviors and goals: Assistance with making lifestyle changes, Monitoring and Taking Medication    ASSESSMENT:  Patient Problem List reviewed for relevant medical history and current medical status.    BG remain elevated in the 300-400's at Levemir 30 units (0.61 u/kg). Patient would benefit from addition of mealtime insulin. Recommend 5 units of NovoLog insulin TID with meals. Noted that Dr. Lockhart is not in clinic till next week. NovoLog orders pended for signature and sent to Dr. Lockhart's team for review, approval and signature, if in agreement.    Current Diabetes Management per Patient:  Taking diabetes medications?   yes:     Diabetes Medication(s)     Insulin Sig    insulin detemir (LEVEMIR FLEXPEN/FLEXTOUCH) 100 UNIT/ML injection 30 units once daily - Levemir or covered basal insulin equivalent    Insulin Sensitizing Agents Sig    pioglitazone (ACTOS) 45 MG tablet Take 1 tablet (45 mg) by mouth daily For pharmacy: Please discontinue the 30mg tablets          *Abbreviated insulin dose documentation key: Insulin Trade Name (khiwobmuz-mvsoy-enlfaq-bedtime) - i.e. Humalog 5-5-5-0 (Humalog 5 units at breakfast, 5 units at lunch, and 5 units at dinner).    Patient glucose self monitoring as follows: one time daily.   BG meter: Reli-On meter  BG results:       BG values are: Not in goal  Patient's most recent   Lab Results   Component Value Date    A1C 13.8 07/03/2017    is not meeting goal of <7.0    Nutrition:  Patient eats 3 meals per day    Breakfast - eggs and 100% whole wheat toast  Lunch - meat, potatoes, peas  "or corn   Dinner - meat, potatoes, vegetables OR chili, OR lasagna OR tacos  Snacks - varies    Physical Activity:    Type: walking to and from bus stops for transportation  Limitations: ankle pain - patient would like to begin using her exercise bike again, but ankle is stlil too painful after fracturing it about a month ago.    Diabetes Complications:  Acute Complications:     At risk for hypoglycemia? yes  Frequency of hypoglycemia: not at all recently  Patient carries a carbohydrate source with them regularly: Yes     Vitals:  Estimated body mass index is 23.79 kg/(m^2) as calculated from the following:    Height as of 7/3/17: 1.435 m (4' 8.5\").    Weight as of 9/14/17: 49 kg (108 lb).   Last 3 BP:   BP Readings from Last 3 Encounters:   09/06/17 (!) 164/120   08/23/17 130/70   07/28/17 132/84       History   Smoking Status     Never Smoker   Smokeless Tobacco     Never Used       Labs:  Lab Results   Component Value Date    A1C 13.8 07/03/2017     Lab Results   Component Value Date     04/21/2017     Lab Results   Component Value Date    LDL 90 11/14/2016     HDL Cholesterol   Date Value Ref Range Status   11/14/2016 52 >49 mg/dL Final   ]  GFR Estimate   Date Value Ref Range Status   04/21/2017 >90  Non  GFR Calc   >60 mL/min/1.7m2 Final     GFR Estimate If Black   Date Value Ref Range Status   04/21/2017 >90   GFR Calc   >60 mL/min/1.7m2 Final     Lab Results   Component Value Date    CR 0.55 04/21/2017     No results found for: MICROALBUMIN    Health Beliefs and Attitudes:   Patient Activation Measure Survey Score:  RIVER Score (Last Two) 11/25/2014 4/16/2015   RIVER Raw Score 39 47   Activation Score 56.4 77.5   RIVER Level 3 4     Stage of Change: ACTION (Actively working towards change)    Diabetes knowledge and skills assessment:     Patient is knowledgeable in diabetes management concepts related to: Being Active, Monitoring, Taking Medication and Healthy " Coping    Patient needs further education on the following diabetes management concepts: Healthy Eating, Problem Solving and Reducing Risks    Barriers to Learning Assessment: No Barriers identified    Based on learning assessment above, most appropriate setting for further diabetes education would be: Individual setting.    INTERVENTION:  Add NovoLog insulin, 5 units TID before each meal.   Continue current Levemir dose of 30 units.    Education provided today on:  AADE Self-Care Behaviors:  Taking Medication: action of prescribed medication and when to take medications  Healthy Coping: benefits of making appropriate lifestyle changes and utilize support systems    Opportunities for ongoing education and support in diabetes-self management were discussed.    Pt verbalized understanding of concepts discussed and recommendations provided today.       Education Materials Provided:  No new materials provided today    PLAN:  See Patient Instructions for co-developed, patient-stated behavior change goals.  Order for NovoLog signed by Mally Leung NP - patient picked up from pharmacy today.  AVS printed and provided to patient today.    FOLLOW-UP:  Follow-up appointment scheduled on 10/5/17.  Chart routed to referring provider.    Ongoing plan for education and support: Follow-up visit with diabetes educator in 1 week and Follow-up with primary care provider    Kiah Fuchs, MPH, RD, LD, CDE   Time Spent: 35 minutes  Encounter Type: Individual    Any diabetes medication dose changes were made via the CDE Protocol and Collaborative Practice Agreement with the patient's primary care provider. A copy of this encounter was shared with the provider.

## 2017-09-28 NOTE — TELEPHONE ENCOUNTER
I see this was already addressed by Mally Leung.    I called patient to be sure she is aware of this.  She will , denies questions at this time.  Routed to Diab Ed as TIMUR Davila RN  [facility]

## 2017-09-28 NOTE — Clinical Note
Mally Garcia signed orders for Nathalie to start NovoLog before meals. BG are improving - now in 200's-300's with Levemir. We'll continue to follow-up weekly until BG are to goal.   Thanks! Kiah

## 2017-09-28 NOTE — MR AVS SNAPSHOT
After Visit Summary   9/28/2017    Nathalie Harp    MRN: [id number]           Patient Information     Date Of Birth          1955        Visit Information        Provider Department      9/28/2017 2:30 PM CP DIABETIC ED RESOURCE [facility]        Today's Diagnoses     Uncontrolled type 2 diabetes mellitus without complication, with long-term current use of insulin (H)    -  1      Care Instructions    Start NovoLog 5 units before each meal, three meals a day    Follow-up on Thursday, October 5 at 1:30 pm          Follow-ups after your visit        Your next 10 appointments already scheduled     Sep 28, 2017  2:30 PM CDT   Diabetic Education with CP DIABETIC ED RESOURCE   [facility] ([facility]    [address] [address]   [phone]            Oct 05, 2017  1:30 PM CDT   Diabetic Education with CP DIABETIC ED RESOURCE   [facility] ([facility])    [address] [address]   [phone]              Who to contact     If you have questions or need follow up information about today's clinic visit or your schedule please contact [facility] directly at [phone].  Normal or non-critical lab and imaging results will be communicated to you by MyChart, letter or phone within 4 business days after the clinic has received the results. If you do not hear from us within 7 days, please contact the clinic through [employer] or phone. If you have a critical or abnormal lab result, we will notify you by phone as soon as possible.  Submit refill requests through [employer] or call your pharmacy and they will forward the refill request to us. Please allow 3 business days for your refill to be completed.          Additional Information About Your Visit        MyChart Information     [employer] lets you  "send messages to your doctor, view your test results, renew your prescriptions, schedule appointments and more. To sign up, go to [email or web address] . Click on \"Log in\" on the left side of the screen, which will take you to the Welcome page. Then click on \"Sign up Now\" on the right side of the page.     You will be asked to enter the access code listed below, as well as some personal information. Please follow the directions to create your username and password.     Your access code is: [email or web address]  Expires: 2017 11:41 AM     Your access code will  in 90 days. If you need help or a new code, please call your [place] clinic or [phone].        Care EveryWhere ID     This is your Care EveryWhere ID. This could be used by other organizations to access your [place] medical records  [id number]        Your Vitals Were     Last Period                   (LMP Unknown)            Blood Pressure from Last 3 Encounters:   17 (!) 164/120   17 130/70   17 132/84    Weight from Last 3 Encounters:   17 49 kg (108 lb)   17 47.9 kg (105 lb 9.6 oz)   17 48.1 kg (106 lb)              Today, you had the following     No orders found for display       Primary Care Provider Office Phone # Fax #    Eva Lockhart [phone]       [address] [address]        Goals        General    I will check my blood sugars four times per day (pt-stated)     Notes - Note created  3/10/2014 10:41 AM by Priti Orozco, RN    As of today's date 3/10/2014 goal is met at 26 - 50%.   Goal Status:  Active      I will continue to work with Diabetic Educator (pt-stated)     Notes - Note created  3/10/2014 10:42 AM by Priti Orozco, RN    As of today's date 3/10/2014 goal is met at 26 - 50%.   Goal Status:  Active      I will take insulins as prescribed, including sliding scale novolog (pt-stated)     Notes - Note created  3/10/2014 10:43 AM by Priti Orozco, " RN    As of today's date 3/10/2014 goal is met at 51 - 75%.   Goal Status:  Active        Equal Access to Services     MIHAILORE JOHN : Hadii tracey berman mal Cano, wamaryluda luqdeclan, liset kaamosda any, sandra angelin hayaaheather kelloggpham betts melvin waller. So [facility] [phone].    ATENCIÓN: Si habla español, tiene a tompkins disposición servicios gratuitos de asistencia lingüística. Llame al [phone].    We comply with applicable federal civil rights laws and [place] laws. We do not discriminate on the basis of race, color, national origin, age, disability sex, sexual orientation or gender identity.            Thank you!     Thank you for choosing [facility]  for your care. Our goal is always to provide you with excellent care. Hearing back from our patients is one way we can continue to improve our services. Please take a few minutes to complete the written survey that you may receive in the mail after your visit with us. Thank you!             Your Updated Medication List - Protect others around you: Learn how to safely use, store and throw away your medicines at www.disposemymeds.org.          This list is accurate as of: 9/28/17  2:07 PM.  Always use your most recent med list.                   Brand Name Dispense Instructions for use Diagnosis    albuterol 108 (90 BASE) MCG/ACT Inhaler    PROAIR HFA/PROVENTIL HFA/VENTOLIN HFA    3 Inhaler    Inhale 2 puffs into the lungs every 6 hours as needed for shortness of breath / dyspnea ((Ventolin or preferred albuterol equivalent))    Mild intermittent asthma without complication       aspirin 81 MG tablet      Take 1 tablet by mouth daily Reported on 4/21/2017        atorvastatin 40 MG tablet    LIPITOR    90 tablet    TAKE 1 TABLET (40 MG) BY MOUTH DAILY    Hyperlipidemia LDL goal <100       blood glucose lancets standard    no brand specified    1 Box    Use to test blood sugar 3 times daily or as directed.    Type 2 diabetes mellitus without  complication, with long-term current use of insulin (H)       blood glucose monitoring meter device kit    no brand specified    1 kit    Use to test blood sugar 3 times daily or as directed    Type 2 diabetes mellitus without complication, with long-term current use of insulin (H)       blood glucose monitoring test strip    ONE TOUCH ULTRA    300 each    TEST BLOOD SUGARS 3 TIMES DAILY.    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 Use 2 pen needles daily for U500 KwikPen.    Uncontrolled type 2 diabetes mellitus without complication, with long-term current use of insulin (H)       losartan 50 MG tablet    COZAAR    90 tablet    Take 1 tablet (50 mg) by mouth daily    Uncontrolled type 2 diabetes mellitus without complication, with long-term current use of insulin (H), CKD (chronic kidney disease) stage 2, GFR 60-89 ml/min       meclizine 25 MG tablet    ANTIVERT    30 tablet    Take 1 tablet (25 mg) by mouth every 6 hours as needed for dizziness    BPPV (benign paroxysmal positional vertigo), unspecified laterality       omeprazole 20 MG CR capsule    priLOSEC    90 capsule    TAKE ONE CAPSULE BY MOUTH EVERY DAY    Gastroesophageal reflux disease, esophagitis presence not specified       order  for DME     1 each    Equipment being ordered: bath tub grab bars    Arthritis of knee, right, Disorder of bursae and tendons in shoulder region, Falls frequently, Balance problems       oxyCODONE-acetaminophen 5-325 MG per tablet    PERCOCET    15 tablet    Take 1-2 tablets by mouth every 4 hours as needed for pain        pioglitazone 45 MG tablet    ACTOS    30 tablet    Take 1 tablet (45 mg) by mouth daily For pharmacy: Please discontinue the 30mg tablets    Type 2 diabetes mellitus with diabetic nephropathy, with long-term current use of insulin (H)

## 2017-09-28 NOTE — Clinical Note
It looks like Dr. Lockhart is out of the office - this patient of hers would benefit from a start of NovoLog with meals. BG are running in the 300-400's with 30 units of Levemir (0.61 u/kg/day). I've pended a prescription for NovoLog 5 units TID - wondering if Mally or Dr. Perez would be able to review and sign, if in agreement?  Thanks! Kiah

## 2017-12-13 ENCOUNTER — TELEPHONE (OUTPATIENT)
Dept: FAMILY MEDICINE | Facility: CLINIC | Age: 62
End: 2017-12-13

## 2017-12-13 NOTE — TELEPHONE ENCOUNTER
Panel Management Review      Patient has the following on her problem list:     Asthma review     ACT Total Scores 7/28/2017   ACT TOTAL SCORE -   ASTHMA ER VISITS -   ASTHMA HOSPITALIZATIONS -   ACT TOTAL SCORE (Goal Greater than or Equal to 20) 18   In the past 12 months, how many times did you visit the emergency room for your asthma without being admitted to the hospital? 0   In the past 12 months, how many times were you hospitalized overnight because of your asthma? 0      1. Is Asthma diagnosis on the Problem List? Yes    2. Is Asthma listed on Health Maintenance? Yes    3. Patient is due for:  ARMAND    Diabetes    ASA: Passed    Last A1C  Lab Results   Component Value Date    A1C 13.8 07/03/2017    A1C 12.3 12/21/2016    A1C 11.3 10/07/2015    A1C 11.4 08/31/2015    A1C 12.8 11/04/2014     A1C tested: FAILED    Last LDL:    Lab Results   Component Value Date    CHOL 218 11/14/2016     Lab Results   Component Value Date    HDL 52 11/14/2016     Lab Results   Component Value Date    LDL 90 11/14/2016     Lab Results   Component Value Date    TRIG 380 11/14/2016     Lab Results   Component Value Date    CHOLHDLRATIO 3.2 08/31/2015     Lab Results   Component Value Date    NHDL 166 11/14/2016       Is the patient on a Statin? NO             Is the patient on Aspirin? YES    Medications     HMG CoA Reductase Inhibitors    atorvastatin (LIPITOR) 40 MG tablet    Salicylates    aspirin 81 MG tablet          Last three blood pressure readings:  BP Readings from Last 3 Encounters:   09/06/17 (!) 164/120   08/23/17 130/70   07/28/17 132/84       Date of last diabetes office visit:      Tobacco History:     History   Smoking Status     Never Smoker   Smokeless Tobacco     Never Used           IVD   ASA: Passed    Last LDL:    Lab Results   Component Value Date    CHOL 218 11/14/2016     Lab Results   Component Value Date    HDL 52 11/14/2016     Lab Results   Component Value Date    LDL 90 11/14/2016     Lab Results    Component Value Date    TRIG 380 11/14/2016        Lab Results   Component Value Date    CHOLHDLRATIO 3.2 08/31/2015        Is the patient on a Statin? YES   Is the patient on Aspirin? YES                  Medications     HMG CoA Reductase Inhibitors    atorvastatin (LIPITOR) 40 MG tablet    Salicylates    aspirin 81 MG tablet          Last three blood pressure readings:  BP Readings from Last 3 Encounters:   09/06/17 (!) 164/120   08/23/17 130/70   07/28/17 132/84        Tobacco History:     History   Smoking Status     Never Smoker   Smokeless Tobacco     Never Used               Composite cancer screening  Chart review shows that this patient is due/due soon for the following Mammogram  Summary:    Patient is due/failing the following:   MAMMOGRAM    Action needed:   Mammogram     Type of outreach:    patient declined    Questions for provider review:    None                                                                                                                                    Randi Valle MA       Chart routed to Care Team .

## 2018-01-04 ENCOUNTER — TELEPHONE (OUTPATIENT)
Dept: FAMILY MEDICINE | Facility: CLINIC | Age: 63
End: 2018-01-04

## 2018-01-04 DIAGNOSIS — E78.5 HYPERLIPIDEMIA LDL GOAL <100: ICD-10-CM

## 2018-01-04 DIAGNOSIS — N18.2 CKD (CHRONIC KIDNEY DISEASE) STAGE 2, GFR 60-89 ML/MIN: ICD-10-CM

## 2018-01-04 DIAGNOSIS — K21.9 GASTROESOPHAGEAL REFLUX DISEASE, ESOPHAGITIS PRESENCE NOT SPECIFIED: ICD-10-CM

## 2018-01-04 NOTE — TELEPHONE ENCOUNTER
I called patient and scheduled her to see me next Wednesday 1/10/17. I will review her doses at that time and is possible we may need to make changes. However, please refill what you have on file for her. She has limited transportation, so I'd rather she not go without any medications.    Thank you!    Jovana Loo, Pharm.D., [facility]  Medication Therapy Management Pharmacist  [phone]

## 2018-01-04 NOTE — TELEPHONE ENCOUNTER
Kannan Whaley,     I work with this patient on med sync and I had called her today to go over her med list (we are due to fill her prescriptions next week). While speaking to the patient she had stated that she was taking her insulins differently than prescribed and was stretching the supply out but she wanted to refill them. We are wondering if we should refill the medications at current dose or if you would like to reach out to the patient to discuss her medications. She also stated that she has not been taking the Pioglitazone 45 mg because it was not working. She mentioned that she was maybe going to see the doctor next week. I recommended she talk to you or her doctor about how she has been taking her medications but she seemed uninterested in following through with this. Please let us know if we should refill the insulins and the Pioglitazone as is or if changes are going to be made. You can call us at [phone] or respond through epic. Thank you!    Sunshine Perkins [facility]

## 2018-01-04 NOTE — TELEPHONE ENCOUNTER
Requested Prescriptions   Pending Prescriptions Disp Refills     atorvastatin (LIPITOR) 40 MG tablet [Pharmacy Med Name: ATORVASTATIN CALCIUM 40MG TABS] 90 tablet 3    Last Written Prescription Date:  1-4-17  Last Fill Quantity: 90,  # refills: 3   Last Office Visit with [facility] or [facility] prescribing provider:  7-28-17   Future Office Visit:    Next 5 appointments (look out 90 days)     Peter 10, 2018  1:00 PM CST   Office Visit with Jovana Loo [facility] ([facility])    [address] [place] [address]   [phone]                  Sig: TAKE ONE TABLET BY MOUTH EVERY DAY    Statins Protocol Failed    1/4/2018  9:48 AM       Failed - LDL on file in past 12 months    Recent Labs   Lab Test  11/14/16   1004   LDL  90            Passed - No abnormal creatine kinase in past 12 months    No lab results found.         Passed - Recent or future visit with authorizing provider    Patient had office visit in the last year or has a visit in the next 30 days with authorizing provider.  See chart review.              Passed - Patient is age 18 or older       Passed - No active pregnancy on record       Passed - No positive pregnancy test in past 12 months        losartan (COZAAR) 50 MG tablet [Pharmacy Med Name: LOSARTAN POTASSIUM 50MG TABS] 90 tablet 3    Last Written Prescription Date:  1-12-17  Last Fill Quantity: 90,  # refills: 3   Last Office Visit with [facility], [facility] or [facility] prescribing provider:  7-28-17   Future Office Visit:    Next 5 appointments (look out 90 days)     Peter 10, 2018  1:00 PM CST   Office Visit with Jovana Loo [facility] ([facility])    [address] [address]   [phone]                  Sig: TAKE ONE TABLET BY MOUTH EVERY DAY    Angiotensin-II Receptors Failed    1/4/2018  9:48 AM       Failed -  Blood pressure under 140/90 in past 12 months.    BP Readings from Last 3 Encounters:   09/06/17 (!) 164/120   08/23/17 130/70   07/28/17 132/84                Passed - Recent or future visit with authorizing provider's specialty    Patient had office visit in the last year or has a visit in the next 30 days with authorizing provider.  See chart review.              Passed - Patient is age 18 or older       Passed - No active pregnancy on record       Passed - Normal serum creatinine on file in past 12 months    Recent Labs   Lab Test  04/21/17   1040   CR  0.55            Passed - Normal serum potassium on file in past 12 months    Recent Labs   Lab Test  04/21/17   1040   POTASSIUM  3.5                   Passed - No positive pregnancy test in past 12 months        omeprazole (PRILOSEC) 20 MG CR capsule [Pharmacy Med Name: OMEPRAZOLE 20MG CPDR] 90 capsule 1    Last Written Prescription Date:  5-17-17  Last Fill Quantity: 90,  # refills: 1   Last Office Visit with [facility] or [facility] prescribing provider:  7-28-17   Future Office Visit:    Next 5 appointments (look out 90 days)     Peter 10, 2018  1:00 PM CST   Office Visit with Jovana Loo RPH   [facility] ([facility])    [address] [address] [phone]                  Sig: TAKE ONE CAPSULE BY MOUTH EVERY DAY    PPI Protocol Passed    1/4/2018  9:48 AM       Passed - Not on Clopidogrel (unless Pantoprazole ordered)       Passed - No diagnosis of osteoporosis on record       Passed - Recent or future visit with authorizing provider's specialty    Patient had office visit in the last year or has a visit in the next 30 days with authorizing provider.  See chart review.              Passed - Patient is age 18 or older       Passed - No active pregnacy on record       Passed - No positive pregnancy test in past 12 months

## 2018-01-05 RX ORDER — ATORVASTATIN CALCIUM 40 MG/1
TABLET, FILM COATED ORAL
Qty: 90 TABLET | Refills: 0 | Status: SHIPPED | OUTPATIENT
Start: 2018-01-05 | End: 2018-04-04

## 2018-01-05 RX ORDER — LOSARTAN POTASSIUM 50 MG/1
TABLET ORAL
Qty: 90 TABLET | Refills: 3 | Status: SHIPPED | OUTPATIENT
Start: 2018-01-05 | End: 2018-03-14 | Stop reason: DRUGHIGH

## 2018-01-05 NOTE — TELEPHONE ENCOUNTER
Omeprazole Prescription approved per [facility] Refill Protocol.    Atorvastatin Medication is being filled for 1 time refill only due to:  Patient needs labs cholesterol labs.      Routing refill request to provider for review/approval because:  Elevated BP measurements over goal; no plan noted in chart regarding HTN      Also needs to be seen for diabetes follow up.  Will need fasting labs.        Syeda Gay RN  [facility]

## 2018-01-10 ENCOUNTER — OFFICE VISIT (OUTPATIENT)
Dept: PHARMACY | Facility: CLINIC | Age: 63
End: 2018-01-10

## 2018-01-10 VITALS
WEIGHT: 107.8 LBS | DIASTOLIC BLOOD PRESSURE: 60 MMHG | HEART RATE: 82 BPM | BODY MASS INDEX: 23.74 KG/M2 | SYSTOLIC BLOOD PRESSURE: 120 MMHG

## 2018-01-10 DIAGNOSIS — E78.5 HYPERLIPIDEMIA LDL GOAL <100: Primary | ICD-10-CM

## 2018-01-10 DIAGNOSIS — J45.20 MILD INTERMITTENT ASTHMA WITHOUT COMPLICATION: ICD-10-CM

## 2018-01-10 PROCEDURE — 99607 MTMS BY PHARM ADDL 15 MIN: CPT | Performed by: PHARMACIST

## 2018-01-10 PROCEDURE — 99606 MTMS BY PHARM EST 15 MIN: CPT | Performed by: PHARMACIST

## 2018-01-10 RX ORDER — INSULIN ASPART 100 [IU]/ML
8 INJECTION, SOLUTION INTRAVENOUS; SUBCUTANEOUS
Qty: 15 ML | Refills: 3 | Status: SHIPPED | OUTPATIENT
Start: 2018-01-10 | End: 2018-02-09

## 2018-01-10 NOTE — PROGRESS NOTES
SUBJECTIVE/OBJECTIVE:                Nathalie Harp is a 62 year old female coming in for a follow-up visit for Medication Therapy Management.  She was referred to me from Eva Lockhart       Chief Complaint: Follow up from our visit on 8/23/17.  Since then has started working with diabetes ed - off of Humulin U500 and on Levemir and Novolog for diabetes. Was in ED in 9/2017 d/t fall and small fracture of ankle.    Tobacco: No tobacco use  Alcohol: none    Medication Adherence: issues found - from 9/14/17 visit with diabetes ed: Patient has not been getting the majority of the insulin she has thought she was taking. Uses Matt-fine Auto-cover pen needles and has been reusing them for 1 week to 1 month. When used once, needle retracts and cannot be used to inject again, so insulin is leaking out of needle and not getting under her skin when patient thinks she is injecting. Concern that if patient is getting minimal insulin at this time, currently prescribed high doses of U500 will cause hypoglycemia. Huddled with Dr. Lockhart - plan to stop U500 insulin and start Levemir at 20 units daily. Verbal order from Dr. Lockhart at 1:45 pm, 9/14/17, to start Levemir 20 units/day and follow-up weekly for dose adjustment. This provides 0.4 u/kg/day.     Diabetes:  Pt currently taking Levemir 30units QD, Novolog 5units TID. Reports she has been out of her insulin for a few weeks(?) now and can't recall what her doses were before running out. Was on pioglitazone 45mg QD but stopped on own as she felt it was not working.   SMBG Ranges (based on glucometer readings): 14d avg (11n) 429  Symptoms of low blood sugar? none.   Symptoms of high blood sugar? numbness and weakness in her legs on occasion  Eye exam: due  Foot exam: up to date  ACEi/ARB: Yes: losartan 50mg QD. Microalbumin is not < 30 mg/g.   Aspirin: Taking 81mg daily and has the following issues: bruises easily at injection sites  Statin: Yes: atorvastatin  40mg QD and denies side effects  Diet/Exercise: Always eats twice a day, sometimes misses lunch. Will often have a glucerna at lunch.  Date FBG/ 2hours post   1/9 390   1/8 393   1/7 482   1/6 445   1/5 401   1/1 415   12/31 453   12/30 400   12/29 496   12/28 379   12/26 353   12/25 379   12/24 457   12/23 360   12/22 440   12/21 439   12/19 414         Asthma:  Current asthma medications: Albuterol MDI and (Pt reports using albuterol a few times per week).    Asthma triggers include: upper respiratory infections, strong odors and fumes, emotions and cold air.  Pt reports the following symptoms: none. Starting to feel better, reports had a cold or flu earlier in the winter. Also had new furnace and ducts installed at home and thinks this has helped.  AAP on file: due for update   ACT Total Scores 1/10/2017 2/24/2017 7/28/2017   ACT TOTAL SCORE - - -   ASTHMA ER VISITS - - -   ASTHMA HOSPITALIZATIONS - - -   ACT TOTAL SCORE (Goal Greater than or Equal to 20) 10 12 18   In the past 12 months, how many times did you visit the emergency room for your asthma without being admitted to the hospital? 1 0 0   In the past 12 months, how many times were you hospitalized overnight because of your asthma? 0 0 0         Current labs include:  BP Readings from Last 3 Encounters:   09/06/17 (!) 164/120   08/23/17 130/70   07/28/17 132/84     Today's Vitals: /60  Pulse 82  Wt 107 lb 12.8 oz (48.9 kg)  LMP  (LMP Unknown)  BMI 23.74 kg/m2  Lab Results   Component Value Date    A1C 13.8 07/03/2017   .  Lab Results   Component Value Date    CHOL 218 11/14/2016     Lab Results   Component Value Date    TRIG 380 11/14/2016     Lab Results   Component Value Date    HDL 52 11/14/2016     Lab Results   Component Value Date    LDL 90 11/14/2016       Liver Function Studies -   Recent Labs   Lab Test  11/14/16   1004   PROTTOTAL  7.5   ALBUMIN  4.2   BILITOTAL  0.6   ALKPHOS  109   AST  14   ALT  29       Lab Results   Component  Value Date    UCRR 22 11/14/2016    MICROL 12 11/14/2016    UMALCR 56.82 (H) 11/14/2016       Last Basic Metabolic Panel:  Lab Results   Component Value Date     04/21/2017      Lab Results   Component Value Date    POTASSIUM 3.5 04/21/2017     Lab Results   Component Value Date    CHLORIDE 98 04/21/2017     Lab Results   Component Value Date    BUN 11 04/21/2017     Lab Results   Component Value Date    CR 0.55 04/21/2017     GFR Estimate   Date Value Ref Range Status   04/21/2017 >90  Non  GFR Calc   >60 mL/min/1.7m2 Final   11/14/2016 62 >60 mL/min/1.7m2 Final   10/07/2015 63 >60 mL/min/1.7m2 Final     GFR Estimate If Black   Date Value Ref Range Status   04/21/2017 >90   GFR Calc   >60 mL/min/1.7m2 Final   11/14/2016 75 >60 mL/min/1.7m2 Final   10/07/2015 77 >60 mL/min/1.7m2 Final     TSH   Date Value Ref Range Status   11/14/2016 0.35 (L) 0.40 - 5.00 mU/L Final   ]    Most Recent Immunizations   Administered Date(s) Administered     Influenza (High Dose) 3 valent vaccine 10/03/2013     Influenza (IIV3) PF 08/30/2016     Pneumo Conj 13-V (2010&after) 11/14/2016     Pneumococcal 23 valent 11/14/2008     TD (ADULT, 7+) 09/01/2011       ASSESSMENT:              Current medications were reviewed today as discussed above.        Medication Adherence: needs improvement - see below    Diabetes: Needs Improvement. Patient is not meeting A1c goal of < 7%. Self monitoring of blood glucose is not at goal of fasting  mg/dL. Pt would benefit from:  SMBG: Check blood sugars fasting, and occasionally 2 hours after starting a meal.  Basal Insulin (Levemir) :  Restart and increase dose.  Bolus / Rapid Acting Insulin (Novolog) : Restart and increase dose  Actos:  discontinue  Updated Hemoglobin A1c, lipids, microalbumin per HM. Will do at next visit as not fasting today.   Microalbumin is not at goal < 30 mg/g. As above, recheck microalbumin next month.    Asthma: Stable per patient.         PLAN:                  1. I will talk to Kiah Fuchs regarding the auto-cover needle. Nathalie wondering whether needle retracts after she primes? Does she need to replace needle after priming? ADDENDUM: Per Kiah, When she primes the needle prior to injecting it does not cause the needle to retract. The needle retracts after the clear cover over the needle is pushed down all the way against her skin with the injection, then when the clear cover pops back out it causes the needle to retract.   2. Restart Levemir and increase to 35units QD x2 weeks, then increase to 40units QD x2 weeks, then RTC. Updated Rx sent to pharmacy.  3. Restart Novolog 5units TID with meals x2 weeks, then increase to 8units TID x2 weeks, then RTC. Updated Rx sent to pharmacy.  4. Ok to stay off of pioglitazone, pharmacy informed.  5. Future A1c, lipids, microalbumin already in place, lab appointment scheduled.    I spent 60 minutes with this patient today. All changes were made via collaborative practice agreement with Eva Lockhart. A copy of the visit note was provided to the patient's primary care provider.     Will follow up in 1 month.    The patient was given a summary of these recommendations as an after visit summary.    Jovana Loo, Pharm.D., [facility]  Medication Therapy Management Pharmacist  [phone]

## 2018-01-10 NOTE — LETTER
My Asthma Action Plan  Name: Nathalie Harp   YOB: 1955  Date: 1/10/2018   My doctor: Jovana Loo, KOBY, RP   My clinic: [facility]        My Control Medicine: None  My Rescue Medicine: Albuterol (Proair/Ventolin/Proventil) inhaler as needed for shortness of breath   My Asthma Severity: intermittent  Avoid your asthma triggers: upper respiratory infections, strong odors and fumes, emotions and cold air               GREEN ZONE   Good Control    I feel good    No cough or wheeze    Can work, sleep and play without asthma symptoms       Take your asthma control medicine every day.     1. If exercise triggers your asthma, take your rescue medication    15 minutes before exercise or sports, and    During exercise if you have asthma symptoms  2. Spacer to use with inhaler: If you have a spacer, make sure to use it with your inhaler             YELLOW ZONE Getting Worse  I have ANY of these:    I do not feel good    Cough or wheeze    Chest feels tight    Wake up at night   1. Keep taking your Green Zone medications  2. Start taking your rescue medicine:    every 20 minutes for up to 1 hour. Then every 4 hours for 24-48 hours.  3. If you stay in the Yellow Zone for more than 12-24 hours, contact your doctor.  4. If you do not return to the Green Zone in 12-24 hours or you get worse, start taking your oral steroid medicine if prescribed by your provider.           RED ZONE Medical Alert - Get Help  I have ANY of these:    I feel awful    Medicine is not helping    Breathing getting harder    Trouble walking or talking    Nose opens wide to breathe       1. Take your rescue medicine NOW  2. If your provider has prescribed an oral steroid medicine, start taking it NOW  3. Call your doctor NOW  4. If you are still in the Red Zone after 20 minutes and you have not reached your doctor:    Take your rescue medicine again and    Call 911 or go to the emergency room right  away    See your regular doctor within 2 weeks of an Emergency Room or Urgent Care visit for follow-up treatment.        Electronically signed by: Jovana Loo [facility], January 10, 2018    Annual Reminders:  Meet with Asthma Educator,  Flu Shot in the Fall, consider Pneumonia Vaccination for patients with asthma (aged 19 and older).    Pharmacy:    [id number] - [place], [place] - [address] [facility] - [place], [place] - [address] [facility] [place], [place] - [address]                    Asthma Triggers  How To Control Things That Make Your Asthma Worse    Triggers are things that make your asthma worse.  Look at the list below to help you find your triggers and what you can do about them.  You can help prevent asthma flare-ups by staying away from your triggers.      Trigger                                                          What you can do   Cigarette Smoke  Tobacco smoke can make asthma worse. Do not allow smoking in your home, car or around you.  Be sure no one smokes at a child s day care or school.  If you smoke, ask your health care provider for ways to help you quit.  Ask family members to quit too.  Ask your health care provider for a referral to Quit Plan to help you quit smoking, or call 4-326-348-PLAN.     Colds, Flu, Bronchitis  These are common triggers of asthma. Wash your hands often.  Don t touch your eyes, nose or mouth.  Get a flu shot every year.     Dust Mites  These are tiny bugs that live in cloth or carpet. They are too small to see. Wash sheets and blankets in hot water every week.   Encase pillows and mattress in dust mite proof covers.  Avoid having carpet if you can. If you have carpet, vacuum weekly.   Use a dust mask and HEPA vacuum.   Pollen and Outdoor Mold  Some people are allergic to trees, grass, or weed pollen, or molds. Try to keep your windows closed.  Limit time out  doors when pollen count is high.   Ask you health care provider about taking medicine during allergy season.     Animal Dander  Some people are allergic to skin flakes, urine or saliva from pets with fur or feathers. Keep pets with fur or feathers out of your home.    If you can t keep the pet outdoors, then keep the pet out of your bedroom.  Keep the bedroom door closed.  Keep pets off cloth furniture and away from stuffed toys.     Mice, Rats, and Cockroaches  Some people are allergic to the waste from these pests.   Cover food and garbage.  Clean up spills and food crumbs.  Store grease in the refrigerator.   Keep food out of the bedroom.   Indoor Mold  This can be a trigger if your home has high moisture. Fix leaking faucets, pipes, or other sources of water.   Clean moldy surfaces.  Dehumidify basement if it is damp and smelly.   Smoke, Strong Odors, and Sprays  These can reduce air quality. Stay away from strong odors and sprays, such as perfume, powder, hair spray, paints, smoke incense, paint, cleaning products, candles and new carpet.   Exercise or Sports  Some people with asthma have this trigger. Be active!  Ask your doctor about taking medicine before sports or exercise to prevent symptoms.    Warm up for 5-10 minutes before and after sports or exercise.     Other Triggers of Asthma  Cold air:  Cover your nose and mouth with a scarf.  Sometimes laughing or crying can be a trigger.  Some medicines and food can trigger asthma.

## 2018-01-10 NOTE — PATIENT INSTRUCTIONS
Recommendations from today's MTM visit:                                                      1. I will call you after I talk to Kiah about your pen needle. For now, just make sure you use a new pen needle every time you inject insulin.    2. Increase your Levemir to 35units every day for the next two weeks, then increase to 40units per day.    3. Restart Novolog 5units before each meal (and glucerna) for the next two weeks, then increase to 8units before each meal (and glucerna).    4. Test your blood sugar in the morning before breakfast (goal of 80-130mg/dL), 2 hours after a meal (goal less than 180mg/dL), and anytime you feel it may be low (feeling shaky, dizzy, sweaty, weak). If your blood sugar is below 70mg/dL, have something to eat to bring it up. If your blood sugar is NOT below 70mg/dL, take a 5 minute break, have a glass water, and let the feeling pass. This is your body adjusting to lower and more normal blood sugar levels.    Next MTM visit: Friday, February 9th - 8:15 in the lab (please be fasting and bring a snack with you for after labs) and 8:30am with Jovana    To schedule another MTM appointment, please call the clinic directly or you may call the MTM scheduling line at [phone] or toll-free at [phone].     My Clinical Pharmacist's contact information:                                                      It was a pleasure seeing you today!  Please feel free to contact me with any questions or concerns you have.      Jovana Loo, Pharm.D., [facility]  Medication Therapy Management Pharmacist  [phone]    You may receive a survey about the MTM services you received.  I would appreciate your feedback to help me serve you better in the future. Please fill it out and return it when you can. Your comments will be anonymous.      My healthcare goals:                                                      Diabetes Goals:    Home Monitoring of Blood Sugars:Fasting  mg/dL and 2 hours  after a meal less than 180 mg/dL.    Hemoglobin A1C: Less than 8%. Yours is   Lab Results   Component Value Date    A1C 13.8 07/03/2017   .    Blood Pressure: Less than 140/90mmHg. Yours is /60  Pulse 82  Wt 107 lb 12.8 oz (48.9 kg)  LMP  (LMP Unknown)  BMI 23.74 kg/m2.    Cholesterol: You are taking atorvastatin to help decrease the risk of heart disease.    Things you can do to help lower your blood sugars:    Diet: 3 meals and 1-2 snacks per day with 45-60 grams of carbohydrates per meal and 15-30 grams of carbohydrates per snack. Try to fill your plate at least half-full with vegetables, fill one-quarter full with lean meats or protein, and also make sure you get at least some carbohydrate with every meal.    Exercise: 30 minutes per day of anything that will increase your heart rate and make you break a sweat! Gardening, walking, cleaning the house, changing the oil in your car, etc. If you feel like 30 minutes per day is too much, start small. Even lifting canned foods or working your arms with a resistance band in front of the TV can help.

## 2018-01-10 NOTE — MR AVS SNAPSHOT
After Visit Summary   1/10/2018    Nathalie Harp    MRN: [id number]           Patient Information     Date Of Birth          1955        Visit Information        Provider Department      1/10/2018 1:00 PM Jovana Loo, [facility]        Today's Diagnoses     Uncontrolled type 2 diabetes mellitus without complication, with long-term current use of insulin (H)          Care Instructions    Recommendations from today's [facility] visit:                                                      1. I will call you after I talk to Kiah about your pen needle. For now, just make sure you use a new pen needle every time you inject insulin.    2. Increase your Levemir to 35units every day for the next two weeks, then increase to 40units per day.    3. Restart Novolog 5units before each meal (and glucerna) for the next two weeks, then increase to 8units before each meal (and glucerna).    4. Test your blood sugar in the morning before breakfast (goal of 80-130mg/dL), 2 hours after a meal (goal less than 180mg/dL), and anytime you feel it may be low (feeling shaky, dizzy, sweaty, weak). If your blood sugar is below 70mg/dL, have something to eat to bring it up. If your blood sugar is NOT below 70mg/dL, take a 5 minute break, have a glass water, and let the feeling pass. This is your body adjusting to lower and more normal blood sugar levels.    Next MT visit: Friday, February 9th - 8:15 in the lab (please be fasting and bring a snack with you for after labs) and 8:30am with Jovana    To schedule another [facility] appointment, please call the clinic directly or you may call the [facility] scheduling line at [phone] or toll-free at [phone].     My Clinical Pharmacist's contact information:                                                      It was a pleasure seeing you today!  Please feel free to contact me with any questions or concerns you have.      Jovana Loo,  Pharm.D., [facility]  Medication Therapy Management Pharmacist  [phone]    You may receive a survey about the [facility] services you received.  I would appreciate your feedback to help me serve you better in the future. Please fill it out and return it when you can. Your comments will be anonymous.      My healthcare goals:                                                      Diabetes Goals:    Home Monitoring of Blood Sugars:Fasting  mg/dL and 2 hours after a meal less than 180 mg/dL.    Hemoglobin A1C: Less than 8%. Yours is   Lab Results   Component Value Date    A1C 13.8 07/03/2017   .    Blood Pressure: Less than 140/90mmHg. Yours is /60  Pulse 82  Wt 107 lb 12.8 oz (48.9 kg)  LMP  (LMP Unknown)  BMI 23.74 kg/m2.    Cholesterol: You are taking atorvastatin to help decrease the risk of heart disease.    Things you can do to help lower your blood sugars:    Diet: 3 meals and 1-2 snacks per day with 45-60 grams of carbohydrates per meal and 15-30 grams of carbohydrates per snack. Try to fill your plate at least half-full with vegetables, fill one-quarter full with lean meats or protein, and also make sure you get at least some carbohydrate with every meal.    Exercise: 30 minutes per day of anything that will increase your heart rate and make you break a sweat! Gardening, walking, cleaning the house, changing the oil in your car, etc. If you feel like 30 minutes per day is too much, start small. Even lifting canned foods or working your arms with a resistance band in front of the TV can help.                  Follow-ups after your visit        Your next 10 appointments already scheduled     Feb 09, 2018  8:15 AM CST   [facility]    [address] [place] [address] [phone]           Please do not eat 10-12 hours before your appointment if you are coming in fasting for labs on lipids,  "cholesterol, or glucose (sugar). This does not apply to pregnant women. Water, hot tea and black coffee (with nothing added) are okay. Do not drink other fluids, diet soda or chew gum.            Feb 09, 2018  8:30 AM CST   Office Visit with Jovana Loo RPH   [facility]    [address] [address] [phone]           Bring a current list of meds and any records pertaining to this visit. For Physicals, please bring immunization records and any forms needing to be filled out. Please arrive 10 minutes early to complete paperwork.              Who to contact     If you have questions or need follow up information about today's clinic visit or your schedule please contact [facility] directly at [phone].  Normal or non-critical lab and imaging results will be communicated to you by MyChart, letter or phone within 4 business days after the clinic has received the results. If you do not hear from us within 7 days, please contact the clinic through [employer] or phone. If you have a critical or abnormal lab result, we will notify you by phone as soon as possible.  Submit refill requests through [email or web address] or call your pharmacy and they will forward the refill request to us. Please allow 3 business days for your refill to be completed.          Additional Information About Your Visit        MyChart Information     [email or web address] lets you send messages to your doctor, view your test results, renew your prescriptions, schedule appointments and more. To sign up, go to [email or web address] . Click on \"Log in\" on the left side of the screen, which will take you to the Welcome page. Then click on \"Sign up Now\" on the right side of the page.     You will be asked to enter the access code listed below, as well as some personal information. Please follow the directions to create your username and " password.     Your access code is: [email or web address]  Expires: 4/10/2018  1:56 PM     Your access code will  in 90 days. If you need help or a new code, please call your [facility] clinic or [phone].        Care EveryWhere ID     This is your Care EveryWhere ID. This could be used by other organizations to access your [facility] medical records  [id number]        Your Vitals Were     Pulse Last Period BMI (Body Mass Index)             82 (LMP Unknown) 23.74 kg/m2          Blood Pressure from Last 3 Encounters:   01/10/18 120/60   17 (!) 164/120   17 130/70    Weight from Last 3 Encounters:   01/10/18 107 lb 12.8 oz (48.9 kg)   17 108 lb (49 kg)   17 105 lb 9.6 oz (47.9 kg)              Today, you had the following     No orders found for display         Today's Medication Changes          These changes are accurate as of: 1/10/18  1:56 PM.  If you have any questions, ask your nurse or doctor.               These medicines have changed or have updated prescriptions.        Dose/Directions    insulin detemir 100 UNIT/ML injection   Commonly known as:  LEVEMIR FLEXPEN/FLEXTOUCH   This may have changed:  additional instructions   Used for:  Uncontrolled type 2 diabetes mellitus without complication, with long-term current use of insulin (H)        40 units once daily - Levemir or covered basal insulin equivalent   Quantity:  15 mL   Refills:  3       NovoLOG FLEXPEN 100 UNIT/ML injection   This may have changed:  how much to take   Used for:  Uncontrolled type 2 diabetes mellitus without complication, with long-term current use of insulin (H)   Generic drug:  insulin aspart        Dose:  8 Units   Inject 8 Units Subcutaneous 3 times daily (with meals)   Quantity:  15 mL   Refills:  3         Stop taking these medicines if you haven't already. Please contact your care team if you have questions.     pioglitazone 45 MG tablet   Commonly known as:  ACTOS                Where to get your medicines       These medications were sent to [facility] - [place], [place] - [address]  [address], [facility] [address]     Phone:  [phone]     insulin detemir 100 UNIT/ML injection    NovoLOG FLEXPEN 100 UNIT/ML injection                Primary Care Provider Office Phone # Fax #    Eva Lockhart [phone]       [address] [facility] [address]        Goals        General    I will check my blood sugars four times per day (pt-stated)     Notes - Note created  3/10/2014 10:41 AM by Priti Orozco, RN    As of today's date 3/10/2014 goal is met at 26 - 50%.   Goal Status:  Active      I will continue to work with Diabetic Educator (pt-stated)     Notes - Note created  3/10/2014 10:42 AM by Priti Orozco, RN    As of today's date 3/10/2014 goal is met at 26 - 50%.   Goal Status:  Active      I will take insulins as prescribed, including sliding scale novolog (pt-stated)     Notes - Note created  3/10/2014 10:43 AM by Priti Orozco, RN    As of today's date 3/10/2014 goal is met at 51 - 75%.   Goal Status:  Active        Equal Access to Services     ADI LOPEZ : Hadii aad ku hadasho Soomaali, waaxda luqadaha, qaybta kaalmada adeegyada, waxay idiin haysherryn stan betts laandreia . So [facility] [phone].    ATENCIÓN: Si habla español, tiene a tompkins disposición servicios gratuitos de asistencia lingüística. [facility] [phone].    We comply with applicable federal civil rights laws and [place] laws. We do not discriminate on the basis of race, color, national origin, age, disability, sex, sexual orientation, or gender identity.            Thank you!     Thank you for choosing [facility]  for your care. Our goal is always to provide you with excellent care. Hearing back from our patients is one way we can continue to improve our services. Please take a few minutes to complete the written survey that you may  receive in the mail after your visit with us. Thank you!             Your Updated Medication List - Protect others around you: Learn how to safely use, store and throw away your medicines at www.disposemymeds.org.          This list is accurate as of: 1/10/18  1:56 PM.  Always use your most recent med list.                   Brand Name Dispense Instructions for use Diagnosis    albuterol 108 (90 BASE) MCG/ACT Inhaler    PROAIR HFA/PROVENTIL HFA/VENTOLIN HFA    3 Inhaler    Inhale 2 puffs into the lungs every 6 hours as needed for shortness of breath / dyspnea ((Ventolin or preferred albuterol equivalent))    Mild intermittent asthma without complication       aspirin 81 MG tablet      Take 1 tablet by mouth daily Reported on 4/21/2017        atorvastatin 40 MG tablet    LIPITOR    90 tablet    TAKE ONE TABLET BY MOUTH EVERY DAY    Hyperlipidemia LDL goal <100       blood glucose lancets standard    no brand specified    1 Box    Use to test blood sugar 3 times daily or as directed.    Type 2 diabetes mellitus without complication, with long-term current use of insulin (H)       blood glucose monitoring meter device kit    no brand specified    1 kit    Use to test blood sugar 3 times daily or as directed    Type 2 diabetes mellitus without complication, with long-term current use of insulin (H)       blood glucose monitoring test strip    ONETOUCH ULTRA    300 each    TEST BLOOD SUGARS 3 TIMES DAILY.    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 Use 4 pen needles daily.    Uncontrolled type 2 diabetes mellitus without complication, with long-term current use of insulin (H)       losartan 50 MG tablet    COZAAR    90 tablet    TAKE ONE TABLET BY MOUTH EVERY DAY    Uncontrolled type 2 diabetes mellitus without complication, with long-term current use of insulin (H), CKD (chronic kidney disease) stage 2, GFR 60-89 ml/min       meclizine 25 MG tablet    ANTIVERT    30 tablet    Take 1 tablet (25 mg) by mouth every 6 hours as needed for dizziness    BPPV (benign paroxysmal positional vertigo), unspecified laterality       NovoLOG FLEXPEN 100 UNIT/ML injection   Generic drug:  insulin aspart     15 mL    Inject 8 Units Subcutaneous 3 times daily (with meals)    Uncontrolled type 2 diabetes mellitus without complication, with long-term current use of insulin (H)       omeprazole 20 MG CR capsule    priLOSEC    90 capsule    TAKE ONE CAPSULE BY MOUTH EVERY DAY    Gastroesophageal reflux disease, esophagitis presence not specified       order for DME     1 each    Equipment being ordered: bath tub grab bars    Arthritis of knee, right, Disorder of bursae and tendons in shoulder region, Falls frequently, Balance problems

## 2018-02-09 ENCOUNTER — OFFICE VISIT (OUTPATIENT)
Dept: FAMILY MEDICINE | Facility: CLINIC | Age: 63
End: 2018-02-09
Payer: MEDICARE

## 2018-02-09 ENCOUNTER — OFFICE VISIT (OUTPATIENT)
Dept: PHARMACY | Facility: CLINIC | Age: 63
End: 2018-02-09
Payer: COMMERCIAL

## 2018-02-09 VITALS
SYSTOLIC BLOOD PRESSURE: 130 MMHG | BODY MASS INDEX: 24.76 KG/M2 | WEIGHT: 112.4 LBS | HEART RATE: 82 BPM | DIASTOLIC BLOOD PRESSURE: 72 MMHG

## 2018-02-09 VITALS
HEIGHT: 56 IN | OXYGEN SATURATION: 97 % | BODY MASS INDEX: 25.42 KG/M2 | TEMPERATURE: 97.3 F | WEIGHT: 113 LBS | DIASTOLIC BLOOD PRESSURE: 73 MMHG | SYSTOLIC BLOOD PRESSURE: 124 MMHG | HEART RATE: 80 BPM

## 2018-02-09 DIAGNOSIS — J30.2 CHRONIC SEASONAL ALLERGIC RHINITIS, UNSPECIFIED TRIGGER: ICD-10-CM

## 2018-02-09 DIAGNOSIS — E11.21 TYPE 2 DIABETES MELLITUS WITH DIABETIC NEPHROPATHY, WITH LONG-TERM CURRENT USE OF INSULIN (H): Primary | ICD-10-CM

## 2018-02-09 DIAGNOSIS — Z23 NEED FOR PROPHYLACTIC VACCINATION AND INOCULATION AGAINST INFLUENZA: ICD-10-CM

## 2018-02-09 DIAGNOSIS — J45.20 MILD INTERMITTENT ASTHMA WITHOUT COMPLICATION: Primary | ICD-10-CM

## 2018-02-09 DIAGNOSIS — N18.2 CKD (CHRONIC KIDNEY DISEASE) STAGE 2, GFR 60-89 ML/MIN: ICD-10-CM

## 2018-02-09 DIAGNOSIS — E78.5 HYPERLIPIDEMIA LDL GOAL <100: ICD-10-CM

## 2018-02-09 DIAGNOSIS — Z13.5 SCREENING FOR DIABETIC RETINOPATHY: ICD-10-CM

## 2018-02-09 DIAGNOSIS — H60.543 ECZEMATOID OTITIS EXTERNA OF BOTH EARS, UNSPECIFIED CHRONICITY: ICD-10-CM

## 2018-02-09 DIAGNOSIS — Z12.4 SCREENING FOR MALIGNANT NEOPLASM OF CERVIX: ICD-10-CM

## 2018-02-09 DIAGNOSIS — Z79.4 TYPE 2 DIABETES MELLITUS WITH DIABETIC NEPHROPATHY, WITH LONG-TERM CURRENT USE OF INSULIN (H): Primary | ICD-10-CM

## 2018-02-09 LAB
ALT SERPL W P-5'-P-CCNC: 23 U/L (ref 0–50)
ANION GAP SERPL CALCULATED.3IONS-SCNC: 8 MMOL/L (ref 3–14)
AST SERPL W P-5'-P-CCNC: 15 U/L (ref 0–45)
BUN SERPL-MCNC: 16 MG/DL (ref 7–30)
CALCIUM SERPL-MCNC: 9.8 MG/DL (ref 8.5–10.1)
CHLORIDE SERPL-SCNC: 105 MMOL/L (ref 94–109)
CHOLEST SERPL-MCNC: 261 MG/DL
CO2 SERPL-SCNC: 25 MMOL/L (ref 20–32)
CREAT SERPL-MCNC: 0.49 MG/DL (ref 0.52–1.04)
CREAT UR-MCNC: 142 MG/DL
GFR SERPL CREATININE-BSD FRML MDRD: >90 ML/MIN/1.7M2
GLUCOSE SERPL-MCNC: 192 MG/DL (ref 70–99)
HBA1C MFR BLD: 12.8 % (ref 4.3–6)
HDLC SERPL-MCNC: 88 MG/DL
LDLC SERPL CALC-MCNC: 124 MG/DL
MICROALBUMIN UR-MCNC: 1240 MG/L
MICROALBUMIN/CREAT UR: 873.24 MG/G CR (ref 0–25)
NONHDLC SERPL-MCNC: 173 MG/DL
POTASSIUM SERPL-SCNC: 4 MMOL/L (ref 3.4–5.3)
SODIUM SERPL-SCNC: 138 MMOL/L (ref 133–144)
TRIGL SERPL-MCNC: 243 MG/DL

## 2018-02-09 PROCEDURE — 99607 MTMS BY PHARM ADDL 15 MIN: CPT | Performed by: PHARMACIST

## 2018-02-09 PROCEDURE — 82043 UR ALBUMIN QUANTITATIVE: CPT | Performed by: INTERNAL MEDICINE

## 2018-02-09 PROCEDURE — 83036 HEMOGLOBIN GLYCOSYLATED A1C: CPT | Performed by: INTERNAL MEDICINE

## 2018-02-09 PROCEDURE — 80061 LIPID PANEL: CPT | Performed by: INTERNAL MEDICINE

## 2018-02-09 PROCEDURE — G0145 SCR C/V CYTO,THINLAYER,RESCR: HCPCS | Performed by: INTERNAL MEDICINE

## 2018-02-09 PROCEDURE — 99214 OFFICE O/P EST MOD 30 MIN: CPT | Performed by: INTERNAL MEDICINE

## 2018-02-09 PROCEDURE — 36415 COLL VENOUS BLD VENIPUNCTURE: CPT | Performed by: INTERNAL MEDICINE

## 2018-02-09 PROCEDURE — 84450 TRANSFERASE (AST) (SGOT): CPT | Performed by: INTERNAL MEDICINE

## 2018-02-09 PROCEDURE — 80048 BASIC METABOLIC PNL TOTAL CA: CPT | Performed by: INTERNAL MEDICINE

## 2018-02-09 PROCEDURE — 84460 ALANINE AMINO (ALT) (SGPT): CPT | Performed by: INTERNAL MEDICINE

## 2018-02-09 PROCEDURE — G0476 HPV COMBO ASSAY CA SCREEN: HCPCS | Performed by: INTERNAL MEDICINE

## 2018-02-09 PROCEDURE — 99606 MTMS BY PHARM EST 15 MIN: CPT | Performed by: PHARMACIST

## 2018-02-09 PROCEDURE — 87624 HPV HI-RISK TYP POOLED RSLT: CPT | Performed by: INTERNAL MEDICINE

## 2018-02-09 RX ORDER — NEOMYCIN SULFATE, POLYMYXIN B SULFATE AND HYDROCORTISONE 10; 3.5; 1 MG/ML; MG/ML; [USP'U]/ML
3 SUSPENSION/ DROPS AURICULAR (OTIC) 4 TIMES DAILY
Qty: 6 ML | Refills: 3 | Status: SHIPPED | OUTPATIENT
Start: 2018-02-09 | End: 2019-02-15

## 2018-02-09 RX ORDER — FLUTICASONE PROPIONATE 50 MCG
1-2 SPRAY, SUSPENSION (ML) NASAL DAILY
Qty: 3 BOTTLE | Refills: 11 | Status: SHIPPED | OUTPATIENT
Start: 2018-02-09 | End: 2019-02-15

## 2018-02-09 RX ORDER — FEXOFENADINE HCL 180 MG/1
180 TABLET ORAL DAILY
Qty: 30 TABLET | Refills: 1 | Status: SHIPPED | OUTPATIENT
Start: 2018-02-09 | End: 2019-02-15

## 2018-02-09 RX ORDER — INSULIN ASPART 100 [IU]/ML
16 INJECTION, SOLUTION INTRAVENOUS; SUBCUTANEOUS
Qty: 15 ML | Refills: 3 | Status: SHIPPED | OUTPATIENT
Start: 2018-02-09 | End: 2018-04-13

## 2018-02-09 ASSESSMENT — PAIN SCALES - GENERAL: PAINLEVEL: MODERATE PAIN (5)

## 2018-02-09 NOTE — PROGRESS NOTES
Nathalie Harp    Enclosed are your results.  Your labs are normal/stable at this time.   Let's discuss the elevated cholesterol at your next visit.     Please call  with any questions.  We can also discuss any questions regarding these labs at your next scheduled visit.    Sincerely,    Eva Lockhart M.D.

## 2018-02-09 NOTE — LETTER
[facility]  [address] [place]  [address]  [phone]        February 12, 2018    Nathalie Harp  [address]  [facility] [address]        Dear Nathalie,    Enclosed are your results.  Your labs are normal/stable at this time.   Let's discuss the elevated cholesterol at your next visit.     Please call  with any questions.  We can also discuss any questions regarding these labs at your next scheduled visit.    Results for orders placed or performed in visit on 02/09/18   Basic metabolic panel   Result Value Ref Range    Sodium 138 133 - 144 mmol/L    Potassium 4.0 3.4 - 5.3 mmol/L    Chloride 105 94 - 109 mmol/L    Carbon Dioxide 25 20 - 32 mmol/L    Anion Gap 8 3 - 14 mmol/L    Glucose 192 (H) 70 - 99 mg/dL    Urea Nitrogen 16 7 - 30 mg/dL    Creatinine 0.49 (L) 0.52 - 1.04 mg/dL    GFR Estimate >90 >60 mL/min/1.7m2    GFR Estimate If Black >90 >60 mL/min/1.7m2    Calcium 9.8 8.5 - 10.1 mg/dL   Albumin Random Urine Quantitative with Creat Ratio   Result Value Ref Range    Creatinine Urine 142 mg/dL    Albumin Urine mg/L 1240 mg/L    Albumin Urine mg/g Cr 873.24 (H) 0 - 25 mg/g Cr   Lipid panel reflex to direct LDL Fasting   Result Value Ref Range    Cholesterol 261 (H) <200 mg/dL    Triglycerides 243 (H) <150 mg/dL    HDL Cholesterol 88 >49 mg/dL    LDL Cholesterol Calculated 124 (H) <100 mg/dL    Non HDL Cholesterol 173 (H) <130 mg/dL   AST   Result Value Ref Range    AST 15 0 - 45 U/L   ALT   Result Value Ref Range    ALT 23 0 - 50 U/L   Hemoglobin A1c   Result Value Ref Range    Hemoglobin A1C 12.8 (H) 4.3 - 6.0 %       If you have any questions please call the clinic at [phone].    Sincerely,    Eva MCDOWELL

## 2018-02-09 NOTE — PROGRESS NOTES
SUBJECTIVE/OBJECTIVE:                Nathalie Harp is a 62 year old female coming in for a follow-up visit for Medication Therapy Management.  She was referred to me from Eva Lockhart     Chief Complaint: Follow up from our visit on 1/10/2018.  Diabetes follow-up.  Personal Healthcare Goals: Get A1c below 9 so she can qualify for cataract surgery  Tobacco: No tobacco use  Alcohol: none    Medication Adherence: no issues reported    Diabetes:  Pt currently taking Levemir 40units QAM, Novolog 8-10units with meals (eats 2-3x/day). Is using a new needle with each injection.  SMBG Ranges (based on glucometer readings): 30d avg (n26) 349  Symptoms of low blood sugar? none. Reports she never really feels symptoms of lows. Keeps a Relion glucose shot on hand at all times.  Symptoms of high blood sugar? none  Eye exam: due  Foot exam: up to date  ACEi/ARB: Yes: losartan 50mg QD. Microalbumin is not < 30 mg/g. Labs updated today; pending at time of visit.  Aspirin: Taking 81mg daily and has the following issues: bruises easily  Statin: Yes: atorvastatin 40mg QD and denies side effects  Diet/Exercise: 2-3 meals per day. Glucerna if she misses lunch.  Date FBG/ 2hours post   2/8 305   2/7 371   2/6 146   2/5 354   2/4 294   2/3 278   2/2 397   2/1 362   1/31 246   1/30 243   1/29 273   1/28 502       Asthma:  Current asthma medications: Albuterol MDI and (Pt reports using albuterol 0-1 times per week).  Asthma triggers include: upper respiratory infections, strong odors and fumes and cold air. No current issue with URI.  Pt reports the following symptoms: none.  AAP on file: YES  ACT Total Scores 2/24/2017 7/28/2017 2/9/2018   ACT TOTAL SCORE - - -   ASTHMA ER VISITS - - -   ASTHMA HOSPITALIZATIONS - - -   ACT TOTAL SCORE (Goal Greater than or Equal to 20) 12 18 21   In the past 12 months, how many times did you visit the emergency room for your asthma without being admitted to the hospital? 0 0 0   In the past  12 months, how many times were you hospitalized overnight because of your asthma? 0 0 0       Current labs include:  BP Readings from Last 3 Encounters:   01/10/18 120/60   09/06/17 (!) 164/120   08/23/17 130/70     Wt Readings from Last 3 Encounters:   01/10/18 107 lb 12.8 oz (48.9 kg)   09/14/17 108 lb (49 kg)   08/23/17 105 lb 9.6 oz (47.9 kg)       Today's Vitals: /72  Pulse 82  Wt 112 lb 6.4 oz (51 kg)  LMP  (LMP Unknown)  BMI 24.76 kg/m2  Lab Results   Component Value Date    A1C 12.8 02/09/2018    A1C 13.8 07/03/2017    A1C 12.3 12/21/2016    A1C 11.3 10/07/2015    A1C 11.4 08/31/2015       Lab Results   Component Value Date    CHOL 218 11/14/2016     Lab Results   Component Value Date    TRIG 380 11/14/2016     Lab Results   Component Value Date    HDL 52 11/14/2016     Lab Results   Component Value Date    LDL 90 11/14/2016       Liver Function Studies -   Recent Labs   Lab Test  11/14/16   1004   PROTTOTAL  7.5   ALBUMIN  4.2   BILITOTAL  0.6   ALKPHOS  109   AST  14   ALT  29       Lab Results   Component Value Date    UCRR 22 11/14/2016    MICROL 12 11/14/2016    UMALCR 56.82 (H) 11/14/2016       Last Basic Metabolic Panel:  Lab Results   Component Value Date     04/21/2017      Lab Results   Component Value Date    POTASSIUM 3.5 04/21/2017     Lab Results   Component Value Date    CHLORIDE 98 04/21/2017     Lab Results   Component Value Date    BUN 11 04/21/2017     Lab Results   Component Value Date    CR 0.55 04/21/2017     GFR Estimate   Date Value Ref Range Status   04/21/2017 >90  Non  GFR Calc   >60 mL/min/1.7m2 Final   11/14/2016 62 >60 mL/min/1.7m2 Final   10/07/2015 63 >60 mL/min/1.7m2 Final     GFR Estimate If Black   Date Value Ref Range Status   04/21/2017 >90   GFR Calc   >60 mL/min/1.7m2 Final   11/14/2016 75 >60 mL/min/1.7m2 Final   10/07/2015 77 >60 mL/min/1.7m2 Final     Most Recent Immunizations   Administered Date(s) Administered      Influenza (High Dose) 3 valent vaccine 10/03/2013     Influenza (IIV3) PF 08/30/2016     Influenza Vaccine IM 3yrs+ 4 Valent IIV4 08/30/2017     Pneumo Conj 13-V (2010&after) 11/14/2016     Pneumococcal 23 valent 11/14/2008     TD (ADULT, 7+) 09/01/2011       ASSESSMENT:              Current medications were reviewed today as discussed above.      Medication Adherence: no issues identified    Diabetes: Needs Improvement. Patient is not meeting A1c goal of < 7%, but down 1% from previous. Self monitoring of blood glucose is not at goal of fasting  mg/dL. BG improving since last month with titration of insulin - will continue to titrate more aggressively.     Asthma: Improved. Up to date and at goal of ACT > or equal to 20.    PLAN:                  1. Increase Levemir by 5units each week: 45units QAMx1 week, then 50units QAMx1 week, then 55units QAM, then RTC.  2. Increase Novolog by 2units per dose per week: 12units TID with meals x1 week, then 14units with meals x1 week, then 16units with meals x1 week, then RTC.  Updated Rx's sent to pharmacy.      I spent 30 minutes with this patient today. All changes were made via collaborative practice agreement with Eva Lockhart. A copy of the visit note was provided to the patient's primary care provider.     Will follow up in 3 weeks.    The patient was given a summary of these recommendations as an after visit summary.    Jovana Loo, Pharm.D., BCACP  Medication Therapy Management Pharmacist  [phone]

## 2018-02-09 NOTE — MR AVS SNAPSHOT
After Visit Summary   2/9/2018    Nathalie Harp    MRN: [id number]           Patient Information     Date Of Birth          1955        Visit Information        Provider Department      2/9/2018 8:30 AM Jovana Loo, [facility] MTM        Today's Diagnoses     Uncontrolled type 2 diabetes mellitus without complication, with long-term current use of insulin (H)          Care Instructions    Recommendations from today's MT visit:                                                        1. Increase your Levemir to 45units every morning for 1 week, then 50units every morning for 1 week, then 55units every morning.     2. Increase your Novolog to 12units with meals for 1 week, then 14units with meals for 1 week, then 16units with meals.    3. We will meet again in 3 weeks to check on your blood sugars.    4. I sent a new prescription for the pen needles to the pharmacy.    Next MTM visit: Wednesday, February 28th at 9:30am    To schedule another MTM appointment, please call the clinic directly or you may call the MTM scheduling line at [phone] or toll-free at [phone].     My Clinical Pharmacist's contact information:                                                      It was a pleasure seeing you today!  Please feel free to contact me with any questions or concerns you have.      Jovana Loo, Pharm.D., [facility]  Medication Therapy Management Pharmacist  [phone]    You may receive a survey about the MT services you received.  I would appreciate your feedback to help me serve you better in the future. Please fill it out and return it when you can. Your comments will be anonymous.      My healthcare goals:                                                      Diabetes Goals:    Home Monitoring of Blood Sugars:Fasting  mg/dL and 2 hours after a meal less than 180 mg/dL.    Hemoglobin A1C: Less than 7%. Yours is   Lab Results    Component Value Date    A1C 12.8 02/09/2018    A1C 13.8 07/03/2017    A1C 12.3 12/21/2016    A1C 11.3 10/07/2015    A1C 11.4 08/31/2015       Blood Pressure: Less than 140/90mmHg. Yours is /72  Pulse 82  Wt 112 lb 6.4 oz (51 kg)  LMP  (LMP Unknown)  BMI 24.76 kg/m2.    Cholesterol: You are taking atorvastatin to help decrease the risk of heart disease.    Things you can do to help lower your blood sugars:    Diet: 3 meals and 1-2 snacks per day with 45-60 grams of carbohydrates per meal and 15-30 grams of carbohydrates per snack. Try to fill your plate at least half-full with vegetables, fill one-quarter full with lean meats or protein, and also make sure you get at least some carbohydrate with every meal.    Exercise: 30 minutes per day of anything that will increase your heart rate and make you break a sweat! Gardening, walking, cleaning the house, changing the oil in your car, etc. If you feel like 30 minutes per day is too much, start small. Even lifting canned foods or working your arms with a resistance band in front of the TV can help.                  Follow-ups after your visit        Your next 10 appointments already scheduled     Feb 09, 2018 10:40 AM CST   [facility]hart [facility]    [address] [place] [id number] [phone]           Bring a current list of meds and any records pertaining to this visit. For Physicals, please bring immunization records and any forms needing to be filled out. Please arrive 10 minutes early to complete paperwork.            Feb 28, 2018  9:30 AM CST   Office Visit with Jovana Loo RPH   [facility] ([facility])    [address] [facility] [address]   [phone]           Bring a current list of meds and any records pertaining to this visit. For  "Physicals, please bring immunization records and any forms needing to be filled out. Please arrive 10 minutes early to complete paperwork.              Who to contact     If you have questions or need follow up information about today's clinic visit or your schedule please contact [facility] MTM directly at [phone].  Normal or non-critical lab and imaging results will be communicated to you by MyChart, letter or phone within 4 business days after the clinic has received the results. If you do not hear from us within 7 days, please contact the clinic through MyChart or phone. If you have a critical or abnormal lab result, we will notify you by phone as soon as possible.  Submit refill requests through [employer] or call your pharmacy and they will forward the refill request to us. Please allow 3 business days for your refill to be completed.          Additional Information About Your Visit        MyChart Information     [employer] lets you send messages to your doctor, view your test results, renew your prescriptions, schedule appointments and more. To sign up, go to [email or web address] . Click on \"Log in\" on the left side of the screen, which will take you to the Welcome page. Then click on \"Sign up Now\" on the right side of the page.     You will be asked to enter the access code listed below, as well as some personal information. Please follow the directions to create your username and password.     Your access code is: [email or web address]  Expires: 4/10/2018  1:56 PM     Your access code will  in 90 days. If you need help or a new code, please call your [facility] or [phone].        Care EveryWhere ID     This is your Care EveryWhere ID. This could be used by other organizations to access your [place] medical records  [id number]        Your Vitals Were     Pulse Last Period BMI (Body Mass Index)             82 (LMP Unknown) 24.76 kg/m2          Blood Pressure from Last 3 " Encounters:   02/09/18 130/72   01/10/18 120/60   09/06/17 (!) 164/120    Weight from Last 3 Encounters:   02/09/18 112 lb 6.4 oz (51 kg)   01/10/18 107 lb 12.8 oz (48.9 kg)   09/14/17 108 lb (49 kg)              Today, you had the following     No orders found for display         Today's Medication Changes          These changes are accurate as of 2/9/18  9:15 AM.  If you have any questions, ask your nurse or doctor.               These medicines have changed or have updated prescriptions.        Dose/Directions    insulin detemir 100 UNIT/ML injection   Commonly known as:  LEVEMIR FLEXPEN/FLEXTOUCH   This may have changed:  additional instructions   Used for:  Uncontrolled type 2 diabetes mellitus without complication, with long-term current use of insulin (H)   Changed by:  Jovana Loo RPH        55 units once daily every morning - Levemir or covered basal insulin equivalent. Dose as of 2/9/2018.   Quantity:  30 mL   Refills:  3       NovoLOG FLEXPEN 100 UNIT/ML injection   This may have changed:  how much to take   Used for:  Uncontrolled type 2 diabetes mellitus without complication, with long-term current use of insulin (H)   Generic drug:  insulin aspart   Changed by:  Jovana Loo RPH        Dose:  16 Units   Inject 16 Units Subcutaneous 3 times daily (with meals)   Quantity:  15 mL   Refills:  3            Where to get your medicines      These medications were sent to [facility] [place], [place] - [address]  [address] [facility] [address]     Phone:  [phone]     insulin detemir 100 UNIT/ML injection    insulin pen needle 30G X 8 MM    NovoLOG FLEXPEN 100 UNIT/ML injection                Primary Care Provider Office Phone # Fax #    Eva Lockhart [phone]       [address] [facility] [address]        Goals        General    I will check my blood sugars four times per day (pt-stated)      Notes - Note created  3/10/2014 10:41 AM by Priti Orozco RN    As of today's date 3/10/2014 goal is met at 26 - 50%.   Goal Status:  Active      I will continue to work with Diabetic Educator (pt-stated)     Notes - Note created  3/10/2014 10:42 AM by Priti Orozco RN    As of today's date 3/10/2014 goal is met at 26 - 50%.   Goal Status:  Active      I will take insulins as prescribed, including sliding scale novolog (pt-stated)     Notes - Note created  3/10/2014 10:43 AM by Priti Orozco RN    As of today's date 3/10/2014 goal is met at 51 - 75%.   Goal Status:  Active        Equal Access to Services     ADI LOPEZ : Hadii tracey berman hadasho Sokamryn, waaxda luqadaha, qaybta kaalmada adeegyada, sandra charles . So [facility] [phone].    ATENCIÓN: Si habla español, tiene a tompkins disposición servicios gratuitos de asistencia lingüística. Llame al [phone].    We comply with applicable federal civil rights laws and [place] laws. We do not discriminate on the basis of race, color, national origin, age, disability, sex, sexual orientation, or gender identity.            Thank you!     Thank you for choosing [facility]  for your care. Our goal is always to provide you with excellent care. Hearing back from our patients is one way we can continue to improve our services. Please take a few minutes to complete the written survey that you may receive in the mail after your visit with us. Thank you!             Your Updated Medication List - Protect others around you: Learn how to safely use, store and throw away your medicines at www.disposemymeds.org.          This list is accurate as of 2/9/18  9:15 AM.  Always use your most recent med list.                   Brand Name Dispense Instructions for use Diagnosis    albuterol 108 (90 BASE) MCG/ACT Inhaler    PROAIR HFA/PROVENTIL HFA/VENTOLIN HFA    3 Inhaler    Inhale 2 puffs into the lungs every 6 hours as needed for  shortness of breath / dyspnea ((Ventolin or preferred albuterol equivalent))    Mild intermittent asthma without complication       aspirin 81 MG tablet      Take 1 tablet by mouth daily Reported on 4/21/2017        atorvastatin 40 MG tablet    LIPITOR    90 tablet    TAKE ONE TABLET BY MOUTH EVERY DAY    Hyperlipidemia LDL goal <100       blood glucose lancets standard    no brand specified    1 Box    Use to test blood sugar 3 times daily or as directed.    Type 2 diabetes mellitus without complication, with long-term current use of insulin (H)       blood glucose monitoring meter device kit    no brand specified    1 kit    Use to test blood sugar 3 times daily or as directed    Type 2 diabetes mellitus without complication, with long-term current use of insulin (H)       blood glucose monitoring test strip    ONETOUCH ULTRA    300 each    TEST BLOOD SUGARS 3 TIMES DAILY.    Type 2 diabetes mellitus without complication, with long-term current use of insulin (H)       celecoxib 200 MG capsule    celeBREX    180 capsule    Take 1 capsule (200 mg) by mouth 2 times daily as needed for moderate pain    Primary osteoarthritis of both knees       EPINEPHrine 0.3 MG/0.3ML injection 2-pack    EPIPEN 2-MELVIN    6 mL    INJECT 0.3 MLS (0.3 MG) INTO THE MUSCLE ONCE AS NEEDED FOR ANAPHYLAXIS    Allergic to shellfish       insulin detemir 100 UNIT/ML injection    LEVEMIR FLEXPEN/FLEXTOUCH    30 mL    55 units once daily every morning - Levemir or covered basal insulin equivalent. Dose as of 2/9/2018.    Uncontrolled type 2 diabetes mellitus without complication, with long-term current use of insulin (H)       insulin pen needle 30G X 8 MM     200 each    Matt-Fine Auto-Cover needles. Use 4 pen needles daily.    Uncontrolled type 2 diabetes mellitus without complication, with long-term current use of insulin (H)       losartan 50 MG tablet    COZAAR    90 tablet    TAKE ONE TABLET BY MOUTH EVERY DAY    Uncontrolled type 2  diabetes mellitus without complication, with long-term current use of insulin (H), CKD (chronic kidney disease) stage 2, GFR 60-89 ml/min       meclizine 25 MG tablet    ANTIVERT    30 tablet    Take 1 tablet (25 mg) by mouth every 6 hours as needed for dizziness    BPPV (benign paroxysmal positional vertigo), unspecified laterality       NovoLOG FLEXPEN 100 UNIT/ML injection   Generic drug:  insulin aspart     15 mL    Inject 16 Units Subcutaneous 3 times daily (with meals)    Uncontrolled type 2 diabetes mellitus without complication, with long-term current use of insulin (H)       omeprazole 20 MG CR capsule    priLOSEC    90 capsule    TAKE ONE CAPSULE BY MOUTH EVERY DAY    Gastroesophageal reflux disease, esophagitis presence not specified

## 2018-02-09 NOTE — PROGRESS NOTES
SUBJECTIVE:   Nathalie Harp is a 62 year old female who presents to clinic today for the following health issues:    61 y/o F with h/o IDDM.  Poor control in past attributed to incorrect insulin pen technique.  Today, her A1C is 12.8... The insulin dose will be increased by 5 U weekly for next 3 weeks. .   She has paperwork in hand for DMV.     She wants something for her nasal congestion.      Willing to complete PAP today    ENT Symptoms             Symptoms: cc Present Absent Comment   Fever/Chills   x    Fatigue   x    Muscle Aches   x    Eye Irritation   x    Sneezing   x    Nasal Gustabo/Drg  x  Nasal congestion, post nasal drip   Sinus Pressure/Pain  x     Loss of smell  x     Dental pain   x    Sore Throat   x    Swollen Glands   x    Ear Pain/Fullness  x  Bilateral ear pain   Cough   x    Wheeze   x    Chest Pain   x    Shortness of breath   x    Rash  x  On right arm   Other         Symptom duration:  2 months   Symptom severity:  moderate   Treatments tried:  none   Contacts:  none         Patient is also here to get Drivers license form filled out.    Problem list and histories reviewed & adjusted, as indicated.  Additional history: as documented    Patient Active Problem List   Diagnosis     Esophageal reflux     Irritable bowel syndrome     Female stress incontinence     Migraine without aura     Arthritis of knee, right     Disorder of bursae and tendons in shoulder region     Degeneration of thoracic or thoracolumbar intervertebral disc     Degeneration of cervical intervertebral disc     Other hammer toe (acquired)     Allergic rhinitis due to pollen     Menopausal symptoms     Need for SBE (subacute bacterial endocarditis) prophylaxis     Preventive measure     Hyperlipidemia LDL goal <100     Health Care Home     Anemia     Dizziness     Hammer toe     Microalbuminuria     ACP (advance care planning)     Allergic to shellfish     Falls frequently     Balance problems     CKD (chronic kidney  disease) stage 2, GFR 60-89 ml/min     Type 2 diabetes mellitus with diabetic nephropathy (H)     Diarrhea     Iron deficiency anemia, unspecified iron deficiency anemia type     Right axillary hidradenitis     Uncontrolled type 2 diabetes mellitus without complication, with long-term current use of insulin (H)     Mild intermittent asthma without complication     Past Surgical History:   Procedure Laterality Date     HC EXCIS PRIMARY GANGLION WRIST  1985 and 1987    right wrist     KNEE SURGERY  2008 approx    arthroscopic; Dr. Rivera       Social History   Substance Use Topics     Smoking status: Never Smoker     Smokeless tobacco: Never Used     Alcohol use Yes      Comment: rarely     Family History   Problem Relation Age of Onset     CEREBROVASCULAR DISEASE Maternal Grandmother      Arthritis Maternal Grandmother      Asthma Mother      GASTROINTESTINAL DISEASE Mother      IBS     Alcohol/Drug Mother      Depression Mother      Neurologic Disorder Mother      migraines     GASTROINTESTINAL DISEASE Father      IBS     DIABETES Father      Alcohol/Drug Father      Neurologic Disorder Father      migraines     Obesity Father      GASTROINTESTINAL DISEASE Maternal Grandfather      Ulcers     Alcohol/Drug Maternal Grandfather      DIABETES Paternal Grandmother      Alcohol/Drug Paternal Grandmother      Arthritis Paternal Grandmother      Obesity Paternal Grandmother      Hypertension Sister      Hypertension Brother      Circulatory Sister      Asthma Sister      Asthma Sister      Asthma Brother      Thyroid Disease Sister      Thyroid Disease Sister      Obesity Sister          Current Outpatient Prescriptions   Medication Sig Dispense Refill     insulin detemir (LEVEMIR FLEXPEN/FLEXTOUCH) 100 UNIT/ML injection 55 units once daily every morning - Levemir or covered basal insulin equivalent. Dose as of 2/9/2018. 30 mL 3     insulin pen needle 30G X 8 MM Matt-Fine Auto-Cover needles. Use 4 pen needles daily. 200  each 3     NOVOLOG FLEXPEN 100 UNIT/ML soln Inject 16 Units Subcutaneous 3 times daily (with meals) 15 mL 3     atorvastatin (LIPITOR) 40 MG tablet TAKE ONE TABLET BY MOUTH EVERY DAY 90 tablet 0     losartan (COZAAR) 50 MG tablet TAKE ONE TABLET BY MOUTH EVERY DAY 90 tablet 3     omeprazole (PRILOSEC) 20 MG CR capsule TAKE ONE CAPSULE BY MOUTH EVERY DAY 90 capsule 1     EPINEPHrine (EPIPEN 2-MELVIN) 0.3 MG/0.3ML injection 2-pack INJECT 0.3 MLS (0.3 MG) INTO THE MUSCLE ONCE AS NEEDED FOR ANAPHYLAXIS 6 mL 1     albuterol (PROAIR HFA/PROVENTIL HFA/VENTOLIN HFA) 108 (90 BASE) MCG/ACT Inhaler Inhale 2 puffs into the lungs every 6 hours as needed for shortness of breath / dyspnea ((Ventolin or preferred albuterol equivalent)) 3 Inhaler 3     blood glucose monitoring (ONE TOUCH ULTRA) test strip TEST BLOOD SUGARS 3 TIMES DAILY. 300 each 3     blood glucose monitoring (NO BRAND SPECIFIED) meter device kit Use to test blood sugar 3 times daily or as directed 1 kit 0     blood glucose (NO BRAND SPECIFIED) lancets standard Use to test blood sugar 3 times daily or as directed. 1 Box 3     meclizine (ANTIVERT) 25 MG tablet Take 1 tablet (25 mg) by mouth every 6 hours as needed for dizziness 30 tablet 1     celecoxib (CELEBREX) 200 MG capsule Take 1 capsule (200 mg) by mouth 2 times daily as needed for moderate pain 180 capsule 1     aspirin 81 MG tablet Take 1 tablet by mouth daily Reported on 4/21/2017       [DISCONTINUED] NOVOLOG FLEXPEN 100 UNIT/ML soln Inject 8 Units Subcutaneous 3 times daily (with meals) 15 mL 3     [DISCONTINUED] insulin detemir (LEVEMIR FLEXPEN/FLEXTOUCH) 100 UNIT/ML injection 40 units once daily - Levemir or covered basal insulin equivalent 15 mL 3     [DISCONTINUED] insulin pen needle 30G X 8 MM Matt-Fine Auto-Cover needles. Use 4 pen needles daily. 100 each 3     Allergies   Allergen Reactions     Amoxicillin-Pot Clavulanate [[id number]+Fd&C Red #40 [facility]]      Headache, nausea and vomiting      Augmentin      Beesix [Pyridoxine]      Benadryl [Acetaminophen]      Ceclor [Cefaclor]      Cefaclor      Rash       Cephalexin      Cephalosporins      Codeine      Detrol [Tolterodine Tartrate]      Dust Mites      Latex      Metformin GI Disturbance     Severe diarrhea     Pollen Extract      Septra [Bactrim]      Septra [Sulfamethoxazole W-Trimethoprim]      Shellfish Allergy      Crab       Simvastatin      HA     Sorbitol Diarrhea     Sulfa Drugs      Sulfonamide Derivatives      Recent Labs   Lab Test  02/09/18   0737  07/03/17   1030  04/21/17   1040  12/21/16   1003  11/14/16   1004   08/31/15   1144  11/04/14   1119  07/11/14   1038  03/03/14   0906   07/05/13   1159   A1C  12.8*  13.8*   --   12.3*   --    < >  11.4*  12.8*  12.8*  12.3*   --   11.2*   LDL   --    --    --    --   90   --   83   --   85   --    --   Cannot estimate LDL when triglyceride exceeds 400 mg/dL  132*   HDL   --    --    --    --   52   --   65   --    --    --    --   47*   TRIG   --    --    --    --   380*   --   301*   --    --    --    --   463*   ALT   --    --    --    --   29   --   31  19  22   --    < >  22   CR   --    --   0.55   --   0.92   < >  0.80  0.80  0.90   --    < >  0.80   GFRESTIMATED   --    --   >90  Non  GFR Calc     --   62   < >  73  73  64   --    < >  73   GFRESTBLACK   --    --   >90   GFR Calc     --   75   < >  88  88  77   --    < >  89   POTASSIUM   --    --   3.5   --   5.0   < >  4.7  4.2  4.2   --    < >  4.5   TSH   --    --    --    --   0.35*   --    --    --    --   0.54   --    --     < > = values in this interval not displayed.      BP Readings from Last 3 Encounters:   02/09/18 124/73   02/09/18 130/72   01/10/18 120/60    Wt Readings from Last 3 Encounters:   02/09/18 113 lb (51.3 kg)   02/09/18 112 lb 6.4 oz (51 kg)   01/10/18 107 lb 12.8 oz (48.9 kg)                  Labs reviewed in EPIC    Reviewed and updated as needed this visit by clinical  "staff  Tobacco  Allergies  Meds  Med Hx  Surg Hx  Fam Hx  Soc Hx      Reviewed and updated as needed this visit by Provider         ROS:  Constitutional, HEENT, cardiovascular, pulmonary, gi and gu systems are negative, except as otherwise noted.    OBJECTIVE:     /73 (BP Location: Right arm, Patient Position: Sitting, Cuff Size: Adult Regular)  Pulse 80  Temp 97.3  F (36.3  C) (Oral)  Ht 4' 8\" (1.422 m)  Wt 113 lb (51.3 kg)  LMP  (LMP Unknown)  SpO2 97%  BMI 25.33 kg/m2  Body mass index is 25.33 kg/(m^2).  GENERAL: healthy, alert and no distress  EYES: Eyes grossly normal to inspection, PERRL and conjunctivae and sclerae normal  HENT: normal cephalic/atraumatic, oropharynx clear, oral mucous membranes moist and ear canals with what appears to be eczema  NECK: no adenopathy, no asymmetry, masses, or scars and thyroid normal to palpation  RESP: lungs clear to auscultation - no rales, rhonchi or wheezes   (female): normal female external genitalia, normal urethral meatus, vaginal mucosa, normal cervix/adnexa/uterus without masses or discharge  PAP done with good view of cervix.   MS: no gross musculoskeletal defects noted, no edema  PSYCH: mentation appears normal, affect normal/bright    Diagnostic Test Results:  Results for orders placed or performed in visit on 02/09/18   Basic metabolic panel   Result Value Ref Range    Sodium 138 133 - 144 mmol/L    Potassium 4.0 3.4 - 5.3 mmol/L    Chloride 105 94 - 109 mmol/L    Carbon Dioxide 25 20 - 32 mmol/L    Anion Gap 8 3 - 14 mmol/L    Glucose 192 (H) 70 - 99 mg/dL    Urea Nitrogen 16 7 - 30 mg/dL    Creatinine 0.49 (L) 0.52 - 1.04 mg/dL    GFR Estimate >90 >60 mL/min/1.7m2    GFR Estimate If Black >90 >60 mL/min/1.7m2    Calcium 9.8 8.5 - 10.1 mg/dL   Albumin Random Urine Quantitative with Creat Ratio   Result Value Ref Range    Creatinine Urine 142 mg/dL    Albumin Urine mg/L 1240 mg/L    Albumin Urine mg/g Cr 873.24 (H) 0 - 25 mg/g Cr   Lipid " panel reflex to direct LDL Fasting   Result Value Ref Range    Cholesterol 261 (H) <200 mg/dL    Triglycerides 243 (H) <150 mg/dL    HDL Cholesterol 88 >49 mg/dL    LDL Cholesterol Calculated 124 (H) <100 mg/dL    Non HDL Cholesterol 173 (H) <130 mg/dL   AST   Result Value Ref Range    AST 15 0 - 45 U/L   ALT   Result Value Ref Range    ALT 23 0 - 50 U/L   Hemoglobin A1c   Result Value Ref Range    Hemoglobin A1C 12.8 (H) 4.3 - 6.0 %       ASSESSMENT/PLAN:             ICD-10-CM    1. Type 2 diabetes mellitus with diabetic nephropathy, with long-term current use of insulin (H) E11.21 Hemoglobin A1c    Z79.4 CANCELED: Albumin Random Urine Quantitative with Creat Ratio   2. Hyperlipidemia LDL goal <100 E78.5    3. Screening for malignant neoplasm of cervix Z12.4 Pap imaged thin layer screen with HPV - recommended age 30 - 65 years (select HPV order below)     HPV High Risk Types DNA Cervical   4. Screening for diabetic retinopathy Z13.5    5. Need for prophylactic vaccination and inoculation against influenza Z23    6. Chronic seasonal allergic rhinitis, unspecified trigger J30.2 fexofenadine (ALLEGRA) 180 MG tablet     fluticasone (FLONASE) 50 MCG/ACT spray   7. Eczematoid otitis externa of both ears, unspecified chronicity H60.543 neomycin-polymyxin-hydrocortisone (CORTISPORIN) 3.5-39977-8 otic suspension       DMV form done X 1 year.      Today, her A1C is 12.8... The insulin dose will be increased by 5 U weekly for next 3 weeks.  PharmD following her closely and patient is feeling comfortable with making these changes on her own.     PAP done today    Cortisporin ear drops.      Eva Lockhart MD  [facility]

## 2018-02-09 NOTE — LETTER
February 19, 2018    Nathalie Harp  [address]  [facility] [address]    Dear Nathalie,  We are happy to inform you that your PAP smear result from 2/9/18 is normal.  We are now able to do a follow up test on PAP smears. The DNA test is for HPV (Human Papilloma Virus). Cervical cancer is closely linked with certain types of HPV. Your result showed no evidence of high risk HPV.  Therefore we recommend you return in 3 years for your next pap smear.  You will still need to return to the clinic every year for an annual exam and other preventive tests.  Please contact the clinic at [phone] with any questions.  Sincerely,    Eva Lockhart MD/missael

## 2018-02-09 NOTE — MR AVS SNAPSHOT
After Visit Summary   2/9/2018    Nathalie Harp    MRN: [id number]           Patient Information     Date Of Birth          1955        Visit Information        Provider Department      2/9/2018 10:40 AM Eva Lockhart MD [facility]        Today's Diagnoses     Type 2 diabetes mellitus with diabetic nephropathy, with long-term current use of insulin (H)    -  1    Hyperlipidemia LDL goal <100        Screening for malignant neoplasm of cervix        Screening for diabetic retinopathy        Need for prophylactic vaccination and inoculation against influenza        Chronic seasonal allergic rhinitis, unspecified trigger        Eczematoid otitis externa of both ears, unspecified chronicity          Care Instructions    For allergies:  Fluticasone Nasal Spray  And Allegra -- take both daily    Return to clinic 3 months with labs prior    Trial cortisporin ear drops          Follow-ups after your visit        Your next 10 appointments already scheduled     Feb 28, 2018  9:30 AM CST   Office Visit with Jovana Loo RPH   [facility] ([facility])    [address] [facility] [address] [phone]           Bring a current list of meds and any records pertaining to this visit. For Physicals, please bring immunization records and any forms needing to be filled out. Please arrive 10 minutes early to complete paperwork.              Future tests that were ordered for you today     Open Future Orders        Priority Expected Expires Ordered    Hemoglobin A1c Routine  2/9/2019 2/9/2018            Who to contact     If you have questions or need follow up information about today's clinic visit or your schedule please contact [facility] directly at [phone].  Normal or non-critical lab and imaging results will be communicated to you by MyChart, letter or phone  "within 4 business days after the clinic has received the results. If you do not hear from us within 7 days, please contact the clinic through [facility] or phone. If you have a critical or abnormal lab result, we will notify you by phone as soon as possible.  Submit refill requests through [facility] or call your pharmacy and they will forward the refill request to us. Please allow 3 business days for your refill to be completed.          Additional Information About Your Visit        [facility] Information     [facility] lets you send messages to your doctor, view your test results, renew your prescriptions, schedule appointments and more. To sign up, go to [email or web address] . Click on \"Log in\" on the left side of the screen, which will take you to the Welcome page. Then click on \"Sign up Now\" on the right side of the page.     You will be asked to enter the access code listed below, as well as some personal information. Please follow the directions to create your username and password.     Your access code is: [email or web address]  Expires: 4/10/2018  1:56 PM     Your access code will  in 90 days. If you need help or a new code, please call your [facility] clinic or [phone].        Care EveryWhere ID     This is your Care EveryWhere ID. This could be used by other organizations to access your [facility] medical records  [id number]        Your Vitals Were     Pulse Temperature Height Last Period Pulse Oximetry BMI (Body Mass Index)    80 97.3  F (36.3  C) (Oral) 4' 8\" (1.422 m) (LMP Unknown) 97% 25.33 kg/m2       Blood Pressure from Last 3 Encounters:   18 124/73   18 130/72   01/10/18 120/60    Weight from Last 3 Encounters:   18 113 lb (51.3 kg)   18 112 lb 6.4 oz (51 kg)   01/10/18 107 lb 12.8 oz (48.9 kg)              We Performed the Following     Albumin Random Urine Quantitative with Creat Ratio     HPV High Risk Types DNA Cervical     Pap imaged thin layer screen with HPV - recommended " age 30 - 65 years (select HPV order below)          Today's Medication Changes          These changes are accurate as of 2/9/18 11:32 AM.  If you have any questions, ask your nurse or doctor.               Start taking these medicines.        Dose/Directions    fexofenadine 180 MG tablet   Commonly known as:  ALLEGRA   Used for:  Chronic seasonal allergic rhinitis, unspecified trigger   Started by:  Eva Lockhart MD        Dose:  180 mg   Take 1 tablet (180 mg) by mouth daily   Quantity:  30 tablet   Refills:  1       fluticasone 50 MCG/ACT spray   Commonly known as:  FLONASE   Used for:  Chronic seasonal allergic rhinitis, unspecified trigger   Started by:  Eva Lockhart MD        Dose:  1-2 spray   Spray 1-2 sprays into both nostrils daily   Quantity:  3 Bottle   Refills:  11       neomycin-polymyxin-hydrocortisone 3.5-54680-9 otic suspension   Commonly known as:  CORTISPORIN   Used for:  Eczematoid otitis externa of both ears, unspecified chronicity   Started by:  Eva Lockhart MD        Dose:  3 drop   Place 3 drops into both ears 4 times daily for 10 days   Quantity:  6 mL   Refills:  3         These medicines have changed or have updated prescriptions.        Dose/Directions    insulin detemir 100 UNIT/ML injection   Commonly known as:  LEVEMIR FLEXPEN/FLEXTOUCH   This may have changed:  additional instructions   Used for:  Uncontrolled type 2 diabetes mellitus without complication, with long-term current use of insulin (H)   Changed by:  Jovana Loo RPH        55 units once daily every morning - Levemir or covered basal insulin equivalent. Dose as of 2/9/2018.   Quantity:  30 mL   Refills:  3       NovoLOG FLEXPEN 100 UNIT/ML injection   This may have changed:  how much to take   Used for:  Uncontrolled type 2 diabetes mellitus without complication, with long-term current use of insulin (H)   Generic drug:  insulin aspart   Changed by:  Jovana Loo RPH        Dose:   16 Units   Inject 16 Units Subcutaneous 3 times daily (with meals)   Quantity:  15 mL   Refills:  3            Where to get your medicines      These medications were sent to [facility] - [place], [place] - [address]  [address], [place]. \[facility]"" [id number]     Phone:  [phone]     fexofenadine 180 MG tablet    fluticasone 50 MCG/ACT spray    insulin detemir 100 UNIT/ML injection    insulin pen needle 30G X 8 MM    neomycin-polymyxin-hydrocortisone 3.5-14749-5 otic suspension    NovoLOG FLEXPEN 100 UNIT/ML injection                Primary Care Provider Office Phone # Fax #    Eva Lockhart [phone]       [address]  [facility] [address]        Goals        General    I will check my blood sugars four times per day (pt-stated)     Notes - Note created  3/10/2014 10:41 AM by Priti Orozco, RN    As of today's date 3/10/2014 goal is met at 26 - 50%.   Goal Status:  Active      I will continue to work with Diabetic Educator (pt-stated)     Notes - Note created  3/10/2014 10:42 AM by Priti Orozco, RN    As of today's date 3/10/2014 goal is met at 26 - 50%.   Goal Status:  Active      I will take insulins as prescribed, including sliding scale novolog (pt-stated)     Notes - Note created  3/10/2014 10:43 AM by Priti Orozco, RN    As of today's date 3/10/2014 goal is met at 51 - 75%.   Goal Status:  Active        Equal Access to Services     [facility] AH: Hadii aad ku hadasho Soomaali, waaxda luqadaha, qaybta kaalmada adeegyada, waxay joaquim charles . So [facility] [phone].    ATENCIÓN: Si habla español, tiene a tompkins disposición servicios gratuitos de asistencia lingüística. Llame al [phone].    We comply with applicable federal civil rights laws and [place] laws. We do not discriminate on the basis of race, color, national origin, age, disability, sex, sexual orientation, or gender identity.            Thank  you!     Thank you for choosing [facility]  for your care. Our goal is always to provide you with excellent care. Hearing back from our patients is one way we can continue to improve our services. Please take a few minutes to complete the written survey that you may receive in the mail after your visit with us. Thank you!             Your Updated Medication List - Protect others around you: Learn how to safely use, store and throw away your medicines at www.disposemymeds.org.          This list is accurate as of 2/9/18 11:32 AM.  Always use your most recent med list.                   Brand Name Dispense Instructions for use Diagnosis    albuterol 108 (90 BASE) MCG/ACT Inhaler    PROAIR HFA/PROVENTIL HFA/VENTOLIN HFA    3 Inhaler    Inhale 2 puffs into the lungs every 6 hours as needed for shortness of breath / dyspnea ((Ventolin or preferred albuterol equivalent))    Mild intermittent asthma without complication       aspirin 81 MG tablet      Take 1 tablet by mouth daily Reported on 4/21/2017        atorvastatin 40 MG tablet    LIPITOR    90 tablet    TAKE ONE TABLET BY MOUTH EVERY DAY    Hyperlipidemia LDL goal <100       blood glucose lancets standard    no brand specified    1 Box    Use to test blood sugar 3 times daily or as directed.    Type 2 diabetes mellitus without complication, with long-term current use of insulin (H)       blood glucose monitoring meter device kit    no brand specified    1 kit    Use to test blood sugar 3 times daily or as directed    Type 2 diabetes mellitus without complication, with long-term current use of insulin (H)       blood glucose monitoring test strip    ONETOUCH ULTRA    300 each    TEST BLOOD SUGARS 3 TIMES DAILY.    Type 2 diabetes mellitus without complication, with long-term current use of insulin (H)       celecoxib 200 MG capsule    celeBREX    180 capsule    Take 1 capsule (200 mg) by mouth 2 times daily as needed for moderate pain     Primary osteoarthritis of both knees       EPINEPHrine 0.3 MG/0.3ML injection 2-pack    EPIPEN 2-MELVIN    6 mL    INJECT 0.3 MLS (0.3 MG) INTO THE MUSCLE ONCE AS NEEDED FOR ANAPHYLAXIS    Allergic to shellfish       fexofenadine 180 MG tablet    ALLEGRA    30 tablet    Take 1 tablet (180 mg) by mouth daily    Chronic seasonal allergic rhinitis, unspecified trigger       fluticasone 50 MCG/ACT spray    FLONASE    3 Bottle    Spray 1-2 sprays into both nostrils daily    Chronic seasonal allergic rhinitis, unspecified trigger       insulin detemir 100 UNIT/ML injection    LEVEMIR FLEXPEN/FLEXTOUCH    30 mL    55 units once daily every morning - Levemir or covered basal insulin equivalent. Dose as of 2/9/2018.    Uncontrolled type 2 diabetes mellitus without complication, with long-term current use of insulin (H)       insulin pen needle 30G X 8 MM     200 each    Matt-[employer] Auto-Cover needles. Use 4 pen needles daily.    Uncontrolled type 2 diabetes mellitus without complication, with long-term current use of insulin (H)       losartan 50 MG tablet    COZAAR    90 tablet    TAKE ONE TABLET BY MOUTH EVERY DAY    Uncontrolled type 2 diabetes mellitus without complication, with long-term current use of insulin (H), CKD (chronic kidney disease) stage 2, GFR 60-89 ml/min       meclizine 25 MG tablet    ANTIVERT    30 tablet    Take 1 tablet (25 mg) by mouth every 6 hours as needed for dizziness    BPPV (benign paroxysmal positional vertigo), unspecified laterality       neomycin-polymyxin-hydrocortisone 3.5-30530-5 otic suspension    CORTISPORIN    6 mL    Place 3 drops into both ears 4 times daily for 10 days    Eczematoid otitis externa of both ears, unspecified chronicity       NovoLOG FLEXPEN 100 UNIT/ML injection   Generic drug:  insulin aspart     15 mL    Inject 16 Units Subcutaneous 3 times daily (with meals)    Uncontrolled type 2 diabetes mellitus without complication, with long-term current use of insulin (H)        omeprazole 20 MG CR capsule    priLOSEC    90 capsule    TAKE ONE CAPSULE BY MOUTH EVERY DAY    Gastroesophageal reflux disease, esophagitis presence not specified

## 2018-02-09 NOTE — PATIENT INSTRUCTIONS
Recommendations from today's MTM visit:                                                        1. Increase your Levemir to 45units every morning for 1 week, then 50units every morning for 1 week, then 55units every morning.     2. Increase your Novolog to 12units with meals for 1 week, then 14units with meals for 1 week, then 16units with meals.    3. We will meet again in 3 weeks to check on your blood sugars.    4. I sent a new prescription for the pen needles to the pharmacy.    Next MTM visit: Wednesday, February 28th at 9:30am    To schedule another MTM appointment, please call the clinic directly or you may call the MTM scheduling line at [phone] or toll-free at [phone].     My Clinical Pharmacist's contact information:                                                      It was a pleasure seeing you today!  Please feel free to contact me with any questions or concerns you have.      Jovana Loo, Pharm.D., [facility]  Medication Therapy Management Pharmacist  [phone]    You may receive a survey about the MTM services you received.  I would appreciate your feedback to help me serve you better in the future. Please fill it out and return it when you can. Your comments will be anonymous.      My healthcare goals:                                                      Diabetes Goals:    Home Monitoring of Blood Sugars:Fasting  mg/dL and 2 hours after a meal less than 180 mg/dL.    Hemoglobin A1C: Less than 7%. Yours is   Lab Results   Component Value Date    A1C 12.8 02/09/2018    A1C 13.8 07/03/2017    A1C 12.3 12/21/2016    A1C 11.3 10/07/2015    A1C 11.4 08/31/2015       Blood Pressure: Less than 140/90mmHg. Yours is /72  Pulse 82  Wt 112 lb 6.4 oz (51 kg)  LMP  (LMP Unknown)  BMI 24.76 kg/m2.    Cholesterol: You are taking atorvastatin to help decrease the risk of heart disease.    Things you can do to help lower your blood sugars:    Diet: 3 meals and 1-2 snacks per day  with 45-60 grams of carbohydrates per meal and 15-30 grams of carbohydrates per snack. Try to fill your plate at least half-full with vegetables, fill one-quarter full with lean meats or protein, and also make sure you get at least some carbohydrate with every meal.    Exercise: 30 minutes per day of anything that will increase your heart rate and make you break a sweat! Gardening, walking, cleaning the house, changing the oil in your car, etc. If you feel like 30 minutes per day is too much, start small. Even lifting canned foods or working your arms with a resistance band in front of the TV can help.

## 2018-02-09 NOTE — PATIENT INSTRUCTIONS
For allergies:  Fluticasone Nasal Spray  And Allegra -- take both daily    Return to clinic 3 months with labs prior    Trial cortisporin ear drops

## 2018-02-10 ASSESSMENT — ASTHMA QUESTIONNAIRES: ACT_TOTALSCORE: 21

## 2018-02-14 LAB
COPATH REPORT: NORMAL
PAP: NORMAL

## 2018-02-15 LAB
FINAL DIAGNOSIS: NORMAL
HPV HR 12 DNA CVX QL NAA+PROBE: NEGATIVE
HPV16 DNA SPEC QL NAA+PROBE: NEGATIVE
HPV18 DNA SPEC QL NAA+PROBE: NEGATIVE
SPECIMEN DESCRIPTION: NORMAL
SPECIMEN SOURCE CVX/VAG CYTO: NORMAL

## 2018-02-26 ENCOUNTER — RADIANT APPOINTMENT (OUTPATIENT)
Dept: MAMMOGRAPHY | Facility: CLINIC | Age: 63
End: 2018-02-26
Attending: INTERNAL MEDICINE
Payer: MEDICARE

## 2018-02-26 DIAGNOSIS — Z12.31 VISIT FOR SCREENING MAMMOGRAM: ICD-10-CM

## 2018-02-26 PROCEDURE — 77067 SCR MAMMO BI INCL CAD: CPT | Mod: TC

## 2018-02-28 ENCOUNTER — OFFICE VISIT (OUTPATIENT)
Dept: PHARMACY | Facility: CLINIC | Age: 63
End: 2018-02-28
Payer: COMMERCIAL

## 2018-02-28 VITALS — SYSTOLIC BLOOD PRESSURE: 150 MMHG | DIASTOLIC BLOOD PRESSURE: 76 MMHG | WEIGHT: 117.2 LBS | BODY MASS INDEX: 26.28 KG/M2

## 2018-02-28 PROCEDURE — 99606 MTMS BY PHARM EST 15 MIN: CPT | Performed by: PHARMACIST

## 2018-02-28 PROCEDURE — 99607 MTMS BY PHARM ADDL 15 MIN: CPT | Performed by: PHARMACIST

## 2018-02-28 NOTE — MR AVS SNAPSHOT
After Visit Summary   2/28/2018    Nathalie Harp    MRN: [id number]           Patient Information     Date Of Birth          1955        Visit Information        Provider Department      2/28/2018 9:30 AM Jovana Loo, [facility] MTM        Today's Diagnoses     Uncontrolled type 2 diabetes mellitus without complication, with long-term current use of insulin (H)          Care Instructions    Recommendations from today's MT visit:                                                      We are making progress! Your blood sugar numbers are looking better!    1. Increase your Levemir to 45units daily for 1 week, then increase again to 50units daily.    2. Increase your Novolog to 14units with meals for one week, then increase again to 16units with meals.    3. Finish your supply of the Auto-cover pen needles. When it's time to refill start the new, shorter pen needles. I'm hoping this will eliminate any further complications with the needles and getting the appropriate dose of insulin.      Next MTM visit: Wednesday, March 14th at 9:30am    To schedule another MTM appointment, please call the clinic directly or you may call the MTM scheduling line at [phone] or toll-free at [phone].     My Clinical Pharmacist's contact information:                                                      It was a pleasure seeing you today!  Please feel free to contact me with any questions or concerns you have.      Jovana Loo, Pharm.D., [facility]  Medication Therapy Management Pharmacist  [phone]    You may receive a survey about the MTM services you received.  I would appreciate your feedback to help me serve you better in the future. Please fill it out and return it when you can. Your comments will be anonymous.      My healthcare goals:                                                      Diabetes Goals:    Home Monitoring of Blood Sugars:Fasting   "mg/dL and 2 hours after a meal less than 180 mg/dL.    Hemoglobin A1C: Less than 7%. Yours is   Lab Results   Component Value Date    A1C 12.8 02/09/2018     Blood Pressure: Less than 140/90mmHg. Yours is /76  Wt 117 lb 3.2 oz (53.2 kg)  LMP  (LMP Unknown)  BMI 26.28 kg/m2.                Follow-ups after your visit        Your next 10 appointments already scheduled     Mar 14, 2018  9:30 AM CDT   Office Visit with Jovana Loo RPH   [facility] ([facility])    [address] [address] [phone]           Bring a current list of meds and any records pertaining to this visit. For Physicals, please bring immunization records and any forms needing to be filled out. Please arrive 10 minutes early to complete paperwork.              Who to contact     If you have questions or need follow up information about today's clinic visit or your schedule please contact [facility] directly at [phone].  Normal or non-critical lab and imaging results will be communicated to you by [email or web address], letter or phone within 4 business days after the clinic has received the results. If you do not hear from us within 7 days, please contact the clinic through [employer] or phone. If you have a critical or abnormal lab result, we will notify you by phone as soon as possible.  Submit refill requests through MySQUAR or call your pharmacy and they will forward the refill request to us. Please allow 3 business days for your refill to be completed.          Additional Information About Your Visit        MySQUAR Information     MySQUAR lets you send messages to your doctor, view your test results, renew your prescriptions, schedule appointments and more. To sign up, go to [email or web address] . Click on \"Log in\" on the left side of the screen, which will take you to the Welcome page. Then click on \"Sign up Now\" on " the right side of the page.     You will be asked to enter the access code listed below, as well as some personal information. Please follow the directions to create your username and password.     Your access code is: [email or web address]  Expires: 4/10/2018  1:56 PM     Your access code will  in 90 days. If you need help or a new code, please call your [facility] clinic or [phone].        Care EveryWhere ID     This is your Care EveryWhere ID. This could be used by other organizations to access your [facility] medical records  [id number]        Your Vitals Were     Last Period BMI (Body Mass Index)                (LMP Unknown) 26.28 kg/m2           Blood Pressure from Last 3 Encounters:   18 150/76   18 124/73   18 130/72    Weight from Last 3 Encounters:   18 117 lb 3.2 oz (53.2 kg)   18 113 lb (51.3 kg)   18 112 lb 6.4 oz (51 kg)              Today, you had the following     No orders found for display         Today's Medication Changes          These changes are accurate as of 18 10:15 AM.  If you have any questions, ask your nurse or doctor.               Start taking these medicines.        Dose/Directions    insulin pen needle 31G X 5 MM   Commonly known as:  B-D U/F   Used for:  Uncontrolled type 2 diabetes mellitus without complication, with long-term current use of insulin (H)        Use 4 daily as directed. This replaces the auto-cover pen needle.   Quantity:  200 each   Refills:  prn            Where to get your medicines      These medications were sent to [facility] - [place], [place] - [address]  [address], [facility] [address]     Phone:  [phone]     insulin pen needle 31G X 5 MM                Primary Care Provider Office Phone # Fax #    Eva Lockhart [phone]       [address]  [facility] [address]        Goals        General    I will check my blood sugars  four times per day (pt-stated)     Notes - Note created  3/10/2014 10:41 AM by Priti Orozco RN    As of today's date 3/10/2014 goal is met at 26 - 50%.   Goal Status:  Active      I will continue to work with Diabetic Educator (pt-stated)     Notes - Note created  3/10/2014 10:42 AM by Priti Orozco RN    As of today's date 3/10/2014 goal is met at 26 - 50%.   Goal Status:  Active      I will take insulins as prescribed, including sliding scale novolog (pt-stated)     Notes - Note created  3/10/2014 10:43 AM by Priti Orozco RN    As of today's date 3/10/2014 goal is met at 51 - 75%.   Goal Status:  Active        Equal Access to Services     ADI LOPEZ : Hadii tracey resendez Sokamryn, waaxda luqadaha, qaybta kaalmada adeegyada, sandra charles . So [facility] [phone].    ATENCIÓN: Si habla español, tiene a tompkins disposición servicios gratuitos de asistencia lingüística. Llame al [phone].    We comply with applicable federal civil rights laws and [place] laws. We do not discriminate on the basis of race, color, national origin, age, disability, sex, sexual orientation, or gender identity.            Thank you!     Thank you for choosing [facility]  for your care. Our goal is always to provide you with excellent care. Hearing back from our patients is one way we can continue to improve our services. Please take a few minutes to complete the written survey that you may receive in the mail after your visit with us. Thank you!             Your Updated Medication List - Protect others around you: Learn how to safely use, store and throw away your medicines at www.disposemymeds.org.          This list is accurate as of 2/28/18 10:15 AM.  Always use your most recent med list.                   Brand Name Dispense Instructions for use Diagnosis    albuterol 108 (90 BASE) MCG/ACT Inhaler    PROAIR HFA/PROVENTIL HFA/VENTOLIN HFA    3 Inhaler    Inhale 2 puffs into the  lungs every 6 hours as needed for shortness of breath / dyspnea ((Ventolin or preferred albuterol equivalent))    Mild intermittent asthma without complication       aspirin 81 MG tablet      Take 1 tablet by mouth daily Reported on 4/21/2017        atorvastatin 40 MG tablet    LIPITOR    90 tablet    TAKE ONE TABLET BY MOUTH EVERY DAY    Hyperlipidemia LDL goal <100       blood glucose lancets standard    no brand specified    1 Box    Use to test blood sugar 3 times daily or as directed.    Type 2 diabetes mellitus without complication, with long-term current use of insulin (H)       blood glucose monitoring meter device kit    no brand specified    1 kit    Use to test blood sugar 3 times daily or as directed    Type 2 diabetes mellitus without complication, with long-term current use of insulin (H)       blood glucose monitoring test strip    ONETOUCH ULTRA    300 each    TEST BLOOD SUGARS 3 TIMES DAILY.    Type 2 diabetes mellitus without complication, with long-term current use of insulin (H)       celecoxib 200 MG capsule    celeBREX    180 capsule    Take 1 capsule (200 mg) by mouth 2 times daily as needed for moderate pain    Primary osteoarthritis of both knees       EPINEPHrine 0.3 MG/0.3ML injection 2-pack    EPIPEN 2-MELVIN    6 mL    INJECT 0.3 MLS (0.3 MG) INTO THE MUSCLE ONCE AS NEEDED FOR ANAPHYLAXIS    Allergic to shellfish       fexofenadine 180 MG tablet    ALLEGRA    30 tablet    Take 1 tablet (180 mg) by mouth daily    Chronic seasonal allergic rhinitis, unspecified trigger       fluticasone 50 MCG/ACT spray    FLONASE    3 Bottle    Spray 1-2 sprays into both nostrils daily    Chronic seasonal allergic rhinitis, unspecified trigger       insulin detemir 100 UNIT/ML injection    LEVEMIR FLEXPEN/FLEXTOUCH    30 mL    55 units once daily every morning - Levemir or covered basal insulin equivalent. Dose as of 2/9/2018.    Uncontrolled type 2 diabetes mellitus without complication, with long-term  current use of insulin (H)       insulin pen needle 31G X 5 MM    B-D U/F    200 each    Use 4 daily as directed. This replaces the auto-cover pen needle.    Uncontrolled type 2 diabetes mellitus without complication, with long-term current use of insulin (H)       losartan 50 MG tablet    COZAAR    90 tablet    TAKE ONE TABLET BY MOUTH EVERY DAY    Uncontrolled type 2 diabetes mellitus without complication, with long-term current use of insulin (H), CKD (chronic kidney disease) stage 2, GFR 60-89 ml/min       meclizine 25 MG tablet    ANTIVERT    30 tablet    Take 1 tablet (25 mg) by mouth every 6 hours as needed for dizziness    BPPV (benign paroxysmal positional vertigo), unspecified laterality       NovoLOG FLEXPEN 100 UNIT/ML injection   Generic drug:  insulin aspart     15 mL    Inject 16 Units Subcutaneous 3 times daily (with meals)    Uncontrolled type 2 diabetes mellitus without complication, with long-term current use of insulin (H)       omeprazole 20 MG CR capsule    priLOSEC    90 capsule    TAKE ONE CAPSULE BY MOUTH EVERY DAY    Gastroesophageal reflux disease, esophagitis presence not specified

## 2018-02-28 NOTE — PATIENT INSTRUCTIONS
Recommendations from today's MTM visit:                                                      We are making progress! Your blood sugar numbers are looking better!    1. Increase your Levemir to 45units daily for 1 week, then increase again to 50units daily.    2. Increase your Novolog to 14units with meals for one week, then increase again to 16units with meals.    3. Finish your supply of the Auto-cover pen needles. When it's time to refill start the new, shorter pen needles. I'm hoping this will eliminate any further complications with the needles and getting the appropriate dose of insulin.      Next MTM visit: Wednesday, March 14th at 9:30am    To schedule another MTM appointment, please call the clinic directly or you may call the MTM scheduling line at [phone] or toll-free at [phone].     My Clinical Pharmacist's contact information:                                                      It was a pleasure seeing you today!  Please feel free to contact me with any questions or concerns you have.      Jovana Loo, Pharm.D., [facility]  Medication Therapy Management Pharmacist  [phone]    You may receive a survey about the MTM services you received.  I would appreciate your feedback to help me serve you better in the future. Please fill it out and return it when you can. Your comments will be anonymous.      My healthcare goals:                                                      Diabetes Goals:    Home Monitoring of Blood Sugars:Fasting  mg/dL and 2 hours after a meal less than 180 mg/dL.    Hemoglobin A1C: Less than 7%. Yours is   Lab Results   Component Value Date    A1C 12.8 02/09/2018     Blood Pressure: Less than 140/90mmHg. Yours is /76  Wt 117 lb 3.2 oz (53.2 kg)  LMP  (LMP Unknown)  BMI 26.28 kg/m2.

## 2018-02-28 NOTE — PROGRESS NOTES
"SUBJECTIVE/OBJECTIVE:                Nathalie Harp is a 62 year old female coming in for a follow-up visit for Medication Therapy Management.  She was referred to me from Eva Lockhart     Chief Complaint: Follow up from our visit on 2/9/2018.  Diabetes follow-up.  Personal Healthcare Goals: Get A1c below 9 so she can qualify for cataract surgery  Tobacco: No tobacco use  Alcohol: none    Medication Adherence: issues reported    Diabetes:  Pt currently taking Levemir 40units QAM, Novolog 12units with meals (eats 2-3x/day). Admits she forgot to titrate up on her Levemir dose as recommended at last visit. Reports she is using a new needle with each injection. She notes today that with her Autocover needles, if her finger slips off of the plunger while doing an injection, she doesn't get the rest of her insulin dose. She will replace the needle to get the remainder of her dose.  SMBG Ranges (based on glucometer readings): 14d avg (n16) 283  Symptoms of low blood sugar? Liz, \"like all the blood has drained from my body\". Happened once since last visit. Keeps a Relion glucose shot on hand at all times.  Symptoms of high blood sugar? None. Has noted weight gain as BG improves.  Eye exam: due  Foot exam: up to date  ACEi/ARB: Yes: losartan 50mg QD. Microalbumin is not < 30 mg/g.  Aspirin: Taking 81mg daily and has the following issues: bruises easily  Statin: Yes: atorvastatin 40mg QD and denies side effects  Diet/Exercise: 2-3 meals per day. Glucerna if she misses lunch. Rides stationary bike at home a few times per week.  Date FBG/ 2hours post Lunch/2hours post Dinner /2hours post   2/28 243     2/27 276     2/26 127 (8am)71 (10:45am),106 (11:22am) /311 (3:30pm) /441 (9pm)   2/24 209     2/23 259     2/22 334     2/20 308     2/19 316     2/18 318     2/17 347     2/16 519 (thinks she may have missed levemir)     2/15 335         Current labs include:  Today's Vitals: /76  Wt 117 lb 3.2 oz (53.2 " kg)  LMP  (LMP Unknown)  BMI 26.28 kg/m2     BP Readings from Last 3 Encounters:   02/09/18 124/73   02/09/18 130/72   01/10/18 120/60     Wt Readings from Last 3 Encounters:   02/09/18 113 lb (51.3 kg)   02/09/18 112 lb 6.4 oz (51 kg)   01/10/18 107 lb 12.8 oz (48.9 kg)       Lab Results   Component Value Date    A1C 12.8 02/09/2018    A1C 13.8 07/03/2017    A1C 12.3 12/21/2016    A1C 11.3 10/07/2015    A1C 11.4 08/31/2015       Lab Results   Component Value Date    CHOL 261 02/09/2018     Lab Results   Component Value Date    TRIG 243 02/09/2018     Lab Results   Component Value Date    HDL 88 02/09/2018     Lab Results   Component Value Date     02/09/2018       Liver Function Studies -   Recent Labs   Lab Test  02/09/18   0737  11/14/16   1004   PROTTOTAL   --   7.5   ALBUMIN   --   4.2   BILITOTAL   --   0.6   ALKPHOS   --   109   AST  15  14   ALT  23  29       Lab Results   Component Value Date    UCRR 142 02/09/2018    MICROL 1240 02/09/2018    UMALCR 873.24 (H) 02/09/2018       Last Basic Metabolic Panel:  Lab Results   Component Value Date     02/09/2018      Lab Results   Component Value Date    POTASSIUM 4.0 02/09/2018     Lab Results   Component Value Date    CHLORIDE 105 02/09/2018     Lab Results   Component Value Date    BUN 16 02/09/2018     Lab Results   Component Value Date    CR 0.49 02/09/2018     GFR Estimate   Date Value Ref Range Status   02/09/2018 >90 >60 mL/min/1.7m2 Final     Comment:     Non  GFR Calc   04/21/2017 >90  Non  GFR Calc   >60 mL/min/1.7m2 Final   11/14/2016 62 >60 mL/min/1.7m2 Final     GFR Estimate If Black   Date Value Ref Range Status   02/09/2018 >90 >60 mL/min/1.7m2 Final     Comment:      GFR Calc   04/21/2017 >90   GFR Calc   >60 mL/min/1.7m2 Final   11/14/2016 75 >60 mL/min/1.7m2 Final     TSH   Date Value Ref Range Status   11/14/2016 0.35 (L) 0.40 - 5.00 mU/L Final     T4 Free   Date  Value Ref Range Status   11/14/2016 1.28 0.70 - 1.85 ng/dL Final   ]      Most Recent Immunizations   Administered Date(s) Administered     Influenza (High Dose) 3 valent vaccine 08/31/2017     Influenza (IIV3) PF 08/30/2016     Influenza Vaccine IM 3yrs+ 4 Valent IIV4 08/30/2017     Pneumo Conj 13-V (2010&after) 11/14/2016     Pneumococcal 23 valent 11/14/2008     TD (ADULT, 7+) 09/01/2011       ASSESSMENT:              Current medications were reviewed today as discussed above.      Medication Adherence: no issues identified    Diabetes: Needs Improvement. Patient is not meeting A1c goal of < 7%, but down 1% from previous. Self monitoring of blood glucose is not at goal of fasting  mg/dL. BG improving since last month with titration of insulin - will continue to titrate more aggressively. Will also switch to regular BD pen needles, as the autocover needles continue to provide a barrier for patient to get appropriate dose of insulin. BP elevated today, but has been well controlled historically. Will consider increasing dose of ARB at next visit if still needed.    PLAN:                  1. Increase Levemir by 5units each week: 45units QAMx1 week, then 50units QAMx1 week.  2. Increase Novolog by 2units per dose per week: 14units with meals x1 week, then 16units with meals x1 week, then RTC.  3. Pt will finish current supply of Auto-cover pen needles, then D/C. Rx for BD 4mm pen needles sent to pharmacy.       I spent 30 minutes with this patient today. All changes were made via collaborative practice agreement with Eva Lockhart. A copy of the visit note was provided to the patient's primary care provider.     Will follow up in 2 weeks.    The patient was given a summary of these recommendations as an after visit summary.    Jovana Loo, Pharm.D., [facility]  Medication Therapy Management Pharmacist  [phone]

## 2018-03-14 ENCOUNTER — OFFICE VISIT (OUTPATIENT)
Dept: PHARMACY | Facility: CLINIC | Age: 63
End: 2018-03-14
Payer: COMMERCIAL

## 2018-03-14 VITALS
HEART RATE: 84 BPM | BODY MASS INDEX: 26.59 KG/M2 | WEIGHT: 118.6 LBS | DIASTOLIC BLOOD PRESSURE: 84 MMHG | SYSTOLIC BLOOD PRESSURE: 156 MMHG

## 2018-03-14 DIAGNOSIS — J45.20 MILD INTERMITTENT ASTHMA WITHOUT COMPLICATION: ICD-10-CM

## 2018-03-14 DIAGNOSIS — Z79.4 TYPE 2 DIABETES MELLITUS WITH DIABETIC NEPHROPATHY, WITH LONG-TERM CURRENT USE OF INSULIN (H): ICD-10-CM

## 2018-03-14 DIAGNOSIS — J30.1 ALLERGIC RHINITIS DUE TO POLLEN, UNSPECIFIED CHRONICITY, UNSPECIFIED SEASONALITY: ICD-10-CM

## 2018-03-14 DIAGNOSIS — Z79.4 TYPE 2 DIABETES MELLITUS WITH DIABETIC NEUROPATHY, WITH LONG-TERM CURRENT USE OF INSULIN (H): Primary | ICD-10-CM

## 2018-03-14 DIAGNOSIS — E11.21 TYPE 2 DIABETES MELLITUS WITH DIABETIC NEPHROPATHY, WITH LONG-TERM CURRENT USE OF INSULIN (H): ICD-10-CM

## 2018-03-14 DIAGNOSIS — E11.40 TYPE 2 DIABETES MELLITUS WITH DIABETIC NEUROPATHY, WITH LONG-TERM CURRENT USE OF INSULIN (H): Primary | ICD-10-CM

## 2018-03-14 DIAGNOSIS — K21.9 GASTROESOPHAGEAL REFLUX DISEASE, ESOPHAGITIS PRESENCE NOT SPECIFIED: ICD-10-CM

## 2018-03-14 PROCEDURE — 99607 MTMS BY PHARM ADDL 15 MIN: CPT | Performed by: PHARMACIST

## 2018-03-14 PROCEDURE — 99606 MTMS BY PHARM EST 15 MIN: CPT | Performed by: PHARMACIST

## 2018-03-14 RX ORDER — LOSARTAN POTASSIUM 100 MG/1
100 TABLET ORAL DAILY
Qty: 90 TABLET | Refills: 3 | Status: SHIPPED | OUTPATIENT
Start: 2018-03-14 | End: 2018-07-18 | Stop reason: DRUGHIGH

## 2018-03-14 RX ORDER — HYDROCORTISONE ACETATE 0.5 %
1 CREAM (GRAM) TOPICAL DAILY
COMMUNITY
Start: 2018-03-14

## 2018-03-14 RX ORDER — GABAPENTIN 300 MG/1
CAPSULE ORAL
Qty: 90 CAPSULE | Refills: 3 | Status: SHIPPED | OUTPATIENT
Start: 2018-03-14 | End: 2019-05-28

## 2018-03-14 NOTE — PROGRESS NOTES
"SUBJECTIVE/OBJECTIVE:                Nathalie Harp is a 62 year old female coming in for a follow-up visit for Medication Therapy Management.  She was referred to me from Eva Lockhart     Chief Complaint: Follow up from our visit on 2/28/2018.  Diabetes follow-up. Just found out yesterday that she needs to find a new place to live. Has to be out of her current rental 6 weeks from now; has been living in current home for the last 12yrs. Appreciate any help from care coordination.  Personal Healthcare Goals: Get A1c below 9 so she can qualify for cataract surgery    Tobacco: No tobacco use  Alcohol: none    Medication Adherence: issues reported; she hasn't been good about making adjustments to meds on her own    Diabetes:  Pt currently taking Levemir 45units QAM, Novolog 14units with meals (eats 2-3x/day). She did not titrate up to 50units of Levemir and 16units of Novolog as recommended at last visit. Reports she is using a new needle with each injection. Is still finishing her supply of autocover needles, but states she will run out soon and start the BD pen needles.   SMBG Ranges (based on glucometer readings): 14d avg (n22) 255  Symptoms of low blood sugar? Shakey, dizzy, sweaty, strong heartbeat, \"like all the blood has drained from my body\". Happened with BG of 87mg/dL yesterday. Drank some grape juice and still felt off, so had a [employer]. Keeps a Relion glucose shot on hand at all times.  Symptoms of high blood sugar? None. Has noted weight gain as BG improves.  Eye exam: up to date  Foot exam: up to date  ACEi/ARB: Yes: losartan 50mg QD. Microalbumin is not < 30 mg/g.  Aspirin: Taking 81mg daily and has the following issues: bruises easily  Statin: Yes: atorvastatin 40mg QD and denies side effects  Diet/Exercise: 2-3 meals per day. Glucerna if she misses lunch. Rides stationary bike at home a few times per week.  Date FBG/ 2hours post Lunch/2hours post Dinner /2hours post   3/14 280   " "  3/13  87 (1:45pm), 156 (2:30pm) 326, 381   3/12 171     3/11 362     3/10 308 (7:40am), 383 (8:45am)     3/9 274  374   3/8 272     3/7 327     3/6 302     3/5 250 157    3/4 198 185 74 (8pm)   3/3 256     3/2 240     3/1 252         Chronic pain:  Current medications include: None. Pain is described as Numb, Shooting, Stabbing and pins and needles, like you pricked yourself with a paring knife.  Some in toes, but worse in fingers -- most often at night \"about 75% of the time\"; is not as often or as bad during the day. How is your pain? Inadequate pain control.  Are you able to do your normal activities? Can do most things, but pain gets in the way some.  Are you able to sleep? Awake with occasional pain; is disruptive to sleep, hard time getting comfortable and staying comfortable. Has been on Celebrex in the past for arthritis pain. Stopped taking it after recent hospitalization d/t inefficacy. She does take glucosamine chondroitin and feels this is helpful for her.    Asthma/Allergies:  Current asthma medications: Albuterol MDI and (Pt reports using albuterol occassionaly - hasn't needed recently).  Asthma triggers include: upper respiratory infections, dust mites and animal dander. Recently bought some Saline nasal spray and hoping this will help with congestion. Tried Neti Pot and couldn't get the hang of it. She also takes fexofenadine 180mg QD; she feels this is somewhat helpful for allergies but does not help her congestion. She has fluticasone nasal spray, but only uses rarely because it causes bloody noses.  Pt reports the following symptoms: none. AAP on file: YES  ACT Total Scores 2/24/2017 7/28/2017 2/9/2018   ACT TOTAL SCORE - - -   ASTHMA ER VISITS - - -   ASTHMA HOSPITALIZATIONS - - -   ACT TOTAL SCORE (Goal Greater than or Equal to 20) 12 18 21   In the past 12 months, how many times did you visit the emergency room for your asthma without being admitted to the hospital? 0 0 0   In the past 12 " months, how many times were you hospitalized overnight because of your asthma? 0 0 0       GERD: Current medications include: Prilosec (omeprazole) 20mg once daily. Pt c/o no current symptoms; reports a hx of stomach ulcer and positive for H.Pylori.  Patient feels that current regimen is effective.      Current labs include:  Today's Vitals: /84  Pulse 84  Wt 118 lb 9.6 oz (53.8 kg)  LMP  (LMP Unknown)  BMI 26.59 kg/m2     BP Readings from Last 3 Encounters:   02/28/18 150/76   02/09/18 124/73   02/09/18 130/72     Wt Readings from Last 3 Encounters:   02/28/18 117 lb 3.2 oz (53.2 kg)   02/09/18 113 lb (51.3 kg)   02/09/18 112 lb 6.4 oz (51 kg)     Lab Results   Component Value Date    A1C 12.8 02/09/2018    A1C 13.8 07/03/2017    A1C 12.3 12/21/2016    A1C 11.3 10/07/2015    A1C 11.4 08/31/2015     Lab Results   Component Value Date    CHOL 261 02/09/2018     Lab Results   Component Value Date    TRIG 243 02/09/2018     Lab Results   Component Value Date    HDL 88 02/09/2018     Lab Results   Component Value Date     02/09/2018       Liver Function Studies -   Recent Labs   Lab Test  02/09/18   0737  11/14/16   1004   PROTTOTAL   --   7.5   ALBUMIN   --   4.2   BILITOTAL   --   0.6   ALKPHOS   --   109   AST  15  14   ALT  23  29       Lab Results   Component Value Date    UCRR 142 02/09/2018    MICROL 1240 02/09/2018    UMALCR 873.24 (H) 02/09/2018       Last Basic Metabolic Panel:  Lab Results   Component Value Date     02/09/2018      Lab Results   Component Value Date    POTASSIUM 4.0 02/09/2018     Lab Results   Component Value Date    CHLORIDE 105 02/09/2018     Lab Results   Component Value Date    BUN 16 02/09/2018     Lab Results   Component Value Date    CR 0.49 02/09/2018     GFR Estimate   Date Value Ref Range Status   02/09/2018 >90 >60 mL/min/1.7m2 Final     Comment:     Non  GFR Calc   04/21/2017 >90  Non  GFR Calc   >60 mL/min/1.7m2 Final    11/14/2016 62 >60 mL/min/1.7m2 Final     GFR Estimate If Black   Date Value Ref Range Status   02/09/2018 >90 >60 mL/min/1.7m2 Final     Comment:      GFR Calc   04/21/2017 >90   GFR Calc   >60 mL/min/1.7m2 Final   11/14/2016 75 >60 mL/min/1.7m2 Final     Most Recent Immunizations   Administered Date(s) Administered     Influenza (High Dose) 3 valent vaccine 08/31/2017     Influenza (IIV3) PF 08/30/2016     Influenza Vaccine IM 3yrs+ 4 Valent IIV4 08/30/2017     Pneumo Conj 13-V (2010&after) 11/14/2016     Pneumococcal 23 valent 11/14/2008     TD (ADULT, 7+) 09/01/2011         ASSESSMENT:              Current medications were reviewed today as discussed above.      Medication Adherence: no issues identified    Diabetes: Needs Improvement. Patient is not meeting A1c goal of < 7%. Self monitoring of blood glucose is not at goal of fasting  mg/dL and post prandial < 180 mg/dL; numbers improving with insulin titration, so will plan to continue this. Pt would benefit from:  Basal Insulin (Levemir) :  increase dose; will jump up to 55units as she has not been able to follow home titration schedule of increasing her dose each week.  Bolus / Rapid Acting Insulin (Novolog) : increase dose.   ACE/ARB (Losartan): increase dose -- optimize d/t microalbumin not at goal < 30 mg/g and BP not at goal <140/90mmHg. Not currently diagnosed with HTN, needs further work up from PCP.  Statin (atorvastatin): Pt is on high intensity statin which is indicated based on 2013 ACC/AHA guidelines for lipid management.      Chronic Pain: Needs Improvement. Pain is not well controlled.  Patient would benefit from trial of gabapentin for nerve pain. Does not have diagnosis of neuropathy so will need workup for this by PCP.    Asthma/Allergies: Asthma is stable at this time, ACT at goal >20. Allergies may be improved with scheduled use of saline nasal spray.     GERD: Stable.    PLAN:                  1.  Increase Levemir to 55units QAM.  2. Increase Novolog to 16units with meals.  3. Increase losartan to 100mg QD. Rx sent to pharmacy.  4. Start gabapentin 300mg QHS. Reviewed appropriate use, benefits, risks and monitoring at length. Rx sent to pharmacy.  5. Start using Saline nasal spray scheduled daily.  6. I will CC chart to care coordination for any help available with moving/housing.    I spent 60 minutes with this patient today. All changes were made via collaborative practice agreement with Eva Lockhart. A copy of the visit note was provided to the patient's primary care provider.     Will follow up in 2 weeks.    The patient was given a summary of these recommendations as an after visit summary.    Jovana Loo, Pharm.D., [facility]  Medication Therapy Management Pharmacist  [phone]

## 2018-03-14 NOTE — MR AVS SNAPSHOT
After Visit Summary   3/14/2018    Nathalie Harp    MRN: [id number]           Patient Information     Date Of Birth          1955        Visit Information        Provider Department      3/14/2018 9:30 AM Jovana Loo, [facility]        Today's Diagnoses     Type 2 diabetes mellitus with diabetic neuropathy, with long-term current use of insulin (H)    -  1      Care Instructions    Recommendations from today's MTM visit:                                                        1. Increase your Levemir dose to 55units every morning.    2. Increase your Novolog to 16units with meals. We are making great progress with your daily blood sugar numbers, keep up the good work!    3. Increase your Losartan to 100mg daily. Take two of the 50mg tablets until gone, then  the 100mg tablets from the pharmacy and take one daily. This is to help lower your blood pressure and protect your kidneys.    4. Start gabapentin 300mg at bedtime. This is to help with nerve pain, and may also help you sleep better.    5. We discussed using the saline nasal spray every day to help keep your nasal passages moist. You may find that after a few weeks, you can use the fluticasone nasal spray without issue to help with congestion.    Next MTM visit: Wednesday, March 28th at 10:30am.    To schedule another MTM appointment, please call the clinic directly or you may call the MTM scheduling line at [phone] or toll-free at [phone].     My Clinical Pharmacist's contact information:                                                      It was a pleasure seeing you today!  Please feel free to contact me with any questions or concerns you have.      Jovana Loo, Pharm.D., [facility]  Medication Therapy Management Pharmacist  [phone]    You may receive a survey about the MTM services you received.  I would appreciate your feedback to help me serve you better in the  future. Please fill it out and return it when you can. Your comments will be anonymous.      My healthcare goals:                                                      Diabetes Goals:    Home Monitoring of Blood Sugars:Fasting  mg/dL and 2 hours after a meal less than 180 mg/dL.    Hemoglobin A1C: Less than 8%. Yours is   Lab Results   Component Value Date    A1C 12.8 02/09/2018   .    Blood Pressure: Less than 140/90mmHg. Yours is /84  Pulse 84  Wt 118 lb 9.6 oz (53.8 kg)  LMP  (LMP Unknown)  BMI 26.59 kg/m2.    Cholesterol: You are taking atorvastatin to help decrease the risk of heart disease.    Things you can do to help lower your blood sugars:    Diet: 3 meals and 1-2 snacks per day with 45-60 grams of carbohydrates per meal and 15-30 grams of carbohydrates per snack. Try to fill your plate at least half-full with vegetables, fill one-quarter full with lean meats or protein, and also make sure you get at least some carbohydrate with every meal.    Exercise: 30 minutes per day of anything that will increase your heart rate and make you break a sweat! Gardening, walking, cleaning the house, changing the oil in your car, etc. If you feel like 30 minutes per day is too much, start small. Even lifting canned foods or working your arms with a resistance band in front of the TV can help.                  Follow-ups after your visit        Your next 10 appointments already scheduled     Mar 28, 2018 10:30 AM CDT   Office Visit with Jovana Loo RPH   [facility] ([facility])    [address] [place] [id number] [phone]           Bring a current list of meds and any records pertaining to this visit. For Physicals, please bring immunization records and any forms needing to be filled out. Please arrive 10 minutes early to complete paperwork.              Who to contact     If you have questions or need follow up  "information about today's clinic visit or your schedule please contact [facility] MTM directly at [phone].  Normal or non-critical lab and imaging results will be communicated to you by MyChart, letter or phone within 4 business days after the clinic has received the results. If you do not hear from us within 7 days, please contact the clinic through [employer] or phone. If you have a critical or abnormal lab result, we will notify you by phone as soon as possible.  Submit refill requests through [employer] or call your pharmacy and they will forward the refill request to us. Please allow 3 business days for your refill to be completed.          Additional Information About Your Visit        [facility] Information     [employer] lets you send messages to your doctor, view your test results, renew your prescriptions, schedule appointments and more. To sign up, go to [email or web address] . Click on \"Log in\" on the left side of the screen, which will take you to the Welcome page. Then click on \"Sign up Now\" on the right side of the page.     You will be asked to enter the access code listed below, as well as some personal information. Please follow the directions to create your username and password.     Your access code is: [email or web address]  Expires: 4/10/2018  2:56 PM     Your access code will  in 90 days. If you need help or a new code, please call your [place] clinic or [phone].        Care EveryWhere ID     This is your Care EveryWhere ID. This could be used by other organizations to access your [place] medical records  [id number]        Your Vitals Were     Pulse Last Period BMI (Body Mass Index)             84 (LMP Unknown) 26.59 kg/m2          Blood Pressure from Last 3 Encounters:   18 156/84   18 150/76   18 124/73    Weight from Last 3 Encounters:   18 118 lb 9.6 oz (53.8 kg)   18 117 lb 3.2 oz (53.2 kg)   18 113 lb (51.3 kg)            "   Today, you had the following     No orders found for display         Today's Medication Changes          These changes are accurate as of 3/14/18 10:28 AM.  If you have any questions, ask your nurse or doctor.               Start taking these medicines.        Dose/Directions    gabapentin 300 MG capsule   Commonly known as:  NEURONTIN   Used for:  Type 2 diabetes mellitus with diabetic neuropathy, with long-term current use of insulin (H)        Take 1 tablet (300 mg) at night.   Quantity:  90 capsule   Refills:  3         These medicines have changed or have updated prescriptions.        Dose/Directions    losartan 100 MG tablet   Commonly known as:  COZAAR   This may have changed:  See the new instructions.   Used for:  Type 2 diabetes mellitus with diabetic neuropathy, with long-term current use of insulin (H)        Dose:  100 mg   Take 1 tablet (100 mg) by mouth daily   Quantity:  90 tablet   Refills:  3            Where to get your medicines      These medications were sent to [facility] [address]  [address] [facility] [address]     Phone:  [phone]     gabapentin 300 MG capsule    losartan 100 MG tablet                Primary Care Provider Office Phone # Fax #    Eva Lockhart [phone]       [address] [facility] [address]        Goals        General    I will check my blood sugars four times per day (pt-stated)     Notes - Note created  3/10/2014 10:41 AM by Priti Orozco RN    As of today's date 3/10/2014 goal is met at 26 - 50%.   Goal Status:  Active      I will continue to work with Diabetic Educator (pt-stated)     Notes - Note created  3/10/2014 10:42 AM by Priti Orozco RN    As of today's date 3/10/2014 goal is met at 26 - 50%.   Goal Status:  Active      I will take insulins as prescribed, including sliding scale novolog (pt-stated)     Notes - Note created  3/10/2014 10:43 AM  by Priti Orozco RN    As of today's date 3/10/2014 goal is met at 51 - 75%.   Goal Status:  Active        Equal Access to Services     ADI LOPEZ : Narcisa aad ku hadlupetana Cano, hiralnatividad olivastrevha, liset gerrydaylin bessmarilou, sandra kelly besspham betts laSelenetri waller. So [facility] [phone].    ATENCIÓN: Si habla español, tiene a tompkins disposición servicios gratuitos de asistencia lingüística. Llame al [phone].    We comply with applicable federal civil rights laws and [place] laws. We do not discriminate on the basis of race, color, national origin, age, disability, sex, sexual orientation, or gender identity.            Thank you!     Thank you for choosing [facility]  for your care. Our goal is always to provide you with excellent care. Hearing back from our patients is one way we can continue to improve our services. Please take a few minutes to complete the written survey that you may receive in the mail after your visit with us. Thank you!             Your Updated Medication List - Protect others around you: Learn how to safely use, store and throw away your medicines at www.disposemymeds.org.          This list is accurate as of 3/14/18 10:28 AM.  Always use your most recent med list.                   Brand Name Dispense Instructions for use Diagnosis    albuterol 108 (90 BASE) MCG/ACT Inhaler    PROAIR HFA/PROVENTIL HFA/VENTOLIN HFA    3 Inhaler    Inhale 2 puffs into the lungs every 6 hours as needed for shortness of breath / dyspnea ((Ventolin or preferred albuterol equivalent))    Mild intermittent asthma without complication       aspirin 81 MG tablet      Take 1 tablet by mouth daily Reported on 4/21/2017        atorvastatin 40 MG tablet    LIPITOR    90 tablet    TAKE ONE TABLET BY MOUTH EVERY DAY    Hyperlipidemia LDL goal <100       blood glucose lancets standard    no brand specified    1 Box    Use to test blood sugar 3 times daily or as directed.    Type 2 diabetes  mellitus without complication, with long-term current use of insulin (H)       blood glucose monitoring meter device kit    no brand specified    1 kit    Use to test blood sugar 3 times daily or as directed    Type 2 diabetes mellitus without complication, with long-term current use of insulin (H)       blood glucose monitoring test strip    ONETOUCH ULTRA    300 each    TEST BLOOD SUGARS 3 TIMES DAILY.    Type 2 diabetes mellitus without complication, with long-term current use of insulin (H)       EPINEPHrine 0.3 MG/0.3ML injection 2-pack    EPIPEN 2-MELVIN    6 mL    INJECT 0.3 MLS (0.3 MG) INTO THE MUSCLE ONCE AS NEEDED FOR ANAPHYLAXIS    Allergic to shellfish       fexofenadine 180 MG tablet    ALLEGRA    30 tablet    Take 1 tablet (180 mg) by mouth daily    Chronic seasonal allergic rhinitis, unspecified trigger       fluticasone 50 MCG/ACT spray    FLONASE    3 Bottle    Spray 1-2 sprays into both nostrils daily    Chronic seasonal allergic rhinitis, unspecified trigger       gabapentin 300 MG capsule    NEURONTIN    90 capsule    Take 1 tablet (300 mg) at night.    Type 2 diabetes mellitus with diabetic neuropathy, with long-term current use of insulin (H)       insulin detemir 100 UNIT/ML injection    LEVEMIR FLEXPEN/FLEXTOUCH    30 mL    55 units once daily every morning - Levemir or covered basal insulin equivalent. Dose as of 2/9/2018.    Uncontrolled type 2 diabetes mellitus without complication, with long-term current use of insulin (H)       insulin pen needle 31G X 5 MM    B-D U/F    200 each    Use 4 daily as directed. This replaces the auto-cover pen needle.    Uncontrolled type 2 diabetes mellitus without complication, with long-term current use of insulin (H)       losartan 100 MG tablet    COZAAR    90 tablet    Take 1 tablet (100 mg) by mouth daily    Type 2 diabetes mellitus with diabetic neuropathy, with long-term current use of insulin (H)       NovoLOG FLEXPEN 100 UNIT/ML injection   Generic  drug:  insulin aspart     15 mL    Inject 16 Units Subcutaneous 3 times daily (with meals)    Uncontrolled type 2 diabetes mellitus without complication, with long-term current use of insulin (H)       omeprazole 20 MG CR capsule    priLOSEC    90 capsule    TAKE ONE CAPSULE BY MOUTH EVERY DAY    Gastroesophageal reflux disease, esophagitis presence not specified

## 2018-03-14 NOTE — PATIENT INSTRUCTIONS
Recommendations from today's MTM visit:                                                        1. Increase your Levemir dose to 55units every morning.    2. Increase your Novolog to 16units with meals. We are making great progress with your daily blood sugar numbers, keep up the good work!    3. Increase your Losartan to 100mg daily. Take two of the 50mg tablets until gone, then  the 100mg tablets from the pharmacy and take one daily. This is to help lower your blood pressure and protect your kidneys.    4. Start gabapentin 300mg at bedtime. This is to help with nerve pain, and may also help you sleep better.    5. We discussed using the saline nasal spray every day to help keep your nasal passages moist. You may find that after a few weeks, you can use the fluticasone nasal spray without issue to help with congestion.    Next MTM visit: Wednesday, March 28th at 10:30am.    To schedule another MTM appointment, please call the clinic directly or you may call the MTM scheduling line at [phone] or toll-free at [phone].     My Clinical Pharmacist's contact information:                                                      It was a pleasure seeing you today!  Please feel free to contact me with any questions or concerns you have.      Jovana Loo, Pharm.D., [facility]  Medication Therapy Management Pharmacist  [phone]    You may receive a survey about the MTM services you received.  I would appreciate your feedback to help me serve you better in the future. Please fill it out and return it when you can. Your comments will be anonymous.      My healthcare goals:                                                      Diabetes Goals:    Home Monitoring of Blood Sugars:Fasting  mg/dL and 2 hours after a meal less than 180 mg/dL.    Hemoglobin A1C: Less than 8%. Yours is   Lab Results   Component Value Date    A1C 12.8 02/09/2018   .    Blood Pressure: Less than 140/90mmHg. Yours is /84   Pulse 84  Wt 118 lb 9.6 oz (53.8 kg)  LMP  (LMP Unknown)  BMI 26.59 kg/m2.    Cholesterol: You are taking atorvastatin to help decrease the risk of heart disease.    Things you can do to help lower your blood sugars:    Diet: 3 meals and 1-2 snacks per day with 45-60 grams of carbohydrates per meal and 15-30 grams of carbohydrates per snack. Try to fill your plate at least half-full with vegetables, fill one-quarter full with lean meats or protein, and also make sure you get at least some carbohydrate with every meal.    Exercise: 30 minutes per day of anything that will increase your heart rate and make you break a sweat! Gardening, walking, cleaning the house, changing the oil in your car, etc. If you feel like 30 minutes per day is too much, start small. Even lifting canned foods or working your arms with a resistance band in front of the TV can help.

## 2018-03-28 ENCOUNTER — OFFICE VISIT (OUTPATIENT)
Dept: PHARMACY | Facility: CLINIC | Age: 63
End: 2018-03-28
Payer: COMMERCIAL

## 2018-03-28 VITALS
SYSTOLIC BLOOD PRESSURE: 159 MMHG | OXYGEN SATURATION: 98 % | BODY MASS INDEX: 26.72 KG/M2 | HEART RATE: 79 BPM | WEIGHT: 119.2 LBS | DIASTOLIC BLOOD PRESSURE: 87 MMHG

## 2018-03-28 DIAGNOSIS — G89.29 OTHER CHRONIC PAIN: ICD-10-CM

## 2018-03-28 DIAGNOSIS — J45.20 MILD INTERMITTENT ASTHMA WITHOUT COMPLICATION: ICD-10-CM

## 2018-03-28 DIAGNOSIS — J30.1 ALLERGIC RHINITIS DUE TO POLLEN, UNSPECIFIED CHRONICITY, UNSPECIFIED SEASONALITY: ICD-10-CM

## 2018-03-28 DIAGNOSIS — E11.40 TYPE 2 DIABETES MELLITUS WITH DIABETIC NEUROPATHY, WITH LONG-TERM CURRENT USE OF INSULIN (H): Primary | ICD-10-CM

## 2018-03-28 DIAGNOSIS — Z79.4 TYPE 2 DIABETES MELLITUS WITH DIABETIC NEUROPATHY, WITH LONG-TERM CURRENT USE OF INSULIN (H): Primary | ICD-10-CM

## 2018-03-28 PROCEDURE — 99607 MTMS BY PHARM ADDL 15 MIN: CPT | Performed by: PHARMACIST

## 2018-03-28 PROCEDURE — 99606 MTMS BY PHARM EST 15 MIN: CPT | Performed by: PHARMACIST

## 2018-03-28 NOTE — MR AVS SNAPSHOT
After Visit Summary   3/28/2018    Nathalie Harp    MRN: [id number]           Patient Information     Date Of Birth          1955        Visit Information        Provider Department      3/28/2018 10:30 AM Jovana Loo [facility] MTM        Care Instructions    Recommendations from today's MT visit:                                                        1. Continue on your same doses of insulin for now: Levemir 55units every morning and Novolog 16units with meals.    2. Increase your blood sugar testing -- test every time you wake up in the middle of night. Also try to test at least once in the afternoon, and continue testing every morning when you wake up. We need more information to determine if you are on the right doses of your insulin.      Next MTM visit:     To schedule another MTM appointment, please call the clinic directly or you may call the MTM scheduling line at [phone] or toll-free at [phone].     My Clinical Pharmacist's contact information:                                                      It was a pleasure seeing you today!  Please feel free to contact me with any questions or concerns you have.      Jovana Loo, Pharm.D., [facility]  Medication Therapy Management Pharmacist  [phone]    You may receive a survey about the MT services you received.  I would appreciate your feedback to help me serve you better in the future. Please fill it out and return it when you can. Your comments will be anonymous.                Follow-ups after your visit        Who to contact     If you have questions or need follow up information about today's clinic visit or your schedule please contact [facility] MTM directly at [phone].  Normal or non-critical lab and imaging results will be communicated to you by MyChart, letter or phone within 4 business days after the clinic has received the results. If  "you do not hear from us within 7 days, please contact the clinic through LOOKCAST or phone. If you have a critical or abnormal lab result, we will notify you by phone as soon as possible.  Submit refill requests through LOOKCAST or call your pharmacy and they will forward the refill request to us. Please allow 3 business days for your refill to be completed.          Additional Information About Your Visit        "[employer] Information     LOOKCAST lets you send messages to your doctor, view your test results, renew your prescriptions, schedule appointments and more. To sign up, go to [email or web address] . Click on \"Log in\" on the left side of the screen, which will take you to the Welcome page. Then click on \"Sign up Now\" on the right side of the page.     You will be asked to enter the access code listed below, as well as some personal information. Please follow the directions to create your username and password.     Your access code is: [email or web address]  Expires: 4/10/2018  2:56 PM     Your access code will  in 90 days. If you need help or a new code, please call your [facility] clinic or [phone].        Care EveryWhere ID     This is your Care EveryWhere ID. This could be used by other organizations to access your [facility] medical records  [id number]        Your Vitals Were     Last Period BMI (Body Mass Index)                (LMP Unknown) 26.72 kg/m2           Blood Pressure from Last 3 Encounters:   18 156/84   18 150/76   18 124/73    Weight from Last 3 Encounters:   18 119 lb 3.2 oz (54.1 kg)   18 118 lb 9.6 oz (53.8 kg)   18 117 lb 3.2 oz (53.2 kg)              Today, you had the following     No orders found for display       Primary Care Provider Office Phone # Fax #    Eva Lockhart [phone]       [address] [facility] [address]        Goals        General    I will check my blood sugars four times per day (pt-stated)     " Notes - Note created  3/10/2014 10:41 AM by Priti Orozco RN    As of today's date 3/10/2014 goal is met at 26 - 50%.   Goal Status:  Active      I will continue to work with Diabetic Educator (pt-stated)     Notes - Note created  3/10/2014 10:42 AM by Priti Orozco RN    As of today's date 3/10/2014 goal is met at 26 - 50%.   Goal Status:  Active      I will take insulins as prescribed, including sliding scale novolog (pt-stated)     Notes - Note created  3/10/2014 10:43 AM by Priti Orozco RN    As of today's date 3/10/2014 goal is met at 51 - 75%.   Goal Status:  Active        Equal Access to Services     ADI LOPEZ : Hadii tracey berman hadasho Sokamryn, waaxda luqadaha, qaybta kaalmada adeegyada, sandra charles . So [facility] [phone].    ATENCIÓN: Si habla español, tiene a tompkins disposición servicios gratuitos de asistencia lingüística. Llame al [phone].    We comply with applicable federal civil rights laws and [place] laws. We do not discriminate on the basis of race, color, national origin, age, disability, sex, sexual orientation, or gender identity.            Thank you!     Thank you for choosing [facility]  for your care. Our goal is always to provide you with excellent care. Hearing back from our patients is one way we can continue to improve our services. Please take a few minutes to complete the written survey that you may receive in the mail after your visit with us. Thank you!             Your Updated Medication List - Protect others around you: Learn how to safely use, store and throw away your medicines at www.disposemymeds.org.          This list is accurate as of 3/28/18 11:13 AM.  Always use your most recent med list.                   Brand Name Dispense Instructions for use Diagnosis    albuterol 108 (90 BASE) MCG/ACT Inhaler    PROAIR HFA/PROVENTIL HFA/VENTOLIN HFA    3 Inhaler    Inhale 2 puffs into the lungs every 6 hours as needed for  shortness of breath / dyspnea ((Ventolin or preferred albuterol equivalent))    Mild intermittent asthma without complication       aspirin 81 MG tablet      Take 1 tablet by mouth daily Reported on 4/21/2017        atorvastatin 40 MG tablet    LIPITOR    90 tablet    TAKE ONE TABLET BY MOUTH EVERY DAY    Hyperlipidemia LDL goal <100       blood glucose lancets standard    no brand specified    1 Box    Use to test blood sugar 3 times daily or as directed.    Type 2 diabetes mellitus without complication, with long-term current use of insulin (H)       blood glucose monitoring meter device kit    no brand specified    1 kit    Use to test blood sugar 3 times daily or as directed    Type 2 diabetes mellitus without complication, with long-term current use of insulin (H)       blood glucose monitoring test strip    ONETOUCH ULTRA    300 each    TEST BLOOD SUGARS 3 TIMES DAILY.    Type 2 diabetes mellitus without complication, with long-term current use of insulin (H)       EPINEPHrine 0.3 MG/0.3ML injection 2-pack    EPIPEN 2-MELVIN    6 mL    INJECT 0.3 MLS (0.3 MG) INTO THE MUSCLE ONCE AS NEEDED FOR ANAPHYLAXIS    Allergic to shellfish       fexofenadine 180 MG tablet    ALLEGRA    30 tablet    Take 1 tablet (180 mg) by mouth daily    Chronic seasonal allergic rhinitis, unspecified trigger       fluticasone 50 MCG/ACT spray    FLONASE    3 Bottle    Spray 1-2 sprays into both nostrils daily    Chronic seasonal allergic rhinitis, unspecified trigger       gabapentin 300 MG capsule    NEURONTIN    90 capsule    Take 1 tablet (300 mg) at night.    Type 2 diabetes mellitus with diabetic neuropathy, with long-term current use of insulin (H)       glucosamine chondroitin 1500 complex Caps      Take 1 capsule by mouth daily        insulin detemir 100 UNIT/ML injection    LEVEMIR FLEXPEN/FLEXTOUCH    30 mL    55 units once daily every morning - Levemir or covered basal insulin equivalent. Dose as of 2/9/2018.    Uncontrolled  type 2 diabetes mellitus without complication, with long-term current use of insulin (H)       insulin pen needle 31G X 5 MM    B-D U/F    200 each    Use 4 daily as directed. This replaces the auto-cover pen needle.    Uncontrolled type 2 diabetes mellitus without complication, with long-term current use of insulin (H)       losartan 100 MG tablet    COZAAR    90 tablet    Take 1 tablet (100 mg) by mouth daily    Type 2 diabetes mellitus with diabetic neuropathy, with long-term current use of insulin (H)       NovoLOG FLEXPEN 100 UNIT/ML injection   Generic drug:  insulin aspart     15 mL    Inject 16 Units Subcutaneous 3 times daily (with meals)    Uncontrolled type 2 diabetes mellitus without complication, with long-term current use of insulin (H)       omeprazole 20 MG CR capsule    priLOSEC    90 capsule    TAKE ONE CAPSULE BY MOUTH EVERY DAY    Gastroesophageal reflux disease, esophagitis presence not specified

## 2018-03-28 NOTE — PROGRESS NOTES
"SUBJECTIVE/OBJECTIVE:                Nathalie Harp is a 62 year old female coming in for a follow-up visit for Medication Therapy Management.  She was referred to me from Eva Lockhart     Chief Complaint: Follow up from our visit on 3/14/2018.  Diabetes follow-up. Has a cold currently, has been sucking on cough drops for the last 1.5 weeks. Coughing a lot, thinks she may have bronchitis. Will see if someone can listen to her lungs today.  Personal Healthcare Goals: Get A1c below 9 so she can qualify for cataract surgery    Tobacco: No tobacco use  Alcohol: none    Medication Adherence: issues reported; admits she has missed her PM doses of insulin Mon and Tues this week.    Diabetes:  Pt currently taking Levemir 55units QAM, Novolog 16units with meals (eats 2-3x/day).   Reports she is using a new needle with each injection. Is still finishing her supply of autocover needles, but states she will run out soon and start the BD pen needles.   SMBG Ranges (based on glucometer readings): 14d avg (n15 261. Notes that several of her fasting readings are after eating something in the middle of the night d/t symptoms of hypoglycemia while sleeping.  Symptoms of low blood sugar? Shakey, dizzy, sweaty, strong heartbeat, \"like all the blood has drained from my body\". Happens with low-normal BG and did have one episode of hypoglycemia. She drinks grape juice when this occurs. Keeps a Relion glucose shot on hand at all times when out of the house. As above, has also been waking up in the middle of the night a few times per week.  Symptoms of high blood sugar? None. Has noted weight gain as BG improves.  ACEi/ARB: Yes: losartan 100mg QD. Microalbumin is not < 30 mg/g. Losartan dose was increased at last visit.  Aspirin: Taking 81mg daily and has the following issues: bruises easily  Statin: Yes: atorvastatin 40mg QD and denies side effects  Diet/Exercise: 2-3 meals per day. Glucerna if she misses lunch. Rides " "stationary bike at home a few times per week.  Eggs and toast for breakfast.  Date FBG/ 2hours post Lunch/2hours post Dinner /2hours post HS   3/28 336      3/27 263      3/26 250      3/25 325      3/24 229      3/24  79     3/23 410      3/22 269      3/21 185      3/20 386   85 (9:16)   3/18 260      3/17 303      3/16 246      3/15 295      3/14 280 63 356        Chronic pain:  Current medications include: Gabapentin 300mg QHS - started at last visit for pain described as Numb, Shooting, Stabbing and pins and needles, like you pricked yourself with a paring knife.  She reports the gabapentin has \"helped considerably\". States the pain in her fingers and feet is much improved and she is sleeping much better. She denies confusion, grogginess the next morning, peripheral edema.    Asthma/Allergies:  Current asthma medications: Albuterol MDI and (Pt reports using albuterol occassionaly - states she has used it once in the last 2 weeks). As above has had a bothersome cough for the last 1.5 weeks and tells me she thinks it could be bronchitis. Asthma triggers include: upper respiratory infections, dust mites and animal dander. Recently bought some Saline nasal spray and hoping this will help with congestion - she hasn't started using this since developing her cough/cold.  She also takes fexofenadine 180mg QD; she feels this is somewhat helpful for allergies but does not help her congestion. She has fluticasone nasal spray, but only uses rarely because it causes bloody noses.  Pt reports the following symptoms: none. AAP on file: YES  ACT Total Scores 2/24/2017 7/28/2017 2/9/2018   ACT TOTAL SCORE - - -   ASTHMA ER VISITS - - -   ASTHMA HOSPITALIZATIONS - - -   ACT TOTAL SCORE (Goal Greater than or Equal to 20) 12 18 21   In the past 12 months, how many times did you visit the emergency room for your asthma without being admitted to the hospital? 0 0 0   In the past 12 months, how many times were you hospitalized " overnight because of your asthma? 0 0 0       Current labs include:  Today's Vitals: /87  Pulse 79  Wt 119 lb 3.2 oz (54.1 kg)  LMP  (LMP Unknown)  BMI 26.72 kg/m2     BP Readings from Last 3 Encounters:   03/14/18 156/84   02/28/18 150/76   02/09/18 124/73     Wt Readings from Last 3 Encounters:   03/14/18 118 lb 9.6 oz (53.8 kg)   02/28/18 117 lb 3.2 oz (53.2 kg)   02/09/18 113 lb (51.3 kg)     Lab Results   Component Value Date    A1C 12.8 02/09/2018    A1C 13.8 07/03/2017    A1C 12.3 12/21/2016    A1C 11.3 10/07/2015    A1C 11.4 08/31/2015     Lab Results   Component Value Date    CHOL 261 02/09/2018     Lab Results   Component Value Date    TRIG 243 02/09/2018     Lab Results   Component Value Date    HDL 88 02/09/2018     Lab Results   Component Value Date     02/09/2018       Liver Function Studies -   Recent Labs   Lab Test  02/09/18   0737  11/14/16   1004   PROTTOTAL   --   7.5   ALBUMIN   --   4.2   BILITOTAL   --   0.6   ALKPHOS   --   109   AST  15  14   ALT  23  29       Lab Results   Component Value Date    UCRR 142 02/09/2018    MICROL 1240 02/09/2018    UMALCR 873.24 (H) 02/09/2018       Last Basic Metabolic Panel:  Lab Results   Component Value Date     02/09/2018      Lab Results   Component Value Date    POTASSIUM 4.0 02/09/2018     Lab Results   Component Value Date    CHLORIDE 105 02/09/2018     Lab Results   Component Value Date    BUN 16 02/09/2018     Lab Results   Component Value Date    CR 0.49 02/09/2018     GFR Estimate   Date Value Ref Range Status   02/09/2018 >90 >60 mL/min/1.7m2 Final     Comment:     Non  GFR Calc   04/21/2017 >90  Non  GFR Calc   >60 mL/min/1.7m2 Final   11/14/2016 62 >60 mL/min/1.7m2 Final     GFR Estimate If Black   Date Value Ref Range Status   02/09/2018 >90 >60 mL/min/1.7m2 Final     Comment:      GFR Calc   04/21/2017 >90   GFR Calc   >60 mL/min/1.7m2 Final   11/14/2016 75  >60 mL/min/1.7m2 Final     Most Recent Immunizations   Administered Date(s) Administered     Influenza (High Dose) 3 valent vaccine 08/31/2017     Influenza (IIV3) PF 08/30/2016     Influenza Vaccine IM 3yrs+ 4 Valent IIV4 08/30/2017     Pneumo Conj 13-V (2010&after) 11/14/2016     Pneumococcal 23 valent 11/14/2008     TD (ADULT, 7+) 09/01/2011         ASSESSMENT:              Current medications were reviewed today as discussed above.      Medication Adherence: no issues identified    Diabetes: Needs Improvement. Patient is not meeting A1c goal of < 7%. Self monitoring of blood glucose is not at goal of fasting  mg/dL and post prandial < 180 mg/dL. Unclear how often she is eating in middle of night d/t symptoms of hypoglycemia; she would benefit from increased SMBG to get a better sense of if she is actually going low in middle of night or if we can safely increase her insulin dose. Additionally, her BP is elevated today x2, however significant coughing may be contributing. Will monitor for now.    Chronic Pain: Improved. Does not have diagnosis of neuropathy so will need workup for this by PCP.    Asthma/Allergies: Needs further assessment. Sapna Anand PA-C was kassy to listen to her lungs, no crackles or wheezing heard. No intervention needed at this time, but scheduled for a follow-up with provider this Friday.       PLAN:                  1. Continue present doses of insulin. Pt will increase SMBG for symptoms of hypoglycemia, especially in middle of the night.  2. Pt will follow-up with Sapna on Friday for further observation of cough/cold symptoms.    I spent 60 minutes with this patient today. All changes were made via collaborative practice agreement with Eva Lockhart. A copy of the visit note was provided to the patient's primary care provider.     Will follow up in 2 weeks.    The patient was given a summary of these recommendations as an after visit summary.    Jovana Loo, Pharm.D.,  [facility]  Medication Therapy Management Pharmacist  [phone]

## 2018-03-28 NOTE — PATIENT INSTRUCTIONS
Recommendations from today's MTM visit:                                                        1. Continue on your same doses of insulin for now: Levemir 55units every morning and Novolog 16units with meals.    2. Increase your blood sugar testing -- test every time you wake up in the middle of night. Also try to test at least once in the afternoon, and continue testing every morning when you wake up. We need more information to determine if you are on the right doses of your insulin.      Next MTM visit:     To schedule another MTM appointment, please call the clinic directly or you may call the MTM scheduling line at [phone] or toll-free at [phone].     My Clinical Pharmacist's contact information:                                                      It was a pleasure seeing you today!  Please feel free to contact me with any questions or concerns you have.      Jovana Loo, Pharm.D., [facility]  Medication Therapy Management Pharmacist  [phone]    You may receive a survey about the MTM services you received.  I would appreciate your feedback to help me serve you better in the future. Please fill it out and return it when you can. Your comments will be anonymous.

## 2018-03-30 ENCOUNTER — OFFICE VISIT (OUTPATIENT)
Dept: FAMILY MEDICINE | Facility: CLINIC | Age: 63
End: 2018-03-30
Payer: MEDICARE

## 2018-03-30 VITALS
HEART RATE: 77 BPM | WEIGHT: 122.4 LBS | DIASTOLIC BLOOD PRESSURE: 88 MMHG | OXYGEN SATURATION: 98 % | TEMPERATURE: 98 F | SYSTOLIC BLOOD PRESSURE: 172 MMHG | BODY MASS INDEX: 27.44 KG/M2

## 2018-03-30 DIAGNOSIS — J45.20 MILD INTERMITTENT ASTHMA WITHOUT COMPLICATION: ICD-10-CM

## 2018-03-30 DIAGNOSIS — R05.9 COUGH: Primary | ICD-10-CM

## 2018-03-30 PROCEDURE — 99213 OFFICE O/P EST LOW 20 MIN: CPT | Performed by: PHYSICIAN ASSISTANT

## 2018-03-30 RX ORDER — AZITHROMYCIN 250 MG/1
TABLET, FILM COATED ORAL
Qty: 6 TABLET | Refills: 0 | Status: SHIPPED | OUTPATIENT
Start: 2018-03-30 | End: 2018-04-13

## 2018-03-30 RX ORDER — BENZONATATE 200 MG/1
200 CAPSULE ORAL 3 TIMES DAILY PRN
Qty: 21 CAPSULE | Refills: 0 | Status: SHIPPED | OUTPATIENT
Start: 2018-03-30 | End: 2018-04-13

## 2018-03-30 NOTE — PROGRESS NOTES
SUBJECTIVE:   Nathalie Harp is a 62 year old female who presents to clinic today for the following health issues:      Acute Illness   Acute illness concerns: URI  Onset: 2 weeks 3 days    Fever: no     Chills/Sweats: YES    Headache (location?): YES- unchanged from baseline    Sinus Pressure:YES    Conjunctivitis:  no    Ear Pain: YES: both    Rhinorrhea: YES    Congestion: YES    Sore Throat: no      Cough: YES-non-productive, barking    Wheeze: no     Decreased Appetite: no     Nausea: YES    Vomiting: no     Diarrhea:  YES    Dysuria/Freq.: no     Fatigue/Achiness: YES    Sick/Strep Exposure: no      Therapies Tried and outcome: OTC exedrin and aleve       Took her blood pressure medication last night.   Has had a little more stress later.   No over the counter cough or cold medications.   Chest pain in the ribs from coughing.   Shortness of breath from the coughing, the rest of the time she is ok.   Always has a headache-unchanged from her baseline.   Has not been using her albuterol     Problem list and histories reviewed & adjusted, as indicated.  Additional history: as documented    Patient Active Problem List   Diagnosis     Esophageal reflux     Irritable bowel syndrome     Female stress incontinence     Migraine without aura     Arthritis of knee, right     Disorder of bursae and tendons in shoulder region     Degeneration of thoracic or thoracolumbar intervertebral disc     Degeneration of cervical intervertebral disc     Other hammer toe (acquired)     Allergic rhinitis due to pollen     Menopausal symptoms     Need for SBE (subacute bacterial endocarditis) prophylaxis     Preventive measure     Hyperlipidemia LDL goal <100     Health Care Home     Anemia     Dizziness     Hammer toe     Microalbuminuria     ACP (advance care planning)     Allergic to shellfish     Falls frequently     Balance problems     CKD (chronic kidney disease) stage 2, GFR 60-89 ml/min     Type 2 diabetes mellitus with  diabetic nephropathy (H)     Diarrhea     Iron deficiency anemia, unspecified iron deficiency anemia type     Right axillary hidradenitis     Uncontrolled type 2 diabetes mellitus without complication, with long-term current use of insulin (H)     Mild intermittent asthma without complication     Past Surgical History:   Procedure Laterality Date     HC EXCIS PRIMARY GANGLION WRIST  1985 and 1987    right wrist     KNEE SURGERY  2008 approx    arthroscopic; Dr. Rivera       Social History   Substance Use Topics     Smoking status: Never Smoker     Smokeless tobacco: Never Used     Alcohol use Yes      Comment: rarely     Family History   Problem Relation Age of Onset     CEREBROVASCULAR DISEASE Maternal Grandmother      Arthritis Maternal Grandmother      Asthma Mother      GASTROINTESTINAL DISEASE Mother      IBS     Alcohol/Drug Mother      Depression Mother      Neurologic Disorder Mother      migraines     GASTROINTESTINAL DISEASE Father      IBS     DIABETES Father      Alcohol/Drug Father      Neurologic Disorder Father      migraines     Obesity Father      GASTROINTESTINAL DISEASE Maternal Grandfather      Ulcers     Alcohol/Drug Maternal Grandfather      DIABETES Paternal Grandmother      Alcohol/Drug Paternal Grandmother      Arthritis Paternal Grandmother      Obesity Paternal Grandmother      Hypertension Sister      Hypertension Brother      Circulatory Sister      Asthma Sister      Asthma Sister      Asthma Brother      Thyroid Disease Sister      Thyroid Disease Sister      Obesity Sister            Reviewed and updated as needed this visit by clinical staff  Tobacco  Allergies  Meds  Med Hx  Surg Hx  Fam Hx  Soc Hx      Reviewed and updated as needed this visit by Provider         ROS:  Constitutional, HEENT, cardiovascular, pulmonary, gi and gu systems are negative, except as otherwise noted.    OBJECTIVE:     /88  Pulse 77  Temp 98  F (36.7  C) (Oral)  Wt 122 lb 6.4 oz (55.5 kg)   LMP  (LMP Unknown)  SpO2 98%  Breastfeeding? No  BMI 27.44 kg/m2  Body mass index is 27.44 kg/(m^2).  GENERAL: healthy, alert and no distress  HENT: ear canals and TM's normal, nose and mouth without ulcers or lesions  NECK: no adenopathy, no asymmetry, masses, or scars and thyroid normal to palpation  RESP: lungs clear to auscultation - no rales, rhonchi or wheezes- dry persistent cough.   CV: regular rate and rhythm, normal S1 S2, no S3 or S4, no murmur, click or rub,     Diagnostic Test Results:  none     ASSESSMENT/PLAN:       ICD-10-CM    1. Cough R05 benzonatate (TESSALON) 200 MG capsule     azithromycin (ZITHROMAX) 250 MG tablet   2. Uncontrolled type 2 diabetes mellitus without complication, with long-term current use of insulin (H) E11.65     Z79.4    3. Mild intermittent asthma without complication J45.20    Likely a post viral cough, could have bacterial component due to length of symptoms.   Patient to try tessalon and increased albuterol first and then fill zithromax if needed.   Avoiding prednisone due to DM at this time.   BP elevated, but has follow up with MTM coming up.       Sapna Anand PA-C  [facility]

## 2018-03-30 NOTE — PATIENT INSTRUCTIONS
Increase albuterol use. Can use 1-2 puffs every 4 hours if needed.     Use zithromax if not improving by Monday.

## 2018-03-30 NOTE — NURSING NOTE
"Chief Complaint   Patient presents with     URI     Health Maintenance     medicare annual wellness visit       Initial BP (!) 196/94 (BP Location: Right arm, Patient Position: Chair, Cuff Size: Adult Regular)  Pulse 77  Temp 98  F (36.7  C) (Oral)  Wt 122 lb 6.4 oz (55.5 kg)  LMP  (LMP Unknown)  SpO2 98%  Breastfeeding? No  BMI 27.44 kg/m2 Estimated body mass index is 27.44 kg/(m^2) as calculated from the following:    Height as of 2/9/18: 4' 8\" (1.422 m).    Weight as of this encounter: 122 lb 6.4 oz (55.5 kg).  Medication Reconciliation: complete     CHANCE Steve MA      "

## 2018-03-30 NOTE — MR AVS SNAPSHOT
After Visit Summary   3/30/2018    Nathalie Harp    MRN: [id number]           Patient Information     Date Of Birth          1955        Visit Information        Provider Department      3/30/2018 9:20 AM Sapna Anand PA-C [facility]        Today's Diagnoses     Cough    -  1    Uncontrolled type 2 diabetes mellitus without complication, with long-term current use of insulin (H)        Mild intermittent asthma without complication          Care Instructions    Increase albuterol use. Can use 1-2 puffs every 4 hours if needed.     Use zithromax if not improving by Monday.           Follow-ups after your visit        Your next 10 appointments already scheduled     Apr 13, 2018 10:30 AM CDT   Office Visit with Jovana Loo RPH   [facility] ([facility])    [address] [place] [id number] [phone]           Bring a current list of meds and any records pertaining to this visit. For Physicals, please bring immunization records and any forms needing to be filled out. Please arrive 10 minutes early to complete paperwork.              Who to contact     If you have questions or need follow up information about today's clinic visit or your schedule please contact [facility] directly at [phone].  Normal or non-critical lab and imaging results will be communicated to you by MyChart, letter or phone within 4 business days after the clinic has received the results. If you do not hear from us within 7 days, please contact the clinic through [facility] or phone. If you have a critical or abnormal lab result, we will notify you by phone as soon as possible.  Submit refill requests through [email or web address] or call your pharmacy and they will forward the refill request to us. Please allow 3 business days for your refill to be completed.          Additional Information About  "Your Visit        [employer] Information     [employer] lets you send messages to your doctor, view your test results, renew your prescriptions, schedule appointments and more. To sign up, go to [email or web address] . Click on \"Log in\" on the left side of the screen, which will take you to the Welcome page. Then click on \"Sign up Now\" on the right side of the page.     You will be asked to enter the access code listed below, as well as some personal information. Please follow the directions to create your username and password.     Your access code is: [email or web address]  Expires: 4/10/2018  2:56 PM     Your access code will  in 90 days. If you need help or a new code, please call your [place] clinic or [phone].        Care EveryWhere ID     This is your Care EveryWhere ID. This could be used by other organizations to access your [place] medical records  [id number]        Your Vitals Were     Pulse Temperature Last Period Pulse Oximetry Breastfeeding? BMI (Body Mass Index)    77 98  F (36.7  C) (Oral) (LMP Unknown) 98% No 27.44 kg/m2       Blood Pressure from Last 3 Encounters:   18 172/88   18 159/87   18 156/84    Weight from Last 3 Encounters:   18 122 lb 6.4 oz (55.5 kg)   18 119 lb 3.2 oz (54.1 kg)   18 118 lb 9.6 oz (53.8 kg)              Today, you had the following     No orders found for display         Today's Medication Changes          These changes are accurate as of 3/30/18  9:40 AM.  If you have any questions, ask your nurse or doctor.               Start taking these medicines.        Dose/Directions    azithromycin 250 MG tablet   Commonly known as:  ZITHROMAX   Used for:  Cough   Started by:  Sapna Anand PA-C        Two tablets first day, then one tablet daily for four days.   Quantity:  6 tablet   Refills:  0       benzonatate 200 MG capsule   Commonly known as:  TESSALON   Used for:  Cough   Started by:  Sapna Anand PA-C        Dose:  200 mg "   Take 1 capsule (200 mg) by mouth 3 times daily as needed for cough   Quantity:  21 capsule   Refills:  0            Where to get your medicines      These medications were sent to [facility] - [place], [place] - [address]  [address], [facility] [address]     Phone:  [phone]     azithromycin 250 MG tablet    benzonatate 200 MG capsule                Primary Care Provider Office Phone # Fax #    Eva Lockhart [phone]       [address] [facility] [place] [place] [address]        Goals        General    I will check my blood sugars four times per day (pt-stated)     Notes - Note created  3/10/2014 10:41 AM by Priti Orozco RN    As of today's date 3/10/2014 goal is met at 26 - 50%.   Goal Status:  Active      I will continue to work with Diabetic Educator (pt-stated)     Notes - Note created  3/10/2014 10:42 AM by Priti Orozco RN    As of today's date 3/10/2014 goal is met at 26 - 50%.   Goal Status:  Active      I will take insulins as prescribed, including sliding scale novolog (pt-stated)     Notes - Note created  3/10/2014 10:43 AM by Priti Orozco, RN    As of today's date 3/10/2014 goal is met at 51 - 75%.   Goal Status:  Active        Equal Access to Services     [facility]: Hadii aad ku hadasho Soomaali, waaxda luqadaha, qaybta kaalmada adeegyada, waxay angelin haytri charles . So [facility] [phone].    ATENCIÓN: Si habla español, tiene a tompkins disposición servicios gratuitos de asistencia lingüística. Llame al [phone].    We comply with applicable federal civil rights laws and [place] laws. We do not discriminate on the basis of race, color, national origin, age, disability, sex, sexual orientation, or gender identity.            Thank you!     Thank you for choosing [facility]  for your care. Our goal is always to provide you with excellent care. Hearing back from our patients is one  way we can continue to improve our services. Please take a few minutes to complete the written survey that you may receive in the mail after your visit with us. Thank you!             Your Updated Medication List - Protect others around you: Learn how to safely use, store and throw away your medicines at www.disposemymeds.org.          This list is accurate as of 3/30/18  9:40 AM.  Always use your most recent med list.                   Brand Name Dispense Instructions for use Diagnosis    albuterol 108 (90 BASE) MCG/ACT Inhaler    PROAIR HFA/PROVENTIL HFA/VENTOLIN HFA    3 Inhaler    Inhale 2 puffs into the lungs every 6 hours as needed for shortness of breath / dyspnea ((Ventolin or preferred albuterol equivalent))    Mild intermittent asthma without complication       aspirin 81 MG tablet      Take 1 tablet by mouth daily Reported on 4/21/2017        atorvastatin 40 MG tablet    LIPITOR    90 tablet    TAKE ONE TABLET BY MOUTH EVERY DAY    Hyperlipidemia LDL goal <100       azithromycin 250 MG tablet    ZITHROMAX    6 tablet    Two tablets first day, then one tablet daily for four days.    Cough       benzonatate 200 MG capsule    TESSALON    21 capsule    Take 1 capsule (200 mg) by mouth 3 times daily as needed for cough    Cough       blood glucose lancets standard    no brand specified    1 Box    Use to test blood sugar 3 times daily or as directed.    Type 2 diabetes mellitus without complication, with long-term current use of insulin (H)       blood glucose monitoring meter device kit    no brand specified    1 kit    Use to test blood sugar 3 times daily or as directed    Type 2 diabetes mellitus without complication, with long-term current use of insulin (H)       blood glucose monitoring test strip    ONETOUCH ULTRA    300 each    TEST BLOOD SUGARS 3 TIMES DAILY.    Type 2 diabetes mellitus without complication, with long-term current use of insulin (H)       EPINEPHrine 0.3 MG/0.3ML injection 2-pack     EPIPEN 2-MELVIN    6 mL    INJECT 0.3 MLS (0.3 MG) INTO THE MUSCLE ONCE AS NEEDED FOR ANAPHYLAXIS    Allergic to shellfish       fexofenadine 180 MG tablet    ALLEGRA    30 tablet    Take 1 tablet (180 mg) by mouth daily    Chronic seasonal allergic rhinitis, unspecified trigger       fluticasone 50 MCG/ACT spray    FLONASE    3 Bottle    Spray 1-2 sprays into both nostrils daily    Chronic seasonal allergic rhinitis, unspecified trigger       gabapentin 300 MG capsule    NEURONTIN    90 capsule    Take 1 tablet (300 mg) at night.    Type 2 diabetes mellitus with diabetic neuropathy, with long-term current use of insulin (H)       glucosamine chondroitin 1500 complex Caps      Take 1 capsule by mouth daily        insulin detemir 100 UNIT/ML injection    LEVEMIR FLEXPEN/FLEXTOUCH    30 mL    55 units once daily every morning - Levemir or covered basal insulin equivalent. Dose as of 2/9/2018.    Uncontrolled type 2 diabetes mellitus without complication, with long-term current use of insulin (H)       insulin pen needle 31G X 5 MM    B-D U/F    200 each    Use 4 daily as directed. This replaces the auto-cover pen needle.    Uncontrolled type 2 diabetes mellitus without complication, with long-term current use of insulin (H)       losartan 100 MG tablet    COZAAR    90 tablet    Take 1 tablet (100 mg) by mouth daily    Type 2 diabetes mellitus with diabetic neuropathy, with long-term current use of insulin (H)       NovoLOG FLEXPEN 100 UNIT/ML injection   Generic drug:  insulin aspart     15 mL    Inject 16 Units Subcutaneous 3 times daily (with meals)    Uncontrolled type 2 diabetes mellitus without complication, with long-term current use of insulin (H)       omeprazole 20 MG CR capsule    priLOSEC    90 capsule    TAKE ONE CAPSULE BY MOUTH EVERY DAY    Gastroesophageal reflux disease, esophagitis presence not specified

## 2018-04-04 DIAGNOSIS — E78.5 HYPERLIPIDEMIA LDL GOAL <100: ICD-10-CM

## 2018-04-04 DIAGNOSIS — Z91.013 ALLERGIC TO SHELLFISH: ICD-10-CM

## 2018-04-04 NOTE — TELEPHONE ENCOUNTER
"Requested Prescriptions   Pending Prescriptions Disp Refills     EPINEPHrine (EPIPEN/ADRENACLICK/OR ANY BX GENERIC EQUIV) 0.3 MG/0.3ML injection 2-pack [Pharmacy Med Name: EPINEPHRINE 0.3MG/0.3ML SOAJ]  1    Last Written Prescription Date:  7/28/17  Last Fill Quantity: 6,  # refills: 1   Last office visit: 3/30/2018 with prescribing provider:     Future Office Visit:   Next 5 appointments (look out 90 days)     Apr 13, 2018 10:30 AM CDT   Office Visit with Jovana Loo [facility] ([facility])    [address] [id number] [phone]                  Sig: INJECT ONE DEVICE INTO THE MUSCLE AS NEEDED FOR ALLERGIC REACTION    Anaphylaxis Kits Protocol Passed    4/4/2018 10:39 AM       Passed - Recent (12 mo) or future (30 days) visit witin the authorizing provider's specialty    Patient had office visit in the last 12 months or has a visit in the next 30 days with authorizing provider or within the authorizing provider's specialty.  See \"Patient Info\" tab in inbasket, or \"Choose Columns\" in Meds & Orders section of the refill encounter.           Passed - Patient is age 5 or older        atorvastatin (LIPITOR) 40 MG tablet [Pharmacy Med Name: ATORVASTATIN CALCIUM 40MG TABS] 90 tablet 0    Last Written Prescription Date:  1/5/18  Last Fill Quantity: 90,  # refills: 0   Last office visit: 3/30/2018 with prescribing provider:     Future Office Visit:   Next 5 appointments (look out 90 days)     Apr 13, 2018 10:30 AM CDT   Office Visit with Jovana Loo [facility] ([facility])    [address] [id number] [phone]                  Sig: TAKE ONE TABLET BY MOUTH EVERY DAY    Statins Protocol Passed    4/4/2018 10:39 AM       Passed - LDL on file in past 12 months    Recent Labs   Lab Test  02/09/18   0737   LDL  124* " "           Passed - No abnormal creatine kinase in past 12 months    No lab results found.            Passed - Recent (12 mo) or future (30 days) visit within the authorizing provider's specialty    Patient had office visit in the last 12 months or has a visit in the next 30 days with authorizing provider or within the authorizing provider's specialty.  See \"Patient Info\" tab in inbasket, or \"Choose Columns\" in Meds & Orders section of the refill encounter.           Passed - Patient is age 18 or older       Passed - No active pregnancy on record       Passed - No positive pregnancy test in past 12 months          "

## 2018-04-05 RX ORDER — EPINEPHRINE 0.3 MG/.3ML
INJECTION SUBCUTANEOUS
Qty: 0.3 ML | Refills: 1 | Status: SHIPPED | OUTPATIENT
Start: 2018-04-05 | End: 2019-02-15

## 2018-04-05 RX ORDER — ATORVASTATIN CALCIUM 40 MG/1
TABLET, FILM COATED ORAL
Qty: 90 TABLET | Refills: 0 | Status: SHIPPED | OUTPATIENT
Start: 2018-04-05 | End: 2018-05-30

## 2018-04-13 ENCOUNTER — OFFICE VISIT (OUTPATIENT)
Dept: PHARMACY | Facility: CLINIC | Age: 63
End: 2018-04-13
Payer: COMMERCIAL

## 2018-04-13 VITALS
SYSTOLIC BLOOD PRESSURE: 132 MMHG | HEART RATE: 88 BPM | WEIGHT: 117.6 LBS | BODY MASS INDEX: 26.37 KG/M2 | DIASTOLIC BLOOD PRESSURE: 76 MMHG

## 2018-04-13 DIAGNOSIS — Z79.4 TYPE 2 DIABETES MELLITUS WITH DIABETIC NEUROPATHY, WITH LONG-TERM CURRENT USE OF INSULIN (H): Primary | ICD-10-CM

## 2018-04-13 DIAGNOSIS — G89.29 OTHER CHRONIC PAIN: ICD-10-CM

## 2018-04-13 DIAGNOSIS — R05.9 COUGH: ICD-10-CM

## 2018-04-13 DIAGNOSIS — E11.40 TYPE 2 DIABETES MELLITUS WITH DIABETIC NEUROPATHY, WITH LONG-TERM CURRENT USE OF INSULIN (H): Primary | ICD-10-CM

## 2018-04-13 PROCEDURE — 99607 MTMS BY PHARM ADDL 15 MIN: CPT | Performed by: PHARMACIST

## 2018-04-13 PROCEDURE — 99606 MTMS BY PHARM EST 15 MIN: CPT | Performed by: PHARMACIST

## 2018-04-13 RX ORDER — BENZONATATE 200 MG/1
200 CAPSULE ORAL 3 TIMES DAILY PRN
Qty: 21 CAPSULE | Refills: 0 | Status: SHIPPED | OUTPATIENT
Start: 2018-04-13 | End: 2018-05-30

## 2018-04-13 RX ORDER — INSULIN ASPART 100 [IU]/ML
18 INJECTION, SOLUTION INTRAVENOUS; SUBCUTANEOUS
Qty: 15 ML | Refills: 3 | Status: SHIPPED | OUTPATIENT
Start: 2018-04-13 | End: 2018-05-30

## 2018-04-13 NOTE — PROGRESS NOTES
"SUBJECTIVE/OBJECTIVE:                Nathalie Harp is a 62 year old female coming in for a follow-up visit for Medication Therapy Management.  She was referred to me from Eva Lockhart     Chief Complaint: Follow up from our visit on 3/28/2018.  Diabetes follow-up. Cough is much improved since last visit, not needing as much cough drops. She did end up filling z-pack and finishing supply of benzonatate. Asking for 1 refill of benzonatate today.  Personal Healthcare Goals: Get A1c below 9 so she can qualify for cataract surgery    Tobacco: No tobacco use  Alcohol: none    Medication Adherence: issues reported; admits she occasionally misses her PM dose of insulin. Has missed one dose since our last visit.     Diabetes:  Pt currently taking Levemir 55units QAM, Novolog 16units with meals (eats 2-3x/day).   Reports she is using a new needle with each injection, and now using regular BD pen needles (not the autocover needles). She states they are more comfortable. Seem to be working properly, no issues with insulin leaking out or difficulty injecting.   SMBG Ranges (based on glucometer readings): 14d avg (n14 386. A few times a month will have something to eat in the middle of the night, not every night.  Symptoms of low blood sugar? Shakey, dizzy, sweaty, strong heartbeat, \"like all the blood has drained from my body\" -- happened once with reading of 104mg/dL.   Symptoms of high blood sugar? Fatigue.  ACEi/ARB: Yes: losartan 100mg QD. Microalbumin is not < 30 mg/g. Losartan dose was increased in March.  Aspirin: Taking 81mg daily and has the following issues: bruises easily  Statin: Yes: atorvastatin 40mg QD and denies side effects  Diet/Exercise: 2-3 meals per day. Glucerna if she misses lunch. Hasn't been riding stationary bike at home lately.  Yesterday she ate:  Breakfast: 2 eggs, 2 toast  Lunch: pizza (this isn't normal for her)  Snack: banana  Dinner: can't recall, went to bed at 7pm  Date FBG/ " "2hours post Lunch/2hours post   4/13 470    4/12 567    4/11 456    4/9 517    4/8 500    4/7 469    4/6 517    4/5 358    4/4 395    4/3 335    4/2 267    4/1 288    3/31 155 104 (1:30pm)   3/30 196    3/29  217     Chronic pain:  Current medications include: Gabapentin 300mg QHS - started in March for pain described as Numb, Shooting, Stabbing and pins and needles, like you pricked yourself with a paring knife.  She reports the gabapentin has \"helped considerably\". States the pain in her fingers and feet is much improved and she is sleeping much better. She denies confusion, grogginess the next morning, peripheral edema. Also reports joint and muscle pain. Has been taking Aleve daily and it isn't helping much, however in combination with glucosamine she does feel is helpful for her lower back pain. Excedrin helps a little bit for pain and is very effective for occasional headache. Wondering if she can go back on Celebrex -- she stopped this after her most recent hospitalization d/t inefficacy.    Asthma/Allergies:  Current asthma medications: Albuterol MDI and (Pt reports using albuterol occassionaly - states she has used it once in the last 2 weeks). As above bothersome cough and congestion improved after Z-pack and use of benzonatate. Still has some residual cough and requesting one more fill of benzonatate.   Asthma triggers include: upper respiratory infections, dust mites and animal dander.    She also takes fexofenadine 180mg QD; she feels this is somewhat helpful for allergies but does not help her congestion. She has fluticasone nasal spray, but only uses rarely because it causes bloody noses.  Pt reports the following symptoms: none. AAP on file: YES  ACT Total Scores 2/24/2017 7/28/2017 2/9/2018   ACT TOTAL SCORE - - -   ASTHMA ER VISITS - - -   ASTHMA HOSPITALIZATIONS - - -   ACT TOTAL SCORE (Goal Greater than or Equal to 20) 12 18 21   In the past 12 months, how many times did you visit the emergency " room for your asthma without being admitted to the hospital? 0 0 0   In the past 12 months, how many times were you hospitalized overnight because of your asthma? 0 0 0       Current labs include:  Today's Vitals: /76  Pulse 88  Wt 117 lb 9.6 oz (53.3 kg)  LMP  (LMP Unknown)  BMI 26.37 kg/m2     BP Readings from Last 3 Encounters:   03/30/18 172/88   03/28/18 159/87   03/14/18 156/84     Wt Readings from Last 3 Encounters:   03/30/18 122 lb 6.4 oz (55.5 kg)   03/28/18 119 lb 3.2 oz (54.1 kg)   03/14/18 118 lb 9.6 oz (53.8 kg)     Lab Results   Component Value Date    A1C 12.8 02/09/2018    A1C 13.8 07/03/2017    A1C 12.3 12/21/2016    A1C 11.3 10/07/2015    A1C 11.4 08/31/2015     Lab Results   Component Value Date    CHOL 261 02/09/2018     Lab Results   Component Value Date    TRIG 243 02/09/2018     Lab Results   Component Value Date    HDL 88 02/09/2018     Lab Results   Component Value Date     02/09/2018       Liver Function Studies -   Recent Labs   Lab Test  02/09/18   0737  11/14/16   1004   PROTTOTAL   --   7.5   ALBUMIN   --   4.2   BILITOTAL   --   0.6   ALKPHOS   --   109   AST  15  14   ALT  23  29       Lab Results   Component Value Date    UCRR 142 02/09/2018    MICROL 1240 02/09/2018    UMALCR 873.24 (H) 02/09/2018       Last Basic Metabolic Panel:  Lab Results   Component Value Date     02/09/2018      Lab Results   Component Value Date    POTASSIUM 4.0 02/09/2018     Lab Results   Component Value Date    CHLORIDE 105 02/09/2018     Lab Results   Component Value Date    BUN 16 02/09/2018     Lab Results   Component Value Date    CR 0.49 02/09/2018     GFR Estimate   Date Value Ref Range Status   02/09/2018 >90 >60 mL/min/1.7m2 Final     Comment:     Non  GFR Calc   04/21/2017 >90  Non  GFR Calc   >60 mL/min/1.7m2 Final   11/14/2016 62 >60 mL/min/1.7m2 Final     GFR Estimate If Black   Date Value Ref Range Status   02/09/2018 >90 >60  mL/min/1.7m2 Final     Comment:      GFR Calc   04/21/2017 >90   GFR Calc   >60 mL/min/1.7m2 Final   11/14/2016 75 >60 mL/min/1.7m2 Final     Most Recent Immunizations   Administered Date(s) Administered     Influenza (High Dose) 3 valent vaccine 08/31/2017     Influenza (IIV3) PF 08/30/2016     Influenza Vaccine IM 3yrs+ 4 Valent IIV4 08/30/2017     Pneumo Conj 13-V (2010&after) 11/14/2016     Pneumococcal 23 valent 11/14/2008     TD (ADULT, 7+) 09/01/2011         ASSESSMENT:              Current medications were reviewed today as discussed above.      Medication Adherence: no issues identified    Diabetes: Needs Improvement. Patient is not meeting A1c goal of < 7%. Self monitoring of blood glucose is not at goal of fasting  mg/dL and post prandial < 180 mg/dL. Pt insists she is compliant with insulin injections. It's possible that Levemir is wearing off before 24hrs; she may benefit from splitting her dose BID to ensure 24hour coverage. She would also benefit from an increased dose of both Levemir and Novolog.    Chronic Pain: Needs improvement. She may benefit from trial of scheduled APAP, before considering stronger pain medications. Could possibly consider re-challenge of Celebrex in the future. Does not have diagnosis of neuropathy so will need workup for this by PCP.    Asthma/Allergies: Improved. Refill of benzonatate ok - reviewed with Sapna Anand PA-C.       PLAN:                  1. Increase Levemir to 60units and split BID.  2. Increase Novolog to 18units with meals.  3. Scheduled APAP 500mg 1-2 tabs BID-TID.  4. Refill of benzonatate x1 today.    I spent 60 minutes with this patient today. All changes were made via collaborative practice agreement with Eva Lockhart. A copy of the visit note was provided to the patient's primary care provider.     Will follow up in 2 weeks.    The patient was given a summary of these recommendations as an after visit  summary.    Jovana Loo, Pharm.D., [facility]  Medication Therapy Management Pharmacist  [phone]

## 2018-04-13 NOTE — PATIENT INSTRUCTIONS
Recommendations from today's MTM visit:                                                        1. Start taking your Levemir 30units every morning and 30units every evening at bedtime.    2. Increase your Novolog to 18units before every meal.    3. Try taking Tylenol (acetaminophen) 500mg 1-2 tablets two or three times every day and see if this helps with your pain.    4. Benozonatate cough medicine refilled.      Next MTM visit: Friday, April 27th at 10:30am    To schedule another MTM appointment, please call the clinic directly or you may call the MTM scheduling line at [phone] or toll-free at [phone].     My Clinical Pharmacist's contact information:                                                      It was a pleasure seeing you today!  Please feel free to contact me with any questions or concerns you have.      Jovana Loo, Pharm.D., [facility]  Medication Therapy Management Pharmacist  [phone]    You may receive a survey about the MTM services you received.  I would appreciate your feedback to help me serve you better in the future. Please fill it out and return it when you can. Your comments will be anonymous.      My healthcare goals:                                                      Diabetes Goals:    Home Monitoring of Blood Sugars:Fasting  mg/dL and 2 hours after a meal less than 180 mg/dL.    Hemoglobin A1C: Less than 8%. Yours is   Lab Results   Component Value Date    A1C 12.8 02/09/2018   .    Blood Pressure: Less than 140/90mmHg. Yours is /76  Pulse 88  Wt 117 lb 9.6 oz (53.3 kg)  LMP  (LMP Unknown)  BMI 26.37 kg/m2.    Cholesterol: You are taking atorvastatin to help decrease the risk of heart disease.    Things you can do to help lower your blood sugars:    Diet: 3 meals and 1-2 snacks per day with 45-60 grams of carbohydrates per meal and 15-30 grams of carbohydrates per snack. Try to fill your plate at least half-full with vegetables, fill one-quarter full  with lean meats or protein, and also make sure you get at least some carbohydrate with every meal.    Exercise: 30 minutes per day of anything that will increase your heart rate and make you break a sweat! Gardening, walking, cleaning the house, changing the oil in your car, etc. If you feel like 30 minutes per day is too much, start small. Even lifting canned foods or working your arms with a resistance band in front of the TV can help.

## 2018-04-13 NOTE — MR AVS SNAPSHOT
After Visit Summary   4/13/2018    Nathalie Harp    MRN: [id number]           Patient Information     Date Of Birth          1955        Visit Information        Provider Department      4/13/2018 10:30 AM Jovana Loo, [facility] MTM        Today's Diagnoses     Type 2 diabetes mellitus with diabetic neuropathy, with long-term current use of insulin (H)    -  1    Cough        Uncontrolled type 2 diabetes mellitus without complication, with long-term current use of insulin (H)        Other chronic pain          Care Instructions    Recommendations from today's MT visit:                                                        1. Start taking your Levemir 30units every morning and 30units every evening at bedtime.    2. Increase your Novolog to 18units before every meal.    3. Try taking Tylenol (acetaminophen) 500mg 1-2 tablets two or three times every day and see if this helps with your pain.    4. Benozonatate cough medicine refilled.      Next MTM visit: Friday, April 27th at 10:30am    To schedule another MTM appointment, please call the clinic directly or you may call the MTM scheduling line at [phone] or toll-free at [phone].     My Clinical Pharmacist's contact information:                                                      It was a pleasure seeing you today!  Please feel free to contact me with any questions or concerns you have.      Jovana Loo, Pharm.D., [facility]  Medication Therapy Management Pharmacist  [phone]    You may receive a survey about the MTM services you received.  I would appreciate your feedback to help me serve you better in the future. Please fill it out and return it when you can. Your comments will be anonymous.      My healthcare goals:                                                      Diabetes Goals:    Home Monitoring of Blood Sugars:Fasting  mg/dL and 2 hours after a meal less than 180  mg/dL.    Hemoglobin A1C: Less than 8%. Yours is   Lab Results   Component Value Date    A1C 12.8 02/09/2018   .    Blood Pressure: Less than 140/90mmHg. Yours is /76  Pulse 88  Wt 117 lb 9.6 oz (53.3 kg)  LMP  (LMP Unknown)  BMI 26.37 kg/m2.    Cholesterol: You are taking atorvastatin to help decrease the risk of heart disease.    Things you can do to help lower your blood sugars:    Diet: 3 meals and 1-2 snacks per day with 45-60 grams of carbohydrates per meal and 15-30 grams of carbohydrates per snack. Try to fill your plate at least half-full with vegetables, fill one-quarter full with lean meats or protein, and also make sure you get at least some carbohydrate with every meal.    Exercise: 30 minutes per day of anything that will increase your heart rate and make you break a sweat! Gardening, walking, cleaning the house, changing the oil in your car, etc. If you feel like 30 minutes per day is too much, start small. Even lifting canned foods or working your arms with a resistance band in front of the TV can help.                  Follow-ups after your visit        Your next 10 appointments already scheduled     Apr 27, 2018 10:30 AM CDT   Office Visit with Jovana Loo RPH   [facility] ([address] [address] [phone]           Bring a current list of meds and any records pertaining to this visit. For Physicals, please bring immunization records and any forms needing to be filled out. Please arrive 10 minutes early to complete paperwork.              Who to contact     If you have questions or need follow up information about today's clinic visit or your schedule please contact [facility] [place] [facility] directly at [phone].  Normal or non-critical lab and imaging results will be communicated to you by MyChart, letter or phone within 4 business days after the clinic  "has received the results. If you do not hear from us within 7 days, please contact the clinic through [employer] or phone. If you have a critical or abnormal lab result, we will notify you by phone as soon as possible.  Submit refill requests through [employer] or call your pharmacy and they will forward the refill request to us. Please allow 3 business days for your refill to be completed.          Additional Information About Your Visit        [employer] Information     [employer] lets you send messages to your doctor, view your test results, renew your prescriptions, schedule appointments and more. To sign up, go to [email or web address] . Click on \"Log in\" on the left side of the screen, which will take you to the Welcome page. Then click on \"Sign up Now\" on the right side of the page.     You will be asked to enter the access code listed below, as well as some personal information. Please follow the directions to create your username and password.     Your access code is: [email or web address]  Expires: 2018 11:21 AM     Your access code will  in 90 days. If you need help or a new code, please call your [place] clinic or [phone].        Care EveryWhere ID     This is your Care EveryWhere ID. This could be used by other organizations to access your [place] medical records  [id number]        Your Vitals Were     Pulse Last Period BMI (Body Mass Index)             88 (LMP Unknown) 26.37 kg/m2          Blood Pressure from Last 3 Encounters:   18 132/76   18 172/88   18 159/87    Weight from Last 3 Encounters:   18 117 lb 9.6 oz (53.3 kg)   18 122 lb 6.4 oz (55.5 kg)   18 119 lb 3.2 oz (54.1 kg)              Today, you had the following     No orders found for display         Today's Medication Changes          These changes are accurate as of 18 11:21 AM.  If you have any questions, ask your nurse or doctor.               Start taking these medicines.        Dose/Directions "    acetaminophen 500 MG Caps   Commonly known as:  ACETAMINOPHEN EXTRA STRENGTH   Used for:  Other chronic pain        Dose:  1-2 tablet   Take 1-2 tablets by mouth 3 times daily   Quantity:  180 capsule   Refills:  0         These medicines have changed or have updated prescriptions.        Dose/Directions    insulin detemir 100 UNIT/ML injection   Commonly known as:  LEVEMIR FLEXPEN/FLEXTOUCH   This may have changed:  additional instructions   Used for:  Uncontrolled type 2 diabetes mellitus without complication, with long-term current use of insulin (H)        Inject 30units under the skin twice daily - every morning AND at bedtime.   Quantity:  30 mL   Refills:  3            Where to get your medicines      These medications were sent to [facility] - [place], [place] - [address]  [address], [facility] [address]     Phone:  [phone]     benzonatate 200 MG capsule    insulin detemir 100 UNIT/ML injection         Some of these will need a paper prescription and others can be bought over the counter.  Ask your nurse if you have questions.     You don't need a prescription for these medications     acetaminophen 500 MG Caps                Primary Care Provider Office Phone # Fax #    Eva Lockhart [phone]       [address]  [facility] [place] [address]        Goals        General    I will check my blood sugars four times per day (pt-stated)     Notes - Note created  3/10/2014 10:41 AM by Priti Orozco RN    As of today's date 3/10/2014 goal is met at 26 - 50%.   Goal Status:  Active      I will continue to work with Diabetic Educator (pt-stated)     Notes - Note created  3/10/2014 10:42 AM by Priti Orozco RN    As of today's date 3/10/2014 goal is met at 26 - 50%.   Goal Status:  Active      I will take insulins as prescribed, including sliding scale novolog (pt-stated)     Notes - Note created  3/10/2014 10:43 AM by Ernesto  SORAIDA Marcos    As of today's date 3/10/2014 goal is met at 51 - 75%.   Goal Status:  Active        Equal Access to Services     ADI LOPEZ : Hadii aad ku haddavid Brodyali, hiralda rlcriss, liset kaamosda any, sandra angelin hayaan besspham betts laSelenetri waller. So [facility] [phone].    ATENCIÓN: Si habla español, tiene a tompkins disposición servicios gratuitos de asistencia lingüística. Llame al [phone].    We comply with applicable federal civil rights laws and [place] laws. We do not discriminate on the basis of race, color, national origin, age, disability, sex, sexual orientation, or gender identity.            Thank you!     Thank you for choosing [facility]  for your care. Our goal is always to provide you with excellent care. Hearing back from our patients is one way we can continue to improve our services. Please take a few minutes to complete the written survey that you may receive in the mail after your visit with us. Thank you!             Your Updated Medication List - Protect others around you: Learn how to safely use, store and throw away your medicines at www.disposemymeds.org.          This list is accurate as of 4/13/18 11:21 AM.  Always use your most recent med list.                   Brand Name Dispense Instructions for use Diagnosis    acetaminophen 500 MG Caps    ACETAMINOPHEN EXTRA STRENGTH    180 capsule    Take 1-2 tablets by mouth 3 times daily    Other chronic pain       albuterol 108 (90 Base) MCG/ACT Inhaler    PROAIR HFA/PROVENTIL HFA/VENTOLIN HFA    3 Inhaler    Inhale 2 puffs into the lungs every 6 hours as needed for shortness of breath / dyspnea ((Ventolin or preferred albuterol equivalent))    Mild intermittent asthma without complication       aspirin 81 MG tablet      Take 1 tablet by mouth daily Reported on 4/21/2017        atorvastatin 40 MG tablet    LIPITOR    90 tablet    TAKE ONE TABLET BY MOUTH EVERY DAY    Hyperlipidemia LDL goal <100        benzonatate 200 MG capsule    TESSALON    21 capsule    Take 1 capsule (200 mg) by mouth 3 times daily as needed for cough    Cough       blood glucose lancets standard    no brand specified    1 Box    Use to test blood sugar 3 times daily or as directed.    Type 2 diabetes mellitus without complication, with long-term current use of insulin (H)       blood glucose monitoring meter device kit    no brand specified    1 kit    Use to test blood sugar 3 times daily or as directed    Type 2 diabetes mellitus without complication, with long-term current use of insulin (H)       blood glucose monitoring test strip    ONETOUCH ULTRA    300 each    TEST BLOOD SUGARS 3 TIMES DAILY.    Type 2 diabetes mellitus without complication, with long-term current use of insulin (H)       EPINEPHrine 0.3 MG/0.3ML injection 2-pack    EPIPEN/ADRENACLICK/or ANY BX GENERIC EQUIV    0.3 mL    INJECT ONE DEVICE INTO THE MUSCLE AS NEEDED FOR ALLERGIC REACTION    Allergic to shellfish       fexofenadine 180 MG tablet    ALLEGRA    30 tablet    Take 1 tablet (180 mg) by mouth daily    Chronic seasonal allergic rhinitis, unspecified trigger       fluticasone 50 MCG/ACT spray    FLONASE    3 Bottle    Spray 1-2 sprays into both nostrils daily    Chronic seasonal allergic rhinitis, unspecified trigger       gabapentin 300 MG capsule    NEURONTIN    90 capsule    Take 1 tablet (300 mg) at night.    Type 2 diabetes mellitus with diabetic neuropathy, with long-term current use of insulin (H)       glucosamine chondroitin 1500 complex Caps      Take 1 capsule by mouth daily        insulin detemir 100 UNIT/ML injection    LEVEMIR FLEXPEN/FLEXTOUCH    30 mL    Inject 30units under the skin twice daily - every morning AND at bedtime.    Uncontrolled type 2 diabetes mellitus without complication, with long-term current use of insulin (H)       insulin pen needle 31G X 5 MM    B-D U/F    200 each    Use 4 daily as directed. This replaces the auto-cover  pen needle.    Uncontrolled type 2 diabetes mellitus without complication, with long-term current use of insulin (H)       losartan 100 MG tablet    COZAAR    90 tablet    Take 1 tablet (100 mg) by mouth daily    Type 2 diabetes mellitus with diabetic neuropathy, with long-term current use of insulin (H)       NovoLOG FLEXPEN 100 UNIT/ML injection   Generic drug:  insulin aspart     15 mL    Inject 16 Units Subcutaneous 3 times daily (with meals)    Uncontrolled type 2 diabetes mellitus without complication, with long-term current use of insulin (H)       omeprazole 20 MG CR capsule    priLOSEC    90 capsule    TAKE ONE CAPSULE BY MOUTH EVERY DAY    Gastroesophageal reflux disease, esophagitis presence not specified

## 2018-04-27 ENCOUNTER — OFFICE VISIT (OUTPATIENT)
Dept: PHARMACY | Facility: CLINIC | Age: 63
End: 2018-04-27
Payer: COMMERCIAL

## 2018-04-27 ENCOUNTER — PATIENT OUTREACH (OUTPATIENT)
Dept: CARE COORDINATION | Facility: CLINIC | Age: 63
End: 2018-04-27

## 2018-04-27 VITALS
WEIGHT: 118.6 LBS | BODY MASS INDEX: 26.59 KG/M2 | HEART RATE: 80 BPM | DIASTOLIC BLOOD PRESSURE: 84 MMHG | SYSTOLIC BLOOD PRESSURE: 150 MMHG

## 2018-04-27 DIAGNOSIS — E11.21 TYPE 2 DIABETES MELLITUS WITH DIABETIC NEPHROPATHY, WITH LONG-TERM CURRENT USE OF INSULIN (H): Primary | ICD-10-CM

## 2018-04-27 DIAGNOSIS — Z79.4 TYPE 2 DIABETES MELLITUS WITH DIABETIC NEPHROPATHY, WITH LONG-TERM CURRENT USE OF INSULIN (H): Primary | ICD-10-CM

## 2018-04-27 PROCEDURE — 99606 MTMS BY PHARM EST 15 MIN: CPT | Performed by: PHARMACIST

## 2018-04-27 PROCEDURE — 99607 MTMS BY PHARM ADDL 15 MIN: CPT | Performed by: PHARMACIST

## 2018-04-27 NOTE — MR AVS SNAPSHOT
After Visit Summary   4/27/2018    Nathalie Harp    MRN: [id number]           Patient Information     Date Of Birth          1955        Visit Information        Provider Department      4/27/2018 10:30 AM Jovana Loo, [facility] MT        Today's Diagnoses     Type 2 diabetes mellitus with diabetic nephropathy, with long-term current use of insulin (H)    -  1       Follow-ups after your visit        Additional Services     CARE COORDINATION REFERRAL       Services are provided by a Care Coordinator for people with complex needs such as: medical, social, or financial troubles.  The Care Coordinator works with the patient and their Primary Care Provider to determine health goals, obtain resources, achieve outcomes, and develop care plans that help coordinate the patient's care.     Reason for Referral: Complex Medical Concerns/Education: Chronic diagnosis poorly controlled diabetes and Compliance with medical treatment plan, Financial Support: Food and Housing and Resources for Transportation    Additional pertinent details:  Peter met with Nathalie 4/27/18 initially. Has a 44 year old daughter with reported mental retardation, this really consumes her.    Clinical Staff have discussed the Care Coordination Referral with the patient and/or caregiver: yes                  Your next 10 appointments already scheduled     May 30, 2018 10:00 AM CDT   LAB with CP LAB   [facility] ([facility])    [address] [place] [address] [phone]           Please do not eat 10-12 hours before your appointment if you are coming in fasting for labs on lipids, cholesterol, or glucose (sugar). This does not apply to pregnant women. Water, hot tea and black coffee (with nothing added) are okay. Do not drink other fluids, diet soda or chew gum.            May 30, 2018 10:20 AM CDT   Office Visit  "with Sapna Anand PA-C   [facility] ([facility]    [address] [facility] [address] [phone]           Bring a current list of meds and any records pertaining to this visit. For Physicals, please bring immunization records and any forms needing to be filled out. Please arrive 10 minutes early to complete paperwork.            May 30, 2018 11:00 AM CDT   Office Visit with Jovana Loo RPH   [facility] ([facility])    [address] [facility] [id number] [phone]           Bring a current list of meds and any records pertaining to this visit. For Physicals, please bring immunization records and any forms needing to be filled out. Please arrive 10 minutes early to complete paperwork.              Who to contact     If you have questions or need follow up information about today's clinic visit or your schedule please contact [facility] directly at [phone].  Normal or non-critical lab and imaging results will be communicated to you by MyChart, letter or phone within 4 business days after the clinic has received the results. If you do not hear from us within 7 days, please contact the clinic through [employer] or phone. If you have a critical or abnormal lab result, we will notify you by phone as soon as possible.  Submit refill requests through [employer] or call your pharmacy and they will forward the refill request to us. Please allow 3 business days for your refill to be completed.          Additional Information About Your Visit        [employer] Information     [employer] lets you send messages to your doctor, view your test results, renew your prescriptions, schedule appointments and more. To sign up, go to [email or web address] . Click on \"Log in\" on the left side of the screen, which will take you to the Welcome page. " "Then click on \"Sign up Now\" on the right side of the page.     You will be asked to enter the access code listed below, as well as some personal information. Please follow the directions to create your username and password.     Your access code is: [email or web address]  Expires: 2018 11:21 AM     Your access code will  in 90 days. If you need help or a new code, please call your [place] clinic or [phone].        Care EveryWhere ID     This is your Care EveryWhere ID. This could be used by other organizations to access your [place] medical records  [id number]        Your Vitals Were     Pulse Last Period BMI (Body Mass Index)             80 (LMP Unknown) 26.59 kg/m2          Blood Pressure from Last 3 Encounters:   18 150/84   18 132/76   18 172/88    Weight from Last 3 Encounters:   18 118 lb 9.6 oz (53.8 kg)   18 117 lb 9.6 oz (53.3 kg)   18 122 lb 6.4 oz (55.5 kg)              We Performed the Following     CARE COORDINATION REFERRAL        Primary Care Provider Office Phone # Fax #    Eva Lockhart [phone]       [address] [facility] [address]        Goals        General    Financial Wellbeing (pt-stated)     Notes - Note created  2018 11:53 AM by Peter Woo RN    Goal Statement: I will work with the care coordinators in applying for the public assistance that I may qualify for in the next 1-2 months.  Measure of Success: applications have been filled out  Supportive Steps to Achieve: rationale explained.  Barriers: none  Strengths: motivated  Date to Achieve By: 1-2 months        I will check my blood sugars four times per day (pt-stated)     Notes - Note created  3/10/2014 10:41 AM by Priti Orozco RN    As of today's date 3/10/2014 goal is met at 26 - 50%.   Goal Status:  Active      I will continue to work with Diabetic Educator (pt-stated)     Notes - Note created  3/10/2014 10:42 AM by Priti Orozco RN    As " of today's date 3/10/2014 goal is met at 26 - 50%.   Goal Status:  Active      I will take insulins as prescribed, including sliding scale novolog (pt-stated)     Notes - Note created  3/10/2014 10:43 AM by Priti Orozco, RN    As of today's date 3/10/2014 goal is met at 51 - 75%.   Goal Status:  Active      Transportation (pt-stated)     Notes - Note created  4/27/2018 11:50 AM by Peter Woo, RN    Goal Statement: I will apply for [employer] in the next 1-2 months.  Measure of Success: application complete  Supportive Steps to Achieve: rationale of less confusion for the patient  Barriers: none  Strengths: motivated  Date to Achieve By: 1-2 months          Equal Access to Services     ADI LOPEZ AH: Hadsharon colino Sokamryn, waaxda luqadaha, qaybta kaalmada adeegyada, sandra waller. So [facility] [phone].    ATENCIÓN: Si habla español, tiene a tompkins disposición servicios gratuitos de asistencia lingüística. Llame al [phone].    We comply with applicable federal civil rights laws and [place] laws. We do not discriminate on the basis of race, color, national origin, age, disability, sex, sexual orientation, or gender identity.            Thank you!     Thank you for choosing [facility]  for your care. Our goal is always to provide you with excellent care. Hearing back from our patients is one way we can continue to improve our services. Please take a few minutes to complete the written survey that you may receive in the mail after your visit with us. Thank you!             Your Updated Medication List - Protect others around you: Learn how to safely use, store and throw away your medicines at www.disposemymeds.org.          This list is accurate as of 4/27/18 11:54 AM.  Always use your most recent med list.                   Brand Name Dispense Instructions for use Diagnosis    acetaminophen 500 MG Caps    ACETAMINOPHEN EXTRA STRENGTH    180 capsule     Take 1-2 tablets by mouth 3 times daily    Other chronic pain       albuterol 108 (90 Base) MCG/ACT Inhaler    PROAIR HFA/PROVENTIL HFA/VENTOLIN HFA    3 Inhaler    Inhale 2 puffs into the lungs every 6 hours as needed for shortness of breath / dyspnea ((Ventolin or preferred albuterol equivalent))    Mild intermittent asthma without complication       aspirin 81 MG tablet      Take 1 tablet by mouth daily Reported on 4/21/2017        atorvastatin 40 MG tablet    LIPITOR    90 tablet    TAKE ONE TABLET BY MOUTH EVERY DAY    Hyperlipidemia LDL goal <100       benzonatate 200 MG capsule    TESSALON    21 capsule    Take 1 capsule (200 mg) by mouth 3 times daily as needed for cough    Cough       blood glucose lancets standard    no brand specified    1 Box    Use to test blood sugar 3 times daily or as directed.    Type 2 diabetes mellitus without complication, with long-term current use of insulin (H)       blood glucose monitoring meter device kit    no brand specified    1 kit    Use to test blood sugar 3 times daily or as directed    Type 2 diabetes mellitus without complication, with long-term current use of insulin (H)       blood glucose monitoring test strip    ONETOUCH ULTRA    300 each    TEST BLOOD SUGARS 3 TIMES DAILY.    Type 2 diabetes mellitus without complication, with long-term current use of insulin (H)       EPINEPHrine 0.3 MG/0.3ML injection 2-pack    EPIPEN/ADRENACLICK/or ANY BX GENERIC EQUIV    0.3 mL    INJECT ONE DEVICE INTO THE MUSCLE AS NEEDED FOR ALLERGIC REACTION    Allergic to shellfish       fexofenadine 180 MG tablet    ALLEGRA    30 tablet    Take 1 tablet (180 mg) by mouth daily    Chronic seasonal allergic rhinitis, unspecified trigger       fluticasone 50 MCG/ACT spray    FLONASE    3 Bottle    Spray 1-2 sprays into both nostrils daily    Chronic seasonal allergic rhinitis, unspecified trigger       gabapentin 300 MG capsule    NEURONTIN    90 capsule    Take 1 tablet (300 mg) at  night.    Type 2 diabetes mellitus with diabetic neuropathy, with long-term current use of insulin (H)       glucosamine chondroitin 1500 complex Caps      Take 1 capsule by mouth daily        insulin detemir 100 UNIT/ML injection    LEVEMIR FLEXPEN/FLEXTOUCH    30 mL    Inject 30units under the skin twice daily - every morning AND at bedtime.    Uncontrolled type 2 diabetes mellitus without complication, with long-term current use of insulin (H)       insulin pen needle 31G X 5 MM    B-D U/F    200 each    Use 4 daily as directed. This replaces the auto-cover pen needle.    Uncontrolled type 2 diabetes mellitus without complication, with long-term current use of insulin (H)       losartan 100 MG tablet    COZAAR    90 tablet    Take 1 tablet (100 mg) by mouth daily    Type 2 diabetes mellitus with diabetic neuropathy, with long-term current use of insulin (H)       NovoLOG FLEXPEN 100 UNIT/ML injection   Generic drug:  insulin aspart     15 mL    Inject 18 Units Subcutaneous 3 times daily (with meals)    Uncontrolled type 2 diabetes mellitus without complication, with long-term current use of insulin (H)       omeprazole 20 MG CR capsule    priLOSEC    90 capsule    TAKE ONE CAPSULE BY MOUTH EVERY DAY    Gastroesophageal reflux disease, esophagitis presence not specified

## 2018-04-27 NOTE — LETTER
Health Care Home - Access Care Plan    About Me  Patient Name:  Nathalie Harp    YOB: 1955  Age:                             62 year old   Moe MRN:            [id number] Telephone Information:     Home Phone [phone]   Mobile [phone]       Address:    [address]  [facility] [address] Email address:  No e-mail address on record      Emergency Contact(s)  Name Relationship Lgl Grd Work Phone Home Phone Mobile Phone   1RAJAN BROWN* Daughter   [phone]   2. NO SECONDARY C*    none              Health Maintenance: Routine Health maintenance Reviewed: Due/Overdue     My Access Plan  Medical Emergency 911   Questions or concerns during clinic hours Primary Clinic Line, I will call the clinic directly: [facility] [phone]   24 Hour Appointment Line [phone] or  [phone]) (toll free)   24 Hour Nurse Line [phone] (toll free)   Questions or concerns outside clinic hours 24 Hour Appointment Line, I will call the after-hours on-call line:   [facility] [phone] or [phone]) (toll-free)      Preferred Urgent Care     Preferred Hospital     Preferred Pharmacy [id number] [address]     Behavioral Health Crisis Line The National Suicide Prevention Lifeline at 1-370.994.9971 or 911     My Care Team Members  Patient Care Team       Relationship Specialty Notifications Start End    Eva Lockhart MD PCP - General Internal Medicine  1/4/17     Phone: [phone] Fax: [phone] [address] [facility] [address]    Malissa Jack   Diabetic Education  3/10/14     Phone: [phone] Fax: [phone]         XX RESIGNED XX [facility] [address]    Farida    11/5/14     Comment:  Zamzam MCKEON     Phone: [phone]         Peter Woo, RN Clinic Care Coordinator Primary Care  - CC Admissions 4/27/18     Phone: [phone] Fax: [phone]               My Medical and Care Information  Problem List   Patient Active Problem List   Diagnosis     Esophageal reflux     Irritable bowel syndrome     Female stress incontinence     Migraine without aura     Arthritis of knee, right     Disorder of bursae and tendons in shoulder region     Degeneration of thoracic or thoracolumbar intervertebral disc     Degeneration of cervical intervertebral disc     Other hammer toe (acquired)     Allergic rhinitis due to pollen     Menopausal symptoms     Need for SBE (subacute bacterial endocarditis) prophylaxis     Preventive measure     Hyperlipidemia LDL goal <100     Health Care Home     Anemia     Dizziness     Hammer toe     Microalbuminuria     ACP (advance care planning)     Allergic to shellfish     Falls frequently     Balance problems     CKD (chronic kidney disease) stage 2, GFR 60-89 ml/min     Type 2 diabetes mellitus with diabetic nephropathy (H)     Diarrhea     Iron deficiency anemia, unspecified iron deficiency anemia type     Right axillary hidradenitis     Uncontrolled type 2 diabetes mellitus without complication, with long-term current use of insulin (H)     Mild intermittent asthma without complication      Current Medications and Allergies:  See printed Medication Report

## 2018-04-27 NOTE — PROGRESS NOTES
Clinic Care Coordination Contact    OUTREACH    Referral Information:  Referral Source: [facility]    Primary Diagnosis: Diabetes    Chief Complaint   Patient presents with     Clinic Care Coordination - Face To Face     RN CC     Care Team     Chart Review Please        Universal Utilization:   Utilization    Last refreshed: 4/27/2018 11:53 AM:  No Show Count (past year) 4       Last refreshed: 4/27/2018 11:53 AM:  ED visits 1       Last refreshed: 4/27/2018 11:53 AM:  Hospital admissions 0          Current as of: 4/27/2018 11:53 AM             Clinical Concerns:  Current Medical Concerns:  The patient was seen by [facility] today who made a referral to Care Coordination for assistance in housing and transportation.  The patient has been working with [facility] for assistance in coordinating medications to control her diabetes.  The patient states that her food stamps decreased from $196 to $140, which supplies food for both she and her 44 year old daughter.  The daughter received EBT assistance although she will not use it for food for the family the patient states that she uses it for cash.  The patient states that she has multiple food allergies and is afraid to eat any prepared meal such as meals on wheels for fear of contamination.  The patient continues to refer to the staff at the [facility] as the people who are preventing her from obtaining any additional assistance.  The patient also informed the [facility] pharmacist that she and her daughter, 3 dogs, 3 cats, and 1 parakeet will be homeless on Tuesday although she does have a voucher for a section 8 housing.  The SW was asked to look into any resources that may assist the patient.       SPECIALTY PROBLEM LIST   Menopausal symptoms   Other Medical Problems   Hyperlipidemia LDL goal <100   Mild intermittent asthma without complication   Uncontrolled type 2 diabetes mellitus without complication, with long-term current use of insulin (H)   Iron deficiency anemia, unspecified iron  "deficiency anemia type   Right axillary hidradenitis   Diarrhea   Falls frequently   Balance problems   CKD (chronic kidney disease) stage 2, GFR 60-89 ml/min   Type 2 diabetes mellitus with diabetic nephropathy (H)   Allergic to shellfish   ACP (advance care planning)   Microalbuminuria   Hammer toe   Dizziness   Anemia   Health Care Home   Need for SBE (subacute bacterial endocarditis) prophylaxis   Esophageal reflux   Irritable bowel syndrome   Female stress incontinence   Migraine without aura   Arthritis of knee, right   Disorder of bursae and tendons in shoulder region   Degeneration of thoracic or thoracolumbar intervertebral disc   Degeneration of cervical intervertebral disc   Other hammer toe (acquired)   Allergic rhinitis due to pollen   Preventive measure        Current Behavioral Concerns: none noted    Education Provided to patient: The offerings of care coordination.  The limitations on the assistance that we can find.  Clinical Pathway Name: None  Health Maintenance Reviewed: Due/Overdue     Medication Management:  Great concern with medications.  The patient has been picking them up and telling the MTM that she is using the insulin and yet her BS readings are in the 400\"s.       Functional Status:  Mobility Status: Independent w/Device  Equipment Currently Used at Home: cane, straight  Transportation:  Transportation means:: Public transportation     Psychosocial:  Current living arrangement:: I live in a private home with family     Financial/Insurance: Short on money for food can not use food shelf food or free meals.         Resources and Interventions:  Current Resources:  ;    Advanced Care Plans/Directives on file:: No        Goals:   Goals        General    Financial Wellbeing (pt-stated)     Notes - Note created  4/27/2018 11:53 AM by Peter Woo, RN    Goal Statement: I will work with the care coordinators in applying for the public assistance that I may qualify for in the next 1-2 " months.  Measure of Success: applications have been filled out  Supportive Steps to Achieve: rationale explained.  Barriers: none  Strengths: motivated  Date to Achieve By: 1-2 months        Healthy Eating (pt-stated)     Notes - Note created  4/27/2018 11:56 AM by Peter Woo, RN    Goal Statement: I will work on planning and purchasing nutritious meals that will not raise my blood sugar levels, as evidenced by lower BG readings and lower A1C level.  Measure of Success: Lower BG and A1C levels  Supportive Steps to Achieve: rationale given to the patient  Barriers: none  Strengths: motivated  Date to Achieve By: ongoing        I will check my blood sugars four times per day (pt-stated)     Notes - Note created  3/10/2014 10:41 AM by Priti Orozco RN    As of today's date 3/10/2014 goal is met at 26 - 50%.   Goal Status:  Active      I will continue to work with Diabetic Educator (pt-stated)     Notes - Note created  3/10/2014 10:42 AM by Priti Orozco RN    As of today's date 3/10/2014 goal is met at 26 - 50%.   Goal Status:  Active      I will take insulins as prescribed, including sliding scale novolog (pt-stated)     Notes - Note created  3/10/2014 10:43 AM by Priti Orozco RN    As of today's date 3/10/2014 goal is met at 51 - 75%.   Goal Status:  Active      Transportation (pt-stated)     Notes - Note created  4/27/2018 11:50 AM by Peter Woo RN    Goal Statement: I will apply for [employer] in the next 1-2 months.  Measure of Success: application complete  Supportive Steps to Achieve: rationale of less confusion for the patient  Barriers: none  Strengths: motivated  Date to Achieve By: 1-2 months                Patient/Caregiver understanding: The patient has a poor understanding of the disease process and how to really take care of herself.  The patient continually references her 44 year old daughter with the learning disability, ADHD, autism spectrum, and a volital temper.  The Primary Care  Coordination RN asked if a group home for the daughter had ever been considered and the patient responded that she is too smart for a group home.  This seems to be a stumbling block for the patient to be able to properly care for herself.     Outreach Frequency: weekly  Future Appointments              In 1 month CP LAB [facility]    In 1 month Sapna Anand PA-C [facility]    In 1 month Jovana Loo [facility]           Plan: 1).  The patient will find a suitable housing for herself by Tuesday.  2).  The patient will take all medications as prescribed by the providers.  3).  The Primary Care Coordination RN will make a follow up call to the patient again in 1 weeks.  4).  Care Coordination to remain available for the patient to contact in the event of future needs.        Peter Victoria, MSN, RN, PHN I Clinic Care Coordinator  [facility]  Phone: [phone]  [email or web address]

## 2018-04-27 NOTE — PROGRESS NOTES
"SUBJECTIVE/OBJECTIVE:                Nathalie Harp is a 62 year old female coming in for a follow-up visit for Medication Therapy Management.  She was referred to me from Eva Lockhart     Chief Complaint: Follow up from our visit on 4/13/2018.  Diabetes follow-up.   Appears completely overwhelmed at our visit today - states \"I'm here. Stressed out about not knowing if I'll be dead or alive.\" When asked about her health, she goes into detail about ways her daughter makes her life difficult. Reports her home is full of garbage and she tripped over it last night and this morning, shows me a scrape on her knee.  Also reports her lease is up at the end of this month and she isn't sure where she will be going come May 1st.  Personal Healthcare Goals: Get A1c below 9 so she can qualify for cataract surgery    Tobacco: No tobacco use  Alcohol: none    Medication Adherence: issues reported; admits she occasionally misses her PM dose of insulin or lunch time dose. I suspect she is missing most of her doses. She is on autofill at our pharmacy and has been compliant picking up her meds each month.    Diabetes:  Pt currently taking Levemir 60units QAM, Novolog 18units with meals (eats 2-3x/day).   SMBG Ranges (based on glucometer readings): 14d avg n0) 027. Admits to missing \"1 or 2\" doses.   Symptoms of low blood sugar? Shakey, dizzy, sweaty, strong heartbeat, \"like all the blood has drained from my body\" -- none recently  Symptoms of high blood sugar? Fatigue, confusion? Attributes this to not getting enough sleep.  ACEi/ARB: Yes: losartan 100mg QD. Microalbumin is not < 30 mg/g. Losartan dose was increased in March.  Aspirin: Taking 81mg daily and has the following issues: bruises easily  Statin: Yes: atorvastatin 40mg QD and denies side effects  Diet/Exercise: Gets $140 in food stamps each month. Reports her dietary restrictions (lactose intolerance, shellfish, diabetic diet, dentures) make it difficult to " get nutritious food. Feels scared to eat some food if it will cause an allergic reaction.  Uses public transportation on occasion. But has gotten lost before so doesn't like to take the bus. Usually gets a ride.    Date FBG/ 2hours post   4/27 434   4/25 430   4/23 430   4/22 429   4/21 478   4/20 399   4/19 HI   4/17 446   4/16 396   4/13 470   4/12 567   4/11 456       Current labs include:  Today's Vitals: Wt 118 lb 9.6 oz (53.8 kg)  LMP  (LMP Unknown)  BMI 26.59 kg/m2     BP Readings from Last 3 Encounters:   04/13/18 132/76   03/30/18 172/88   03/28/18 159/87     Wt Readings from Last 3 Encounters:   04/13/18 117 lb 9.6 oz (53.3 kg)   03/30/18 122 lb 6.4 oz (55.5 kg)   03/28/18 119 lb 3.2 oz (54.1 kg)     Lab Results   Component Value Date    A1C 12.8 02/09/2018    A1C 13.8 07/03/2017    A1C 12.3 12/21/2016    A1C 11.3 10/07/2015    A1C 11.4 08/31/2015     Lab Results   Component Value Date    UCRR 142 02/09/2018    MICROL 1240 02/09/2018    UMALCR 873.24 (H) 02/09/2018     Lab Results   Component Value Date    CR 0.49 02/09/2018     GFR Estimate   Date Value Ref Range Status   02/09/2018 >90 >60 mL/min/1.7m2 Final     Comment:     Non  GFR Calc   04/21/2017 >90  Non  GFR Calc   >60 mL/min/1.7m2 Final   11/14/2016 62 >60 mL/min/1.7m2 Final       ASSESSMENT:              Current medications were reviewed today as discussed above.      Medication Adherence: no issues identified    Diabetes: Needs Improvement. Patient is not meeting A1c goal of < 7%. Self monitoring of blood glucose is not at goal of fasting  mg/dL. Pt insists she is compliant with insulin injections, however I suspect with all of the stressors in her life, she is missing her insulin frequently. We have been titrating up on her insulin doses the last several months and BG only seem to be getting worse. She may benefit from care coordination involvement to provide assistance in any way possible --  particularly with housing and food, and any other help available in caring for her daughter.      PLAN:                  1. Referral to care coordination. Peter was able to meet with Nathalie today and offer some support and she will continue to follow.  2. No changes to medications today.    I spent 60 minutes with this patient today. All changes were made via collaborative practice agreement with Eva Lockhart. A copy of the visit note was provided to the patient's primary care provider.     Will follow up in 1 month.    The patient declined a summary of these recommendations as an after visit summary.    Jovana Loo, Pharm.D., [facility]  Medication Therapy Management Pharmacist  [phone]

## 2018-04-27 NOTE — LETTER
[facility]  Complex Care Plan  About Me  Patient Name:  Nathalie Harp    YOB: 1955  Age:     62 year old   Moe MRN:   [id number] Telephone Information:    Home Phone [phone]   Mobile [phone]       [address]    [address] [address] Email address:  No e-mail address on record      Emergency Contact(s)  Name Relationship Lgl Grd Work Phone Home Phone Mobile Phone   1RAJAN BROWN* Daughter   [phone]   2. NO SECONDARY C*    none            Primary language:  English     needed? No   [facility] [employer]:  [phone] op. 1  Other communication barriers:    Preferred Method of Communication:  Mail  Current living arrangement: I live in a private home with family  Mobility Status/ Medical Equipment: Independent w/Device    Health Maintenance  Health Maintenance Reviewed: Due/Overdue     My Access Plan  Medical Emergency 911   Primary Clinic Line [facility] - [phone]   24 Hour Appointment Line [phone] or  [phone]) (toll-free)   24 Hour Nurse Line [phone] (toll-free)   Preferred Urgent Care     Preferred Hospital     Preferred Pharmacy [id number] - [address]     Behavioral Health Crisis Line The National Suicide Prevention Lifeline at 1-770.213.1389 or 911     My Care Team Members    Patient Care Team       Relationship Specialty Notifications Start End    Eva Lockhart MD PCP - General Internal Medicine  1/4/17     Phone: [phone] Fax: [phone] [address] [facility] [address]    Malissa Jack   Diabetic Education  3/10/14     Phone: [phone] Fax: [phone]         XX RESIGNED XX [facility] [address]    Farida    11/5/14     Comment:  Zamzam MCKEON     Phone: [phone]         Peter Woo, RN Clinic Care Coordinator Primary Care - CC  Admissions 4/27/18     Phone: [phone] Fax: [phone]                My Care Plans  Self Management and Treatment Plan  Goals and (Comments)  Goals        General    Financial Wellbeing (pt-stated)     Notes - Note created  4/27/2018 11:53 AM by Peter Woo, RN    Goal Statement: I will work with the care coordinators in applying for the public assistance that I may qualify for in the next 1-2 months.  Measure of Success: applications have been filled out  Supportive Steps to Achieve: rationale explained.  Barriers: none  Strengths: motivated  Date to Achieve By: 1-2 months        Healthy Eating (pt-stated)     Notes - Note created  4/27/2018 11:56 AM by Peter Woo, RN    Goal Statement: I will work on planning and purchasing nutritious meals that will not raise my blood sugar levels, as evidenced by lower BG readings and lower A1C level.  Measure of Success: Lower BG and A1C levels  Supportive Steps to Achieve: rationale given to the patient  Barriers: none  Strengths: motivated  Date to Achieve By: ongoing        I will check my blood sugars four times per day (pt-stated)     Notes - Note created  3/10/2014 10:41 AM by Priti Orozco RN    As of today's date 3/10/2014 goal is met at 26 - 50%.   Goal Status:  Active      I will continue to work with Diabetic Educator (pt-stated)     Notes - Note created  3/10/2014 10:42 AM by Priti Orozco RN    As of today's date 3/10/2014 goal is met at 26 - 50%.   Goal Status:  Active      I will take insulins as prescribed, including sliding scale novolog (pt-stated)     Notes - Note created  3/10/2014 10:43 AM by Priti Orozco RN    As of today's date 3/10/2014 goal is met at 51 - 75%.   Goal Status:  Active      Transportation (pt-stated)     Notes - Note created  4/27/2018 11:50 AM by Peter Woo, RN    Goal Statement: I will apply for [employer] in the next 1-2 months.  Measure of Success: application complete  Supportive Steps to Achieve: rationale of less  confusion for the patient  Barriers: none  Strengths: motivated  Date to Achieve By: 1-2 months               Action Plans on File:   Asthma                   Advance Care Plans/Directives Type:        My Medical and Care Information  Problem List   Patient Active Problem List   Diagnosis     Esophageal reflux     Irritable bowel syndrome     Female stress incontinence     Migraine without aura     Arthritis of knee, right     Disorder of bursae and tendons in shoulder region     Degeneration of thoracic or thoracolumbar intervertebral disc     Degeneration of cervical intervertebral disc     Other hammer toe (acquired)     Allergic rhinitis due to pollen     Menopausal symptoms     Need for SBE (subacute bacterial endocarditis) prophylaxis     Preventive measure     Hyperlipidemia LDL goal <100     Health Care Home     Anemia     Dizziness     Hammer toe     Microalbuminuria     ACP (advance care planning)     Allergic to shellfish     Falls frequently     Balance problems     CKD (chronic kidney disease) stage 2, GFR 60-89 ml/min     Type 2 diabetes mellitus with diabetic nephropathy (H)     Diarrhea     Iron deficiency anemia, unspecified iron deficiency anemia type     Right axillary hidradenitis     Uncontrolled type 2 diabetes mellitus without complication, with long-term current use of insulin (H)     Mild intermittent asthma without complication      Current Medications and Allergies:  See printed Medication Report.    Care Coordination Start Date: No linked episodes   Frequency of Care Coordination: weekly   Form Last Updated: 04/27/2018

## 2018-04-27 NOTE — LETTER
[place] CARE COORDINATION    April 27, 2018    Nathalie Harp  [address]  [facility] [address]      Dear Nathalie,    I am a clinic care coordinator who works with Eva Lockhart MD at [facility]. I wanted to thank you for spending the time to talk with me.  I wanted to introduce myself and provide you with my contact information so that you can call me with questions or concerns about your health care. Below is a description of clinic care coordination and how I can further assist you.     The clinic care coordinator is a registered nurse and/or  who understand the health care system. The goal of clinic care coordination is to help you manage your health and improve access to the [facility] system in the most efficient manner. The registered nurse can assist you in meeting your health care goals by providing education, coordinating services, and strengthening the communication among your providers. The  can assist you with financial, behavioral, psychosocial, chemical dependency, counseling, and/or psychiatric resources.    Please feel free to contact me at [phone], with any questions or concerns. We at [facility] are focused on providing you with the highest-quality healthcare experience possible and that all starts with you.     Sincerely,     Peter Victoria, MSN, RN, PHN I Clinic Care Coordinator  [facility]  Phone: [phone]  [email or web address]    Enclosed: I have enclosed a copy of a 24 Hour Access Plan. This has helpful phone numbers for you to call when needed. Please keep this in an easy to access place to use as needed.

## 2018-05-02 ENCOUNTER — PATIENT OUTREACH (OUTPATIENT)
Dept: CARE COORDINATION | Facility: CLINIC | Age: 63
End: 2018-05-02

## 2018-05-02 NOTE — LETTER
[place] CARE COORDINATION    May 2, 2018    Nathalie Harp  [address]  [facility] [address]      Dear Nathalie,    Thank you for your time today on the telephone.     The clinic care coordinator is a registered nurse and/or  who understand the health care system. The goal of clinic care coordination is to help you manage your health and improve access to the [facility] system in the most efficient manner. The registered nurse can assist you in meeting your health care goals by providing education, coordinating services, and strengthening the communication among your providers. The  can assist you with financial, behavioral, psychosocial, chemical dependency, counseling, and/or psychiatric resources.    Please feel free to contact me at [phone] with any questions or concerns. We at [facility] are focused on providing you with the highest-quality healthcare experience possible and that all starts with you.     Sincerely,     Christina Dutton, MARGARITO, MSW    Clinical Care Coordination  [facility]  [phone]    Enclosed: I have enclosed a copy of a 24 Hour Access Plan. This has helpful phone numbers for you to call when needed. Please keep this in an easy to access place to use as needed.

## 2018-05-02 NOTE — LETTER
Health Care Home - Access Care Plan    About Me  Patient Name:  Nathalie Harp    YOB: 1955  Age:                             62 year old   Moe MRN:            [id number] Telephone Information:     Home Phone [phone]   Mobile [phone]       Address:    [address] [address] Email address:  No e-mail address on record      Emergency Contact(s)  Name Relationship Lgl Grd Work Phone Home Phone Mobile Phone   1. RAJAN HARP* Daughter   [phone]   2. NO SECONDARY C*    none              Health Maintenance:      My Access Plan  Medical Emergency 911   Questions or concerns during clinic hours Primary Clinic Line, I will call the clinic directly: [facility] [phone]   24 Hour Appointment Line [phone] or  [phone]) (toll free)   24 Hour Nurse Line [phone] (toll free)   Questions or concerns outside clinic hours 24 Hour Appointment Line, I will call the after-hours on-call line:   [facility] [phone] or [phone] ([phone]) (toll-free)   Preferred Urgent Care  Unknown   Preferred Hospital  Unknown   Preferred Pharmacy [id number] - [place], [place] - [address] [place] [address]     Behavioral Health Crisis Line The National Suicide Prevention Lifeline at 1-478.945.1110 or 911     My Care Team Members  Patient Care Team       Relationship Specialty Notifications Start End    Eva Lockhart MD PCP - General Internal Medicine  1/4/17     Phone: [phone] Fax: [phone] [address] [facility] [address]    Malissa Jack   Diabetic Education  3/10/14     Phone: [phone] Fax: [phone]         XX RESIGNED XX [facility] [address]    Farida    11/5/14     Comment:  Zamzam MCKEON     Phone: [phone]         Christina Dutton BSW Clinic Care Coordinator Primary Care - CC  4/30/18     Phone:  [phone] Fax: [phone]               My Medical and Care Information  Problem List   Patient Active Problem List   Diagnosis     Esophageal reflux     Irritable bowel syndrome     Female stress incontinence     Migraine without aura     Arthritis of knee, right     Disorder of bursae and tendons in shoulder region     Degeneration of thoracic or thoracolumbar intervertebral disc     Degeneration of cervical intervertebral disc     Other hammer toe (acquired)     Allergic rhinitis due to pollen     Menopausal symptoms     Need for SBE (subacute bacterial endocarditis) prophylaxis     Preventive measure     Hyperlipidemia LDL goal <100     Health Care Home     Anemia     Dizziness     Hammer toe     Microalbuminuria     ACP (advance care planning)     Allergic to shellfish     Falls frequently     Balance problems     CKD (chronic kidney disease) stage 2, GFR 60-89 ml/min     Type 2 diabetes mellitus with diabetic nephropathy (H)     Diarrhea     Iron deficiency anemia, unspecified iron deficiency anemia type     Right axillary hidradenitis     Uncontrolled type 2 diabetes mellitus without complication, with long-term current use of insulin (H)     Mild intermittent asthma without complication

## 2018-05-02 NOTE — PROGRESS NOTES
"Clinic Care Coordination Contact--Social Work Initial Call/Assessment  Care Team Conversations    Psychosocial: Care Coordination RN met with Patient last week in clinic.  During an MTM visit, Patient divulged that she and her daughter, 3 dogs, 3 cats, and 1 parakeet will be homeless on 5/1/2018.  Patient informed she does have a voucher for a section 8 housing.  CC RN requested SW call Patient to discuss additional resources.  During her conversation with Patient, CC RN did discuss available resources and informed that shelters do not usually accept pets.      SW attempted to call Patient on 4/30/2018, could not leave message as voice mail full.  SW called Patient today to introduce self and for update on housing status.  Patient reports that she and daughter were granted an extension until June 1.  Patient states being uncertain why she and daughter are being \"kicked out\", she questions if this is related to the owners not liking dogs or that they do not wish to rent to those with Section 8.  Patient reports whatever the case, they cannot afford to pay rent as it has now tripled in price.  SW inquired on next steps in their housing journey.  Patient reports she and daughter are going downtown today to obtain a housing list and will see where this goes from there.  Patient reports they may rent a pod if needed for storage if needed.  SW inquired on Patient's pets, to which Patient stated \"that is a good question\".  SW inquired if Patient was aware that shelters do not typically allow pets.  Patient states she and daughter may find housing off today's list in time for June 1.  Patient was not interested in alternate resources.  SW inquired if we could talk again in the future.  Patient accepted.  VERA provided her contact information.  VERA verified Patient's address.  Patient reports having received SW's contact information in her meeting in the clinic last week.      Plan:   1) VERA will send Access Plan, introduction " letter   2) SW will update PCP, MTM, call Patient in 2 weeks for update and needs assessment     Christina Dutton, RIZWANAW, MSW   [facility]   [phone]  5/2/2018 11:58 AM

## 2018-05-30 ENCOUNTER — OFFICE VISIT (OUTPATIENT)
Dept: FAMILY MEDICINE | Facility: CLINIC | Age: 63
End: 2018-05-30
Payer: COMMERCIAL

## 2018-05-30 ENCOUNTER — OFFICE VISIT (OUTPATIENT)
Dept: PHARMACY | Facility: CLINIC | Age: 63
End: 2018-05-30

## 2018-05-30 VITALS
SYSTOLIC BLOOD PRESSURE: 133 MMHG | TEMPERATURE: 98.6 F | DIASTOLIC BLOOD PRESSURE: 78 MMHG | WEIGHT: 110.8 LBS | HEART RATE: 76 BPM | BODY MASS INDEX: 24.84 KG/M2

## 2018-05-30 DIAGNOSIS — E11.21 TYPE 2 DIABETES MELLITUS WITH DIABETIC NEPHROPATHY, WITH LONG-TERM CURRENT USE OF INSULIN (H): ICD-10-CM

## 2018-05-30 DIAGNOSIS — E78.5 HYPERLIPIDEMIA LDL GOAL <100: ICD-10-CM

## 2018-05-30 DIAGNOSIS — Z79.4 TYPE 2 DIABETES MELLITUS WITH DIABETIC NEPHROPATHY, WITH LONG-TERM CURRENT USE OF INSULIN (H): Primary | ICD-10-CM

## 2018-05-30 DIAGNOSIS — Z23 NEED FOR PNEUMOCOCCAL VACCINATION: ICD-10-CM

## 2018-05-30 DIAGNOSIS — Z79.4 TYPE 2 DIABETES MELLITUS WITH DIABETIC NEPHROPATHY, WITH LONG-TERM CURRENT USE OF INSULIN (H): ICD-10-CM

## 2018-05-30 DIAGNOSIS — K21.9 GASTROESOPHAGEAL REFLUX DISEASE, ESOPHAGITIS PRESENCE NOT SPECIFIED: ICD-10-CM

## 2018-05-30 DIAGNOSIS — E11.21 TYPE 2 DIABETES MELLITUS WITH DIABETIC NEPHROPATHY, WITH LONG-TERM CURRENT USE OF INSULIN (H): Primary | ICD-10-CM

## 2018-05-30 LAB — HBA1C MFR BLD: 14.4 % (ref 0–5.6)

## 2018-05-30 PROCEDURE — 99214 OFFICE O/P EST MOD 30 MIN: CPT | Mod: 25 | Performed by: PHYSICIAN ASSISTANT

## 2018-05-30 PROCEDURE — 99607 MTMS BY PHARM ADDL 15 MIN: CPT | Performed by: PHARMACIST

## 2018-05-30 PROCEDURE — G0009 ADMIN PNEUMOCOCCAL VACCINE: HCPCS | Performed by: PHYSICIAN ASSISTANT

## 2018-05-30 PROCEDURE — 99606 MTMS BY PHARM EST 15 MIN: CPT | Performed by: PHARMACIST

## 2018-05-30 PROCEDURE — 83036 HEMOGLOBIN GLYCOSYLATED A1C: CPT | Performed by: INTERNAL MEDICINE

## 2018-05-30 PROCEDURE — 90732 PPSV23 VACC 2 YRS+ SUBQ/IM: CPT | Performed by: PHYSICIAN ASSISTANT

## 2018-05-30 PROCEDURE — 36415 COLL VENOUS BLD VENIPUNCTURE: CPT | Performed by: INTERNAL MEDICINE

## 2018-05-30 RX ORDER — ATORVASTATIN CALCIUM 40 MG/1
40 TABLET, FILM COATED ORAL DAILY
Qty: 90 TABLET | Refills: 2 | Status: SHIPPED | OUTPATIENT
Start: 2018-05-30 | End: 2019-05-29

## 2018-05-30 RX ORDER — INSULIN ASPART 100 [IU]/ML
12-15 INJECTION, SOLUTION INTRAVENOUS; SUBCUTANEOUS
Qty: 15 ML | Refills: 3 | COMMUNITY
Start: 2018-05-30 | End: 2019-02-15

## 2018-05-30 NOTE — PROGRESS NOTES
Nathalie Harp    The A1C is still quite elevated implying you are having sugars which are too high.     I am glad you continue to work with us to improve this.      Sincerely,       SHANITA LAM M.D.

## 2018-05-30 NOTE — MR AVS SNAPSHOT
After Visit Summary   5/30/2018    Nathalie Harp    MRN: [id number]           Patient Information     Date Of Birth          1955        Visit Information        Provider Department      5/30/2018 11:00 AM Jovana Loo, [facility] MTM        Care Instructions    Recommendations from today's MTM visit:                                                        1. MAKE SURE YOU REMOVE BOTH THE BIGGER CLEAR CAP AND THE SMALLER PURPLE CAP OVER THE NEEDLE, BEFORE YOU INJECT YOUR INSULIN.    2. Reduce the Levemir dose to 30units once a day and the Novolog dose to 5units before a meal.    3. Test your blood sugar in the morning before breakfast (goal of 80-130mg/dL), 2 hours after a meal (goal less than 180mg/dL), and anytime you feel it may be low (feeling shaky, dizzy, sweaty, weak). If your blood sugar is below 70mg/dL, have something to eat to bring it up. If your blood sugar is NOT below 70mg/dL, take a 5 minute break, have a glass water, and let the feeling pass. This is your body adjusting to lower and more normal blood sugar levels.    Next MTM visit: Wednesday, June 13th at 11am.    To schedule another MTM appointment, please call the clinic directly or you may call the MTM scheduling line at [phone] or toll-free at [phone].     My Clinical Pharmacist's contact information:                                                      It was a pleasure seeing you today!  Please feel free to contact me with any questions or concerns you have.      Jovana Loo, Pharm.D., [facility]  Medication Therapy Management Pharmacist  [phone]    You may receive a survey about the MTM services you received.  I would appreciate your feedback to help me serve you better in the future. Please fill it out and return it when you can. Your comments will be anonymous.              Follow-ups after your visit        Your next 10 appointments already scheduled      Jun 13, 2018 11:00 AM CDT   Office Visit with Jovana Loo RPH   [facility] MTM ([facility])    [address] [facility] [address] [phone]           Bring a current list of meds and any records pertaining to this visit. For Physicals, please bring immunization records and any forms needing to be filled out. Please arrive 10 minutes early to complete paperwork.              Who to contact     If you have questions or need follow up information about today's clinic visit or your schedule please contact [facility] directly at [phone].  Normal or non-critical lab and imaging results will be communicated to you by MyChart, letter or phone within 4 business days after the clinic has received the results. If you do not hear from us within 7 days, please contact the clinic through MyChart or phone. If you have a critical or abnormal lab result, we will notify you by phone as soon as possible.  Submit refill requests through [facility] or call your pharmacy and they will forward the refill request to us. Please allow 3 business days for your refill to be completed.          Additional Information About Your Visit        Care EveryWhere ID     This is your Care EveryWhere ID. This could be used by other organizations to access your [facility] medical records  [id number]        Your Vitals Were     Last Period                   (LMP Unknown)            Blood Pressure from Last 3 Encounters:   05/30/18 133/78   04/27/18 150/84   04/13/18 132/76    Weight from Last 3 Encounters:   05/30/18 110 lb 12.8 oz (50.3 kg)   04/27/18 118 lb 9.6 oz (53.8 kg)   04/13/18 117 lb 9.6 oz (53.3 kg)              Today, you had the following     No orders found for display         Today's Medication Changes          These changes are accurate as of 5/30/18 11:39 AM.  If you have any questions, ask your nurse or doctor.                These medicines have changed or have updated prescriptions.        Dose/Directions    atorvastatin 40 MG tablet   Commonly known as:  LIPITOR   This may have changed:  See the new instructions.   Used for:  Hyperlipidemia LDL goal <100   Changed by:  Sapna Anand PA-C        Dose:  40 mg   Take 1 tablet (40 mg) by mouth daily   Quantity:  90 tablet   Refills:  2       NovoLOG FLEXPEN 100 UNIT/ML injection   This may have changed:  how much to take   Used for:  Uncontrolled type 2 diabetes mellitus without complication, with long-term current use of insulin (H)   Generic drug:  insulin aspart   Changed by:  Sapna Anand PA-C        Dose:  20 Units   Inject 20 Units Subcutaneous 3 times daily (with meals)   Quantity:  15 mL   Refills:  3       omeprazole 20 MG CR capsule   Commonly known as:  priLOSEC   This may have changed:  See the new instructions.   Used for:  Gastroesophageal reflux disease, esophagitis presence not specified   Changed by:  Sapna Anand PA-C        Dose:  20 mg   Take 1 capsule (20 mg) by mouth daily   Quantity:  90 capsule   Refills:  3            Where to get your medicines      These medications were sent to [facility] [address]  [address] [facility] [address]     Phone:  [phone]     atorvastatin 40 MG tablet    omeprazole 20 MG CR capsule                Primary Care Provider Office Phone # Fax #    Eva Lockhart [phone]       [address] [facility] [address]        Goals        General    Financial Wellbeing (pt-stated)     Notes - Note created  4/27/2018 11:53 AM by Peter Woo RN    Goal Statement: I will work with the care coordinators in applying for the public assistance that I may qualify for in the next 1-2 months.  Measure of Success: applications have been filled out  Supportive Steps to Achieve: rationale explained.  Barriers: none  Strengths:  motivated  Date to Achieve By: 1-2 months        Healthy Eating (pt-stated)     Notes - Note created  4/27/2018 11:56 AM by Peter Woo, RN    Goal Statement: I will work on planning and purchasing nutritious meals that will not raise my blood sugar levels, as evidenced by lower BG readings and lower A1C level.  Measure of Success: Lower BG and A1C levels  Supportive Steps to Achieve: rationale given to the patient  Barriers: none  Strengths: motivated  Date to Achieve By: ongoing        I will check my blood sugars four times per day (pt-stated)     Notes - Note created  3/10/2014 10:41 AM by Priti Orozco RN    As of today's date 3/10/2014 goal is met at 26 - 50%.   Goal Status:  Active      I will continue to work with Diabetic Educator (pt-stated)     Notes - Note created  3/10/2014 10:42 AM by Priti Orozco RN    As of today's date 3/10/2014 goal is met at 26 - 50%.   Goal Status:  Active      I will take insulins as prescribed, including sliding scale novolog (pt-stated)     Notes - Note created  3/10/2014 10:43 AM by Priti Orozco RN    As of today's date 3/10/2014 goal is met at 51 - 75%.   Goal Status:  Active      Transportation (pt-stated)     Notes - Note created  4/27/2018 11:50 AM by Peter Woo, RN    Goal Statement: I will apply for [employer] in the next 1-2 months.  Measure of Success: application complete  Supportive Steps to Achieve: rationale of less confusion for the patient  Barriers: none  Strengths: motivated  Date to Achieve By: 1-2 months          Equal Access to Services     ADI LOPEZ AH: Hadii tracey ku hadasho Sojulissaali, waaxda luqadaha, qaybta kaalmada adeegyanatividad, sandra waller. So [facility] [phone].    ATENCIÓN: Si habla español, tiene a tompkins disposición servicios gratuitos de asistencia lingüística. Llame al [phone].    We comply with applicable federal civil rights laws and [place] laws. We do not discriminate on the basis of race, color,  national origin, age, disability, sex, sexual orientation, or gender identity.            Thank you!     Thank you for choosing [facility]  for your care. Our goal is always to provide you with excellent care. Hearing back from our patients is one way we can continue to improve our services. Please take a few minutes to complete the written survey that you may receive in the mail after your visit with us. Thank you!             Your Updated Medication List - Protect others around you: Learn how to safely use, store and throw away your medicines at www.disposemymeds.org.          This list is accurate as of 5/30/18 11:39 AM.  Always use your most recent med list.                   Brand Name Dispense Instructions for use Diagnosis    acetaminophen 500 MG Caps    ACETAMINOPHEN EXTRA STRENGTH    180 capsule    Take 1-2 tablets by mouth 3 times daily    Other chronic pain       albuterol 108 (90 Base) MCG/ACT Inhaler    PROAIR HFA/PROVENTIL HFA/VENTOLIN HFA    3 Inhaler    Inhale 2 puffs into the lungs every 6 hours as needed for shortness of breath / dyspnea ((Ventolin or preferred albuterol equivalent))    Mild intermittent asthma without complication       aspirin 81 MG tablet      Take 1 tablet by mouth daily Reported on 4/21/2017        atorvastatin 40 MG tablet    LIPITOR    90 tablet    Take 1 tablet (40 mg) by mouth daily    Hyperlipidemia LDL goal <100       blood glucose lancets standard    no brand specified    1 Box    Use to test blood sugar 3 times daily or as directed.    Type 2 diabetes mellitus without complication, with long-term current use of insulin (H)       blood glucose monitoring meter device kit    no brand specified    1 kit    Use to test blood sugar 3 times daily or as directed    Type 2 diabetes mellitus without complication, with long-term current use of insulin (H)       blood glucose monitoring test strip    ONETOUCH ULTRA    300 each    TEST BLOOD SUGARS 3  TIMES DAILY.    Type 2 diabetes mellitus without complication, with long-term current use of insulin (H)       EPINEPHrine 0.3 MG/0.3ML injection 2-pack    EPIPEN/ADRENACLICK/or ANY BX GENERIC EQUIV    0.3 mL    INJECT ONE DEVICE INTO THE MUSCLE AS NEEDED FOR ALLERGIC REACTION    Allergic to shellfish       fexofenadine 180 MG tablet    ALLEGRA    30 tablet    Take 1 tablet (180 mg) by mouth daily    Chronic seasonal allergic rhinitis, unspecified trigger       fluticasone 50 MCG/ACT spray    FLONASE    3 Bottle    Spray 1-2 sprays into both nostrils daily    Chronic seasonal allergic rhinitis, unspecified trigger       gabapentin 300 MG capsule    NEURONTIN    90 capsule    Take 1 tablet (300 mg) at night.    Type 2 diabetes mellitus with diabetic neuropathy, with long-term current use of insulin (H)       glucosamine chondroitin 1500 complex Caps      Take 1 capsule by mouth daily        insulin detemir 100 UNIT/ML injection    LEVEMIR FLEXPEN/FLEXTOUCH    30 mL    Inject 30units under the skin twice daily - every morning AND at bedtime.    Uncontrolled type 2 diabetes mellitus without complication, with long-term current use of insulin (H)       insulin pen needle 31G X 5 MM    B-D U/F    200 each    Use 4 daily as directed. This replaces the auto-cover pen needle.    Uncontrolled type 2 diabetes mellitus without complication, with long-term current use of insulin (H)       losartan 100 MG tablet    COZAAR    90 tablet    Take 1 tablet (100 mg) by mouth daily    Type 2 diabetes mellitus with diabetic neuropathy, with long-term current use of insulin (H)       NovoLOG FLEXPEN 100 UNIT/ML injection   Generic drug:  insulin aspart     15 mL    Inject 20 Units Subcutaneous 3 times daily (with meals)    Uncontrolled type 2 diabetes mellitus without complication, with long-term current use of insulin (H)       omeprazole 20 MG CR capsule    priLOSEC    90 capsule    Take 1 capsule (20 mg) by mouth daily     Gastroesophageal reflux disease, esophagitis presence not specified

## 2018-05-30 NOTE — PROGRESS NOTES
SUBJECTIVE:   Nathalie Harp is a 63 year old female who presents to clinic today for the following health issues:      Diabetes Follow-up    Patient is checking blood sugars: once daily.  Results are as follows:         am - 200 plus    Diabetic concerns: blood sugar frequently over 200     Symptoms of hypoglycemia (low blood sugar): shaky, dizzy, weak, lethargy     Paresthesias (numbness or burning in feet) or sores: No     Date of last diabetic eye exam:     BP Readings from Last 2 Encounters:   05/30/18 133/78   04/27/18 150/84     Hemoglobin A1C (%)   Date Value   05/30/2018 14.4 (H)   02/09/2018 12.8 (H)     LDL Cholesterol Calculated (mg/dL)   Date Value   02/09/2018 124 (H)   11/14/2016 90       Amount of exercise or physical activity: None    Problems taking medications regularly: No    Medication side effects: none    Diet: diabetic      Sugars have been higher over the last few months.   She notes her stress has increased. She is still in her house, but it ends tomorrow at midnight. Patient has a section 8 certificate but notes that the they want to increase the rent and patient is unable to afford this. Patient has 5 dogs living with her and her daughter.   Has food stamps, which were cut recently as well. No utilizing the food shelf.       Patient has been using 60 in the morning when she eats, she is not  it into BID dosing. Patient is using 20 units when she eats. 2-3 times per day usually. Doesn't snack on anything really throughout the day. Rarely misses her insulin doses.    Uses the omeprazole daily. She doesn't have any break through heartburn. Has seen [place] [facility] in the past.         Problem list and histories reviewed & adjusted, as indicated.  Additional history: as documented    Patient Active Problem List   Diagnosis     Esophageal reflux     Irritable bowel syndrome     Female stress incontinence     Migraine without aura     Arthritis of knee, right     Disorder of bursae and  tendons in shoulder region     Degeneration of thoracic or thoracolumbar intervertebral disc     Degeneration of cervical intervertebral disc     Other hammer toe (acquired)     Allergic rhinitis due to pollen     Menopausal symptoms     Need for SBE (subacute bacterial endocarditis) prophylaxis     Preventive measure     Hyperlipidemia LDL goal <100     Health Care Home     Anemia     Dizziness     Hammer toe     Microalbuminuria     ACP (advance care planning)     Allergic to shellfish     Falls frequently     Balance problems     CKD (chronic kidney disease) stage 2, GFR 60-89 ml/min     Type 2 diabetes mellitus with diabetic nephropathy (H)     Diarrhea     Iron deficiency anemia, unspecified iron deficiency anemia type     Right axillary hidradenitis     Uncontrolled type 2 diabetes mellitus without complication, with long-term current use of insulin (H)     Mild intermittent asthma without complication     Past Surgical History:   Procedure Laterality Date     HC EXCIS PRIMARY GANGLION WRIST  1985 and 1987    right wrist     KNEE SURGERY  2008 approx    arthroscopic; Dr. Rivera       Social History   Substance Use Topics     Smoking status: Never Smoker     Smokeless tobacco: Never Used     Alcohol use Yes      Comment: rarely     Family History   Problem Relation Age of Onset     CEREBROVASCULAR DISEASE Maternal Grandmother      Arthritis Maternal Grandmother      Asthma Mother      GASTROINTESTINAL DISEASE Mother      IBS     Alcohol/Drug Mother      Depression Mother      Neurologic Disorder Mother      migraines     GASTROINTESTINAL DISEASE Father      IBS     DIABETES Father      Alcohol/Drug Father      Neurologic Disorder Father      migraines     Obesity Father      GASTROINTESTINAL DISEASE Maternal Grandfather      Ulcers     Alcohol/Drug Maternal Grandfather      DIABETES Paternal Grandmother      Alcohol/Drug Paternal Grandmother      Arthritis Paternal Grandmother      Obesity Paternal Grandmother       Hypertension Sister      Hypertension Brother      Circulatory Sister      Asthma Sister      Asthma Sister      Asthma Brother      Thyroid Disease Sister      Thyroid Disease Sister      Obesity Sister            Reviewed and updated as needed this visit by clinical staff  Tobacco  Allergies  Meds  Problems  Med Hx  Surg Hx  Fam Hx  Soc Hx        Reviewed and updated as needed this visit by Provider  Allergies  Meds  Problems         ROS:  Constitutional, HEENT, cardiovascular, pulmonary, gi and gu systems are negative, except as otherwise noted.    OBJECTIVE:     /78 (BP Location: Right arm, Patient Position: Chair, Cuff Size: Adult Regular)  Pulse 76  Temp 98.6  F (37  C) (Oral)  Wt 110 lb 12.8 oz (50.3 kg)  LMP  (LMP Unknown)  Breastfeeding? No  BMI 24.84 kg/m2  Body mass index is 24.84 kg/(m^2).  GENERAL: healthy, alert and no distress    Diagnostic Test Results:  Results for orders placed or performed in visit on 05/30/18 (from the past 24 hour(s))   Hemoglobin A1c   Result Value Ref Range    Hemoglobin A1C 14.4 (H) 0 - 5.6 %       ASSESSMENT/PLAN:       ICD-10-CM    1. Uncontrolled type 2 diabetes mellitus without complication, with long-term current use of insulin (H) E11.65 NOVOLOG FLEXPEN 100 UNIT/ML soln    Z79.4    2. Hyperlipidemia LDL goal <100 E78.5 atorvastatin (LIPITOR) 40 MG tablet   3. Gastroesophageal reflux disease, esophagitis presence not specified K21.9 omeprazole (PRILOSEC) 20 MG CR capsule   4. Need for pneumococcal vaccination Z23 Pneumococcal vaccine 23 valent PPSV23  (Pneumovax) [[id number]]     ADMIN MEDICARE: Pneumococcal Vaccine ()   Refilled chronic medications. No changes to insulin as patient is seeing MTM after this appointment. Will defer to her plan as she has been working with patient.   Patient has been working with  on her housing etc.     FUTURE APPOINTMENTS:       - Follow-up visit in 3 months DM, schedule medicare wellness exam.     Sapna DURAN  ESTELA Anand  [facility]

## 2018-05-30 NOTE — PROGRESS NOTES
SUBJECTIVE/OBJECTIVE:                Nathalie Harp is a 63 year old female coming in for a follow-up visit for Medication Therapy Management.  She was referred to me from Eva Lockhart     Chief Complaint: Follow up from our visit on 4/27/2018.  Diabetes follow-up. Saw Sapna Anand PA-C just prior to our visit.  Personal Healthcare Goals: Get A1c below 9 so she can qualify for cataract surgery    Tobacco: No tobacco use  Alcohol: none    Medication Adherence: issues found, discussed below. She is on autofill at our pharmacy and has been compliant picking up her meds each month.    Diabetes:  Pt currently taking Levemir 60units QAM, Novolog 20units with meals (eats 2-3x/day). I had her show me how she does her injections today and she DID NOT REMOVE the small purple cap from the needle before injecting. She was switched to BD pen needles earlier this year after diabetes ed found she was re-using single use auto-cover needles. She has not been getting any of the insulin she thought she was injecting.  SMBG Ranges (based on glucometer readings): 14d avg (n14 439.   Symptoms of low blood sugar? none recently  Symptoms of high blood sugar? Fatigue.     Date FBG/ 2hours post   5/29 395   5/28 427   5/27 501   5/26 592   5/25 371/HI   5/24 362   5/23 365   5/22 493   5/21 413   5/20 404   5/19 510   5/18 334     Current labs include:  Today's Vitals:      BP Readings from Last 3 Encounters:   05/30/18 133/78   04/27/18 150/84   04/13/18 132/76     Wt Readings from Last 3 Encounters:   05/30/18 110 lb 12.8 oz (50.3 kg)   04/27/18 118 lb 9.6 oz (53.8 kg)   04/13/18 117 lb 9.6 oz (53.3 kg)     Lab Results   Component Value Date    A1C 14.4 05/30/2018    A1C 12.8 02/09/2018    A1C 13.8 07/03/2017    A1C 12.3 12/21/2016    A1C 11.3 10/07/2015     Lab Results   Component Value Date    CR 0.49 02/09/2018     GFR Estimate   Date Value Ref Range Status   02/09/2018 >90 >60 mL/min/1.7m2 Final     Comment:     Non   GFR Calc   04/21/2017 >90  Non  GFR Calc   >60 mL/min/1.7m2 Final   11/14/2016 62 >60 mL/min/1.7m2 Final       ASSESSMENT:              Current medications were reviewed today as discussed above.      Medication Adherence: issues identified    Diabetes: Needs Improvement. Patient is not meeting A1c goal of < 7%. Self monitoring of blood glucose is not at goal of fasting  mg/dL. She has not been getting any of her insulin due to misunderstanding how to properly use her pen needle. She would benefit from re-education today and reduced insulin doses.    PLAN:                  1. Reduce Levemir to 30units QD and Novolog to 5units TID with meals. Reviewed proper insulin injection technique at length.    I spent 45 minutes with this patient today. All changes were made via collaborative practice agreement with Eva Lockhart. A copy of the visit note was provided to the patient's primary care provider.     Will follow up in 2 weeks.    The patient was provided a summary of these recommendations as an after visit summary.    Jovana Loo, Pharm.D., [facility]CP  Medication Therapy Management Pharmacist  [phone]

## 2018-05-30 NOTE — PATIENT INSTRUCTIONS
Recommendations from today's MTM visit:                                                        1. MAKE SURE YOU REMOVE BOTH THE BIGGER CLEAR CAP AND THE SMALLER PURPLE CAP OVER THE NEEDLE, BEFORE YOU INJECT YOUR INSULIN.    2. Reduce the Levemir dose to 30units once a day and the Novolog dose to 5units before a meal.    3. Test your blood sugar in the morning before breakfast (goal of 80-130mg/dL), 2 hours after a meal (goal less than 180mg/dL), and anytime you feel it may be low (feeling shaky, dizzy, sweaty, weak). If your blood sugar is below 70mg/dL, have something to eat to bring it up. If your blood sugar is NOT below 70mg/dL, take a 5 minute break, have a glass water, and let the feeling pass. This is your body adjusting to lower and more normal blood sugar levels.    Next MTM visit: Wednesday, June 13th at 11am.    To schedule another MTM appointment, please call the clinic directly or you may call the MTM scheduling line at [phone] or toll-free at [phone].     My Clinical Pharmacist's contact information:                                                      It was a pleasure seeing you today!  Please feel free to contact me with any questions or concerns you have.      Jovana Loo, Pharm.D., [facility]  Medication Therapy Management Pharmacist  [phone]    You may receive a survey about the MTM services you received.  I would appreciate your feedback to help me serve you better in the future. Please fill it out and return it when you can. Your comments will be anonymous.

## 2018-05-30 NOTE — MR AVS SNAPSHOT
After Visit Summary   5/30/2018    Nathalie Harp    MRN: [id number]           Patient Information     Date Of Birth          1955        Visit Information        Provider Department      5/30/2018 10:20 AM Sapna Anand PA-C [facility]        Today's Diagnoses     Uncontrolled type 2 diabetes mellitus without complication, with long-term current use of insulin (H)    -  1    Hyperlipidemia LDL goal <100        Gastroesophageal reflux disease, esophagitis presence not specified        Need for pneumococcal vaccination          Care Instructions    Schedule your physical with Dr. Lockhart.             Follow-ups after your visit        Who to contact     If you have questions or need follow up information about today's clinic visit or your schedule please contact [facility] directly at [phone].  Normal or non-critical lab and imaging results will be communicated to you by MyChart, letter or phone within 4 business days after the clinic has received the results. If you do not hear from us within 7 days, please contact the clinic through MyChart or phone. If you have a critical or abnormal lab result, we will notify you by phone as soon as possible.  Submit refill requests through [email or web address] or call your pharmacy and they will forward the refill request to us. Please allow 3 business days for your refill to be completed.          Additional Information About Your Visit        Care EveryWhere ID     This is your Care EveryWhere ID. This could be used by other organizations to access your [facility] medical records  [id number]        Your Vitals Were     Pulse Temperature Last Period Breastfeeding? BMI (Body Mass Index)       76 98.6  F (37  C) (Oral) (LMP Unknown) No 24.84 kg/m2        Blood Pressure from Last 3 Encounters:   05/30/18 133/78   04/27/18 150/84   04/13/18 132/76    Weight from Last 3 Encounters:   05/30/18 110 lb 12.8 oz (50.3  kg)   04/27/18 118 lb 9.6 oz (53.8 kg)   04/13/18 117 lb 9.6 oz (53.3 kg)              We Performed the Following     ADMIN MEDICARE: Pneumococcal Vaccine ()     Pneumococcal vaccine 23 valent PPSV23  (Pneumovax) [00074]          Today's Medication Changes          These changes are accurate as of 5/30/18 11:00 AM.  If you have any questions, ask your nurse or doctor.               These medicines have changed or have updated prescriptions.        Dose/Directions    atorvastatin 40 MG tablet   Commonly known as:  LIPITOR   This may have changed:  See the new instructions.   Used for:  Hyperlipidemia LDL goal <100   Changed by:  Sapna Anand PA-C        Dose:  40 mg   Take 1 tablet (40 mg) by mouth daily   Quantity:  90 tablet   Refills:  2       NovoLOG FLEXPEN 100 UNIT/ML injection   This may have changed:  how much to take   Used for:  Uncontrolled type 2 diabetes mellitus without complication, with long-term current use of insulin (H)   Generic drug:  insulin aspart   Changed by:  Sapna Anand PA-C        Dose:  20 Units   Inject 20 Units Subcutaneous 3 times daily (with meals)   Quantity:  15 mL   Refills:  3       omeprazole 20 MG CR capsule   Commonly known as:  priLOSEC   This may have changed:  See the new instructions.   Used for:  Gastroesophageal reflux disease, esophagitis presence not specified   Changed by:  Sapna Anand PA-C        Dose:  20 mg   Take 1 capsule (20 mg) by mouth daily   Quantity:  90 capsule   Refills:  3            Where to get your medicines      These medications were sent to [facility] [place], [place] - [address]  [address] [facility] [address]     Phone:  [phone]     atorvastatin 40 MG tablet    omeprazole 20 MG CR capsule                Primary Care Provider Office Phone # Fax #    Eva Lockhart [phone]       [address] [facility] [address]        Goals         General    Financial Wellbeing (pt-stated)     Notes - Note created  4/27/2018 11:53 AM by Peter Woo, RN    Goal Statement: I will work with the care coordinators in applying for the public assistance that I may qualify for in the next 1-2 months.  Measure of Success: applications have been filled out  Supportive Steps to Achieve: rationale explained.  Barriers: none  Strengths: motivated  Date to Achieve By: 1-2 months        Healthy Eating (pt-stated)     Notes - Note created  4/27/2018 11:56 AM by Peter Woo, RN    Goal Statement: I will work on planning and purchasing nutritious meals that will not raise my blood sugar levels, as evidenced by lower BG readings and lower A1C level.  Measure of Success: Lower BG and A1C levels  Supportive Steps to Achieve: rationale given to the patient  Barriers: none  Strengths: motivated  Date to Achieve By: ongoing        I will check my blood sugars four times per day (pt-stated)     Notes - Note created  3/10/2014 10:41 AM by Priti Orozco RN    As of today's date 3/10/2014 goal is met at 26 - 50%.   Goal Status:  Active      I will continue to work with Diabetic Educator (pt-stated)     Notes - Note created  3/10/2014 10:42 AM by Priti Orozco RN    As of today's date 3/10/2014 goal is met at 26 - 50%.   Goal Status:  Active      I will take insulins as prescribed, including sliding scale novolog (pt-stated)     Notes - Note created  3/10/2014 10:43 AM by Priti Orozco RN    As of today's date 3/10/2014 goal is met at 51 - 75%.   Goal Status:  Active      Transportation (pt-stated)     Notes - Note created  4/27/2018 11:50 AM by Peter Woo, RN    Goal Statement: I will apply for [employer] in the next 1-2 months.  Measure of Success: application complete  Supportive Steps to Achieve: rationale of less confusion for the patient  Barriers: none  Strengths: motivated  Date to Achieve By: 1-2 months          Equal Access to Services     ADI LOPEZ AH: Hadii aad ku  mal Cano, hiralda lujoanneadaha, qamaeveta kadaylin agarwal, sandra angelin hayaan besspham anthonygrace lafrandyheather christin. So [facility] [phone].    ATENCIÓN: Si habla lauro, tiene a tompkins disposición servicios gratuitos de asistencia lingüística. Tatiana al [phone].    We comply with applicable federal civil rights laws and [place] laws. We do not discriminate on the basis of race, color, national origin, age, disability, sex, sexual orientation, or gender identity.            Thank you!     Thank you for choosing [facility]  for your care. Our goal is always to provide you with excellent care. Hearing back from our patients is one way we can continue to improve our services. Please take a few minutes to complete the written survey that you may receive in the mail after your visit with us. Thank you!             Your Updated Medication List - Protect others around you: Learn how to safely use, store and throw away your medicines at www.disposemymeds.org.          This list is accurate as of 5/30/18 11:00 AM.  Always use your most recent med list.                   Brand Name Dispense Instructions for use Diagnosis    acetaminophen 500 MG Caps    ACETAMINOPHEN EXTRA STRENGTH    180 capsule    Take 1-2 tablets by mouth 3 times daily    Other chronic pain       albuterol 108 (90 Base) MCG/ACT Inhaler    PROAIR HFA/PROVENTIL HFA/VENTOLIN HFA    3 Inhaler    Inhale 2 puffs into the lungs every 6 hours as needed for shortness of breath / dyspnea ((Ventolin or preferred albuterol equivalent))    Mild intermittent asthma without complication       aspirin 81 MG tablet      Take 1 tablet by mouth daily Reported on 4/21/2017        atorvastatin 40 MG tablet    LIPITOR    90 tablet    Take 1 tablet (40 mg) by mouth daily    Hyperlipidemia LDL goal <100       blood glucose lancets standard    no brand specified    1 Box    Use to test blood sugar 3 times daily or as directed.    Type 2 diabetes mellitus without  complication, with long-term current use of insulin (H)       blood glucose monitoring meter device kit    no brand specified    1 kit    Use to test blood sugar 3 times daily or as directed    Type 2 diabetes mellitus without complication, with long-term current use of insulin (H)       blood glucose monitoring test strip    ONETOUCH ULTRA    300 each    TEST BLOOD SUGARS 3 TIMES DAILY.    Type 2 diabetes mellitus without complication, with long-term current use of insulin (H)       EPINEPHrine 0.3 MG/0.3ML injection 2-pack    EPIPEN/ADRENACLICK/or ANY BX GENERIC EQUIV    0.3 mL    INJECT ONE DEVICE INTO THE MUSCLE AS NEEDED FOR ALLERGIC REACTION    Allergic to shellfish       fexofenadine 180 MG tablet    ALLEGRA    30 tablet    Take 1 tablet (180 mg) by mouth daily    Chronic seasonal allergic rhinitis, unspecified trigger       fluticasone 50 MCG/ACT spray    FLONASE    3 Bottle    Spray 1-2 sprays into both nostrils daily    Chronic seasonal allergic rhinitis, unspecified trigger       gabapentin 300 MG capsule    NEURONTIN    90 capsule    Take 1 tablet (300 mg) at night.    Type 2 diabetes mellitus with diabetic neuropathy, with long-term current use of insulin (H)       glucosamine chondroitin 1500 complex Caps      Take 1 capsule by mouth daily        insulin detemir 100 UNIT/ML injection    LEVEMIR FLEXPEN/FLEXTOUCH    30 mL    Inject 30units under the skin twice daily - every morning AND at bedtime.    Uncontrolled type 2 diabetes mellitus without complication, with long-term current use of insulin (H)       insulin pen needle 31G X 5 MM    B-D U/F    200 each    Use 4 daily as directed. This replaces the auto-cover pen needle.    Uncontrolled type 2 diabetes mellitus without complication, with long-term current use of insulin (H)       losartan 100 MG tablet    COZAAR    90 tablet    Take 1 tablet (100 mg) by mouth daily    Type 2 diabetes mellitus with diabetic neuropathy, with long-term current use of  insulin (H)       NovoLOG FLEXPEN 100 UNIT/ML injection   Generic drug:  insulin aspart     15 mL    Inject 20 Units Subcutaneous 3 times daily (with meals)    Uncontrolled type 2 diabetes mellitus without complication, with long-term current use of insulin (H)       omeprazole 20 MG CR capsule    priLOSEC    90 capsule    Take 1 capsule (20 mg) by mouth daily    Gastroesophageal reflux disease, esophagitis presence not specified

## 2018-05-30 NOTE — LETTER
[facility]  [address] [place]  [address]  [phone]        May 31, 2018    Nathalie Harp  [address]  [facility] [address]        Dear Nathalie,    The A1C is still quite elevated implying you are having sugars which are too high.     I am glad you continue to work with us to improve this.      Results for orders placed or performed in visit on 05/30/18   Hemoglobin A1c   Result Value Ref Range    Hemoglobin A1C 14.4 (H) 0 - 5.6 %       If you have any questions please call the clinic at [phone].    Sincerely,    Eva MCDOWELL

## 2018-05-30 NOTE — NURSING NOTE
Prior to injection verified patient identity using patient's name and date of birth.  Due to injection administration, patient instructed to remain in clinic for 15 minutes  afterwards, and to report any adverse reaction to me immediately.    CHANCE Steve MA    VIS for PCV 23 given on same date of administration.  Staff signature/Title: CHANCE Steve MA

## 2018-06-13 ENCOUNTER — OFFICE VISIT (OUTPATIENT)
Dept: PHARMACY | Facility: CLINIC | Age: 63
End: 2018-06-13

## 2018-06-13 VITALS — SYSTOLIC BLOOD PRESSURE: 158 MMHG | DIASTOLIC BLOOD PRESSURE: 74 MMHG | BODY MASS INDEX: 26.77 KG/M2 | WEIGHT: 119.4 LBS

## 2018-06-13 DIAGNOSIS — E11.21 TYPE 2 DIABETES MELLITUS WITH DIABETIC NEPHROPATHY, WITH LONG-TERM CURRENT USE OF INSULIN (H): Primary | ICD-10-CM

## 2018-06-13 DIAGNOSIS — Z79.4 TYPE 2 DIABETES MELLITUS WITH DIABETIC NEPHROPATHY, WITH LONG-TERM CURRENT USE OF INSULIN (H): Primary | ICD-10-CM

## 2018-06-13 PROCEDURE — 99607 MTMS BY PHARM ADDL 15 MIN: CPT | Performed by: PHARMACIST

## 2018-06-13 PROCEDURE — 99606 MTMS BY PHARM EST 15 MIN: CPT | Performed by: PHARMACIST

## 2018-06-13 NOTE — PROGRESS NOTES
SUBJECTIVE/OBJECTIVE:                Nathalie Harp is a 63 year old female coming in for a follow-up visit for Medication Therapy Management.  She was referred to me from Eva Lockhart     Chief Complaint: Follow up from our visit on 5/30/2018.  Diabetes follow-up. Reports she just moved, now living with her daughter in a friends basement.   Personal Healthcare Goals: Get A1c below 9 so she can qualify for cataract surgery    Tobacco: No tobacco use  Alcohol: none    Medication Adherence: issues found: has been off of losartan, gabapentin, omeprazole, atorvastatin the last 5 days since moving over the weekend. She is on autofill at our pharmacy and has been compliant picking up her meds each month.    Diabetes:  Pt currently taking Levemir 30units QAM, Novolog 5units with meals (eats 2-3x/day). At last visit, we discovered she DID NOT REMOVE the small purple cap from the needle before injecting and was not actually getting any insulin. I provided education on appropriate injection technique going forward. She notes increased bruising now that she is injecting properly.  SMBG Ranges (based on glucometer readings): 14d avg (n24) 190mg/dL  Symptoms of low blood sugar? Shaky, shivering with low-normal BG's.  Symptoms of high blood sugar? Fatigue.   Date FBG/ 2hours post Lunch/2hours post Dinner /2hours post   6/13 163     6/12 97     6/11 120 (7am), 134 (8:10am) 72 /205   6/10 173 89 198   6/9 291     6/8 156  /305   6/7 170     6/6 141     6/5 367  70   6/4 98 (8am), 270 (9:40am)     6/3 535     6/2 107     6/1 154  77 (8pm), 131 (9pm)   5/31 447         Current labs include:  Today's Vitals:    BP Readings from Last 3 Encounters:   05/30/18 133/78   04/27/18 150/84   04/13/18 132/76     Wt Readings from Last 3 Encounters:   05/30/18 110 lb 12.8 oz (50.3 kg)   04/27/18 118 lb 9.6 oz (53.8 kg)   04/13/18 117 lb 9.6 oz (53.3 kg)     Lab Results   Component Value Date    A1C 14.4 05/30/2018    A1C 12.8  02/09/2018    A1C 13.8 07/03/2017    A1C 12.3 12/21/2016    A1C 11.3 10/07/2015     Lab Results   Component Value Date    CR 0.49 02/09/2018     GFR Estimate   Date Value Ref Range Status   02/09/2018 >90 >60 mL/min/1.7m2 Final     Comment:     Non  GFR Calc   04/21/2017 >90  Non  GFR Calc   >60 mL/min/1.7m2 Final   11/14/2016 62 >60 mL/min/1.7m2 Final       ASSESSMENT:              Current medications were reviewed today as discussed above.      Medication Adherence: issues identified    Diabetes: Needs Improvement. Patient is not meeting A1c goal of < 7%. Self monitoring of blood glucose is not at goal of fasting  mg/dL, but is significantly improved from previous.  She has been experiencing symptoms of low blood sugars but no documented readings below 70.  This is likely her body adjusting to the insulin, will plan to keep same doses for now.  Blood pressure is elevated today because she has been off of her losartan.    PLAN:                  1. Continue present medications.  2. Go to pharmacy and request refills of the meds you lost.    I spent 30 minutes with this patient today. All changes were made via collaborative practice agreement with Eva Lockhart. A copy of the visit note was provided to the patient's primary care provider.     Will follow up in 1 month.    The patient was provided a summary of these recommendations as an after visit summary.    Jovana Loo, Pharm.D., [facility]  Medication Therapy Management Pharmacist  [phone]

## 2018-06-13 NOTE — MR AVS SNAPSHOT
After Visit Summary   6/13/2018    Nathalie Harp    MRN: [id number]           Patient Information     Date Of Birth          1955        Visit Information        Provider Department      6/13/2018 11:00 AM Jovana Loo RPH [facility]        Today's Diagnoses     Type 2 diabetes mellitus with diabetic nephropathy, with long-term current use of insulin (H)    -  1       Follow-ups after your visit        Your next 10 appointments already scheduled     Jul 18, 2018  3:30 PM CDT   Office Visit with Jovana Loo RPH   [facility] ([facility])    [address] [id number] [phone]           Bring a current list of meds and any records pertaining to this visit. For Physicals, please bring immunization records and any forms needing to be filled out. Please arrive 10 minutes early to complete paperwork.              Who to contact     If you have questions or need follow up information about today's clinic visit or your schedule please contact [facility] directly at [phone].  Normal or non-critical lab and imaging results will be communicated to you by MyChart, letter or phone within 4 business days after the clinic has received the results. If you do not hear from us within 7 days, please contact the clinic through MyChart or phone. If you have a critical or abnormal lab result, we will notify you by phone as soon as possible.  Submit refill requests through [employer] or call your pharmacy and they will forward the refill request to us. Please allow 3 business days for your refill to be completed.          Additional Information About Your Visit        Care EveryWhere ID     This is your Care EveryWhere ID. This could be used by other organizations to access your [place] medical records  [id number]        Your Vitals Were     Last  Period BMI (Body Mass Index)                (LMP Unknown) 26.77 kg/m2           Blood Pressure from Last 3 Encounters:   06/13/18 158/74   05/30/18 133/78   04/27/18 150/84    Weight from Last 3 Encounters:   06/13/18 119 lb 6.4 oz (54.2 kg)   05/30/18 110 lb 12.8 oz (50.3 kg)   04/27/18 118 lb 9.6 oz (53.8 kg)              Today, you had the following     No orders found for display       Primary Care Provider Office Phone # Fax #    Eva Lockhart [phone]       [address] [facility] [address]        Goals        General    Financial Wellbeing (pt-stated)     Notes - Note created  4/27/2018 11:53 AM by Peter Woo, RN    Goal Statement: I will work with the care coordinators in applying for the public assistance that I may qualify for in the next 1-2 months.  Measure of Success: applications have been filled out  Supportive Steps to Achieve: rationale explained.  Barriers: none  Strengths: motivated  Date to Achieve By: 1-2 months        Healthy Eating (pt-stated)     Notes - Note created  4/27/2018 11:56 AM by Peter Woo, RN    Goal Statement: I will work on planning and purchasing nutritious meals that will not raise my blood sugar levels, as evidenced by lower BG readings and lower A1C level.  Measure of Success: Lower BG and A1C levels  Supportive Steps to Achieve: rationale given to the patient  Barriers: none  Strengths: motivated  Date to Achieve By: ongoing        I will check my blood sugars four times per day (pt-stated)     Notes - Note created  3/10/2014 10:41 AM by Priti Orozco, RN    As of today's date 3/10/2014 goal is met at 26 - 50%.   Goal Status:  Active      I will continue to work with Diabetic Educator (pt-stated)     Notes - Note created  3/10/2014 10:42 AM by Priti Orozco RN    As of today's date 3/10/2014 goal is met at 26 - 50%.   Goal Status:  Active      I will take insulins as prescribed, including sliding scale novolog (pt-stated)      Notes - Note created  3/10/2014 10:43 AM by Priti Orozco, RN    As of today's date 3/10/2014 goal is met at 51 - 75%.   Goal Status:  Active      Transportation (pt-stated)     Notes - Note created  4/27/2018 11:50 AM by Peter Woo, RN    Goal Statement: I will apply for [employer] in the next 1-2 months.  Measure of Success: application complete  Supportive Steps to Achieve: rationale of less confusion for the patient  Barriers: none  Strengths: motivated  Date to Achieve By: 1-2 months          Equal Access to Services     [facility]: Hadii aad ku hadasho Soomaali, waaxda luqadaha, qaybta kaalmada adeegyada, waxay joaquim haytri charles . So [facility] [phone].    ATENCIÓN: Si habla español, tiene a tompkins disposición servicios gratuitos de asistencia lingüística. Llame al [phone].    We comply with applicable federal civil rights laws and [place] laws. We do not discriminate on the basis of race, color, national origin, age, disability, sex, sexual orientation, or gender identity.            Thank you!     Thank you for choosing [facility]  for your care. Our goal is always to provide you with excellent care. Hearing back from our patients is one way we can continue to improve our services. Please take a few minutes to complete the written survey that you may receive in the mail after your visit with us. Thank you!             Your Updated Medication List - Protect others around you: Learn how to safely use, store and throw away your medicines at www.disposemymeds.org.          This list is accurate as of 6/13/18 11:49 AM.  Always use your most recent med list.                   Brand Name Dispense Instructions for use Diagnosis    acetaminophen 500 MG Caps    ACETAMINOPHEN EXTRA STRENGTH    180 capsule    Take 1-2 tablets by mouth 3 times daily    Other chronic pain       albuterol 108 (90 Base) MCG/ACT Inhaler    PROAIR HFA/PROVENTIL HFA/VENTOLIN HFA    3  Inhaler    Inhale 2 puffs into the lungs every 6 hours as needed for shortness of breath / dyspnea ((Ventolin or preferred albuterol equivalent))    Mild intermittent asthma without complication       aspirin 81 MG tablet      Take 1 tablet by mouth daily Reported on 4/21/2017        atorvastatin 40 MG tablet    LIPITOR    90 tablet    Take 1 tablet (40 mg) by mouth daily    Hyperlipidemia LDL goal <100       blood glucose lancets standard    no brand specified    1 Box    Use to test blood sugar 3 times daily or as directed.    Type 2 diabetes mellitus without complication, with long-term current use of insulin (H)       blood glucose monitoring meter device kit    no brand specified    1 kit    Use to test blood sugar 3 times daily or as directed    Type 2 diabetes mellitus without complication, with long-term current use of insulin (H)       blood glucose monitoring test strip    ONETOUCH ULTRA    300 each    TEST BLOOD SUGARS 3 TIMES DAILY.    Type 2 diabetes mellitus without complication, with long-term current use of insulin (H)       EPINEPHrine 0.3 MG/0.3ML injection 2-pack    EPIPEN/ADRENACLICK/or ANY BX GENERIC EQUIV    0.3 mL    INJECT ONE DEVICE INTO THE MUSCLE AS NEEDED FOR ALLERGIC REACTION    Allergic to shellfish       fexofenadine 180 MG tablet    ALLEGRA    30 tablet    Take 1 tablet (180 mg) by mouth daily    Chronic seasonal allergic rhinitis, unspecified trigger       fluticasone 50 MCG/ACT spray    FLONASE    3 Bottle    Spray 1-2 sprays into both nostrils daily    Chronic seasonal allergic rhinitis, unspecified trigger       gabapentin 300 MG capsule    NEURONTIN    90 capsule    Take 1 tablet (300 mg) at night.    Type 2 diabetes mellitus with diabetic neuropathy, with long-term current use of insulin (H)       glucosamine chondroitin 1500 complex Caps      Take 1 capsule by mouth daily        insulin detemir 100 UNIT/ML injection    LEVEMIR FLEXPEN/FLEXTOUCH    30 mL    Inject 30units under  the skin twice daily - every morning AND at bedtime.    Uncontrolled type 2 diabetes mellitus without complication, with long-term current use of insulin (H)       insulin pen needle 31G X 5 MM    B-D U/F    200 each    Use 4 daily as directed. This replaces the auto-cover pen needle.    Uncontrolled type 2 diabetes mellitus without complication, with long-term current use of insulin (H)       losartan 100 MG tablet    COZAAR    90 tablet    Take 1 tablet (100 mg) by mouth daily    Type 2 diabetes mellitus with diabetic neuropathy, with long-term current use of insulin (H)       NovoLOG FLEXPEN 100 UNIT/ML injection   Generic drug:  insulin aspart     15 mL    Inject 20 Units Subcutaneous 3 times daily (with meals)    Uncontrolled type 2 diabetes mellitus without complication, with long-term current use of insulin (H)       omeprazole 20 MG CR capsule    priLOSEC    90 capsule    Take 1 capsule (20 mg) by mouth daily    Gastroesophageal reflux disease, esophagitis presence not specified

## 2018-07-03 ENCOUNTER — PATIENT OUTREACH (OUTPATIENT)
Dept: CARE COORDINATION | Facility: CLINIC | Age: 63
End: 2018-07-03

## 2018-07-03 NOTE — PROGRESS NOTES
"Clinic Care Coordination Contact--Social Work Follow Up Call/Assessment  [facility]/Voicemail       Clinical Data: Care Coordinator Outreach.  During an [facility] visit, Patient divulged that she and her daughter, 3 dogs, 3 cats, and 1 parakeet will be homeless on 5/1/2018.  Patient informed she does have a voucher for a section 8 housing.  CC RN requested SW call Patient to discuss additional resources.  During her conversation with Patient, CC RN did discuss available resources and informed that shelters do not usually accept pets.       SW attempted to call Patient on 4/30/2018, could not leave message as voice mail full.  SW called Patient today to introduce self and for update on housing status.  Patient reports that she and daughter were granted an extension until June 1.  Patient states being uncertain why she and daughter are being \"kicked out\", she questions if this is related to the owners not liking dogs or that they do not wish to rent to those with Section 8.  Patient reports whatever the case, they cannot afford to pay rent as it has now tripled in price.  SW inquired on next steps in their housing journey.  Patient reports she and daughter are going downtown today to obtain a housing list and will see where this goes from there.  Patient reports they may rent a pod if needed for storage if needed.  SW inquired on Patient's pets, to which Patient stated \"that is a good question\".  SW inquired if Patient was aware that shelters do not typically allow pets.  Patient states she and daughter may find housing off today's list in time for June 1.  Patient was not interested in alternate resources    Outreach attempted x 4 ([phone]). Left message on voicemail with call back information and requested return call.    Plan: Care Coordinator will call Patient in 1-2 weeks if no return call    Christina Dutton, MARGARITO, MSW   [facility]   [phone]  7/3/2018 12:38 PM      "

## 2018-07-18 ENCOUNTER — OFFICE VISIT (OUTPATIENT)
Dept: PHARMACY | Facility: CLINIC | Age: 63
End: 2018-07-18

## 2018-07-18 VITALS — BODY MASS INDEX: 26.54 KG/M2 | DIASTOLIC BLOOD PRESSURE: 72 MMHG | WEIGHT: 118.4 LBS | SYSTOLIC BLOOD PRESSURE: 152 MMHG

## 2018-07-18 DIAGNOSIS — J45.20 MILD INTERMITTENT ASTHMA WITHOUT COMPLICATION: ICD-10-CM

## 2018-07-18 DIAGNOSIS — E11.21 TYPE 2 DIABETES MELLITUS WITH DIABETIC NEPHROPATHY, WITH LONG-TERM CURRENT USE OF INSULIN (H): Primary | ICD-10-CM

## 2018-07-18 DIAGNOSIS — R80.9 MICROALBUMINURIA: ICD-10-CM

## 2018-07-18 DIAGNOSIS — G43.019 INTRACTABLE MIGRAINE WITHOUT AURA AND WITHOUT STATUS MIGRAINOSUS: ICD-10-CM

## 2018-07-18 DIAGNOSIS — G43.009 MIGRAINE WITHOUT AURA: ICD-10-CM

## 2018-07-18 DIAGNOSIS — Z79.4 TYPE 2 DIABETES MELLITUS WITH DIABETIC NEPHROPATHY, WITH LONG-TERM CURRENT USE OF INSULIN (H): Primary | ICD-10-CM

## 2018-07-18 DIAGNOSIS — E11.42 DIABETIC POLYNEUROPATHY ASSOCIATED WITH TYPE 2 DIABETES MELLITUS (H): ICD-10-CM

## 2018-07-18 PROCEDURE — 99607 MTMS BY PHARM ADDL 15 MIN: CPT | Performed by: PHARMACIST

## 2018-07-18 PROCEDURE — 99606 MTMS BY PHARM EST 15 MIN: CPT | Performed by: PHARMACIST

## 2018-07-18 RX ORDER — LOSARTAN POTASSIUM AND HYDROCHLOROTHIAZIDE 25; 100 MG/1; MG/1
1 TABLET ORAL EVERY MORNING
Qty: 90 TABLET | Refills: 3 | Status: SHIPPED | OUTPATIENT
Start: 2018-07-18 | End: 2019-10-08

## 2018-07-18 RX ORDER — GABAPENTIN 100 MG/1
100 CAPSULE ORAL EVERY MORNING
Qty: 90 CAPSULE | Refills: 3 | Status: SHIPPED | OUTPATIENT
Start: 2018-07-18 | End: 2019-10-08

## 2018-07-18 NOTE — PROGRESS NOTES
"SUBJECTIVE/OBJECTIVE:                Nathalie Harp is a 63 year old female coming in for a follow-up visit for Medication Therapy Management.  She was referred to me from Eva Lockhart     Chief Complaint: Follow up from our visit on 2018.  Diabetes follow-up. Reports that she and her daughter have been living in a friend's basement.  Describes stressors in the home with multiple people (in addition to her and her daughter) living in the home, yet grateful for the place to stay.  Personal Healthcare Goals: Get A1c below 9 so she can qualify for cataract surgery - hoping to have a physical with her provider at the end of the month for pre-op.    Tobacco: No tobacco use  Alcohol: none    Medication Adherence: States that she was able to  her medications since the move, back on everything.  Reports compliance except forgot insulin maybe twice in the last month.    Diabetes:  Pt currently taking Levemir 15units QAM, Novolog 12-15units with meals (eats 2-3x/day), another 10-15 units if eating dinner later in the day. If eating only applesauce or a banana will not use any insulin. Declines any issues with injecting, way less leakage than previous.  She notes increased bruising now that she is injecting properly.  When asked about discrepancy between last MTM visit plan and this regimen she states that she forgot and also has trouble reading d/t cataracts.  Symptoms of low blood sugar? Shaky, shivering with low-normal BG's - unsure if this is the cold basement or her BG so she checks. Had one documented episode of 63mg/dL on 18 at 10am. When asked about what number to treat HYPOglycemia, she comments: >100 okay, <100 \"needs to be observed\". States that she would treat with hersheys bar or grape juice.  Symptoms of high blood sugar? Fatigue.   30-day average B mg/dL  She is unsure whether her readings (non-AM) are before or after a meal.  Date FBG/ 2hours post Lunch/2hours post Dinner " /2hours post   7/18 253     7/17 277     7/16 263     7/15 333     7/14 336     7/13 280     7/12 278       7/11 311     7/10 308 83 @2:30 (cannot remember if she ate lunch today) - ate hersheys    7/9 199     7/8 294     7/7 218  366   7/6        Lab Results   Component Value Date    A1C 14.4 05/30/2018    A1C 12.8 02/09/2018    A1C 13.8 07/03/2017    A1C 12.3 12/21/2016    A1C 11.3 10/07/2015     Exercise: Her stationary bike has been in storage, unable to exercise without this now.  Knee troubles and asthma - unable to jog or get other physical activity.  Meals: challenging without a stable home at this time.  Breakfast (getting a morning meal most days) - eggs and toast (comments that this is all she can eat without teeth) between 7 am and 10 am  Lunch/Dinner (sometimes one or the other, not routinely both) - today: BBQ pork, whole wheat bun, with potato chips and water.  Otherwise will just eat a banana or applesauce container from the grocery store.    Neuropathy: Gabapentin for nerve pain: taking 300 mg QPM after 7 or 8pm - comments that it makes her very tired a few hours after she takes it, notices that it is not working as well as it used to for the last several weeks.  Reports nerve pain in her hands and her feet, described as a painful, burning that lasts throughout the daytime.    Hypertension/Microalbuminuria: Current medications include losartan 100 mg daily.  Patient does not self-monitor BP.  Denies other HTN medications or history of diuretic use.  Comments on stressful home, taxi being late today, increased use of excedrin as below.    .  Headaches: Taking execedrin 2 tablet in the morning and 2 tablets in the evening. Previously took feverfew for migraines - out of this right now, but hoping to refill.  She additionally takes magnesium and vitamin B2 supplements, and states these are for her headaches.    Asthma/allergies:  Current asthma medications: Albuterol (Pt reports using albuterol last  several weeks ago), no concerns at this time. States she saves the albuterol for an emergency.  ACT Total Scores 2/24/2017 7/28/2017 2/9/2018   ACT TOTAL SCORE - - -   ASTHMA ER VISITS - - -   ASTHMA HOSPITALIZATIONS - - -   ACT TOTAL SCORE (Goal Greater than or Equal to 20) 12 18 21   In the past 12 months, how many times did you visit the emergency room for your asthma without being admitted to the hospital? 0 0 0   In the past 12 months, how many times were you hospitalized overnight because of your asthma? 0 0 0   Allegra - stopped taking months ago.  Bloody noses - noted in the past when she used flonase or allergy medications with pseudoephedrine. Looking to see ENT again.  Last bloody nose was 1 week ago, lasted 15 minutes, stopped after 4 kleenxes.      Today's Vitals:  /72  Wt 118 lb 6.4 oz (53.7 kg)  LMP  (LMP Unknown)  BMI 26.54 kg/m2      ASSESSMENT:              Current medications were reviewed today as discussed above.      Medication Adherence: Discussed, no major concern today.    Diabetes: Needs Improvement. Patient is not meeting A1c goal of < 7%.  Patient has not followed previous PharmD plan, likely resulting in continued hyperglycemia.   Self monitoring of blood glucose is not at goal of fasting  mg/dL.  Recommend increasing basal insulin to previous plan at this time targeting the fasting BG.  Few afternoon readings, difficult to adjust mealtime insulin without patient knowledge of reading relation to meal.  However, patient would benefit from additional short acting insulin with small meals especially with afternoon reading >300 mg/dL.  Patient's treatment of HYPOglycemia <100 mg/dL is reasonable at this time, however education provided about 80 mg/dL or greater being okay.    Neuropathy: Needs Improvement. Patient may benefit from addition of AM dose of gabapentin.    Hypertension/Microalbuminuria: Needs Improvement. Needs further assessment by PCP for diagnosis of HTN -  Patient not at BP goal of <140/90 today with diabetes diagnosis. She may benefit from addition of hydrochlorothiazide at this time and can minimize pill burden by starting losartan+HCTZ. May also benefit from non-drug changes including limiting caffeine, salt from processed foods, increase in exercise and stress management, all likely contributing to high BP today.  She will be due due for BMP lab in 1-2 weeks. Urine albumin not at goal <30mg/dL despite max dose losartan.    Headaches: Excedrin (caffeine) likely contributing to high blood pressure.  See above.      Asthma/allergies: Stable without allegra, last ACT 21 at goal of >/=20.    PLAN:                  1. Increase Levemir insulin to 30 units QAM, as previously prescribed.    2. Continue Novolog insulin 12- 15 units with full meals. At time of evening snack, if sugar is >200 mg/dL, use 4 units novolog.  3. Start taking gabapentin 100 mg QAM. Continue evening 300 mg gabapentin. Rx sent to pharmacy.  4. Stop losartan 100 mg tablets. Start losartan + hydrochlorothiazide 100/25 mg QAM.    I spent 60 minutes with this patient today. All changes were made via collaborative practice agreement with Eva Lockhart. A copy of the visit note was provided to the patient's primary care provider.     Will follow up at time of next PCP visit, approx. 10 days.  Recommend BMP at this follow-up.    The patient was provided a summary of these recommendations as an after visit summary.    Teri Roberts, Pharm.D. MTM Resident  Jovana Loo, Yasemin.D., BCACP  Medication Therapy Management Pharmacist  [phone]

## 2018-07-18 NOTE — PATIENT INSTRUCTIONS
Recommendations from today's MTM visit:                                                        1. Start taking gabapentin 100 mg once daily in the morning.  Monitor how tired you may get with this medication.  You can continue taking the evening 300 mg capsule.    2. Diabetes: increase your levemir insulin to 30 units every morning.  If your sugar is in the 70s or 80s in the morning, please call the clinic before your next follow-up.    Continue using your novolog insulin 12- 15 units with your full meals.  If you are able to eat applesauce or a banana in the evening, test your blood sugar before eating this.  If your sugar is >200 mg/dL, use 4 units with this snack.    3. Your blood pressure is high today.  The excedrin tablets have caffeine, which can increase your blood pressure.  Increasing exercise and decreasing salt can also lower your blood pressure.    Stop taking your losartan 100 mg tablets and we will start losartan + hydrochlorothiazide 100/25 mg once daily.  It is best to take this in the morning.      Next MTM visit:     To schedule another MTM appointment, please call the clinic directly or you may call the MTM scheduling line at [phone] or toll-free at [phone].     My Clinical Pharmacist's contact information:                                                      It was a pleasure seeing you today!  Please feel free to contact me with any questions or concerns you have.      Jovana Loo, Pharm.D., [facility]  Medication Therapy Management Pharmacist  [phone]    You may receive a survey about the MTM services you received.  I would appreciate your feedback to help me serve you better in the future. Please fill it out and return it when you can. Your comments will be anonymous.

## 2018-07-18 NOTE — Clinical Note
Just a heads up - she scheduled a pre-op with you 8/1. She is schedule to see you at 9:20am and also scheduled with me same day at 10am.  Jovana

## 2018-07-18 NOTE — MR AVS SNAPSHOT
After Visit Summary   7/18/2018    Nathalie Harp    MRN: [id number]           Patient Information     Date Of Birth          1955        Visit Information        Provider Department      7/18/2018 3:30 PM Jovana Loo, [facility]        Today's Diagnoses     Type 2 diabetes mellitus with diabetic nephropathy, with long-term current use of insulin (H)    -  1      Care Instructions    Recommendations from today's MT visit:                                                        1. Start taking gabapentin 100 mg once daily in the morning.  Monitor how tired you may get with this medication.  You can continue taking the evening 300 mg capsule.    2. Diabetes: increase your levemir insulin to 30 units every morning.  If your sugar is in the 70s or 80s in the morning, please call the clinic before your next follow-up.    Continue using your novolog insulin 12- 15 units with your full meals.  If you are able to eat applesauce or a banana in the evening, test your blood sugar before eating this.  If your sugar is >200 mg/dL, use 4 units with this snack.    3. Your blood pressure is high today.  The excedrin tablets have caffeine, which can increase your blood pressure.  Increasing exercise and decreasing salt can also lower your blood pressure.    Stop taking your losartan 100 mg tablets and we will start losartan + hydrochlorothiazide 100/25 mg once daily.  It is best to take this in the morning.      Next MT visit:     To schedule another MT appointment, please call the clinic directly or you may call the MT scheduling line at [phone] or toll-free at [phone].     My Clinical Pharmacist's contact information:                                                      It was a pleasure seeing you today!  Please feel free to contact me with any questions or concerns you have.      Jovana Loo, Pharm.D., BCACP  Medication Therapy Management  Pharmacist  [phone]    You may receive a survey about the [facility] services you received.  I would appreciate your feedback to help me serve you better in the future. Please fill it out and return it when you can. Your comments will be anonymous.                    Follow-ups after your visit        Who to contact     If you have questions or need follow up information about today's clinic visit or your schedule please contact [facility] directly at [phone].  Normal or non-critical lab and imaging results will be communicated to you by MyChart, letter or phone within 4 business days after the clinic has received the results. If you do not hear from us within 7 days, please contact the clinic through MyChart or phone. If you have a critical or abnormal lab result, we will notify you by phone as soon as possible.  Submit refill requests through [employer] or call your pharmacy and they will forward the refill request to us. Please allow 3 business days for your refill to be completed.          Additional Information About Your Visit        Care EveryWhere ID     This is your Care EveryWhere ID. This could be used by other organizations to access your [place] medical records  [id number]        Your Vitals Were     Last Period BMI (Body Mass Index)                (LMP Unknown) 26.54 kg/m2           Blood Pressure from Last 3 Encounters:   07/18/18 152/72   06/13/18 158/74   05/30/18 133/78    Weight from Last 3 Encounters:   07/18/18 118 lb 6.4 oz (53.7 kg)   06/13/18 119 lb 6.4 oz (54.2 kg)   05/30/18 110 lb 12.8 oz (50.3 kg)              Today, you had the following     No orders found for display         Today's Medication Changes          These changes are accurate as of 7/18/18  4:41 PM.  If you have any questions, ask your nurse or doctor.               Start taking these medicines.        Dose/Directions    losartan-hydrochlorothiazide 100-25 MG per tablet   Commonly known as:   HYZAAR   Used for:  Type 2 diabetes mellitus with diabetic nephropathy, with long-term current use of insulin (H)        Dose:  1 tablet   Take 1 tablet by mouth every morning   Quantity:  90 tablet   Refills:  3         These medicines have changed or have updated prescriptions.        Dose/Directions    * gabapentin 300 MG capsule   Commonly known as:  NEURONTIN   This may have changed:  Another medication with the same name was added. Make sure you understand how and when to take each.   Used for:  Type 2 diabetes mellitus with diabetic neuropathy, with long-term current use of insulin (H)        Take 1 tablet (300 mg) at night.   Quantity:  90 capsule   Refills:  3       * gabapentin 100 MG capsule   Commonly known as:  NEURONTIN   This may have changed:  You were already taking a medication with the same name, and this prescription was added. Make sure you understand how and when to take each.   Used for:  Type 2 diabetes mellitus with diabetic nephropathy, with long-term current use of insulin (H)        Dose:  100 mg   Take 1 capsule (100 mg) by mouth every morning   Quantity:  90 capsule   Refills:  3       * Notice:  This list has 2 medication(s) that are the same as other medications prescribed for you. Read the directions carefully, and ask your doctor or other care provider to review them with you.      Stop taking these medicines if you haven't already. Please contact your care team if you have questions.     losartan 100 MG tablet   Commonly known as:  COZAAR                Where to get your medicines      These medications were sent to [facility] [place], [place] - [address]  [address], [facility] [address]     Phone:  [phone]     gabapentin 100 MG capsule    losartan-hydrochlorothiazide 100-25 MG per tablet                Primary Care Provider Office Phone # Fax #    Eva Lockhart [phone] [address]  [facility] [address]        Goals        General    Financial Wellbeing (pt-stated)     Notes - Note created  4/27/2018 11:53 AM by Peter Woo, RN    Goal Statement: I will work with the care coordinators in applying for the public assistance that I may qualify for in the next 1-2 months.  Measure of Success: applications have been filled out  Supportive Steps to Achieve: rationale explained.  Barriers: none  Strengths: motivated  Date to Achieve By: 1-2 months        Healthy Eating (pt-stated)     Notes - Note created  4/27/2018 11:56 AM by Peter Woo, RN    Goal Statement: I will work on planning and purchasing nutritious meals that will not raise my blood sugar levels, as evidenced by lower BG readings and lower A1C level.  Measure of Success: Lower BG and A1C levels  Supportive Steps to Achieve: rationale given to the patient  Barriers: none  Strengths: motivated  Date to Achieve By: ongoing        I will check my blood sugars four times per day (pt-stated)     Notes - Note created  3/10/2014 10:41 AM by Priti Orozco RN    As of today's date 3/10/2014 goal is met at 26 - 50%.   Goal Status:  Active      I will continue to work with Diabetic Educator (pt-stated)     Notes - Note created  3/10/2014 10:42 AM by Priti Orozco RN    As of today's date 3/10/2014 goal is met at 26 - 50%.   Goal Status:  Active      I will take insulins as prescribed, including sliding scale novolog (pt-stated)     Notes - Note created  3/10/2014 10:43 AM by Priti Orozco RN    As of today's date 3/10/2014 goal is met at 51 - 75%.   Goal Status:  Active      Transportation (pt-stated)     Notes - Note created  4/27/2018 11:50 AM by Peter Woo, RN    Goal Statement: I will apply for [employer] in the next 1-2 months.  Measure of Success: application complete  Supportive Steps to Achieve: rationale of less confusion for the patient  Barriers: none  Strengths: motivated  Date to Achieve By: 1-2 months          Equal  Access to Services     [facility]: Hadii aad ku haddavid Cano, wamaryluda luqadaha, qamaeveta kaamossandra negro. So [facility] [phone].    ATENCIÓN: Si habla español, tiene a tompkins disposición servicios gratuitos de asistencia lingüística. Llame al [phone].    We comply with applicable federal civil rights laws and [place] laws. We do not discriminate on the basis of race, color, national origin, age, disability, sex, sexual orientation, or gender identity.            Thank you!     Thank you for choosing [facility]  for your care. Our goal is always to provide you with excellent care. Hearing back from our patients is one way we can continue to improve our services. Please take a few minutes to complete the written survey that you may receive in the mail after your visit with us. Thank you!             Your Updated Medication List - Protect others around you: Learn how to safely use, store and throw away your medicines at www.disposemymeds.org.          This list is accurate as of 7/18/18  4:41 PM.  Always use your most recent med list.                   Brand Name Dispense Instructions for use Diagnosis    albuterol 108 (90 Base) MCG/ACT Inhaler    PROAIR HFA/PROVENTIL HFA/VENTOLIN HFA    3 Inhaler    Inhale 2 puffs into the lungs every 6 hours as needed for shortness of breath / dyspnea ((Ventolin or preferred albuterol equivalent))    Mild intermittent asthma without complication       aspirin 81 MG tablet      Take 1 tablet by mouth daily Reported on 4/21/2017        atorvastatin 40 MG tablet    LIPITOR    90 tablet    Take 1 tablet (40 mg) by mouth daily    Hyperlipidemia LDL goal <100       blood glucose lancets standard    no brand specified    1 Box    Use to test blood sugar 3 times daily or as directed.    Type 2 diabetes mellitus without complication, with long-term current use of insulin (H)       blood glucose monitoring meter  device kit    no brand specified    1 kit    Use to test blood sugar 3 times daily or as directed    Type 2 diabetes mellitus without complication, with long-term current use of insulin (H)       blood glucose monitoring test strip    ONETOUCH ULTRA    300 each    TEST BLOOD SUGARS 3 TIMES DAILY.    Type 2 diabetes mellitus without complication, with long-term current use of insulin (H)       EPINEPHrine 0.3 MG/0.3ML injection 2-pack    EPIPEN/ADRENACLICK/or ANY BX GENERIC EQUIV    0.3 mL    INJECT ONE DEVICE INTO THE MUSCLE AS NEEDED FOR ALLERGIC REACTION    Allergic to shellfish       fexofenadine 180 MG tablet    ALLEGRA    30 tablet    Take 1 tablet (180 mg) by mouth daily    Chronic seasonal allergic rhinitis, unspecified trigger       fluticasone 50 MCG/ACT spray    FLONASE    3 Bottle    Spray 1-2 sprays into both nostrils daily    Chronic seasonal allergic rhinitis, unspecified trigger       * gabapentin 300 MG capsule    NEURONTIN    90 capsule    Take 1 tablet (300 mg) at night.    Type 2 diabetes mellitus with diabetic neuropathy, with long-term current use of insulin (H)       * gabapentin 100 MG capsule    NEURONTIN    90 capsule    Take 1 capsule (100 mg) by mouth every morning    Type 2 diabetes mellitus with diabetic nephropathy, with long-term current use of insulin (H)       glucosamine chondroitin 1500 complex Caps      Take 1 capsule by mouth daily        insulin detemir 100 UNIT/ML injection    LEVEMIR FLEXPEN/FLEXTOUCH    30 mL    Inject 30units under the skin twice daily - every morning AND at bedtime.    Uncontrolled type 2 diabetes mellitus without complication, with long-term current use of insulin (H)       insulin pen needle 31G X 5 MM    B-D U/F    200 each    Use 4 daily as directed. This replaces the auto-cover pen needle.    Uncontrolled type 2 diabetes mellitus without complication, with long-term current use of insulin (H)       losartan-hydrochlorothiazide 100-25 MG per tablet     HYZAAR    90 tablet    Take 1 tablet by mouth every morning    Type 2 diabetes mellitus with diabetic nephropathy, with long-term current use of insulin (H)       NovoLOG FLEXPEN 100 UNIT/ML injection   Generic drug:  insulin aspart     15 mL    Inject 20 Units Subcutaneous 3 times daily (with meals)    Uncontrolled type 2 diabetes mellitus without complication, with long-term current use of insulin (H)       omeprazole 20 MG CR capsule    priLOSEC    90 capsule    Take 1 capsule (20 mg) by mouth daily    Gastroesophageal reflux disease, esophagitis presence not specified       * Notice:  This list has 2 medication(s) that are the same as other medications prescribed for you. Read the directions carefully, and ask your doctor or other care provider to review them with you.

## 2018-08-01 ENCOUNTER — OFFICE VISIT (OUTPATIENT)
Dept: PHARMACY | Facility: CLINIC | Age: 63
End: 2018-08-01

## 2018-08-01 ENCOUNTER — OFFICE VISIT (OUTPATIENT)
Dept: FAMILY MEDICINE | Facility: CLINIC | Age: 63
End: 2018-08-01
Payer: COMMERCIAL

## 2018-08-01 VITALS
BODY MASS INDEX: 26.46 KG/M2 | HEART RATE: 75 BPM | TEMPERATURE: 98.3 F | SYSTOLIC BLOOD PRESSURE: 142 MMHG | DIASTOLIC BLOOD PRESSURE: 72 MMHG | WEIGHT: 118 LBS | OXYGEN SATURATION: 98 %

## 2018-08-01 DIAGNOSIS — E11.42 DIABETIC POLYNEUROPATHY ASSOCIATED WITH TYPE 2 DIABETES MELLITUS (H): ICD-10-CM

## 2018-08-01 DIAGNOSIS — Z79.4 TYPE 2 DIABETES MELLITUS WITH DIABETIC NEPHROPATHY, WITH LONG-TERM CURRENT USE OF INSULIN (H): Primary | ICD-10-CM

## 2018-08-01 DIAGNOSIS — E11.21 TYPE 2 DIABETES MELLITUS WITH DIABETIC NEPHROPATHY, WITH LONG-TERM CURRENT USE OF INSULIN (H): Primary | ICD-10-CM

## 2018-08-01 DIAGNOSIS — E78.5 HYPERLIPIDEMIA LDL GOAL <100: ICD-10-CM

## 2018-08-01 DIAGNOSIS — N18.2 CKD (CHRONIC KIDNEY DISEASE) STAGE 2, GFR 60-89 ML/MIN: ICD-10-CM

## 2018-08-01 DIAGNOSIS — R80.9 MICROALBUMINURIA: ICD-10-CM

## 2018-08-01 DIAGNOSIS — K21.9 GASTROESOPHAGEAL REFLUX DISEASE, ESOPHAGITIS PRESENCE NOT SPECIFIED: ICD-10-CM

## 2018-08-01 DIAGNOSIS — J45.20 MILD INTERMITTENT ASTHMA WITHOUT COMPLICATION: ICD-10-CM

## 2018-08-01 DIAGNOSIS — D50.9 IRON DEFICIENCY ANEMIA, UNSPECIFIED IRON DEFICIENCY ANEMIA TYPE: ICD-10-CM

## 2018-08-01 DIAGNOSIS — Z01.818 PREOP GENERAL PHYSICAL EXAM: Primary | ICD-10-CM

## 2018-08-01 PROCEDURE — 99606 MTMS BY PHARM EST 15 MIN: CPT | Performed by: PHARMACIST

## 2018-08-01 PROCEDURE — 99215 OFFICE O/P EST HI 40 MIN: CPT | Performed by: PHYSICIAN ASSISTANT

## 2018-08-01 PROCEDURE — 99607 MTMS BY PHARM ADDL 15 MIN: CPT | Performed by: PHARMACIST

## 2018-08-01 NOTE — PROGRESS NOTES
[address] [address]  [phone]  Dept: [phone]    PRE-OP EVALUATION:  Today's date: 2018    Nathalie Harp (: 1955) presents for pre-operative evaluation assessment as requested by Dr. Chu.  She requires evaluation and anesthesia risk assessment prior to undergoing surgery/procedure for treatment of bilateral cataract .    Proposed Surgery/ Procedure: Bilateral Cataract Surgery  Date of Surgery/ Procedure: 2018 and 2018  Time of Surgery/ Procedure: [facility]  Hospital/Surgical Facility: [facility] [place]  Fax number for surgical facility: [phone]  Primary Physician: Eva Lockhart  Type of Anesthesia Anticipated: to be determined    Patient has a Health Care Directive or Living Will:  NO    1. NO - Do you have a history of heart attack, stroke, stent, bypass or surgery on an artery in the head, neck, heart or legs?  2. NO - Do you ever have any pain or discomfort in your chest?  3. NO - Do you have a history of  Heart Failure?  4. NO - Are you troubled by shortness of breath when: walking on the level, up a slight hill or at night?  5. NO - Do you currently have a cold, bronchitis or other respiratory infection?  6. NO - Do you have a cough, shortness of breath or wheezing?  7. NO - Do you sometimes get pains in the calves of your legs when you walk?  8. Yes - Do you or anyone in your family have previous history of blood clots?- 1/2 sister with a history of a blood clot.   9. NO - Do you or does anyone in your family have a serious bleeding problem such as prolonged bleeding following surgeries or cuts?  10. Yes - Have you ever had problems with anemia or been told to take iron pills? Anemia in the past  11. NO - Have you had any abnormal blood loss such as black, tarry or bloody stools, or abnormal vaginal bleeding?  12. NO - Have you ever had a blood transfusion?  13. Yes - Have you or  any of your relatives ever had problems with anesthesia? Patient gets nauseated with anesthesia.   14. NO - Do you have sleep apnea, excessive snoring or daytime drowsiness?  15. NO - Do you have any prosthetic heart valves?  16. NO - Do you have prosthetic joints?  17. NO - Is there any chance that you may be pregnant?      HPI:     HPI related to upcoming procedure: Patient has bilateral cataracts requiring removal. Patient is having difficulty seeing-particularly her insulin dosing.       DIABETES - Patient has a longstanding history of DiabetesType Type II . Patient is being treated with insulin injections and denies significant side effects. Control has been poor. Patient has had difficulty with injections and only recently started receiving the correct dose of medications. Last A1C was high at 14.4 on 5/30/2018    HYPERTENSION - Patient has longstanding history of HTN , Patient was recently changed to losartan-hydrochlorothiazide, however patient notes she has only started the Losartan-hydrochlorothiazide combo yesterday. Blood pressures have been high.                                                                                                                                                                                           .    MEDICAL HISTORY:     Patient Active Problem List    Diagnosis Date Noted     Hyperlipidemia LDL goal <100 07/05/2013     Priority: High     Mild intermittent asthma without complication 12/21/2016     Priority: Medium     Uncontrolled type 2 diabetes mellitus without complication, with long-term current use of insulin (H) 11/23/2016     Priority: Medium     Iron deficiency anemia, unspecified iron deficiency anemia type 11/14/2016     Priority: Medium     Right axillary hidradenitis 11/14/2016     Priority: Medium     Diarrhea 07/04/2016     Priority: Medium      See GI consult 3/16; no Dx       Falls frequently 06/17/2016     Priority: Medium     Balance problems  06/17/2016     Priority: Medium     CKD (chronic kidney disease) stage 2, GFR 60-89 ml/min 06/17/2016     Priority: Medium     Type 2 diabetes mellitus with diabetic nephropathy (H) 06/17/2016     Priority: Medium     Allergic to shellfish 01/27/2016     Priority: Medium     ACP (advance care planning) 10/09/2015     Priority: Medium     Advance Care Planning 4/21/2017: ACP Review of Chart / Resources Provided:  Reviewed chart for advance care plan.  Nathalie MURPHY Harp has been provided information and resources to begin or update their advance care plan.  Added by Claudine Bardales  Advance Care Planning 10/9/2015: ACP Review and Resources Provided:  Reviewed chart for advance care plan.  Nathalie Harp has no plan or code status on file. Discussed available resources and provided with information.  Confirmed/documented legally designated decision maker(s). Added by Gladis Delgado RN Advance Care Planning Liaison with [employer]           Microalbuminuria 09/03/2015     Priority: Medium     53 in 9/15       Hammer toe 11/04/2014     Priority: Medium     Dizziness 07/11/2014     Priority: Medium     Anemia 03/03/2014     Priority: Medium     Per pt, PMH of anemia.                In 2/14, Hgb 13 with ferritin 11 (and B-12 390) add FeSO4 (in 7/14, pt reports not taking; she plans to try it);                               13 and 13 in 11/14;    14 and 23 in 9/15       Health Care Home 12/02/2013     Priority: Medium       Status:  Closed  Care Coordinator:  Priti Orozco    See Letters for [facility] Care Plan         Need for SBE (subacute bacterial endocarditis) prophylaxis 03/13/2013     Priority: Medium     Pt stated this is per her dentist       Menopausal symptoms      Priority: Medium     per pt prempro ordered by GYN, Dr. Sherie Cody    (IN 7/14, PT PLANNING ANOTHER APPT IN THE NEAR FUTURE)                                                  pt stopped prempro in 2014;        Pelvic exam in 12/14 shows very  friable cervix; pt admits she still has occas bleeding.   Referred back to GYN       Esophageal reflux 12/19/2012     Priority: Medium     EGD neg in 1/09       Irritable bowel syndrome 12/19/2012     Priority: Medium     Female stress incontinence 12/19/2012     Priority: Medium     Migraine without aura 12/19/2012     Priority: Medium     Problem list name updated by automated process. Provider to review       Arthritis of knee, right 12/19/2012     Priority: Medium     Disorder of bursae and tendons in shoulder region 12/19/2012     Priority: Medium     Problem list name updated by automated process. Provider to review       Degeneration of thoracic or thoracolumbar intervertebral disc 12/19/2012     Priority: Medium     Degeneration of cervical intervertebral disc 12/19/2012     Priority: Medium     Other hammer toe (acquired) 12/19/2012     Priority: Medium     Allergic rhinitis due to pollen 12/19/2012     Priority: Medium     Preventive measure 07/05/2013     Priority: Low     APRIMA DATA BASE UNDER THE 12/24/12 NOTE                       Mammogram 7/13, 12/14;            Pap 6/09; 12/14                    Colonoscopy 1/09 neg, including random colon bxs          Past Medical History:   Diagnosis Date     Acute gastric ulcer without mention of hemorrhage or perforation, with obstruction(531.31)      Allergic state      Essential hypertension, benign      History of blood transfusion      Menopausal symptoms     per pt prempro ordered by GYN, Dr. Sherie Cody     Migraines      Mild intermittent asthma      Pure hypercholesterolemia      Type 2 diabetes, HbA1C goal < 8% (H) 7/5/2013    Dx 2004 per pt     Type II or unspecified type diabetes mellitus without mention of complication, not stated as uncontrolled     dx 2005 per pt     Past Surgical History:   Procedure Laterality Date     HC EXCIS PRIMARY GANGLION WRIST  1985 and 1987    right wrist     KNEE SURGERY  2008 approx    arthroscopic; Dr. Rivera      Current Outpatient Prescriptions   Medication Sig Dispense Refill     albuterol (PROAIR HFA/PROVENTIL HFA/VENTOLIN HFA) 108 (90 BASE) MCG/ACT Inhaler Inhale 2 puffs into the lungs every 6 hours as needed for shortness of breath / dyspnea ((Ventolin or preferred albuterol equivalent)) 3 Inhaler 3     aspirin 81 MG tablet Take 1 tablet by mouth daily Reported on 4/21/2017       atorvastatin (LIPITOR) 40 MG tablet Take 1 tablet (40 mg) by mouth daily 90 tablet 2     blood glucose (NO BRAND SPECIFIED) lancets standard Use to test blood sugar 3 times daily or as directed. 100 each 11     blood glucose monitoring (NO BRAND SPECIFIED) meter device kit Use to test blood sugar 3 times daily or as directed 1 kit 0     blood glucose monitoring (ONE TOUCH ULTRA) test strip TEST BLOOD SUGARS 3 TIMES DAILY. 300 each 3     EPINEPHrine (EPIPEN/ADRENACLICK/OR ANY BX GENERIC EQUIV) 0.3 MG/0.3ML injection 2-pack INJECT ONE DEVICE INTO THE MUSCLE AS NEEDED FOR ALLERGIC REACTION 0.3 mL 1     fexofenadine (ALLEGRA) 180 MG tablet Take 1 tablet (180 mg) by mouth daily 30 tablet 1     fluticasone (FLONASE) 50 MCG/ACT spray Spray 1-2 sprays into both nostrils daily 3 Bottle 11     gabapentin (NEURONTIN) 300 MG capsule Take 1 tablet (300 mg) at night. 90 capsule 3     Glucosamine-Chondroit-Vit C-Mn (GLUCOSAMINE CHONDROITIN 1500 COMPLEX) CAPS Take 1 capsule by mouth daily       insulin detemir (LEVEMIR FLEXPEN/FLEXTOUCH) 100 UNIT/ML injection Inject 30units under the skin twice daily - every morning AND at bedtime. 30 mL 3     insulin pen needle (B-D U/F) 31G X 5 MM Use 4 daily as directed. This replaces the auto-cover pen needle. 200 each prn     losartan-hydrochlorothiazide (HYZAAR) 100-25 MG per tablet Take 1 tablet by mouth every morning 90 tablet 3     NOVOLOG FLEXPEN 100 UNIT/ML soln Inject 12-15 Units Subcutaneous 3 times daily (with meals) Patient additionally taking 4 units with snack. 15 mL 3     omeprazole (PRILOSEC) 20 MG CR  capsule Take 1 capsule (20 mg) by mouth daily 90 capsule 3     gabapentin (NEURONTIN) 100 MG capsule Take 1 capsule (100 mg) by mouth every morning 90 capsule 3     OTC products: Aspirin    Allergies   Allergen Reactions     Amoxicillin-Pot Clavulanate [[id number]+Fd&C Red #40 [facility]]      Headache, nausea and vomiting     Augmentin      Beesix [Pyridoxine]      Benadryl [Acetaminophen]      Ceclor [Cefaclor]      Cefaclor      Rash       Cephalexin      Cephalosporins      Codeine      Detrol [Tolterodine Tartrate] Other (See Comments)     Severe Dry mouth     Dust Mites      Latex      Metformin GI Disturbance     Severe diarrhea     Pollen Extract      Septra [Bactrim]      Septra [Sulfamethoxazole W-Trimethoprim]      Shellfish Allergy      Crab       Simvastatin      HA     Sorbitol Diarrhea     Sulfa Drugs      Sulfonamide Derivatives       Latex Allergy: YES: Precautions to take: can get a rash with some latex products    Social History   Substance Use Topics     Smoking status: Never Smoker     Smokeless tobacco: Never Used     Alcohol use Yes      Comment: rarely     History   Drug Use No       REVIEW OF SYSTEMS:   CONSTITUTIONAL: NEGATIVE for fever, chills, change in weight  INTEGUMENTARY/SKIN: NEGATIVE for worrisome rashes, moles or lesions  EYES: NEGATIVE for vision changes or irritation  ENT/MOUTH: NEGATIVE for ear, mouth and throat problems  RESP: NEGATIVE for significant cough or SOB  CV: NEGATIVE for chest pain, palpitations or peripheral edema  GI: NEGATIVE for nausea, abdominal pain, heartburn, or change in bowel habits  : NEGATIVE for frequency, dysuria, or hematuria  MUSCULOSKELETAL: NEGATIVE for significant arthralgias or myalgia  NEURO: NEGATIVE for weakness, dizziness or paresthesias  ENDOCRINE: NEGATIVE for temperature intolerance, skin/hair changes  HEME: NEGATIVE for bleeding problems  PSYCHIATRIC: NEGATIVE for changes in mood or affect    EXAM:   /72  Pulse 75  Temp 98.3  F  (36.8  C) (Oral)  Wt 118 lb (53.5 kg)  LMP  (LMP Unknown)  SpO2 98%  Breastfeeding? No  BMI 26.46 kg/m2    GENERAL APPEARANCE: healthy, alert and no distress     EYES: EOMI, PERRL     HENT: ear canals and TM's normal and nose and mouth without ulcers or lesions     NECK: no adenopathy, no asymmetry, masses, or scars and thyroid normal to palpation     RESP: lungs clear to auscultation - no rales, rhonchi or wheezes     CV: regular rates and rhythm, normal S1 S2, no S3 or S4 and no murmur, click or rub     ABDOMEN:  soft, nontender, no HSM or masses and bowel sounds normal     MS: extremities normal- no gross deformities noted, no evidence of inflammation in joints, FROM in all extremities.     SKIN: no suspicious lesions or rashes     NEURO: Normal strength and tone, sensory exam grossly normal, mentation intact and speech normal     PSYCH: mentation appears normal. and affect normal/bright     LYMPHATICS: No cervical adenopathy    DIAGNOSTICS:   No labs or EKG required for low risk surgery (cataract, skin procedure, breast biopsy, etc)    Recent Labs   Lab Test  05/30/18   0930  02/09/18   0737   04/21/17   1040   11/14/16   1004   HGB   --    --    --   14.0   --   15.8*   PLT   --    --    --   207   --   213   NA   --   138   --   137   --   135   POTASSIUM   --   4.0   --   3.5   --   5.0   CR   --   0.49*   --   0.55   --   0.92   A1C  14.4*  12.8*   < >   --    < >   --     < > = values in this interval not displayed.        IMPRESSION:   Reason for surgery/procedure: Bilateral Cataract Removal  Diagnosis/reason for consult: Pre-operative consult.     The proposed surgical procedure is considered LOW risk.    REVISED CARDIAC RISK INDEX  The patient has the following serious cardiovascular risks for perioperative complications such as (MI, PE, VFib and 3  AV Block):  No serious cardiac risks  INTERPRETATION: 0 risks: Class I (very low risk - 0.4% complication rate)    The patient has the following  additional risks for perioperative complications:  No identified additional risks      ICD-10-CM    1. Preop general physical exam Z01.818    2. Uncontrolled type 2 diabetes mellitus without complication, with long-term current use of insulin (H) E11.65     Z79.4    3. Mild intermittent asthma without complication J45.20    4. Iron deficiency anemia, unspecified iron deficiency anemia type D50.9    5. CKD (chronic kidney disease) stage 2, GFR 60-89 ml/min N18.2    6. Gastroesophageal reflux disease, esophagitis presence not specified K21.9    7. Hyperlipidemia LDL goal <100 E78.5    Patient with elevated A1C. Recently had discovered was not properly dosing insulin Reported sugars have improved but are likely still elevated. Patient with difficulty seeing her insulin pens and dosing so would benefit from cataract surgery. We mendel discuss the potential for poor healing after surgery with an elevated A1C.     Her blood pressure is slightly high, plans to start the losartan/HCTZ combo as directed. Her anemia has been stable and does not require treatment.     RECOMMENDATIONS:       Anemia  Anemia and does not require treatment prior to surgery.  Monitor Hemoglobin postoperatively.      --Patient is to take all scheduled medications on the day of surgery EXCEPT for modifications listed below.    Diabetes Medication Use  -----Take 80% of long acting insulin (e.g. Lantus, NPH) while NPO (fasting)  -----Hold short acting insulin (e.g. Novolog, Humalog) while NPO (fasting)      Anticoagulant or Antiplatelet Medication Use  Bleeding risk is low for this procedure (e.g. dental, skin, cataract)        ACE Inhibitor or Angiotensin Receptor Blocker (ARB) Use  Ace inhibitor or Angiotensin Receptor Blocker (ARB) and will continue this medication due to the higher risk of uncontrolled perioperative hypertension (e.g. neurosurgical procedure)      APPROVAL GIVEN to proceed with proposed procedure, without further diagnostic  evaluation       Signed Electronically by: Sapna Anand PA-C    Copy of this evaluation report is provided to requesting physician.    [place] Preop Guidelines    Revised Cardiac Risk Index    The above chart was reviewed.  The patient was not examined by me.  David Perez MD (supervising physician)  Family Medicine  [facility]

## 2018-08-01 NOTE — PROGRESS NOTES
SUBJECTIVE/OBJECTIVE:                Nathalie Harp is a 63 year old female coming in for a follow-up visit for Medication Therapy Management.  She was referred to me from Eva Lockhart     Chief Complaint: Follow up from our visit on 7/18/2018.  Diabetes follow-up.     Tobacco: No tobacco use  Alcohol: none    Medication Adherence: States that she was able to  her medications since the move, back on everything.  Reports compliance except forgot insulin maybe twice in the last month.    Diabetes:  Pt currently taking Levemir 30units QAM, Novolog 10-12units with meals (at last visit reported taking 12-15units with meals; eats 2-3x/day) and was recommended to take 4units with snacks at last visit. I observed her inject her Levemir in clinic - no issues noted; injection techinque was correct.  Symptoms of low blood sugar? Shaky, shivering with low-normal BG's - one episode of hypoglycemia since last visit.  Symptoms of high blood sugar? None reported.   14d avg (n15) 220mg/dL  30d avg (n32) 244 mg/dL  She is unsure whether her readings (non-AM) are before or after a meal.  Date FBG/ 2hours post Lunch/2hours post Dinner /2hours post    8/1 132      7/31 199      7/30 283      7/29 179      7/28 219      7/27 341  69 (7:30pm), 103 (8:30pm)    7/26 256      7/25 136      7/23 314      7/22 163      7/21 210      7/20 319        Lab Results   Component Value Date    A1C 14.4 05/30/2018    A1C 12.8 02/09/2018    A1C 13.8 07/03/2017    A1C 12.3 12/21/2016    A1C 11.3 10/07/2015     Exercise: Her stationary bike has been in storage, unable to exercise without this now.  Knee troubles and asthma - unable to jog or get other physical activity.  Meals: challenging without a stable home at this time.  Breakfast (getting a morning meal most days) - eggs and toast (comments that this is all she can eat without teeth) between 7 am and 10 am  Lunch/Dinner (sometimes one or the other, not routinely both) - BBQ  pork, whole wheat bun, with potato chips and water.  Otherwise will just eat a banana or applesauce container from the grocery store.    Neuropathy: Gabapentin 100mg QAM and 300mg QPM. Reports she has not started the 100mg capsule in the mornings as recommended at last visit d/t she forgot. She continues to notice the 300mg QPM is not working as well as it used to. Reports nerve pain in her hands and her feet, described as a painful, burning that lasts throughout the daytime.    Hypertension/Microalbuminuria: Current medications include losartan/HCTZ 100/25 mg daily - She has not started this yet, as she forgot to switch from the losartan 100mg tablets.  Patient does not self-monitor BP.  She reports having cut back on OTC excedrin (with caffeine) after last visit.    BP Readings from Last 3 Encounters:   08/01/18 142/72   07/18/18 152/72   06/13/18 158/74       Today's Vitals:  LMP  (LMP Unknown) see above.  Wt Readings from Last 3 Encounters:   08/01/18 118 lb (53.5 kg)   07/18/18 118 lb 6.4 oz (53.7 kg)   06/13/18 119 lb 6.4 oz (54.2 kg)         ASSESSMENT:              Current medications were reviewed today as discussed above.      Medication Adherence: Discussed, no major concern today.    Diabetes: Needs Improvement. Patient is not meeting A1c goal of < 7%.  Due next month.  Self monitoring of blood glucose is not at goal of fasting  mg/dL.  May benefit from increasing basal insulin to target the fasting BG.    Neuropathy: Needs Improvement. Patient may benefit from addition of AM dose of gabapentin as previously recommended.    Hypertension/Microalbuminuria: Needs Improvement. She may benefit from addition of hydrochlorothiazide as previously recommended.at this time and can minimize pill burden by starting losartan+HCTZ.  Urine albumin not at goal <30mg/dL despite max dose losartan.      PLAN:                  1. Increase Levemir insulin to 34 units QAM.    2. Continue Novolog insulin 10-15 units with  full meals and 4units with snacks.   3. Start taking gabapentin 100 mg QAM. Continue evening 300 mg gabapentin.   4. Stop losartan 100 mg tablets. Start losartan + hydrochlorothiazide 100/25 mg QAM.    I spent 45 minutes with this patient today. All changes were made via collaborative practice agreement with Eva Lockhart. A copy of the visit note was provided to the patient's primary care provider.     Will follow up in 1 month. Scheduled for A1c and BMP prior to that visit.    The patient was provided a summary of these recommendations as an after visit summary.    Jovana Loo, Pharm.D., [facility]  Medication Therapy Management Pharmacist  [phone]

## 2018-08-01 NOTE — PATIENT INSTRUCTIONS
Before Your Surgery      Call your surgeon if there is any change in your health. This includes signs of a cold or flu (such as a sore throat, runny nose, cough, rash or fever).    Do not smoke, drink alcohol or take over the counter medicine (unless your surgeon or primary care doctor tells you to) for the 24 hours before and after surgery.    If you take prescribed drugs: Follow your doctor s orders about which medicines to take and which to stop until after surgery.    Eating and drinking prior to surgery: follow the instructions from your surgeon    Take a shower or bath the night before surgery. Use the soap your surgeon gave you to gently clean your skin. If you do not have soap from your surgeon, use your regular soap. Do not shave or scrub the surgery site.  Wear clean pajamas and have clean sheets on your bed.     Take Levemir 24 units in the morning. DO NOT TAKE THE NOVOLOG WHILE NOT EATING OR DRINKING.

## 2018-08-01 NOTE — MR AVS SNAPSHOT
After Visit Summary   8/1/2018    Nathalie Harp    MRN: [id number]           Patient Information     Date Of Birth          1955        Visit Information        Provider Department      8/1/2018 9:20 AM Sapna Anand PA-C [facility]        Today's Diagnoses     Preop general physical exam    -  1    Uncontrolled type 2 diabetes mellitus without complication, with long-term current use of insulin (H)        Mild intermittent asthma without complication        Iron deficiency anemia, unspecified iron deficiency anemia type        CKD (chronic kidney disease) stage 2, GFR 60-89 ml/min        Gastroesophageal reflux disease, esophagitis presence not specified        Hyperlipidemia LDL goal <100          Care Instructions      Before Your Surgery      Call your surgeon if there is any change in your health. This includes signs of a cold or flu (such as a sore throat, runny nose, cough, rash or fever).    Do not smoke, drink alcohol or take over the counter medicine (unless your surgeon or primary care doctor tells you to) for the 24 hours before and after surgery.    If you take prescribed drugs: Follow your doctor s orders about which medicines to take and which to stop until after surgery.    Eating and drinking prior to surgery: follow the instructions from your surgeon    Take a shower or bath the night before surgery. Use the soap your surgeon gave you to gently clean your skin. If you do not have soap from your surgeon, use your regular soap. Do not shave or scrub the surgery site.  Wear clean pajamas and have clean sheets on your bed.     Take Levemir 24 units in the morning. DO NOT TAKE THE NOVOLOG WHILE NOT EATING OR DRINKING.           Follow-ups after your visit        Who to contact     If you have questions or need follow up information about today's clinic visit or your schedule please contact [facility] directly at  [phone].  Normal or non-critical lab and imaging results will be communicated to you by MyChart, letter or phone within 4 business days after the clinic has received the results. If you do not hear from us within 7 days, please contact the clinic through MyChart or phone. If you have a critical or abnormal lab result, we will notify you by phone as soon as possible.  Submit refill requests through Bubblyhart or call your pharmacy and they will forward the refill request to us. Please allow 3 business days for your refill to be completed.          Additional Information About Your Visit        Care EveryWhere ID     This is your Care EveryWhere ID. This could be used by other organizations to access your [place] medical records  [id number]        Your Vitals Were     Pulse Temperature Last Period Pulse Oximetry Breastfeeding? BMI (Body Mass Index)    75 98.3  F (36.8  C) (Oral) (LMP Unknown) 98% No 26.46 kg/m2       Blood Pressure from Last 3 Encounters:   08/01/18 142/72   07/18/18 152/72   06/13/18 158/74    Weight from Last 3 Encounters:   08/01/18 118 lb (53.5 kg)   07/18/18 118 lb 6.4 oz (53.7 kg)   06/13/18 119 lb 6.4 oz (54.2 kg)              Today, you had the following     No orders found for display       Primary Care Provider Office Phone # Fax #    Eva Lockhart [phone]       [address] [facility] [address]        Goals        General    Financial Wellbeing (pt-stated)     Notes - Note created  4/27/2018 11:53 AM by Peter Woo, RN    Goal Statement: I will work with the care coordinators in applying for the public assistance that I may qualify for in the next 1-2 months.  Measure of Success: applications have been filled out  Supportive Steps to Achieve: rationale explained.  Barriers: none  Strengths: motivated  Date to Achieve By: 1-2 months        Healthy Eating (pt-stated)     Notes - Note created  4/27/2018 11:56 AM by Peter Woo, RN    Goal  Statement: I will work on planning and purchasing nutritious meals that will not raise my blood sugar levels, as evidenced by lower BG readings and lower A1C level.  Measure of Success: Lower BG and A1C levels  Supportive Steps to Achieve: rationale given to the patient  Barriers: none  Strengths: motivated  Date to Achieve By: ongoing        I will check my blood sugars four times per day (pt-stated)     Notes - Note created  3/10/2014 10:41 AM by Priti Orozco RN    As of today's date 3/10/2014 goal is met at 26 - 50%.   Goal Status:  Active      I will continue to work with Diabetic Educator (pt-stated)     Notes - Note created  3/10/2014 10:42 AM by Priti Orozco RN    As of today's date 3/10/2014 goal is met at 26 - 50%.   Goal Status:  Active      I will take insulins as prescribed, including sliding scale novolog (pt-stated)     Notes - Note created  3/10/2014 10:43 AM by Priti Orozco RN    As of today's date 3/10/2014 goal is met at 51 - 75%.   Goal Status:  Active      Transportation (pt-stated)     Notes - Note created  4/27/2018 11:50 AM by Peter Woo RN    Goal Statement: I will apply for [employer] in the next 1-2 months.  Measure of Success: application complete  Supportive Steps to Achieve: rationale of less confusion for the patient  Barriers: none  Strengths: motivated  Date to Achieve By: 1-2 months          Equal Access to Services     [facility] AH: Hadii aad ku hadasho Sojulissaali, waaxda luqadaha, qaybta kaalmada adeegyada, sandra march haytri charles . So [facility] [phone].    ATENCIÓN: Si habla español, tiene a tompkins disposición servicios gratuitos de asistencia lingüística. Llame al [phone].    We comply with applicable federal civil rights laws and [place] laws. We do not discriminate on the basis of race, color, national origin, age, disability, sex, sexual orientation, or gender identity.            Thank you!     Thank you for choosing [facility]  for your care. Our goal is always to provide you with excellent care. Hearing back from our patients is one way we can continue to improve our services. Please take a few minutes to complete the written survey that you may receive in the mail after your visit with us. Thank you!             Your Updated Medication List - Protect others around you: Learn how to safely use, store and throw away your medicines at www.disposemymeds.org.          This list is accurate as of 8/1/18 10:06 AM.  Always use your most recent med list.                   Brand Name Dispense Instructions for use Diagnosis    albuterol 108 (90 Base) MCG/ACT Inhaler    PROAIR HFA/PROVENTIL HFA/VENTOLIN HFA    3 Inhaler    Inhale 2 puffs into the lungs every 6 hours as needed for shortness of breath / dyspnea ((Ventolin or preferred albuterol equivalent))    Mild intermittent asthma without complication       aspirin 81 MG tablet      Take 1 tablet by mouth daily Reported on 4/21/2017        atorvastatin 40 MG tablet    LIPITOR    90 tablet    Take 1 tablet (40 mg) by mouth daily    Hyperlipidemia LDL goal <100       blood glucose lancets standard    no brand specified    100 each    Use to test blood sugar 3 times daily or as directed.        blood glucose monitoring meter device kit    no brand specified    1 kit    Use to test blood sugar 3 times daily or as directed    Type 2 diabetes mellitus without complication, with long-term current use of insulin (H)       blood glucose monitoring test strip    ONETOUCH ULTRA    300 each    TEST BLOOD SUGARS 3 TIMES DAILY.    Type 2 diabetes mellitus without complication, with long-term current use of insulin (H)       EPINEPHrine 0.3 MG/0.3ML injection 2-pack    EPIPEN/ADRENACLICK/or ANY BX GENERIC EQUIV    0.3 mL    INJECT ONE DEVICE INTO THE MUSCLE AS NEEDED FOR ALLERGIC REACTION    Allergic to shellfish       fexofenadine 180 MG tablet    ALLEGRA    30 tablet    Take 1 tablet (180 mg) by mouth  daily    Chronic seasonal allergic rhinitis, unspecified trigger       fluticasone 50 MCG/ACT spray    FLONASE    3 Bottle    Spray 1-2 sprays into both nostrils daily    Chronic seasonal allergic rhinitis, unspecified trigger       * gabapentin 300 MG capsule    NEURONTIN    90 capsule    Take 1 tablet (300 mg) at night.    Type 2 diabetes mellitus with diabetic neuropathy, with long-term current use of insulin (H)       * gabapentin 100 MG capsule    NEURONTIN    90 capsule    Take 1 capsule (100 mg) by mouth every morning    Type 2 diabetes mellitus with diabetic nephropathy, with long-term current use of insulin (H)       glucosamine chondroitin 1500 complex Caps      Take 1 capsule by mouth daily        insulin detemir 100 UNIT/ML injection    LEVEMIR FLEXPEN/FLEXTOUCH    30 mL    Inject 30units under the skin twice daily - every morning AND at bedtime.    Uncontrolled type 2 diabetes mellitus without complication, with long-term current use of insulin (H)       insulin pen needle 31G X 5 MM    B-D U/F    200 each    Use 4 daily as directed. This replaces the auto-cover pen needle.    Uncontrolled type 2 diabetes mellitus without complication, with long-term current use of insulin (H)       losartan-hydrochlorothiazide 100-25 MG per tablet    HYZAAR    90 tablet    Take 1 tablet by mouth every morning    Type 2 diabetes mellitus with diabetic nephropathy, with long-term current use of insulin (H)       NovoLOG FLEXPEN 100 UNIT/ML injection   Generic drug:  insulin aspart     15 mL    Inject 12-15 Units Subcutaneous 3 times daily (with meals) Patient additionally taking 4 units with snack.    Uncontrolled type 2 diabetes mellitus without complication, with long-term current use of insulin (H)       omeprazole 20 MG CR capsule    priLOSEC    90 capsule    Take 1 capsule (20 mg) by mouth daily    Gastroesophageal reflux disease, esophagitis presence not specified       * Notice:  This list has 2 medication(s) that  are the same as other medications prescribed for you. Read the directions carefully, and ask your doctor or other care provider to review them with you.

## 2018-08-01 NOTE — Clinical Note
Pre-op-bilateral cataract. A1C is high, patient is very difficult to control and is working with MTM, unlikely to change. Having difficulty seeing insulin pens which is likely contributing to her high A1C. Noted high A1C for ophthalmology. BP slightly high but had not taken her new dose of medications.

## 2018-08-01 NOTE — MR AVS SNAPSHOT
After Visit Summary   8/1/2018    Nathalie Harp    MRN: [id number]           Patient Information     Date Of Birth          1955        Visit Information        Provider Department      8/1/2018 10:00 AM Jovana Loo [facility]        Care Instructions    Recommendations from today's MTM visit:                                                        1. Start taking Levemir 34units every morning.    2. Make sure you are taking Novolog 10units with a smaller meal, 15units with a larger meal, and 4units with a snack.    3. Start the morning dose of gabapentin 100mg to see if this is helpful for nerve pain.    4. Make sure you switch to the losartan/HCTZ every morning.    Next MTM visit: Wednesday, August 29th. 9:45am in the lab and 10am with Jovana    To schedule another MTM appointment, please call the clinic directly or you may call the MTM scheduling line at [phone] or toll-free at [phone].     My Clinical Pharmacist's contact information:                                                      It was a pleasure seeing you today!  Please feel free to contact me with any questions or concerns you have.      Jovana Loo, Pharm.D., [facility]  Medication Therapy Management Pharmacist  [phone]    You may receive a survey about the MTM services you received.  I would appreciate your feedback to help me serve you better in the future. Please fill it out and return it when you can. Your comments will be anonymous.              Follow-ups after your visit        Your next 10 appointments already scheduled     Aug 29, 2018  9:45 AM CDT   LAB with CP LAB   [facility]    [address] [place] [id number] [phone]           Please do not eat 10-12 hours before your appointment if you are coming in fasting for labs on lipids, cholesterol, or glucose  (sugar). This does not apply to pregnant women. Water, hot tea and black coffee (with nothing added) are okay. Do not drink other fluids, diet soda or chew gum.            Aug 29, 2018 10:00 AM CDT   Office Visit with Jovana Loo RPH   [facility] ([facility])    [address] [facility] [id number] [phone]           Bring a current list of meds and any records pertaining to this visit. For Physicals, please bring immunization records and any forms needing to be filled out. Please arrive 10 minutes early to complete paperwork.              Who to contact     If you have questions or need follow up information about today's clinic visit or your schedule please contact [facility] directly at [phone].  Normal or non-critical lab and imaging results will be communicated to you by MyChart, letter or phone within 4 business days after the clinic has received the results. If you do not hear from us within 7 days, please contact the clinic through MyChart or phone. If you have a critical or abnormal lab result, we will notify you by phone as soon as possible.  Submit refill requests through Jiglu or call your pharmacy and they will forward the refill request to us. Please allow 3 business days for your refill to be completed.          Additional Information About Your Visit        Care EveryWhere ID     This is your Care EveryWhere ID. This could be used by other organizations to access your [place] medical records  [id number]        Your Vitals Were     Last Period                   (LMP Unknown)            Blood Pressure from Last 3 Encounters:   08/01/18 142/72   07/18/18 152/72   06/13/18 158/74    Weight from Last 3 Encounters:   08/01/18 118 lb (53.5 kg)   07/18/18 118 lb 6.4 oz (53.7 kg)   06/13/18 119 lb 6.4 oz (54.2 kg)              Today, you had the following     No orders found for  display       Primary Care Provider Office Phone # Fax #    Eva Lockhart [phone]       [address] [facility] [address]        Goals        General    Financial Wellbeing (pt-stated)     Notes - Note created  4/27/2018 11:53 AM by Peter Woo, RN    Goal Statement: I will work with the care coordinators in applying for the public assistance that I may qualify for in the next 1-2 months.  Measure of Success: applications have been filled out  Supportive Steps to Achieve: rationale explained.  Barriers: none  Strengths: motivated  Date to Achieve By: 1-2 months        Healthy Eating (pt-stated)     Notes - Note created  4/27/2018 11:56 AM by Peter Woo, RN    Goal Statement: I will work on planning and purchasing nutritious meals that will not raise my blood sugar levels, as evidenced by lower BG readings and lower A1C level.  Measure of Success: Lower BG and A1C levels  Supportive Steps to Achieve: rationale given to the patient  Barriers: none  Strengths: motivated  Date to Achieve By: ongoing        I will check my blood sugars four times per day (pt-stated)     Notes - Note created  3/10/2014 10:41 AM by Priti Orozco RN    As of today's date 3/10/2014 goal is met at 26 - 50%.   Goal Status:  Active      I will continue to work with Diabetic Educator (pt-stated)     Notes - Note created  3/10/2014 10:42 AM by Priti Orozco RN    As of today's date 3/10/2014 goal is met at 26 - 50%.   Goal Status:  Active      I will take insulins as prescribed, including sliding scale novolog (pt-stated)     Notes - Note created  3/10/2014 10:43 AM by Priti Orozco RN    As of today's date 3/10/2014 goal is met at 51 - 75%.   Goal Status:  Active      Transportation (pt-stated)     Notes - Note created  4/27/2018 11:50 AM by Peter Woo, RN    Goal Statement: I will apply for [employer] in the next 1-2 months.  Measure of Success: application complete  Supportive Steps to  Achieve: rationale of less confusion for the patient  Barriers: none  Strengths: motivated  Date to Achieve By: 1-2 months          Equal Access to Services     ADI LOPEZ : Hadsharon Cano, daniel olivastrevha, ladariuscosta garrettamossandra negroalcirajosi waller. So [facility] [phone].    ATENCIÓN: Si habla español, tiene a tompkins disposición servicios gratuitos de asistencia lingüística. Llame al [phone].    We comply with applicable federal civil rights laws and [place] laws. We do not discriminate on the basis of race, color, national origin, age, disability, sex, sexual orientation, or gender identity.            Thank you!     Thank you for choosing [facility]  for your care. Our goal is always to provide you with excellent care. Hearing back from our patients is one way we can continue to improve our services. Please take a few minutes to complete the written survey that you may receive in the mail after your visit with us. Thank you!             Your Updated Medication List - Protect others around you: Learn how to safely use, store and throw away your medicines at www.disposemymeds.org.          This list is accurate as of 8/1/18 10:45 AM.  Always use your most recent med list.                   Brand Name Dispense Instructions for use Diagnosis    albuterol 108 (90 Base) MCG/ACT Inhaler    PROAIR HFA/PROVENTIL HFA/VENTOLIN HFA    3 Inhaler    Inhale 2 puffs into the lungs every 6 hours as needed for shortness of breath / dyspnea ((Ventolin or preferred albuterol equivalent))    Mild intermittent asthma without complication       aspirin 81 MG tablet      Take 1 tablet by mouth daily Reported on 4/21/2017        atorvastatin 40 MG tablet    LIPITOR    90 tablet    Take 1 tablet (40 mg) by mouth daily    Hyperlipidemia LDL goal <100       blood glucose lancets standard    no brand specified    100 each    Use to test blood sugar 3 times daily or as  directed.        blood glucose monitoring meter device kit    no brand specified    1 kit    Use to test blood sugar 3 times daily or as directed    Type 2 diabetes mellitus without complication, with long-term current use of insulin (H)       blood glucose monitoring test strip    ONETOUCH ULTRA    300 each    TEST BLOOD SUGARS 3 TIMES DAILY.    Type 2 diabetes mellitus without complication, with long-term current use of insulin (H)       EPINEPHrine 0.3 MG/0.3ML injection 2-pack    EPIPEN/ADRENACLICK/or ANY BX GENERIC EQUIV    0.3 mL    INJECT ONE DEVICE INTO THE MUSCLE AS NEEDED FOR ALLERGIC REACTION    Allergic to shellfish       fexofenadine 180 MG tablet    ALLEGRA    30 tablet    Take 1 tablet (180 mg) by mouth daily    Chronic seasonal allergic rhinitis, unspecified trigger       fluticasone 50 MCG/ACT spray    FLONASE    3 Bottle    Spray 1-2 sprays into both nostrils daily    Chronic seasonal allergic rhinitis, unspecified trigger       * gabapentin 300 MG capsule    NEURONTIN    90 capsule    Take 1 tablet (300 mg) at night.    Type 2 diabetes mellitus with diabetic neuropathy, with long-term current use of insulin (H)       * gabapentin 100 MG capsule    NEURONTIN    90 capsule    Take 1 capsule (100 mg) by mouth every morning    Type 2 diabetes mellitus with diabetic nephropathy, with long-term current use of insulin (H)       glucosamine chondroitin 1500 complex Caps      Take 1 capsule by mouth daily        insulin detemir 100 UNIT/ML injection    LEVEMIR FLEXPEN/FLEXTOUCH    30 mL    Inject 30units under the skin twice daily - every morning AND at bedtime.    Uncontrolled type 2 diabetes mellitus without complication, with long-term current use of insulin (H)       insulin pen needle 31G X 5 MM    B-D U/F    200 each    Use 4 daily as directed. This replaces the auto-cover pen needle.    Uncontrolled type 2 diabetes mellitus without complication, with long-term current use of insulin (H)        losartan-hydrochlorothiazide 100-25 MG per tablet    HYZAAR    90 tablet    Take 1 tablet by mouth every morning    Type 2 diabetes mellitus with diabetic nephropathy, with long-term current use of insulin (H)       NovoLOG FLEXPEN 100 UNIT/ML injection   Generic drug:  insulin aspart     15 mL    Inject 12-15 Units Subcutaneous 3 times daily (with meals) Patient additionally taking 4 units with snack.    Uncontrolled type 2 diabetes mellitus without complication, with long-term current use of insulin (H)       omeprazole 20 MG CR capsule    priLOSEC    90 capsule    Take 1 capsule (20 mg) by mouth daily    Gastroesophageal reflux disease, esophagitis presence not specified       * Notice:  This list has 2 medication(s) that are the same as other medications prescribed for you. Read the directions carefully, and ask your doctor or other care provider to review them with you.

## 2018-08-01 NOTE — PATIENT INSTRUCTIONS
Recommendations from today's MTM visit:                                                        1. Start taking Levemir 34units every morning.    2. Make sure you are taking Novolog 10units with a smaller meal, 15units with a larger meal, and 4units with a snack.    3. Start the morning dose of gabapentin 100mg to see if this is helpful for nerve pain.    4. Make sure you switch to the losartan/HCTZ every morning.    Next MTM visit: Wednesday, August 29th. 9:45am in the lab and 10am with Jovana    To schedule another MTM appointment, please call the clinic directly or you may call the MTM scheduling line at [phone] or toll-free at [phone].     My Clinical Pharmacist's contact information:                                                      It was a pleasure seeing you today!  Please feel free to contact me with any questions or concerns you have.      Jovana Loo, Pharm.D., [facility]  Medication Therapy Management Pharmacist  [phone]    You may receive a survey about the MTM services you received.  I would appreciate your feedback to help me serve you better in the future. Please fill it out and return it when you can. Your comments will be anonymous.

## 2018-08-02 ASSESSMENT — ASTHMA QUESTIONNAIRES: ACT_TOTALSCORE: 20

## 2018-08-03 ENCOUNTER — TELEPHONE (OUTPATIENT)
Dept: FAMILY MEDICINE | Facility: CLINIC | Age: 63
End: 2018-08-03

## 2018-08-03 NOTE — TELEPHONE ENCOUNTER
Reason for Call:  Other Pre OP Information    Detailed comments: Haley from [facility] calling to get a copy of patient's Pre Op from yesterday's appointment.  This would include labs as well.  Please fax the paperwork to [phone].    Phone Number Patient can be reached at: Other phone number:  [phone]    Best Time: ASAP    Can we leave a detailed message on this number? YES    Call taken on 8/3/2018 at 7:51 AM by Juliana Abrams

## 2018-08-06 ENCOUNTER — DOCUMENTATION ONLY (OUTPATIENT)
Dept: LAB | Facility: CLINIC | Age: 63
End: 2018-08-06

## 2018-08-06 DIAGNOSIS — E11.65 POORLY CONTROLLED TYPE 2 DIABETES MELLITUS (H): Primary | ICD-10-CM

## 2018-08-06 NOTE — PROGRESS NOTES
Called [phone] (home).  Left a message with a female to have patient return call to triage line at [phone].  Bessie Fritz RN [facility] Triage.

## 2018-09-12 ENCOUNTER — TRANSFERRED RECORDS (OUTPATIENT)
Dept: HEALTH INFORMATION MANAGEMENT | Facility: CLINIC | Age: 63
End: 2018-09-12

## 2018-09-19 ENCOUNTER — OFFICE VISIT (OUTPATIENT)
Dept: PHARMACY | Facility: CLINIC | Age: 63
End: 2018-09-19

## 2018-09-19 VITALS — WEIGHT: 111.8 LBS | SYSTOLIC BLOOD PRESSURE: 142 MMHG | BODY MASS INDEX: 25.07 KG/M2 | DIASTOLIC BLOOD PRESSURE: 70 MMHG

## 2018-09-19 DIAGNOSIS — E11.65 POORLY CONTROLLED TYPE 2 DIABETES MELLITUS (H): ICD-10-CM

## 2018-09-19 DIAGNOSIS — Z79.4 TYPE 2 DIABETES MELLITUS WITH DIABETIC NEPHROPATHY, WITH LONG-TERM CURRENT USE OF INSULIN (H): Primary | ICD-10-CM

## 2018-09-19 DIAGNOSIS — R80.9 MICROALBUMINURIA: ICD-10-CM

## 2018-09-19 DIAGNOSIS — E11.21 TYPE 2 DIABETES MELLITUS WITH DIABETIC NEPHROPATHY, WITH LONG-TERM CURRENT USE OF INSULIN (H): Primary | ICD-10-CM

## 2018-09-19 DIAGNOSIS — R82.90 NONSPECIFIC FINDING ON EXAMINATION OF URINE: Primary | ICD-10-CM

## 2018-09-19 DIAGNOSIS — E11.42 DIABETIC POLYNEUROPATHY ASSOCIATED WITH TYPE 2 DIABETES MELLITUS (H): ICD-10-CM

## 2018-09-19 LAB
ALBUMIN UR-MCNC: 100 MG/DL
APPEARANCE UR: ABNORMAL
BACTERIA #/AREA URNS HPF: ABNORMAL /HPF
BILIRUB UR QL STRIP: ABNORMAL
COLOR UR AUTO: YELLOW
GLUCOSE UR STRIP-MCNC: >=1000 MG/DL
HBA1C MFR BLD: 8.7 % (ref 0–5.6)
HGB UR QL STRIP: ABNORMAL
KETONES UR STRIP-MCNC: 15 MG/DL
LEUKOCYTE ESTERASE UR QL STRIP: ABNORMAL
NITRATE UR QL: NEGATIVE
NON-SQ EPI CELLS #/AREA URNS LPF: ABNORMAL /LPF
PH UR STRIP: 6 PH (ref 5–7)
RBC #/AREA URNS AUTO: ABNORMAL /HPF
SOURCE: ABNORMAL
SP GR UR STRIP: 1.02 (ref 1–1.03)
UROBILINOGEN UR STRIP-ACNC: 0.2 EU/DL (ref 0.2–1)
WBC #/AREA URNS AUTO: >100 /HPF
WBC CLUMPS #/AREA URNS HPF: PRESENT /HPF

## 2018-09-19 PROCEDURE — 81001 URINALYSIS AUTO W/SCOPE: CPT | Performed by: INTERNAL MEDICINE

## 2018-09-19 PROCEDURE — 99607 MTMS BY PHARM ADDL 15 MIN: CPT | Performed by: PHARMACIST

## 2018-09-19 PROCEDURE — 99606 MTMS BY PHARM EST 15 MIN: CPT | Performed by: PHARMACIST

## 2018-09-19 PROCEDURE — 87088 URINE BACTERIA CULTURE: CPT | Performed by: INTERNAL MEDICINE

## 2018-09-19 PROCEDURE — 36415 COLL VENOUS BLD VENIPUNCTURE: CPT | Performed by: INTERNAL MEDICINE

## 2018-09-19 PROCEDURE — 87186 SC STD MICRODIL/AGAR DIL: CPT | Performed by: INTERNAL MEDICINE

## 2018-09-19 PROCEDURE — 87086 URINE CULTURE/COLONY COUNT: CPT | Performed by: INTERNAL MEDICINE

## 2018-09-19 PROCEDURE — 83036 HEMOGLOBIN GLYCOSYLATED A1C: CPT | Performed by: INTERNAL MEDICINE

## 2018-09-19 NOTE — PATIENT INSTRUCTIONS
Levemir 35units daily.    Novolog 8-15units with full meals and 4units with snacks.    Return on Wednesday, October 17th at 10am for appointment with Sharad VillasenorD., [facility]  Medication Therapy Management Pharmacist  [phone]

## 2018-09-19 NOTE — LETTER
[facility]  [address] [place]  [address]  [phone]        September 24, 2018    Nathalie Harp  [address]  [facility] [address]        Dear Nathalie,    Your urine grew two different bacteria.  One of them is resistant to the antibiotic you were given (Macrobid).   A prescription for cipro, another antibiotic was sent for you to take for this.  You should still complete the Macrobid antibiotic.     Results for orders placed or performed in visit on 09/19/18   Hemoglobin A1c   Result Value Ref Range    Hemoglobin A1C 8.7 (H) 0 - 5.6 %   UA with Microscopic reflex to Culture   Result Value Ref Range    Color Urine Yellow     Appearance Urine Cloudy     Glucose Urine >=1000 (A) NEG^Negative mg/dL    Bilirubin Urine Small (A) NEG^Negative    Ketones Urine 15 (A) NEG^Negative mg/dL    Specific Gravity Urine 1.020 1.003 - 1.035    pH Urine 6.0 5.0 - 7.0 pH    Protein Albumin Urine 100 (A) NEG^Negative mg/dL    Urobilinogen Urine 0.2 0.2 - 1.0 EU/dL    Nitrite Urine Negative NEG^Negative    Blood Urine Large (A) NEG^Negative    Leukocyte Esterase Urine Small (A) NEG^Negative    Source Midstream Urine     WBC Urine >100 (A) OTO5^0 - 5 /HPF    RBC Urine 2-5 (A) OTO2^O - 2 /HPF    WBC Clumps Present (A) NEG^Negative /HPF    Squamous Epithelial /LPF Urine Few FEW^Few /LPF    Bacteria Urine Many (A) NEG^Negative /HPF   Urine Culture Aerobic Bacterial   Result Value Ref Range    Specimen Description Midstream Urine     Culture Micro >100,000 colonies/mL  Escherichia coli   (A)     Culture Micro 10,000 to 50,000 colonies/mL  Proteus mirabilis   (A)        Susceptibility    Escherichia coli - FRANCK     AMPICILLIN >=32 Resistant ug/mL     CEFAZOLIN* <=4 Sensitive ug/mL      * Cefazolin FRANCK breakpoints are for the treatment of uncomplicated urinary tract infections.  For the treatment of systemic infections, please contact the laboratory for additional testing.     CEFOXITIN <=4  Sensitive ug/mL     CEFTAZIDIME <=1 Sensitive ug/mL     CEFTRIAXONE <=1 Sensitive ug/mL     CIPROFLOXACIN >=4 Resistant ug/mL     GENTAMICIN <=1 Sensitive ug/mL     LEVOFLOXACIN >=8 Resistant ug/mL     NITROFURANTOIN <=16 Sensitive ug/mL     TOBRAMYCIN <=1 Sensitive ug/mL     Trimethoprim/Sulfa >=16/304 Resistant ug/mL     AMPICILLIN/SULBACTAM >=32 Resistant ug/mL     Piperacillin/Tazo <=4 Sensitive ug/mL     CEFEPIME <=1 Sensitive ug/mL    Proteus mirabilis - FRANCK     AMPICILLIN <=2 Sensitive ug/mL     CEFAZOLIN* <=4 Sensitive ug/mL      * Cefazolin FRANCK breakpoints are for the treatment of uncomplicated urinary tract infections.  For the treatment of systemic infections, please contact the laboratory for additional testing.Cefazolin FRANCK breakpoints are for the treatment of uncomplicated urinary tract infections.  For the treatment of systemic infections, please contact the laboratory for additional testing.     CEFOXITIN <=4 Sensitive ug/mL     CEFTAZIDIME <=1 Sensitive ug/mL     CEFTRIAXONE <=1 Sensitive ug/mL     CIPROFLOXACIN <=0.25 Sensitive ug/mL     GENTAMICIN <=1 Sensitive ug/mL     LEVOFLOXACIN <=0.12 Sensitive ug/mL     NITROFURANTOIN* 128 Resistant ug/mL      * Intrinsically Resistant     TOBRAMYCIN <=1 Sensitive ug/mL     Trimethoprim/Sulfa >=16/304 Resistant ug/mL     AMPICILLIN/SULBACTAM <=2 Sensitive ug/mL     Piperacillin/Tazo <=4 Sensitive ug/mL     CEFEPIME <=1 Sensitive ug/mL       If you have any questions please call the clinic at [phone].    Sincerely,    Eva MCDOWELL

## 2018-09-19 NOTE — MR AVS SNAPSHOT
After Visit Summary   9/19/2018    Nathalie Harp    MRN: [id number]           Patient Information     Date Of Birth          1955        Visit Information        Provider Department      9/19/2018 10:00 AM Jovana Loo [facility]        Care Instructions    Levemir 35units daily.    Novolog 8-15units with full meals and 4units with snacks.    Return on Wednesday, October 17th at 10am for appointment with [facility].D., [facility]  Medication Therapy Management Pharmacist  [phone]            Follow-ups after your visit        Your next 10 appointments already scheduled     Sep 20, 2018 11:20 AM CDT   [facility]-C   [facility]    [address] [place] [id number] [phone]           Bring a current list of meds and any records pertaining to this visit. For Physicals, please bring immunization records and any forms needing to be filled out. Please arrive 10 minutes early to complete paperwork.            Oct 17, 2018 10:00 AM CDT   Office Visit with Jovana Loo [facility]    [address] [facility] [id number] [phone]           Bring a current list of meds and any records pertaining to this visit. For Physicals, please bring immunization records and any forms needing to be filled out. Please arrive 10 minutes early to complete paperwork.              Who to contact     If you have questions or need follow up information about today's clinic visit or your schedule please contact [facility] directly at [phone].  Normal or non-critical lab and imaging results will be communicated to you by MyChart, letter or phone within 4 business days after the clinic has received the  "results. If you do not hear from us within 7 days, please contact the clinic through [employer] or phone. If you have a critical or abnormal lab result, we will notify you by phone as soon as possible.  Submit refill requests through Dash[employer] or call your pharmacy and they will forward the refill request to us. Please allow 3 business days for your refill to be completed.          Additional Information About Your Visit        Dash[employer] Information     [employer] lets you send messages to your doctor, view your test results, renew your prescriptions, schedule appointments and more. To sign up, go to [email or web address] . Click on \"Log in\" on the left side of the screen, which will take you to the Welcome page. Then click on \"Sign up Now\" on the right side of the page.     You will be asked to enter the access code listed below, as well as some personal information. Please follow the directions to create your username and password.     Your access code is: [email or web address]  Expires: 2018  8:56 AM     Your access code will  in 90 days. If you need help or a new code, please call your [facility] clinic or [phone].        Care EveryWhere ID     This is your Care EveryWhere ID. This could be used by other organizations to access your [facility] medical records  [id number]        Your Vitals Were     Last Period BMI (Body Mass Index)                (LMP Unknown) 25.07 kg/m2           Blood Pressure from Last 3 Encounters:   18 142/70   18 142/72   18 152/72    Weight from Last 3 Encounters:   18 111 lb 12.8 oz (50.7 kg)   18 118 lb (53.5 kg)   18 118 lb 6.4 oz (53.7 kg)              Today, you had the following     No orders found for display       Primary Care Provider Office Phone # Fax #    Eva Lockhart [phone]       [address] [facility] [address]        Goals        General    Financial Wellbeing (pt-stated)     Notes - Note created  " 4/27/2018 11:53 AM by Peter Woo, RN    Goal Statement: I will work with the care coordinators in applying for the public assistance that I may qualify for in the next 1-2 months.  Measure of Success: applications have been filled out  Supportive Steps to Achieve: rationale explained.  Barriers: none  Strengths: motivated  Date to Achieve By: 1-2 months        Healthy Eating (pt-stated)     Notes - Note created  4/27/2018 11:56 AM by Peter Woo, RN    Goal Statement: I will work on planning and purchasing nutritious meals that will not raise my blood sugar levels, as evidenced by lower BG readings and lower A1C level.  Measure of Success: Lower BG and A1C levels  Supportive Steps to Achieve: rationale given to the patient  Barriers: none  Strengths: motivated  Date to Achieve By: ongoing        I will check my blood sugars four times per day (pt-stated)     Notes - Note created  3/10/2014 10:41 AM by Priti Orozco RN    As of today's date 3/10/2014 goal is met at 26 - 50%.   Goal Status:  Active      I will continue to work with Diabetic Educator (pt-stated)     Notes - Note created  3/10/2014 10:42 AM by Priti Orozco RN    As of today's date 3/10/2014 goal is met at 26 - 50%.   Goal Status:  Active      I will take insulins as prescribed, including sliding scale novolog (pt-stated)     Notes - Note created  3/10/2014 10:43 AM by Priti Orozco RN    As of today's date 3/10/2014 goal is met at 51 - 75%.   Goal Status:  Active      Transportation (pt-stated)     Notes - Note created  4/27/2018 11:50 AM by Peter Woo, RN    Goal Statement: I will apply for [employer] in the next 1-2 months.  Measure of Success: application complete  Supportive Steps to Achieve: rationale of less confusion for the patient  Barriers: none  Strengths: motivated  Date to Achieve By: 1-2 months          Equal Access to Services     ADI LOPEZ AH: Narcisa Cano, daniel anderson, sandra escobar  joaquim kelloggpham weiss'aan ah. So [facility] [phone].    ATENCIÓN: Si marquez restrepo, tiene a tompkins disposición servicios gratuitos de asistencia lingüística. Tatiana yepez [phone].    We comply with applicable federal civil rights laws and [place] laws. We do not discriminate on the basis of race, color, national origin, age, disability, sex, sexual orientation, or gender identity.            Thank you!     Thank you for choosing [facility]  for your care. Our goal is always to provide you with excellent care. Hearing back from our patients is one way we can continue to improve our services. Please take a few minutes to complete the written survey that you may receive in the mail after your visit with us. Thank you!             Your Updated Medication List - Protect others around you: Learn how to safely use, store and throw away your medicines at www.disposemymeds.org.          This list is accurate as of 9/19/18 10:42 AM.  Always use your most recent med list.                   Brand Name Dispense Instructions for use Diagnosis    albuterol 108 (90 Base) MCG/ACT inhaler    PROAIR HFA/PROVENTIL HFA/VENTOLIN HFA    3 Inhaler    Inhale 2 puffs into the lungs every 6 hours as needed for shortness of breath / dyspnea ((Ventolin or preferred albuterol equivalent))    Mild intermittent asthma without complication       aspirin 81 MG tablet      Take 1 tablet by mouth daily Reported on 4/21/2017        atorvastatin 40 MG tablet    LIPITOR    90 tablet    Take 1 tablet (40 mg) by mouth daily    Hyperlipidemia LDL goal <100       blood glucose lancets standard    no brand specified    100 each    Use to test blood sugar 3 times daily or as directed.        blood glucose monitoring meter device kit    no brand specified    1 kit    Use to test blood sugar 3 times daily or as directed    Type 2 diabetes mellitus without complication, with long-term current use of insulin (H)       blood glucose  monitoring test strip    ONETOUCH ULTRA    300 each    TEST BLOOD SUGARS 3 TIMES DAILY.    Type 2 diabetes mellitus without complication, with long-term current use of insulin (H)       EPINEPHrine 0.3 MG/0.3ML injection 2-pack    EPIPEN/ADRENACLICK/or ANY BX GENERIC EQUIV    0.3 mL    INJECT ONE DEVICE INTO THE MUSCLE AS NEEDED FOR ALLERGIC REACTION    Allergic to shellfish       fexofenadine 180 MG tablet    ALLEGRA    30 tablet    Take 1 tablet (180 mg) by mouth daily    Chronic seasonal allergic rhinitis, unspecified trigger       fluticasone 50 MCG/ACT spray    FLONASE    3 Bottle    Spray 1-2 sprays into both nostrils daily    Chronic seasonal allergic rhinitis, unspecified trigger       * gabapentin 300 MG capsule    NEURONTIN    90 capsule    Take 1 tablet (300 mg) at night.    Type 2 diabetes mellitus with diabetic neuropathy, with long-term current use of insulin (H)       * gabapentin 100 MG capsule    NEURONTIN    90 capsule    Take 1 capsule (100 mg) by mouth every morning    Type 2 diabetes mellitus with diabetic nephropathy, with long-term current use of insulin (H)       glucosamine chondroitin 1500 complex Caps      Take 1 capsule by mouth daily        insulin detemir 100 UNIT/ML injection    LEVEMIR FLEXPEN/FLEXTOUCH    30 mL    Inject 30units under the skin twice daily - every morning AND at bedtime.    Uncontrolled type 2 diabetes mellitus without complication, with long-term current use of insulin (H)       insulin pen needle 31G X 5 MM    B-D U/F    200 each    Use 4 daily as directed. This replaces the auto-cover pen needle.    Uncontrolled type 2 diabetes mellitus without complication, with long-term current use of insulin (H)       losartan-hydrochlorothiazide 100-25 MG per tablet    HYZAAR    90 tablet    Take 1 tablet by mouth every morning    Type 2 diabetes mellitus with diabetic nephropathy, with long-term current use of insulin (H)       NovoLOG FLEXPEN 100 UNIT/ML injection   Generic  drug:  insulin aspart     15 mL    Inject 12-15 Units Subcutaneous 3 times daily (with meals) Patient additionally taking 4 units with snack.    Uncontrolled type 2 diabetes mellitus without complication, with long-term current use of insulin (H)       omeprazole 20 MG CR capsule    priLOSEC    90 capsule    Take 1 capsule (20 mg) by mouth daily    Gastroesophageal reflux disease, esophagitis presence not specified       * Notice:  This list has 2 medication(s) that are the same as other medications prescribed for you. Read the directions carefully, and ask your doctor or other care provider to review them with you.

## 2018-09-20 ENCOUNTER — PATIENT OUTREACH (OUTPATIENT)
Dept: CARE COORDINATION | Facility: CLINIC | Age: 63
End: 2018-09-20

## 2018-09-20 ENCOUNTER — OFFICE VISIT (OUTPATIENT)
Dept: FAMILY MEDICINE | Facility: CLINIC | Age: 63
End: 2018-09-20
Payer: COMMERCIAL

## 2018-09-20 VITALS
DIASTOLIC BLOOD PRESSURE: 81 MMHG | HEART RATE: 99 BPM | TEMPERATURE: 98.1 F | BODY MASS INDEX: 25.07 KG/M2 | WEIGHT: 111.8 LBS | SYSTOLIC BLOOD PRESSURE: 131 MMHG

## 2018-09-20 DIAGNOSIS — E11.21 TYPE 2 DIABETES MELLITUS WITH DIABETIC NEPHROPATHY, WITH LONG-TERM CURRENT USE OF INSULIN (H): ICD-10-CM

## 2018-09-20 DIAGNOSIS — N30.00 ACUTE CYSTITIS WITHOUT HEMATURIA: Primary | ICD-10-CM

## 2018-09-20 DIAGNOSIS — L98.9 CHANGING SKIN LESION: ICD-10-CM

## 2018-09-20 DIAGNOSIS — Z79.4 TYPE 2 DIABETES MELLITUS WITH DIABETIC NEPHROPATHY, WITH LONG-TERM CURRENT USE OF INSULIN (H): ICD-10-CM

## 2018-09-20 PROCEDURE — 99214 OFFICE O/P EST MOD 30 MIN: CPT | Performed by: PHYSICIAN ASSISTANT

## 2018-09-20 RX ORDER — NITROFURANTOIN 25; 75 MG/1; MG/1
100 CAPSULE ORAL 2 TIMES DAILY
Qty: 14 CAPSULE | Refills: 0 | Status: SHIPPED | OUTPATIENT
Start: 2018-09-20 | End: 2018-10-01

## 2018-09-20 NOTE — MR AVS SNAPSHOT
After Visit Summary   9/20/2018    Nathalie Harp    MRN: [id number]           Patient Information     Date Of Birth          1955        Visit Information        Provider Department      9/20/2018 11:20 AM Sapna Anand PA-C [facility]        Today's Diagnoses     Acute cystitis without hematuria    -  1    Type 2 diabetes mellitus with diabetic nephropathy, with long-term current use of insulin (H)        Changing skin lesion          Care Instructions    1. Schedule with the dermatologist.           Follow-ups after your visit        Additional Services     DERMATOLOGY REFERRAL       Your provider has referred you to: [facility] Hidalgo (2 locations) [phone]   [email or web address]    Please be aware that coverage of these services is subject to the terms and limitations of your health insurance plan.  Call member services at your health plan with any benefit or coverage questions.      Please bring the following with you to your appointment:    (1) Any X-Rays, CTs or MRIs which have been performed.  Contact the facility where they were done to arrange for  prior to your scheduled appointment.    (2) List of current medications  (3) This referral request   (4) Any documents/labs given to you for this referral                  Your next 10 appointments already scheduled     Oct 17, 2018 10:00 AM CDT   Office Visit with Jovana Loo RPH   [facility] ([facility])    [address] [address] [phone]           Bring a current list of meds and any records pertaining to this visit. For Physicals, please bring immunization records and any forms needing to be filled out. Please arrive 10 minutes early to complete paperwork.              Who to contact     If you have questions or need follow up information about today's  "clinic visit or your schedule please contact [facility] directly at [phone].  Normal or non-critical lab and imaging results will be communicated to you by MyChart, letter or phone within 4 business days after the clinic has received the results. If you do not hear from us within 7 days, please contact the clinic through [facility] or phone. If you have a critical or abnormal lab result, we will notify you by phone as soon as possible.  Submit refill requests through [employer] or call your pharmacy and they will forward the refill request to us. Please allow 3 business days for your refill to be completed.          Additional Information About Your Visit        MyChart Information     [facility] lets you send messages to your doctor, view your test results, renew your prescriptions, schedule appointments and more. To sign up, go to [email or web address] . Click on \"Log in\" on the left side of the screen, which will take you to the Welcome page. Then click on \"Sign up Now\" on the right side of the page.     You will be asked to enter the access code listed below, as well as some personal information. Please follow the directions to create your username and password.     Your access code is: [email or web address]  Expires: 2018  8:56 AM     Your access code will  in 90 days. If you need help or a new code, please call your [place] clinic or [phone].        Care EveryWhere ID     This is your Care EveryWhere ID. This could be used by other organizations to access your [place] medical records  [id number]        Your Vitals Were     Pulse Temperature Last Period Breastfeeding? BMI (Body Mass Index)       99 98.1  F (36.7  C) (Oral) (LMP Unknown) No 25.07 kg/m2        Blood Pressure from Last 3 Encounters:   18 131/81   18 142/70   18 142/72    Weight from Last 3 Encounters:   18 111 lb 12.8 oz (50.7 kg)   18 111 lb 12.8 oz (50.7 kg)   18 118 lb (53.5 " kg)              We Performed the Following     DERMATOLOGY REFERRAL          Today's Medication Changes          These changes are accurate as of 9/20/18 12:26 PM.  If you have any questions, ask your nurse or doctor.               Start taking these medicines.        Dose/Directions    nitroFURantoin (macrocrystal-monohydrate) 100 MG capsule   Commonly known as:  MACROBID   Used for:  Acute cystitis without hematuria   Started by:  Sapna Anand PA-C        Dose:  100 mg   Take 1 capsule (100 mg) by mouth 2 times daily   Quantity:  14 capsule   Refills:  0            Where to get your medicines      These medications were sent to [facility] [place], [place] - [address]  [address], [facility] [address]     Phone:  [phone]     nitroFURantoin (macrocrystal-monohydrate) 100 MG capsule                Primary Care Provider Office Phone # Fax #    Eva Lockhart [phone]       [address]  [facility] [address]        Goals        General    Financial Wellbeing (pt-stated)     Notes - Note created  4/27/2018 11:53 AM by Peter Woo, RN    Goal Statement: I will work with the care coordinators in applying for the public assistance that I may qualify for in the next 1-2 months.  Measure of Success: applications have been filled out  Supportive Steps to Achieve: rationale explained.  Barriers: none  Strengths: motivated  Date to Achieve By: 1-2 months        Healthy Eating (pt-stated)     Notes - Note created  4/27/2018 11:56 AM by Peter Woo, RN    Goal Statement: I will work on planning and purchasing nutritious meals that will not raise my blood sugar levels, as evidenced by lower BG readings and lower A1C level.  Measure of Success: Lower BG and A1C levels  Supportive Steps to Achieve: rationale given to the patient  Barriers: none  Strengths: motivated  Date to Achieve By: ongoing        I will check my blood  sugars four times per day (pt-stated)     Notes - Note created  3/10/2014 10:41 AM by Priti Orozco, RN    As of today's date 3/10/2014 goal is met at 26 - 50%.   Goal Status:  Active      I will continue to work with Diabetic Educator (pt-stated)     Notes - Note created  3/10/2014 10:42 AM by Priti Orozco RN    As of today's date 3/10/2014 goal is met at 26 - 50%.   Goal Status:  Active      I will take insulins as prescribed, including sliding scale novolog (pt-stated)     Notes - Note created  3/10/2014 10:43 AM by Priti Orozco RN    As of today's date 3/10/2014 goal is met at 51 - 75%.   Goal Status:  Active      Transportation (pt-stated)     Notes - Note created  4/27/2018 11:50 AM by Peter Woo RN    Goal Statement: I will apply for [employer] in the next 1-2 months.  Measure of Success: application complete  Supportive Steps to Achieve: rationale of less confusion for the patient  Barriers: none  Strengths: motivated  Date to Achieve By: 1-2 months          Equal Access to Services     ADI LOPEZ AH: Hadii tracey Cano, waxander anderson, qaybsadnra riley. So [facility] [phone].    ATENCIÓN: Si habla español, tiene a tompkins disposición servicios gratuitos de asistencia lingüística. Llame al [phone].    We comply with applicable federal civil rights laws and [place] laws. We do not discriminate on the basis of race, color, national origin, age, disability, sex, sexual orientation, or gender identity.            Thank you!     Thank you for choosing [facility]  for your care. Our goal is always to provide you with excellent care. Hearing back from our patients is one way we can continue to improve our services. Please take a few minutes to complete the written survey that you may receive in the mail after your visit with us. Thank you!             Your Updated Medication List - Protect others around you: Learn how  to safely use, store and throw away your medicines at www.disposemymeds.org.          This list is accurate as of 9/20/18 12:26 PM.  Always use your most recent med list.                   Brand Name Dispense Instructions for use Diagnosis    albuterol 108 (90 Base) MCG/ACT inhaler    PROAIR HFA/PROVENTIL HFA/VENTOLIN HFA    3 Inhaler    Inhale 2 puffs into the lungs every 6 hours as needed for shortness of breath / dyspnea ((Ventolin or preferred albuterol equivalent))    Mild intermittent asthma without complication       aspirin 81 MG tablet      Take 1 tablet by mouth daily Reported on 4/21/2017        atorvastatin 40 MG tablet    LIPITOR    90 tablet    Take 1 tablet (40 mg) by mouth daily    Hyperlipidemia LDL goal <100       blood glucose lancets standard    no brand specified    100 each    Use to test blood sugar 3 times daily or as directed.        blood glucose monitoring meter device kit    no brand specified    1 kit    Use to test blood sugar 3 times daily or as directed    Type 2 diabetes mellitus without complication, with long-term current use of insulin (H)       blood glucose monitoring test strip    ONETOUCH ULTRA    300 each    TEST BLOOD SUGARS 3 TIMES DAILY.    Type 2 diabetes mellitus without complication, with long-term current use of insulin (H)       EPINEPHrine 0.3 MG/0.3ML injection 2-pack    EPIPEN/ADRENACLICK/or ANY BX GENERIC EQUIV    0.3 mL    INJECT ONE DEVICE INTO THE MUSCLE AS NEEDED FOR ALLERGIC REACTION    Allergic to shellfish       fexofenadine 180 MG tablet    ALLEGRA    30 tablet    Take 1 tablet (180 mg) by mouth daily    Chronic seasonal allergic rhinitis, unspecified trigger       fluticasone 50 MCG/ACT spray    FLONASE    3 Bottle    Spray 1-2 sprays into both nostrils daily    Chronic seasonal allergic rhinitis, unspecified trigger       * gabapentin 300 MG capsule    NEURONTIN    90 capsule    Take 1 tablet (300 mg) at night.    Type 2 diabetes mellitus with diabetic  neuropathy, with long-term current use of insulin (H)       * gabapentin 100 MG capsule    NEURONTIN    90 capsule    Take 1 capsule (100 mg) by mouth every morning    Type 2 diabetes mellitus with diabetic nephropathy, with long-term current use of insulin (H)       glucosamine chondroitin 1500 complex Caps      Take 1 capsule by mouth daily        insulin detemir 100 UNIT/ML injection    LEVEMIR FLEXPEN/FLEXTOUCH    30 mL    Inject 30units under the skin twice daily - every morning AND at bedtime.    Uncontrolled type 2 diabetes mellitus without complication, with long-term current use of insulin (H)       insulin pen needle 31G X 5 MM    B-D U/F    200 each    Use 4 daily as directed. This replaces the auto-cover pen needle.    Uncontrolled type 2 diabetes mellitus without complication, with long-term current use of insulin (H)       losartan-hydrochlorothiazide 100-25 MG per tablet    HYZAAR    90 tablet    Take 1 tablet by mouth every morning    Type 2 diabetes mellitus with diabetic nephropathy, with long-term current use of insulin (H)       nitroFURantoin (macrocrystal-monohydrate) 100 MG capsule    MACROBID    14 capsule    Take 1 capsule (100 mg) by mouth 2 times daily    Acute cystitis without hematuria       NovoLOG FLEXPEN 100 UNIT/ML injection   Generic drug:  insulin aspart     15 mL    Inject 12-15 Units Subcutaneous 3 times daily (with meals) Patient additionally taking 4 units with snack.    Uncontrolled type 2 diabetes mellitus without complication, with long-term current use of insulin (H)       omeprazole 20 MG CR capsule    priLOSEC    90 capsule    Take 1 capsule (20 mg) by mouth daily    Gastroesophageal reflux disease, esophagitis presence not specified       * Notice:  This list has 2 medication(s) that are the same as other medications prescribed for you. Read the directions carefully, and ask your doctor or other care provider to review them with you.

## 2018-09-20 NOTE — PROGRESS NOTES
SUBJECTIVE:   Nathalie Harp is a 63 year old female who presents to clinic today for the following health issues:      Diabetes Follow-up      Patient is checking blood sugars: not at all    Diabetic concerns: None     Symptoms of hypoglycemia (low blood sugar): none     Paresthesias (numbness or burning in feet) or sores: Yes      Date of last diabetic eye exam:     BP Readings from Last 2 Encounters:   09/20/18 131/81   09/19/18 142/70     Hemoglobin A1C (%)   Date Value   09/19/2018 8.7 (H)   05/30/2018 14.4 (H)     LDL Cholesterol Calculated (mg/dL)   Date Value   02/09/2018 124 (H)   11/14/2016 90       Diabetes Management Resources    Amount of exercise or physical activity: None    Problems taking medications regularly: No    Medication side effects: none    Diet: diabetic    Saw MTM yesterday for her DM visit. Also met with the  yesterday regarding food and houseing.   Had a urine test done yesterday which shows WBC and culture is still pending.   A1C has improved.   Had been having blood in the urine off and on. Had uncomfortable itch in the area.   Has had some nausea and a little vomiting on Tuesday. No measured fevers     Her food stamps got eliminated so short on food. Doesn't have storage or cooking facilities. Hasn't been to any food shelves.   Having trouble findings section 8 housing. Will not go to a shelter because of her dogs.     Concern: Pt would like to have a mole looked at her lower right leg and her arms.     Problem list and histories reviewed & adjusted, as indicated.  Additional history: as documented    Patient Active Problem List   Diagnosis     Esophageal reflux     Irritable bowel syndrome     Female stress incontinence     Migraine without aura     Arthritis of knee, right     Disorder of bursae and tendons in shoulder region     Degeneration of thoracic or thoracolumbar intervertebral disc     Degeneration of cervical intervertebral disc     Other hammer toe  (acquired)     Allergic rhinitis due to pollen     Menopausal symptoms     Need for SBE (subacute bacterial endocarditis) prophylaxis     Preventive measure     Hyperlipidemia LDL goal <100     Health Care Home     Anemia     Dizziness     Hammer toe     Microalbuminuria     ACP (advance care planning)     Allergic to shellfish     Falls frequently     Balance problems     CKD (chronic kidney disease) stage 2, GFR 60-89 ml/min     Type 2 diabetes mellitus with diabetic nephropathy (H)     Diarrhea     Iron deficiency anemia, unspecified iron deficiency anemia type     Right axillary hidradenitis     Uncontrolled type 2 diabetes mellitus without complication, with long-term current use of insulin (H)     Mild intermittent asthma without complication     Past Surgical History:   Procedure Laterality Date     HC EXCIS PRIMARY GANGLION WRIST  1985 and 1987    right wrist     KNEE SURGERY  2008 approx    arthroscopic; Dr. Rivera       Social History   Substance Use Topics     Smoking status: Never Smoker     Smokeless tobacco: Never Used     Alcohol use Yes      Comment: rarely     Family History   Problem Relation Age of Onset     Cerebrovascular Disease Maternal Grandmother      Arthritis Maternal Grandmother      Asthma Mother      GASTROINTESTINAL DISEASE Mother      IBS     Alcohol/Drug Mother      Depression Mother      Neurologic Disorder Mother      migraines     GASTROINTESTINAL DISEASE Father      IBS     Diabetes Father      Alcohol/Drug Father      Neurologic Disorder Father      migraines     Obesity Father      GASTROINTESTINAL DISEASE Maternal Grandfather      Ulcers     Alcohol/Drug Maternal Grandfather      Diabetes Paternal Grandmother      Alcohol/Drug Paternal Grandmother      Arthritis Paternal Grandmother      Obesity Paternal Grandmother      Hypertension Sister      Hypertension Brother      Circulatory Sister      Asthma Sister      Asthma Sister      Asthma Brother      Thyroid Disease Sister       Thyroid Disease Sister      Obesity Sister            Reviewed and updated as needed this visit by clinical staff  Tobacco  Allergies  Meds  Problems  Med Hx  Surg Hx  Fam Hx  Soc Hx        Reviewed and updated as needed this visit by Provider  Allergies  Meds  Problems         ROS:  Constitutional, HEENT, cardiovascular, pulmonary, gi and gu systems are negative, except as otherwise noted.    OBJECTIVE:     /81 (BP Location: Right arm, Patient Position: Chair, Cuff Size: Adult Regular)  Pulse 99  Temp 98.1  F (36.7  C) (Oral)  Wt 111 lb 12.8 oz (50.7 kg)  LMP  (LMP Unknown)  Breastfeeding? No  BMI 25.07 kg/m2  Body mass index is 25.07 kg/(m^2).  GENERAL: healthy, alert and no distress  RESP: lungs clear to auscultation - no rales, rhonchi or wheezes  CV: regular rate and rhythm, normal S1 S2, no S3 or S4, no murmur, click or rub,   SKIN: On right leg there is a 1cm skin lesion, with hyperpigmented medial edges and light pink lateral edges. Borders are not well defined. No elevation or scale.   PSYCH: mentation appears normal, affect flat    Diagnostic Test Results:  none     ASSESSMENT/PLAN:       ICD-10-CM    1. Acute cystitis without hematuria N30.00 nitroFURantoin, macrocrystal-monohydrate, (MACROBID) 100 MG capsule   2. Type 2 diabetes mellitus with diabetic nephropathy, with long-term current use of insulin (H) E11.21     Z79.4    3. Changing skin lesion L98.9 DERMATOLOGY REFERRAL   Patient met with social work today on living situations. See her note.   No changes for diabetes as working with MTM. Follow up with her as directed.   Needs biopsy of skin lesion. Patient will see derm.   Will treat UTI and await urine culture.     FUTURE APPOINTMENTS:       - prn    Sapna Anand PA-C  [facility]

## 2018-09-21 LAB
BACTERIA SPEC CULT: ABNORMAL
BACTERIA SPEC CULT: ABNORMAL
SPECIMEN SOURCE: ABNORMAL

## 2018-09-22 ENCOUNTER — TELEPHONE (OUTPATIENT)
Dept: FAMILY MEDICINE | Facility: CLINIC | Age: 63
End: 2018-09-22

## 2018-09-22 DIAGNOSIS — N30.00 ACUTE CYSTITIS WITHOUT HEMATURIA: Primary | ICD-10-CM

## 2018-09-22 RX ORDER — CIPROFLOXACIN 500 MG/1
500 TABLET, FILM COATED ORAL 2 TIMES DAILY
Qty: 14 TABLET | Refills: 0 | Status: SHIPPED | OUTPATIENT
Start: 2018-09-22 | End: 2018-10-01

## 2018-09-22 NOTE — TELEPHONE ENCOUNTER
Call patient with content:   ------------------------------    Nathalie Harp    Your urine grew two different bacteria.  One of them is resistant to the antibiotic you were given (Macrobid).   A prescription for cipro, another antibiotic was sent for you to take for this.  You should still complete the Macrobid antibiotic.     Sincerely,     SHANITA LAM M.D.

## 2018-09-22 NOTE — PROGRESS NOTES
Nathalie Harp    Your urine grew two different bacteria.  One of them is resistant to the antibiotic you were given (Macrobid).   A prescription for cipro, another antibiotic was sent for you to take for this.  You should still complete the Macrobid antibiotic.     Sincerely,     SHANITA LAM M.D.

## 2018-09-23 ENCOUNTER — TRANSFERRED RECORDS (OUTPATIENT)
Dept: HEALTH INFORMATION MANAGEMENT | Facility: CLINIC | Age: 63
End: 2018-09-23

## 2018-09-24 NOTE — TELEPHONE ENCOUNTER
"FYI PCP:  Spoke with Daughter Mora (consent to communicate). She states patient is currently hospitalized for Kidney stone, UTI, and for her Diabetes is \"out of wack\".  She was admitted yesterday at [facility].  Thank you.  Wendy Quiroz RN      "

## 2018-09-27 ENCOUNTER — TELEPHONE (OUTPATIENT)
Dept: FAMILY MEDICINE | Facility: CLINIC | Age: 63
End: 2018-09-27

## 2018-09-27 DIAGNOSIS — G08 CEREBRAL VENOUS THROMBOSIS: Primary | ICD-10-CM

## 2018-09-27 NOTE — TELEPHONE ENCOUNTER
Patient will be discharged from [facility] today or tomorrow.  She states [facility] will be starting her on Coumadin so she will need an appt on Monday.    TC:  Please help her schedule a Hospital Follow up on Monday (10/1) where she will have INR drawn in lab and orders placed by PCP for Anticoagulation clinic management.      She will also need a 30 minute new INR/Anticoagulation Patient appt Tuesday.    Thank you.  Wendy Quiroz RN

## 2018-09-27 NOTE — TELEPHONE ENCOUNTER
Patient is scheduled as New Anticoagulation Consult Visit 10-1-18 made by our clinic TC today.  Usually patient see's primary doctor 1st following hospitalization and then orders are placed / referred to INR clinic.    This patient has no orders for INR Referral and no Warfarin history, please review and place orders / pended. Thanks!    Route to Team 1    Bijal Hester RN

## 2018-09-28 ENCOUNTER — TELEPHONE (OUTPATIENT)
Dept: FAMILY MEDICINE | Facility: CLINIC | Age: 63
End: 2018-09-28

## 2018-09-28 LAB
ALT SERPL-CCNC: 11 IU/L
AST SERPL-CCNC: 16 IU/L (ref 5–40)
CREAT SERPL-MCNC: 0.66 MG/DL (ref 0.5–1)
GFR SERPL CREATININE-BSD FRML MDRD: 90 ML/MIN/1.73M2
GLUCOSE SERPL-MCNC: 137 MG/DL (ref 70–100)
INR PPP: 2.2 (ref 0.8–1.2)
POTASSIUM SERPL-SCNC: 4.5 MEQ/L (ref 3.5–5.3)

## 2018-09-28 NOTE — TELEPHONE ENCOUNTER
Reason for Call:  Other Patient Information    Detailed comments: Patient was in the hospital 9/23/18-9/28/2018 DKA found to have cererbal venous thrombus. Patient started warfarin and will be seen in clinic 10/1/2018 for INR appointment. Call back not needed, [facility] was calling with patient information.     Phone Number Patient can be reached at: Other phone number:  [phone]    Best Time: N/A    Can we leave a detailed message on this number? YES    Call taken on 9/28/2018 at 1:57 PM by Claudine Logan

## 2018-09-28 NOTE — TELEPHONE ENCOUNTER
Called patient at [phone] (home) and scheduled her to see a covering provider on Monday 10/1 prior to her INR appointment. Patient will be seeing BK on Monday. INR referral pended and needs signature.    Shari Huff RN  [facility]

## 2018-09-28 NOTE — TELEPHONE ENCOUNTER
PCP is out of the office on 10/01/18. Flagging for RN to review if patient should be scheduled with another provider.

## 2018-09-28 NOTE — TELEPHONE ENCOUNTER
Called patient at [phone] (home). The person who picked up stated patient was busy at the moment and to call back in 5 minutes.     Patient has an INR Clinic Referral pending, already has an INR appointment set up for 10/1/18. Will try calling patient again to have her come in Monday to see a different provider.    Shari Huff RN  [facility]

## 2018-10-01 ENCOUNTER — OFFICE VISIT (OUTPATIENT)
Dept: FAMILY MEDICINE | Facility: CLINIC | Age: 63
End: 2018-10-01
Payer: COMMERCIAL

## 2018-10-01 ENCOUNTER — ANTICOAGULATION THERAPY VISIT (OUTPATIENT)
Dept: NURSING | Facility: CLINIC | Age: 63
End: 2018-10-01
Payer: COMMERCIAL

## 2018-10-01 VITALS
WEIGHT: 113 LBS | TEMPERATURE: 101.2 F | HEART RATE: 89 BPM | OXYGEN SATURATION: 98 % | SYSTOLIC BLOOD PRESSURE: 107 MMHG | BODY MASS INDEX: 25.33 KG/M2 | DIASTOLIC BLOOD PRESSURE: 72 MMHG

## 2018-10-01 DIAGNOSIS — E11.10 DIABETIC KETOACIDOSIS WITHOUT COMA ASSOCIATED WITH TYPE 2 DIABETES MELLITUS (H): ICD-10-CM

## 2018-10-01 DIAGNOSIS — Z09 RADIOTHERAPY FOLLOW-UP EXAMINATION: Primary | ICD-10-CM

## 2018-10-01 DIAGNOSIS — G08 TRANSVERSE SINUS THROMBOSIS: ICD-10-CM

## 2018-10-01 DIAGNOSIS — R50.9 FEVER, UNSPECIFIED FEVER CAUSE: ICD-10-CM

## 2018-10-01 DIAGNOSIS — I63.50 CEREBRAL ARTERY OCCLUSION WITH CEREBRAL INFARCTION (H): ICD-10-CM

## 2018-10-01 DIAGNOSIS — N12 PYELONEPHRITIS: ICD-10-CM

## 2018-10-01 DIAGNOSIS — Z09 HOSPITAL DISCHARGE FOLLOW-UP: Primary | ICD-10-CM

## 2018-10-01 LAB
ANION GAP SERPL CALCULATED.3IONS-SCNC: 10 MMOL/L (ref 3–14)
BUN SERPL-MCNC: 21 MG/DL (ref 7–30)
CALCIUM SERPL-MCNC: 8.8 MG/DL (ref 8.5–10.1)
CHLORIDE SERPL-SCNC: 94 MMOL/L (ref 94–109)
CO2 SERPL-SCNC: 29 MMOL/L (ref 20–32)
CREAT SERPL-MCNC: 0.78 MG/DL (ref 0.52–1.04)
ERYTHROCYTE [DISTWIDTH] IN BLOOD BY AUTOMATED COUNT: 13.7 % (ref 10–15)
GFR SERPL CREATININE-BSD FRML MDRD: 74 ML/MIN/1.7M2
GLUCOSE SERPL-MCNC: 459 MG/DL (ref 70–99)
HCT VFR BLD AUTO: 32.4 % (ref 35–47)
HGB BLD-MCNC: 10.9 G/DL (ref 11.7–15.7)
INR POINT OF CARE: 1.6 (ref 0.86–1.14)
MCH RBC QN AUTO: 32.3 PG (ref 26.5–33)
MCHC RBC AUTO-ENTMCNC: 33.6 G/DL (ref 31.5–36.5)
MCV RBC AUTO: 96 FL (ref 78–100)
PLATELET # BLD AUTO: 287 10E9/L (ref 150–450)
POTASSIUM SERPL-SCNC: 4.1 MMOL/L (ref 3.4–5.3)
RBC # BLD AUTO: 3.37 10E12/L (ref 3.8–5.2)
SODIUM SERPL-SCNC: 133 MMOL/L (ref 133–144)
WBC # BLD AUTO: 5.9 10E9/L (ref 4–11)

## 2018-10-01 PROCEDURE — 85610 PROTHROMBIN TIME: CPT | Mod: QW

## 2018-10-01 PROCEDURE — 99215 OFFICE O/P EST HI 40 MIN: CPT | Performed by: NURSE PRACTITIONER

## 2018-10-01 PROCEDURE — 85027 COMPLETE CBC AUTOMATED: CPT | Performed by: NURSE PRACTITIONER

## 2018-10-01 PROCEDURE — 99207 ZZC NO CHARGE NURSE ONLY: CPT

## 2018-10-01 PROCEDURE — 36416 COLLJ CAPILLARY BLOOD SPEC: CPT

## 2018-10-01 PROCEDURE — 80048 BASIC METABOLIC PNL TOTAL CA: CPT | Performed by: NURSE PRACTITIONER

## 2018-10-01 RX ORDER — CEFPODOXIME PROXETIL 100 MG/1
100 TABLET, FILM COATED ORAL 2 TIMES DAILY
Qty: 20 TABLET | Refills: 0 | Status: SHIPPED | OUTPATIENT
Start: 2018-10-01 | End: 2019-02-15

## 2018-10-01 RX ORDER — WARFARIN SODIUM 2.5 MG/1
2.5 TABLET ORAL DAILY
Qty: 30 TABLET | Refills: 3 | Status: SHIPPED | OUTPATIENT
Start: 2018-10-01 | End: 2018-10-06

## 2018-10-01 ASSESSMENT — PAIN SCALES - GENERAL: PAINLEVEL: NO PAIN (0)

## 2018-10-01 NOTE — PROGRESS NOTES
ANTICOAGULATION INITIAL CLINIC VISIT    Patient Name:  Nathalie Harp  Date:  10/1/2018  Referred by: [facility] / Mally Leung NP / Dr Eva Lockhart primary  Contact Type:  Face to Face - Patient is here for her 1st INR Consultation visit.  She states she has not been on blood thinners in the past.  She states she is allergic to a lot of green veggies (e.g. Asparagus and Kale).  She states she rarely drinks alcohol.  She does not smoke.  She has false teeth uppers and no lower teeth.  She states she has recently moved, but does not anticipate any trouble coming to the clinic for twice weekly INR's until her dose is established. Her warfarin therapy order is 3 months.  She states she has a huge clot on the left side of her head.  We reviewed the teaching topics below and I provided her with a pamphlet.  Today I printed her an AVS outlining her warfarin dose and next appointment.    SUBJECTIVE:  Coumadin education was completed today.  Topics covered include:  -Introduction to coumadin  -Proper Administration  -INR Testing  -Sign/Symptoms of Bleeding  -Signs/Symptoms of Clot Formation or Stroke  -Dietary Intake of Vitamin K  -Drug Interactions  -Anticoagulation Identification (bracelet, necklace or wallet card)  -Future Surgery  -Effects of Alcohol, Tobacco, and Exercise on Coumadin    Coumadin Education Booklet and Coumadin Identification Wallet Card were given to the patient.       Patient Findings     Positives Antibiotic use or infection (vantin - kidney infection)          OBJECTIVE    INR Protime   Date Value Ref Range Status   10/01/2018 1.6 (A) 0.86 - 1.14 Final       ASSESSMENT / PLAN  INR assessment SUB    Recheck INR In: 4 DAYS    INR Location Clinic      Anticoagulation Summary as of 10/1/2018     INR goal 2.0-3.0   Today's INR 1.6!   Warfarin maintenance plan 5 mg (2.5 mg x 2) on Mon, Wed, Fri; 2.5 mg (2.5 mg x 1) all other days   Full warfarin instructions 10/2: 2.5 mg; 10/4: 2.5 mg; Otherwise 5 mg  on Mon, Wed, Fri; 2.5 mg all other days   Weekly warfarin total 25 mg   Plan last modified Bijal Oswald RN (10/1/2018)   Next INR check 10/5/2018   Target end date 12/25/2018    Indications   Cerebral artery occlusion with cerebral infarction (H) [I63.50] [I63.50]         Anticoagulation Episode Summary     INR check location     Preferred lab     Send INR reminders to  ANTICOAG CLINIC    Comments       Anticoagulation Care Providers     Provider Role Specialty Phone number    Mally Leung, LÁZARO CNP  Nurse Practitioner - [facility] [phone]            See the Encounter Report to view Anticoagulation Flowsheet and Dosing Calendar (Go to Encounters tab in chart review, and find the Anticoagulation Therapy Visit)    Dosage adjustment made based on physician directed care plan.    Bijal Oswald, RN

## 2018-10-01 NOTE — Clinical Note
I saw her for hospital follow up. I'm new to her case, but sounds like history of non-compliance. She was hospitalized for DKA and has not been checking blood sugars since discharge and not eating regularly or taking insulin. Are you able to reach out to her? Otherwise, she has an appointment with Jovana Loo October 17. Would you be able to touch base with her then. I'm concerned about her social life and/or mental abilities to adequately care for herself. Thank you

## 2018-10-01 NOTE — PROGRESS NOTES
SUBJECTIVE:   Nathalie Harp is a 63 year old female who presents to clinic today for the following health issues:      Hospital Follow-up Visit:    Hospital/Nursing Home/IP Rehab Facility: [facility] [place]  Date of Admission: 9/23/18  Date of Discharge: 9/28/18  Reason(s) for Admission: DKA    DKA 2/2 pyelonephritis and non-adherence to insulin 5 days prior to admission. CTA done for altered mental status (increasing confusion 3 days prior to admission). Thrombus left transverse/sigmois sinus. No previous history of VTE. Started on warfarin and bridge with lovenox. Discharged on 2.5 mg warfarin with INR 2.2. Recommend warfarin for 3 months  Discharged on Cefpodoxime for pyelonephritis. 14 day course  Diarrhea improving prior to discharge. Stools cultures and Cdiff negative    She had elevated anticardiolipin IgM  Factor V Leidein and Beta-2-Glycoprotein negative  (Mention of Prothrombin 32304c in notes but I don't see results in [facility])         Problems taking medications regularly: Inconsistent use of insulin       Medication changes since discharge: updated       Problems adhering to non-medication therapy:  Inconsistent with dosing of Insulin. Now taking 30 units Levemir every day (previously increased to 35 units by MTM) Novolog 8-15 units based on what she eats which she is not taking regularly    Summary of hospitalization:  [facility] information obtained and reviewed  Diagnostic Tests/Treatments reviewed.  Follow up needed: F/U neurology in 2 months  Other Healthcare Providers Involved in Patient s Care:         None  Update since discharge: improved. GI symptoms are improving. Feeling exhausted. She has not been checking blood sugars (ran out of test strips). She picked up new test strips prior to this appointment.     Post Discharge Medication Reconciliation: discharge medications reconciled, continue medications without change.  Plan of care communicated with patient     Coding guidelines for  this visit:  Type of Medical   Decision Making Face-to-Face Visit       within 7 Days of discharge Face-to-Face Visit        within 14 days of discharge   Moderate Complexity [id number]   High Complexity [id number]            Elevated temperature today which she was not aware of  Denies dysuria, hematuria, urgency. She has 5 days left of antibiotic  Denies sinus pain/pressure, cough, ear pain, rashes, skin sores  Denies diarrhea, nausea, vomiting      Problem list and histories reviewed & adjusted, as indicated.  Additional history: none    Patient Active Problem List   Diagnosis     Esophageal reflux     Irritable bowel syndrome     Female stress incontinence     Migraine without aura     Arthritis of knee, right     Disorder of bursae and tendons in shoulder region     Degeneration of thoracic or thoracolumbar intervertebral disc     Degeneration of cervical intervertebral disc     Other hammer toe (acquired)     Allergic rhinitis due to pollen     Menopausal symptoms     Need for SBE (subacute bacterial endocarditis) prophylaxis     Preventive measure     Hyperlipidemia LDL goal <100     Health Care Home     Anemia     Dizziness     Hammer toe     Microalbuminuria     ACP (advance care planning)     Allergic to shellfish     Falls frequently     Balance problems     CKD (chronic kidney disease) stage 2, GFR 60-89 ml/min     Type 2 diabetes mellitus with diabetic nephropathy (H)     Diarrhea     Iron deficiency anemia, unspecified iron deficiency anemia type     Right axillary hidradenitis     Uncontrolled type 2 diabetes mellitus without complication, with long-term current use of insulin (H)     Mild intermittent asthma without complication     Cerebral artery occlusion with cerebral infarction (H) [I63.50]     Past Surgical History:   Procedure Laterality Date     HC EXCIS PRIMARY GANGLION WRIST  1985 and 1987    right wrist     KNEE SURGERY  2008 approx    arthroscopic; Dr. Rivera       Social History    Substance Use Topics     Smoking status: Never Smoker     Smokeless tobacco: Never Used     Alcohol use Yes      Comment: rarely     Family History   Problem Relation Age of Onset     Cerebrovascular Disease Maternal Grandmother      Arthritis Maternal Grandmother      Asthma Mother      GASTROINTESTINAL DISEASE Mother      IBS     Alcohol/Drug Mother      Depression Mother      Neurologic Disorder Mother      migraines     GASTROINTESTINAL DISEASE Father      IBS     Diabetes Father      Alcohol/Drug Father      Neurologic Disorder Father      migraines     Obesity Father      GASTROINTESTINAL DISEASE Maternal Grandfather      Ulcers     Alcohol/Drug Maternal Grandfather      Diabetes Paternal Grandmother      Alcohol/Drug Paternal Grandmother      Arthritis Paternal Grandmother      Obesity Paternal Grandmother      Hypertension Sister      Hypertension Brother      Circulatory Sister      Asthma Sister      Asthma Sister      Asthma Brother      Thyroid Disease Sister      Thyroid Disease Sister      Obesity Sister          Current Outpatient Prescriptions   Medication Sig Dispense Refill     atorvastatin (LIPITOR) 40 MG tablet Take 1 tablet (40 mg) by mouth daily 90 tablet 2     blood glucose (NO BRAND SPECIFIED) lancets standard Use to test blood sugar 3 times daily or as directed. 100 each 11     blood glucose monitoring (NO BRAND SPECIFIED) meter device kit Use to test blood sugar 3 times daily or as directed 1 kit 0     blood glucose monitoring (ONE TOUCH ULTRA) test strip TEST BLOOD SUGARS 3 TIMES DAILY. 300 each 3     cefpodoxime (VANTIN) 100 MG tablet Take 1 tablet (100 mg) by mouth 2 times daily for 10 days 20 tablet 0     EPINEPHrine (EPIPEN/ADRENACLICK/OR ANY BX GENERIC EQUIV) 0.3 MG/0.3ML injection 2-pack INJECT ONE DEVICE INTO THE MUSCLE AS NEEDED FOR ALLERGIC REACTION 0.3 mL 1     gabapentin (NEURONTIN) 100 MG capsule Take 1 capsule (100 mg) by mouth every morning 90 capsule 3     gabapentin  (NEURONTIN) 300 MG capsule Take 1 tablet (300 mg) at night. 90 capsule 3     Glucosamine-Chondroit-Vit C-Mn (GLUCOSAMINE CHONDROITIN 1500 COMPLEX) CAPS Take 1 capsule by mouth daily       insulin pen needle (B-D U/F) 31G X 5 MM Use 4 daily as directed. This replaces the auto-cover pen needle. 200 each prn     losartan-hydrochlorothiazide (HYZAAR) 100-25 MG per tablet Take 1 tablet by mouth every morning 90 tablet 3     omeprazole (PRILOSEC) 20 MG CR capsule Take 1 capsule (20 mg) by mouth daily 90 capsule 3     warfarin (COUMADIN) 2.5 MG tablet Take 1 tablet (2.5 mg) by mouth daily 30 tablet 3     albuterol (PROAIR HFA/PROVENTIL HFA/VENTOLIN HFA) 108 (90 BASE) MCG/ACT Inhaler Inhale 2 puffs into the lungs every 6 hours as needed for shortness of breath / dyspnea ((Ventolin or preferred albuterol equivalent)) (Patient not taking: Reported on 10/1/2018) 3 Inhaler 3     fexofenadine (ALLEGRA) 180 MG tablet Take 1 tablet (180 mg) by mouth daily (Patient not taking: Reported on 10/1/2018) 30 tablet 1     fluticasone (FLONASE) 50 MCG/ACT spray Spray 1-2 sprays into both nostrils daily (Patient not taking: Reported on 10/1/2018) 3 Bottle 11     insulin detemir (LEVEMIR FLEXPEN/FLEXTOUCH) 100 UNIT/ML injection Inject 30units under the skin twice daily - every morning AND at bedtime. 30 mL 3     NOVOLOG FLEXPEN 100 UNIT/ML soln Inject 12-15 Units Subcutaneous 3 times daily (with meals) Patient additionally taking 4 units with snack. 15 mL 3     BP Readings from Last 3 Encounters:   10/01/18 107/72   09/20/18 131/81   09/19/18 142/70    Wt Readings from Last 3 Encounters:   10/01/18 113 lb (51.3 kg)   09/20/18 111 lb 12.8 oz (50.7 kg)   09/19/18 111 lb 12.8 oz (50.7 kg)                    Reviewed and updated as needed this visit by clinical staff       Reviewed and updated as needed this visit by Provider         ROS:  Constitutional, HEENT, cardiovascular, pulmonary, gi and gu systems are negative, except as otherwise  noted.    OBJECTIVE:     /72 (BP Location: Right arm, Patient Position: Chair, Cuff Size: Adult Regular)  Pulse 89  Temp 101.2  F (38.4  C) (Oral)  Wt 113 lb (51.3 kg)  LMP  (LMP Unknown)  SpO2 98%  BMI 25.33 kg/m2  Body mass index is 25.33 kg/(m^2).  GENERAL: healthy, alert and no distress  RESP: lungs clear to auscultation - no rales, rhonchi or wheezes  CV: regular rate and rhythm, normal S1 S2, no S3 or S4, no murmur, click or rub, no peripheral edema and peripheral pulses strong  ABDOMEN: soft, nontender, no hepatosplenomegaly, no masses and bowel sounds normal  BACK: no CVA tenderness  PSYCH: pleasant, unreliable historian, lacks insight    Diagnostic Test Results:  Results for orders placed or performed in visit on 10/01/18 (from the past 24 hour(s))   Basic metabolic panel   Result Value Ref Range    Sodium 133 133 - 144 mmol/L    Potassium 4.1 3.4 - 5.3 mmol/L    Chloride 94 94 - 109 mmol/L    Carbon Dioxide 29 20 - 32 mmol/L    Anion Gap 10 3 - 14 mmol/L    Glucose 459 (H) 70 - 99 mg/dL    Urea Nitrogen 21 7 - 30 mg/dL    Creatinine 0.78 0.52 - 1.04 mg/dL    GFR Estimate 74 >60 mL/min/1.7m2    GFR Estimate If Black 89 >60 mL/min/1.7m2    Calcium 8.8 8.5 - 10.1 mg/dL   CBC with platelets   Result Value Ref Range    WBC 5.9 4.0 - 11.0 10e9/L    RBC Count 3.37 (L) 3.8 - 5.2 10e12/L    Hemoglobin 10.9 (L) 11.7 - 15.7 g/dL    Hematocrit 32.4 (L) 35.0 - 47.0 %    MCV 96 78 - 100 fl    MCH 32.3 26.5 - 33.0 pg    MCHC 33.6 31.5 - 36.5 g/dL    RDW 13.7 10.0 - 15.0 %    Platelet Count 287 150 - 450 10e9/L       ASSESSMENT/PLAN:   1. Hospital discharge follow-up  - Improving since discharge  - Basic metabolic panel  - CBC with platelets    2. Diabetic ketoacidosis without coma associated with type 2 diabetes mellitus (H)  - Has not checked glucose since discharge. Inconsistent use of insulin and history of non-compliance. Also appears to be social issues (lack of housing, poor availability of food). CC  referral previously placed. Has F/U scheduled with MTM    3. Pyelonephritis  - Asymptomatic. Continue with antibiotic  - cefpodoxime (VANTIN) 100 MG tablet; Take 1 tablet (100 mg) by mouth 2 times daily for 10 days  Dispense: 20 tablet; Refill: 0    4. Transverse sinus thrombosis  - Had elevated anticardiolipin IgM. Could be elevated 2/2 infection. Will need to recheck labs in 6-8 weeks. Offered referral to neurology (needs to see in 2 months). She declines. She would like to see neurologist at [facility] whom she saw in the hospital.   - INR CLINIC REFERRAL  - warfarin (COUMADIN) 2.5 MG tablet; Take 1 tablet (2.5 mg) by mouth daily  Dispense: 30 tablet; Refill: 3    5. Fever, unspecified fever cause  - Temperature elevated in clinic. No symptoms. Check CBC, UA. Physical exam unremarkable for source of infection  - CBC with platelets    Patient Instructions   You should follow up with the neurologist in 2 months. If you want to follow up with the neurologist at [facility], please call them to schedule  Otherwise, please let me know if I can put in a referral within [place]    Stop taking aspirin while you are taking warfarin    If you develop a fever, or new symptoms such as pain with urination, blood in the urine, nausea, vomiting, diarrhea, please be seen in clinic today. Your temperature is elevated today without an obvious source.     More than 45 minutes spent with patient, more than 50% of which was spent on counseling and coordination of care.       LÁZARO [facility]

## 2018-10-01 NOTE — MR AVS SNAPSHOT
Nathaliebereket Harp   10/1/2018 2:40 PM   Anticoagulation Therapy Visit    Description:  63 year old female   Provider:  RONY ANTI COAG   Department:  [facility]           INR as of 10/1/2018     Today's INR 1.6!      Anticoagulation Summary as of 10/1/2018     INR goal 2.0-3.0   Today's INR 1.6!   Full warfarin instructions 10/2: 2.5 mg; 10/4: 2.5 mg; Otherwise 5 mg every day   Next INR check 10/5/2018    Indications   Cerebral artery occlusion with cerebral infarction (H) [I63.50] [I63.50]         Your next Anticoagulation Clinic appointment(s)     Oct 05, 2018  9:00 AM CDT   Anticoagulation Visit with RONY ANTI COAG   [facility] ([facility])    [address] [place] [id number] [phone]              Contact Numbers     [facility]  Please call [phone] or [phone]  to cancel and/or reschedule your appointment.   Please call [phone] with any problems or questions regarding your therapy          October 2018 Details    Sun Mon Tue Wed Thu Fri Sat      1      5 mg   See details      2      2.5 mg         3      5 mg         4      2.5 mg         5            6                 7               8               9               10               11               12               13                 14               15               16               17               18               19               20                 21               22               23               24               25               26               27                 28               29               30               31                   Date Details   10/01 This INR check       Date of next INR:  10/5/2018         How to take your warfarin dose     To take:  2.5 mg Take 1 of the 2.5 mg tablets.    To take:  5 mg Take 2 of the 2.5 mg tablets.

## 2018-10-01 NOTE — LETTER
[facility]   [address] [place]  [address]  [phone]                                   October 2, 2018    Nathalie Harp  [address]  [facility] [address]        Dear Nathalie,    The results of your recent labs are enclosed.  We already discussed your labs on the phone. Please remember that you have an upcoming appointment with Michael Loo on October 17.   I would also like you to schedule an appointment with Dr. Lockhart in 6-8 weeks (sometime mid November) to follow up on your hospital stay. You had some abnormal labs while in the hospital and they should be repeated to make sure you are not at increased risk of future blood clots and stroke.   I hope you are continuing to feel better since being in the hospital. If your symptoms worsen, please follow up sooner.   Please call the clinic if you have any concerns.    Results for orders placed or performed in visit on 10/01/18   Basic metabolic panel   Result Value Ref Range    Sodium 133 133 - 144 mmol/L    Potassium 4.1 3.4 - 5.3 mmol/L    Chloride 94 94 - 109 mmol/L    Carbon Dioxide 29 20 - 32 mmol/L    Anion Gap 10 3 - 14 mmol/L    Glucose 459 (H) 70 - 99 mg/dL    Urea Nitrogen 21 7 - 30 mg/dL    Creatinine 0.78 0.52 - 1.04 mg/dL    GFR Estimate 74 >60 mL/min/1.7m2    GFR Estimate If Black 89 >60 mL/min/1.7m2    Calcium 8.8 8.5 - 10.1 mg/dL   CBC with platelets   Result Value Ref Range    WBC 5.9 4.0 - 11.0 10e9/L    RBC Count 3.37 (L) 3.8 - 5.2 10e12/L    Hemoglobin 10.9 (L) 11.7 - 15.7 g/dL    Hematocrit 32.4 (L) 35.0 - 47.0 %    MCV 96 78 - 100 fl    MCH 32.3 26.5 - 33.0 pg    MCHC 33.6 31.5 - 36.5 g/dL    RDW 13.7 10.0 - 15.0 %    Platelet Count 287 150 - 450 10e9/L       If you have any questions please call the clinic at [phone]    Sincerely,    Mally SESAY CNP  bmd

## 2018-10-01 NOTE — MR AVS SNAPSHOT
After Visit Summary   10/1/2018    Nathalie Harp    MRN: [id number]           Patient Information     Date Of Birth          1955        Visit Information        Provider Department      10/1/2018 1:40 PM Mally Leung APRN CNP [facility]        Today's Diagnoses     Hospital discharge follow-up    -  1    Diabetic ketoacidosis without coma associated with type 2 diabetes mellitus (H)        Pyelonephritis        Transverse sinus thrombosis          Care Instructions    You should follow up with the neurologist in 2 months. If you want to follow up with the neurologist at [facility], please call them to schedule  Otherwise, please let me know if I can put in a referral within [facility]    Stop taking aspirin while you are taking warfarin    If you develop a fever, or new symptoms such as pain with urination, blood in the urine, nausea, vomiting, diarrhea, please be seen in clinic today. Your temperature is elevated today without an obvious source.           Follow-ups after your visit        Additional Services     INR CLINIC REFERRAL       Your provider has referred you to INR Services.    Please be aware that coverage of these services is subject to the terms and limitations of your health insurance plan.  Call member services at your health plan with any benefit or coverage questions.    Indication for Anticoagulation: CVA/TIA  If nonstandard INR is desired, indicate goal range and explanation: 2-3  Expected Duration of Therapy: 3 Months                  Your next 10 appointments already scheduled     Oct 01, 2018  2:40 PM CDT   Anticoagulation Visit with CP ANTI COAG   [facility] ([facility])    [address] [place] [id number]   [phone]            Oct 17, 2018 10:00 AM CDT   Office Visit with Jovana Loo RPH   [facility] ([facility]  "[facility]    [address] [facility] [id number] [phone]           Bring a current list of meds and any records pertaining to this visit. For Physicals, please bring immunization records and any forms needing to be filled out. Please arrive 10 minutes early to complete paperwork.              Who to contact     If you have questions or need follow up information about today's clinic visit or your schedule please contact [facility] directly at [phone].  Normal or non-critical lab and imaging results will be communicated to you by MyChart, letter or phone within 4 business days after the clinic has received the results. If you do not hear from us within 7 days, please contact the clinic through [employer] or phone. If you have a critical or abnormal lab result, we will notify you by phone as soon as possible.  Submit refill requests through [employer] or call your pharmacy and they will forward the refill request to us. Please allow 3 business days for your refill to be completed.          Additional Information About Your Visit        [facility] Information     [employer] lets you send messages to your doctor, view your test results, renew your prescriptions, schedule appointments and more. To sign up, go to [email or web address] . Click on \"Log in\" on the left side of the screen, which will take you to the Welcome page. Then click on \"Sign up Now\" on the right side of the page.     You will be asked to enter the access code listed below, as well as some personal information. Please follow the directions to create your username and password.     Your access code is: [email or web address]  Expires: 2018  8:56 AM     Your access code will  in 90 days. If you need help or a new code, please call your [place] clinic or [phone].        Care EveryWhere ID     This is your Care EveryWhere ID. This could be used by other organizations to access your [place] " medical records  [id number]        Your Vitals Were     Pulse Temperature Last Period Pulse Oximetry BMI (Body Mass Index)       89 101.2  F (38.4  C) (Oral) (LMP Unknown) 98% 25.33 kg/m2        Blood Pressure from Last 3 Encounters:   10/01/18 107/72   09/20/18 131/81   09/19/18 142/70    Weight from Last 3 Encounters:   10/01/18 113 lb (51.3 kg)   09/20/18 111 lb 12.8 oz (50.7 kg)   09/19/18 111 lb 12.8 oz (50.7 kg)              We Performed the Following     *UA reflex to Microscopic and Culture ([facility] (except [place] and [place])     Basic metabolic panel     CBC with platelets     INR CLINIC REFERRAL          Today's Medication Changes          These changes are accurate as of 10/1/18  2:20 PM.  If you have any questions, ask your nurse or doctor.               Start taking these medicines.        Dose/Directions    cefpodoxime 100 MG tablet   Commonly known as:  VANTIN   Used for:  Pyelonephritis   Started by:  Mally Leung APRN CNP        Dose:  100 mg   Take 1 tablet (100 mg) by mouth 2 times daily for 10 days   Quantity:  20 tablet   Refills:  0       warfarin 2.5 MG tablet   Commonly known as:  COUMADIN   Used for:  Transverse sinus thrombosis   Started by:  Mally Leung APRN CNP        Dose:  2.5 mg   Take 1 tablet (2.5 mg) by mouth daily   Quantity:  30 tablet   Refills:  3            Where to get your medicines      These medications were sent to [facility] [address]  [address] [facility] [address]     Phone:  [phone]     cefpodoxime 100 MG tablet    warfarin 2.5 MG tablet                Primary Care Provider Office Phone # Fax #    Eva Lockhart [phone]       [address] [facility] [address]        Goals        General    Financial Wellbeing (pt-stated)     Notes - Note created  4/27/2018 11:53 AM by Peter Woo RN    Goal  Statement: I will work with the care coordinators in applying for the public assistance that I may qualify for in the next 1-2 months.  Measure of Success: applications have been filled out  Supportive Steps to Achieve: rationale explained.  Barriers: none  Strengths: motivated  Date to Achieve By: 1-2 months        Healthy Eating (pt-stated)     Notes - Note created  4/27/2018 11:56 AM by Peter Woo, RN    Goal Statement: I will work on planning and purchasing nutritious meals that will not raise my blood sugar levels, as evidenced by lower BG readings and lower A1C level.  Measure of Success: Lower BG and A1C levels  Supportive Steps to Achieve: rationale given to the patient  Barriers: none  Strengths: motivated  Date to Achieve By: ongoing        I will check my blood sugars four times per day (pt-stated)     Notes - Note created  3/10/2014 10:41 AM by Priti Orozco RN    As of today's date 3/10/2014 goal is met at 26 - 50%.   Goal Status:  Active      I will continue to work with Diabetic Educator (pt-stated)     Notes - Note created  3/10/2014 10:42 AM by Priti Orozco RN    As of today's date 3/10/2014 goal is met at 26 - 50%.   Goal Status:  Active      I will take insulins as prescribed, including sliding scale novolog (pt-stated)     Notes - Note created  3/10/2014 10:43 AM by Priti Orozco RN    As of today's date 3/10/2014 goal is met at 51 - 75%.   Goal Status:  Active      Transportation (pt-stated)     Notes - Note created  4/27/2018 11:50 AM by Peter Woo, RN    Goal Statement: I will apply for [employer] in the next 1-2 months.  Measure of Success: application complete  Supportive Steps to Achieve: rationale of less confusion for the patient  Barriers: none  Strengths: motivated  Date to Achieve By: 1-2 months          Equal Access to Services     ADI LOPEZ : Narcisa berman hadasho Soomaali, waaxda luqadaha, qaybta kaalmada adeegyada, sandra charles . [facility]  [phone].    ATENCIÓN: Si marquez restrepo, tiene a tompkins disposición servicios gratuitos de asistencia lingüística. Tatiana yepez [phone].    We comply with applicable federal civil rights laws and [place] laws. We do not discriminate on the basis of race, color, national origin, age, disability, sex, sexual orientation, or gender identity.            Thank you!     Thank you for choosing [facility]  for your care. Our goal is always to provide you with excellent care. Hearing back from our patients is one way we can continue to improve our services. Please take a few minutes to complete the written survey that you may receive in the mail after your visit with us. Thank you!             Your Updated Medication List - Protect others around you: Learn how to safely use, store and throw away your medicines at www.disposemymeds.org.          This list is accurate as of 10/1/18  2:20 PM.  Always use your most recent med list.                   Brand Name Dispense Instructions for use Diagnosis    albuterol 108 (90 Base) MCG/ACT inhaler    PROAIR HFA/PROVENTIL HFA/VENTOLIN HFA    3 Inhaler    Inhale 2 puffs into the lungs every 6 hours as needed for shortness of breath / dyspnea ((Ventolin or preferred albuterol equivalent))    Mild intermittent asthma without complication       atorvastatin 40 MG tablet    LIPITOR    90 tablet    Take 1 tablet (40 mg) by mouth daily    Hyperlipidemia LDL goal <100       blood glucose lancets standard    no brand specified    100 each    Use to test blood sugar 3 times daily or as directed.        blood glucose monitoring meter device kit    no brand specified    1 kit    Use to test blood sugar 3 times daily or as directed    Type 2 diabetes mellitus without complication, with long-term current use of insulin (H)       blood glucose monitoring test strip    ONETOUCH ULTRA    300 each    TEST BLOOD SUGARS 3 TIMES DAILY.    Type 2 diabetes mellitus without  complication, with long-term current use of insulin (H)       cefpodoxime 100 MG tablet    VANTIN    20 tablet    Take 1 tablet (100 mg) by mouth 2 times daily for 10 days    Pyelonephritis       EPINEPHrine 0.3 MG/0.3ML injection 2-pack    EPIPEN/ADRENACLICK/or ANY BX GENERIC EQUIV    0.3 mL    INJECT ONE DEVICE INTO THE MUSCLE AS NEEDED FOR ALLERGIC REACTION    Allergic to shellfish       fexofenadine 180 MG tablet    ALLEGRA    30 tablet    Take 1 tablet (180 mg) by mouth daily    Chronic seasonal allergic rhinitis, unspecified trigger       fluticasone 50 MCG/ACT spray    FLONASE    3 Bottle    Spray 1-2 sprays into both nostrils daily    Chronic seasonal allergic rhinitis, unspecified trigger       * gabapentin 300 MG capsule    NEURONTIN    90 capsule    Take 1 tablet (300 mg) at night.    Type 2 diabetes mellitus with diabetic neuropathy, with long-term current use of insulin (H)       * gabapentin 100 MG capsule    NEURONTIN    90 capsule    Take 1 capsule (100 mg) by mouth every morning    Type 2 diabetes mellitus with diabetic nephropathy, with long-term current use of insulin (H)       glucosamine chondroitin 1500 complex Caps      Take 1 capsule by mouth daily        insulin detemir 100 UNIT/ML injection    LEVEMIR FLEXPEN/FLEXTOUCH    30 mL    Inject 30units under the skin twice daily - every morning AND at bedtime.    Uncontrolled type 2 diabetes mellitus without complication, with long-term current use of insulin (H)       insulin pen needle 31G X 5 MM    B-D U/F    200 each    Use 4 daily as directed. This replaces the auto-cover pen needle.    Uncontrolled type 2 diabetes mellitus without complication, with long-term current use of insulin (H)       losartan-hydrochlorothiazide 100-25 MG per tablet    HYZAAR    90 tablet    Take 1 tablet by mouth every morning    Type 2 diabetes mellitus with diabetic nephropathy, with long-term current use of insulin (H)       NovoLOG FLEXPEN 100 UNIT/ML injection    Generic drug:  insulin aspart     15 mL    Inject 12-15 Units Subcutaneous 3 times daily (with meals) Patient additionally taking 4 units with snack.    Uncontrolled type 2 diabetes mellitus without complication, with long-term current use of insulin (H)       omeprazole 20 MG CR capsule    priLOSEC    90 capsule    Take 1 capsule (20 mg) by mouth daily    Gastroesophageal reflux disease, esophagitis presence not specified       warfarin 2.5 MG tablet    COUMADIN    30 tablet    Take 1 tablet (2.5 mg) by mouth daily    Transverse sinus thrombosis       * Notice:  This list has 2 medication(s) that are the same as other medications prescribed for you. Read the directions carefully, and ask your doctor or other care provider to review them with you.

## 2018-10-02 DIAGNOSIS — J45.20 MILD INTERMITTENT ASTHMA WITHOUT COMPLICATION: ICD-10-CM

## 2018-10-02 RX ORDER — ALBUTEROL SULFATE 90 UG/1
AEROSOL, METERED RESPIRATORY (INHALATION)
Qty: 54 G | Refills: 3 | Status: SHIPPED | OUTPATIENT
Start: 2018-10-02 | End: 2019-02-15

## 2018-10-02 NOTE — TELEPHONE ENCOUNTER
"Requested Prescriptions   Pending Prescriptions Disp Refills     VENTOLIN  (90 Base) MCG/ACT inhaler [Pharmacy Med Name: VENTOLIN HFA 108MCG/ACT AERS] 54 g 3    Last Written Prescription Date:  7/28/17  Last Fill Quantity: 3,  # refills: 3   Last office visit: 10/1/2018 with prescribing provider:     Future Office Visit:   Next 5 appointments (look out 90 days)     Oct 17, 2018 10:00 AM CDT   Office Visit with Jovana Loo RPH   [facility] ([facility])    [address] [facility] [address] [phone]                  Sig: INHALE 2 PUFFS INTO THE LUNGS EVERY 6 HOURS AS NEEDED FOR SHORTNESS OF BREATH, DIFFICULTY BREATHING OR WHEEZING.    Asthma Maintenance Inhalers - Anticholinergics Passed    10/2/2018 11:31 AM       Passed - Patient is age 12 years or older       Passed - Asthma control assessment score within normal limits in last 6 months    Please review ACT score.          Passed - Recent (6 mo) or future (30 days) visit within the authorizing provider's specialty    Patient had office visit in the last 6 months or has a visit in the next 30 days with authorizing provider or within the authorizing provider's specialty.  See \"Patient Info\" tab in inbasket, or \"Choose Columns\" in Meds & Orders section of the refill encounter.              "

## 2018-10-02 NOTE — PROGRESS NOTES
[address] [address]  Phone: [phone]  Fax: [phone]      10/02/18    Nathalie Harp  [address] [facility] [address]      Dear Nathalie,    The results of your recent labs are enclosed.  We already discussed your labs on the phone. Please remember that you have an upcoming appointment with Michael Loo on October 17.   I would also like you to schedule an appointment with Dr. Lockhart in 6-8 weeks (sometime mid November) to follow up on your hospital stay. You had some abnormal labs while in the hospital and they should be repeated to make sure you are not at increased risk of future blood clots and stroke.   I hope you are continuing to feel better since being in the hospital. If your symptoms worsen, please follow up sooner.   Please call the clinic if you have any concerns.    Sincerely,    LÁZARO Mendez CNP    Your [facility] Care Team

## 2018-10-02 NOTE — PROGRESS NOTES
I called patient regarding results. She has not checked blood sugar from home since discharge from hospital despite picking up test strips from pharmacy yesterday.   Denies fever, chills, nausea, vomiting, abdominal pain, dsyuria  Did not do UA at clinic yesterday because she couldn't get container open  Has not given herself insulin since our visit yesterday. She reports she does have access to food  Discussed importance of eating regular meals and insulin adherence  Increase Levemir to 35 units as previously advised by pharmacy  Complete UA while in clinic 10/5 for INR visit  Has f/u scheduled with [facility] in 2 weeks

## 2018-10-02 NOTE — TELEPHONE ENCOUNTER
Prescription approved per [facility] Refill Protocol.  Wendy Quiroz RN

## 2018-10-05 ENCOUNTER — ANTICOAGULATION THERAPY VISIT (OUTPATIENT)
Dept: NURSING | Facility: CLINIC | Age: 63
End: 2018-10-05
Payer: COMMERCIAL

## 2018-10-05 DIAGNOSIS — I63.50 CEREBRAL ARTERY OCCLUSION WITH CEREBRAL INFARCTION (H): ICD-10-CM

## 2018-10-05 LAB — INR POINT OF CARE: 1.1 (ref 0.86–1.14)

## 2018-10-05 PROCEDURE — 99207 ZZC NO CHARGE NURSE ONLY: CPT

## 2018-10-05 PROCEDURE — 85610 PROTHROMBIN TIME: CPT | Mod: QW

## 2018-10-05 PROCEDURE — 36416 COLLJ CAPILLARY BLOOD SPEC: CPT

## 2018-10-05 NOTE — PROGRESS NOTES
ANTICOAGULATION FOLLOW-UP CLINIC VISIT    Patient Name:  Nathalie Harp  Date:  10/5/2018  Contact Type:  Face to Face    SUBJECTIVE: Plan twice weekly INR's, new start up.     Patient Findings     Positives Initiation of therapy           OBJECTIVE    INR Protime   Date Value Ref Range Status   10/05/2018 1.1 0.86 - 1.14 Final       ASSESSMENT / PLAN  INR assessment SUB    Recheck INR In: 4 DAYS    INR Location Clinic      Anticoagulation Summary as of 10/5/2018     INR goal 2.0-3.0   Today's INR 1.1!   Warfarin maintenance plan 7.5 mg (2.5 mg x 3) on Fri, Sat; 5 mg (2.5 mg x 2) all other days   Full warfarin instructions 7.5 mg on Fri, Sat; 5 mg all other days   Weekly warfarin total 40 mg   Plan last modified Bijal Oswald RN (10/5/2018)   Next INR check 10/9/2018   Target end date 12/25/2018    Indications   Cerebral artery occlusion with cerebral infarction (H) [I63.50] [I63.50]         Anticoagulation Episode Summary     INR check location     Preferred lab     Send INR reminders to [facility] CLINIC    Comments       Anticoagulation Care Providers     Provider Role Specialty Phone number    Mally Leung APRN CNP  Nurse Practitioner - Adult Health [phone]            See the Encounter Report to view Anticoagulation Flowsheet and Dosing Calendar (Go to Encounters tab in chart review, and find the Anticoagulation Therapy Visit)    Dosage adjustment made based on physician directed care plan.    Bijal Oswald RN

## 2018-10-05 NOTE — MR AVS SNAPSHOT
Nathalie Harp   10/5/2018 9:00 AM   Anticoagulation Therapy Visit    Description:  63 year old female   Provider:  CP ANTI COAG   Department:  Cp Nurse           INR as of 10/5/2018     Today's INR 1.1!      Anticoagulation Summary as of 10/5/2018     INR goal 2.0-3.0   Today's INR 1.1!   Full warfarin instructions 7.5 mg on Fri, Sat; 5 mg all other days   Next INR check 10/9/2018    Indications   Cerebral artery occlusion with cerebral infarction (H) [I63.50] [I63.50]         Your next Anticoagulation Clinic appointment(s)     Oct 09, 2018 11:40 AM CDT   [facility]agulation [facility] with CP ANTI COAG   [facility]    [address] [facility] [id number] [phone]            Oct 12, 2018  1:40 PM CDT   Anticoagulation Visit with CP ANTI COAG   [facility]    [address] [facility] [id number] [phone]              Contact Numbers     [facility]  Please call [phone] or [phone]  to cancel and/or reschedule your appointment.   Please call [phone] with any problems or questions regarding your therapy          October 2018 Details    Sun Mon Tue Wed Thu Fri Sat      1               2               3               4               5      7.5 mg   See details      6      7.5 mg           7      5 mg         8      5 mg         9            10               11               12               13                 14               15               16               17               18               19               20                 21               22               23               24               25               26               27                 28               29               30               31                   Date Details   10/05 This INR check       Date of next INR:  10/9/2018         How to take your  warfarin dose     To take:  5 mg Take 2 of the 2.5 mg tablets.    To take:  7.5 mg Take 3 of the 2.5 mg tablets.

## 2018-10-06 ENCOUNTER — OFFICE VISIT (OUTPATIENT)
Dept: URGENT CARE | Facility: URGENT CARE | Age: 63
End: 2018-10-06
Payer: COMMERCIAL

## 2018-10-06 VITALS
OXYGEN SATURATION: 98 % | HEART RATE: 68 BPM | DIASTOLIC BLOOD PRESSURE: 72 MMHG | TEMPERATURE: 98.5 F | SYSTOLIC BLOOD PRESSURE: 146 MMHG

## 2018-10-06 DIAGNOSIS — G08 TRANSVERSE SINUS THROMBOSIS: ICD-10-CM

## 2018-10-06 DIAGNOSIS — Z79.01 CHRONIC ANTICOAGULATION: Primary | ICD-10-CM

## 2018-10-06 PROCEDURE — 99212 OFFICE O/P EST SF 10 MIN: CPT | Performed by: FAMILY MEDICINE

## 2018-10-06 RX ORDER — WARFARIN SODIUM 2.5 MG/1
2.5 TABLET ORAL DAILY
Qty: 9 TABLET | Refills: 0 | Status: SHIPPED | OUTPATIENT
Start: 2018-10-06 | End: 2018-10-09

## 2018-10-06 NOTE — MR AVS SNAPSHOT
After Visit Summary   10/6/2018    Nathalie Harp    MRN: [id number]           Patient Information     Date Of Birth          1955        Visit Information        Provider Department      10/6/2018 3:10 PM Kayli Betancourt MD [facility]        Today's Diagnoses     Chronic anticoagulation    -  1    Transverse sinus thrombosis          Care Instructions        Coumadin order completed and sent to [facility]    Take 7.5 mg (3 tablets) today, 10/6/18  and then 5 mg (2 tablets) on Monday 10/7/18 and 10/8/18   Then meet with coumadin clinic as scheduled on 10/9/18              Follow-ups after your visit        Your next 10 appointments already scheduled     Oct 09, 2018 11:40 AM CDT   Anticoagulation Visit with CP ANTI COAG   [facility]Critical [facility] hospital[facility]    [address] [place] [id number]   [phone]            Oct 12, 2018  1:40 PM CDT   Anticoagulation Visit with CP ANTI COAG   Critical access hospital (Critical access hospital)    [address] [facility] [address]   [phone]            Oct 17, 2018 10:00 AM CDT   Office Visit with Jovana Loo RPH   [facility] (Critical access hospital)    [address] [facility] [address]   [phone]           Bring a current list of meds and any records pertaining to this visit. For Physicals, please bring immunization records and any forms needing to be filled out. Please arrive 10 minutes early to complete paperwork.              Who to contact     If you have questions or need follow up information about today's clinic visit or your schedule please contact [facility] directly at [phone].  Normal or non-critical lab and imaging results will be communicated to you by MyChart, letter or phone within 4  "business days after the clinic has received the results. If you do not hear from us within 7 days, please contact the clinic through [employer] or phone. If you have a critical or abnormal lab result, we will notify you by phone as soon as possible.  Submit refill requests through [employer] or call your pharmacy and they will forward the refill request to us. Please allow 3 business days for your refill to be completed.          Additional Information About Your Visit        [employer] Information     [employer] lets you send messages to your doctor, view your test results, renew your prescriptions, schedule appointments and more. To sign up, go to [email or web address] . Click on \"Log in\" on the left side of the screen, which will take you to the Welcome page. Then click on \"Sign up Now\" on the right side of the page.     You will be asked to enter the access code listed below, as well as some personal information. Please follow the directions to create your username and password.     Your access code is: [email or web address]  Expires: 2018  8:56 AM     Your access code will  in 90 days. If you need help or a new code, please call your [place] clinic or [phone].        Care EveryWhere ID     This is your Care EveryWhere ID. This could be used by other organizations to access your [place] medical records  [id number]        Your Vitals Were     Pulse Temperature Last Period Pulse Oximetry          68 98.5  F (36.9  C) (Oral) (LMP Unknown) 98%         Blood Pressure from Last 3 Encounters:   10/06/18 146/72   10/01/18 107/72   18 131/81    Weight from Last 3 Encounters:   10/01/18 113 lb (51.3 kg)   18 111 lb 12.8 oz (50.7 kg)   18 111 lb 12.8 oz (50.7 kg)              Today, you had the following     No orders found for display         Today's Medication Changes          These changes are accurate as of 10/6/18  3:52 PM.  If you have any questions, ask your nurse or doctor.               These " medicines have changed or have updated prescriptions.        Dose/Directions    warfarin 2.5 MG tablet   Commonly known as:  COUMADIN   This may have changed:  additional instructions   Used for:  Transverse sinus thrombosis   Changed by:  Kayli Betancourt MD        Dose:  2.5 mg   Take 1 tablet (2.5 mg) by mouth daily Take 7.5 mg ( 3 tablets ) today and 5 mg on Sun, Mon, and see coumadin clinic on 10/9/18   Quantity:  9 tablet   Refills:  0            Where to get your medicines      These medications were sent to [id number] - [place], [place] - [address] AT [facility]  [address], [place] [address]     Phone:  [phone]     warfarin 2.5 MG tablet                Primary Care Provider Office Phone # Fax #    Eva Lockhart [phone]       [address] [facility] [address]        Goals        General    Financial Wellbeing (pt-stated)     Notes - Note created  4/27/2018 11:53 AM by Peter Woo RN    Goal Statement: I will work with the care coordinators in applying for the public assistance that I may qualify for in the next 1-2 months.  Measure of Success: applications have been filled out  Supportive Steps to Achieve: rationale explained.  Barriers: none  Strengths: motivated  Date to Achieve By: 1-2 months        Healthy Eating (pt-stated)     Notes - Note created  4/27/2018 11:56 AM by Peter Woo RN    Goal Statement: I will work on planning and purchasing nutritious meals that will not raise my blood sugar levels, as evidenced by lower BG readings and lower A1C level.  Measure of Success: Lower BG and A1C levels  Supportive Steps to Achieve: rationale given to the patient  Barriers: none  Strengths: motivated  Date to Achieve By: ongoing        I will check my blood sugars four times per day (pt-stated)     Notes - Note created  3/10/2014 10:41 AM by Priti Orozco RN    As of today's date 3/10/2014 goal is met at 26 - 50%.    Goal Status:  Active      I will continue to work with Diabetic Educator (pt-stated)     Notes - Note created  3/10/2014 10:42 AM by Priti Orozco, RN    As of today's date 3/10/2014 goal is met at 26 - 50%.   Goal Status:  Active      I will take insulins as prescribed, including sliding scale novolog (pt-stated)     Notes - Note created  3/10/2014 10:43 AM by Priti Orozco RN    As of today's date 3/10/2014 goal is met at 51 - 75%.   Goal Status:  Active      Transportation (pt-stated)     Notes - Note created  4/27/2018 11:50 AM by Peter Woo RN    Goal Statement: I will apply for [employer] in the next 1-2 months.  Measure of Success: application complete  Supportive Steps to Achieve: rationale of less confusion for the patient  Barriers: none  Strengths: motivated  Date to Achieve By: 1-2 months          Equal Access to Services     [facility]: Hadii tracey berman hadasho Sokamryn, waaxda luqadaha, qaybta kaalmada adeegyada, sandra march haytri charles . So [facility] [phone].    ATENCIÓN: Si habla español, tiene a tompkins disposición servicios gratuitos de asistencia lingüística. Llame al [phone].    We comply with applicable federal civil rights laws and [place] laws. We do not discriminate on the basis of race, color, national origin, age, disability, sex, sexual orientation, or gender identity.            Thank you!     Thank you for choosing [facility]  for your care. Our goal is always to provide you with excellent care. Hearing back from our patients is one way we can continue to improve our services. Please take a few minutes to complete the written survey that you may receive in the mail after your visit with us. Thank you!             Your Updated Medication List - Protect others around you: Learn how to safely use, store and throw away your medicines at www.disposemymeds.org.          This list is accurate as of 10/6/18  3:52 PM.  Always use your most recent  med list.                   Brand Name Dispense Instructions for use Diagnosis    ASPIRIN NOT PRESCRIBED    INTENTIONAL    0 each    Please choose reason not prescribed, below    Diabetic ketoacidosis without coma associated with type 2 diabetes mellitus (H)       atorvastatin 40 MG tablet    LIPITOR    90 tablet    Take 1 tablet (40 mg) by mouth daily    Hyperlipidemia LDL goal <100       blood glucose lancets standard    no brand specified    100 each    Use to test blood sugar 3 times daily or as directed.        blood glucose monitoring meter device kit    no brand specified    1 kit    Use to test blood sugar 3 times daily or as directed    Type 2 diabetes mellitus without complication, with long-term current use of insulin (H)       blood glucose monitoring test strip    ONETOUCH ULTRA    300 each    TEST BLOOD SUGARS 3 TIMES DAILY.    Type 2 diabetes mellitus without complication, with long-term current use of insulin (H)       cefpodoxime 100 MG tablet    VANTIN    20 tablet    Take 1 tablet (100 mg) by mouth 2 times daily for 10 days    Pyelonephritis       EPINEPHrine 0.3 MG/0.3ML injection 2-pack    EPIPEN/ADRENACLICK/or ANY BX GENERIC EQUIV    0.3 mL    INJECT ONE DEVICE INTO THE MUSCLE AS NEEDED FOR ALLERGIC REACTION    Allergic to shellfish       fexofenadine 180 MG tablet    ALLEGRA    30 tablet    Take 1 tablet (180 mg) by mouth daily    Chronic seasonal allergic rhinitis, unspecified trigger       fluticasone 50 MCG/ACT spray    FLONASE    3 Bottle    Spray 1-2 sprays into both nostrils daily    Chronic seasonal allergic rhinitis, unspecified trigger       * gabapentin 300 MG capsule    NEURONTIN    90 capsule    Take 1 tablet (300 mg) at night.    Type 2 diabetes mellitus with diabetic neuropathy, with long-term current use of insulin (H)       * gabapentin 100 MG capsule    NEURONTIN    90 capsule    Take 1 capsule (100 mg) by mouth every morning    Type 2 diabetes mellitus with diabetic  nephropathy, with long-term current use of insulin (H)       glucosamine chondroitin 1500 complex Caps      Take 1 capsule by mouth daily        insulin detemir 100 UNIT/ML injection    LEVEMIR FLEXPEN/FLEXTOUCH    30 mL    Inject 30units under the skin twice daily - every morning AND at bedtime.    Uncontrolled type 2 diabetes mellitus without complication, with long-term current use of insulin (H)       insulin pen needle 31G X 5 MM    B-D U/F    200 each    Use 4 daily as directed. This replaces the auto-cover pen needle.    Uncontrolled type 2 diabetes mellitus without complication, with long-term current use of insulin (H)       losartan-hydrochlorothiazide 100-25 MG per tablet    HYZAAR    90 tablet    Take 1 tablet by mouth every morning    Type 2 diabetes mellitus with diabetic nephropathy, with long-term current use of insulin (H)       NovoLOG FLEXPEN 100 UNIT/ML injection   Generic drug:  insulin aspart     15 mL    Inject 12-15 Units Subcutaneous 3 times daily (with meals) Patient additionally taking 4 units with snack.    Uncontrolled type 2 diabetes mellitus without complication, with long-term current use of insulin (H)       omeprazole 20 MG CR capsule    priLOSEC    90 capsule    Take 1 capsule (20 mg) by mouth daily    Gastroesophageal reflux disease, esophagitis presence not specified       VENTOLIN  (90 Base) MCG/ACT inhaler   Generic drug:  albuterol     54 g    INHALE 2 PUFFS INTO THE LUNGS EVERY 6 HOURS AS NEEDED FOR SHORTNESS OF BREATH, DIFFICULTY BREATHING OR WHEEZING.    Mild intermittent asthma without complication       warfarin 2.5 MG tablet    COUMADIN    9 tablet    Take 1 tablet (2.5 mg) by mouth daily Take 7.5 mg ( 3 tablets ) today and 5 mg on Sun, Mon, and see coumadin clinic on 10/9/18    Transverse sinus thrombosis       * Notice:  This list has 2 medication(s) that are the same as other medications prescribed for you. Read the directions carefully, and ask your doctor or  other care provider to review them with you.

## 2018-10-06 NOTE — PATIENT INSTRUCTIONS
Coumadin order completed and sent to [facility]    Take 7.5 mg (3 tablets) today, 10/6/18  and then 5 mg (2 tablets) on Monday 10/7/18 and 10/8/18   Then meet with coumadin clinic as scheduled on 10/9/18

## 2018-10-06 NOTE — PROGRESS NOTES
SUBJECTIVE:   Nathalie Harp is a 63 year old female who presents to clinic today for the following health issues:        Medication Followup of Coumadin    Taking Medication as prescribed: patient seen at coumadin clinic 10/5 18 and INR 1.11     Side Effects:  None-no melena, no increase bleeding, mild headache today    Medication Helping Symptoms:  Yes    When picked up medication on 10/5/18, no coumadin prescription was in her bag.    Per anticoagulation clinic summary  Anticoagulation Summary as of 10/5/2018   INR goal 2.0-3.0   Today's INR 1.1!   Full warfarin instructions 7.5 mg on Fri, Sat; 5 mg all other days   Next INR check 10/9/2018        Patient needs new coumadin prescription to cover the next 4 days until she has her next coumadin clinic visit. She has 3 tablets left from previous prescription on 9/22/18  2.5 mg # 7 tablets from [facility] [place] [facility] and took her first dose of 7.5 mg  last night, Friday, 10/5//18.  She now needs another prescription to cover the daily dosing as noted prior to next  Anticoagulation clinic appointment on 10/9/18.                   Problem list and histories reviewed & adjusted, as indicated.  Additional history: as documented    Patient Active Problem List   Diagnosis     Esophageal reflux     Irritable bowel syndrome     Female stress incontinence     Arthritis of knee, right     Disorder of bursae and tendons in shoulder region     Degeneration of thoracic or thoracolumbar intervertebral disc     Degeneration of cervical intervertebral disc     Other hammer toe (acquired)     Allergic rhinitis due to pollen     Menopausal symptoms     Need for SBE (subacute bacterial endocarditis) prophylaxis     Preventive measure     Hyperlipidemia LDL goal <100     Health Care Home     Anemia     Dizziness     Hammer toe     Microalbuminuria     ACP (advance care planning)     Allergic to shellfish     Falls frequently     Balance problems     CKD (chronic kidney  disease) stage 2, GFR 60-89 ml/min     Type 2 diabetes mellitus with diabetic nephropathy (H)     Diarrhea     Iron deficiency anemia, unspecified iron deficiency anemia type     Right axillary hidradenitis     Uncontrolled type 2 diabetes mellitus without complication, with long-term current use of insulin (H)     Mild intermittent asthma without complication     Cerebral artery occlusion with cerebral infarction (H) [I63.50]     Past Surgical History:   Procedure Laterality Date     HC EXCIS PRIMARY GANGLION WRIST  1985 and 1987    right wrist     KNEE SURGERY  2008 approx    arthroscopic; Dr. Rivera       Social History   Substance Use Topics     Smoking status: Never Smoker     Smokeless tobacco: Never Used     Alcohol use Yes      Comment: rarely     Family History   Problem Relation Age of Onset     Cerebrovascular Disease Maternal Grandmother      Arthritis Maternal Grandmother      Asthma Mother      GASTROINTESTINAL DISEASE Mother      IBS     Alcohol/Drug Mother      Depression Mother      Neurologic Disorder Mother      migraines     GASTROINTESTINAL DISEASE Father      IBS     Diabetes Father      Alcohol/Drug Father      Neurologic Disorder Father      migraines     Obesity Father      GASTROINTESTINAL DISEASE Maternal Grandfather      Ulcers     Alcohol/Drug Maternal Grandfather      Diabetes Paternal Grandmother      Alcohol/Drug Paternal Grandmother      Arthritis Paternal Grandmother      Obesity Paternal Grandmother      Hypertension Sister      Hypertension Brother      Circulatory Sister      Asthma Sister      Asthma Sister      Asthma Brother      Thyroid Disease Sister      Thyroid Disease Sister      Obesity Sister          Current Outpatient Prescriptions   Medication Sig Dispense Refill     warfarin (COUMADIN) 2.5 MG tablet Take 1 tablet (2.5 mg) by mouth daily Take 7.5 mg ( 3 tablets ) today and 5 mg on Sun, Mon, and see coumadin clinic on 10/9/18 9 tablet 0     ASPIRIN NOT PRESCRIBED  (INTENTIONAL) Please choose reason not prescribed, below 0 each 0     atorvastatin (LIPITOR) 40 MG tablet Take 1 tablet (40 mg) by mouth daily 90 tablet 2     blood glucose (NO BRAND SPECIFIED) lancets standard Use to test blood sugar 3 times daily or as directed. 100 each 11     blood glucose monitoring (NO BRAND SPECIFIED) meter device kit Use to test blood sugar 3 times daily or as directed 1 kit 0     blood glucose monitoring (ONE TOUCH ULTRA) test strip TEST BLOOD SUGARS 3 TIMES DAILY. 300 each 3     cefpodoxime (VANTIN) 100 MG tablet Take 1 tablet (100 mg) by mouth 2 times daily for 10 days 20 tablet 0     EPINEPHrine (EPIPEN/ADRENACLICK/OR ANY BX GENERIC EQUIV) 0.3 MG/0.3ML injection 2-pack INJECT ONE DEVICE INTO THE MUSCLE AS NEEDED FOR ALLERGIC REACTION 0.3 mL 1     fexofenadine (ALLEGRA) 180 MG tablet Take 1 tablet (180 mg) by mouth daily (Patient not taking: Reported on 10/1/2018) 30 tablet 1     fluticasone (FLONASE) 50 MCG/ACT spray Spray 1-2 sprays into both nostrils daily (Patient not taking: Reported on 10/1/2018) 3 Bottle 11     gabapentin (NEURONTIN) 100 MG capsule Take 1 capsule (100 mg) by mouth every morning 90 capsule 3     gabapentin (NEURONTIN) 300 MG capsule Take 1 tablet (300 mg) at night. 90 capsule 3     Glucosamine-Chondroit-Vit C-Mn (GLUCOSAMINE CHONDROITIN 1500 COMPLEX) CAPS Take 1 capsule by mouth daily       insulin detemir (LEVEMIR FLEXPEN/FLEXTOUCH) 100 UNIT/ML injection Inject 30units under the skin twice daily - every morning AND at bedtime. 30 mL 3     insulin pen needle (B-D U/F) 31G X 5 MM Use 4 daily as directed. This replaces the auto-cover pen needle. 200 each prn     losartan-hydrochlorothiazide (HYZAAR) 100-25 MG per tablet Take 1 tablet by mouth every morning 90 tablet 3     NOVOLOG FLEXPEN 100 UNIT/ML soln Inject 12-15 Units Subcutaneous 3 times daily (with meals) Patient additionally taking 4 units with snack. 15 mL 3     omeprazole (PRILOSEC) 20 MG CR capsule Take 1  capsule (20 mg) by mouth daily 90 capsule 3     VENTOLIN  (90 Base) MCG/ACT inhaler INHALE 2 PUFFS INTO THE LUNGS EVERY 6 HOURS AS NEEDED FOR SHORTNESS OF BREATH, DIFFICULTY BREATHING OR WHEEZING. 54 g 3     [DISCONTINUED] warfarin (COUMADIN) 2.5 MG tablet Take 1 tablet (2.5 mg) by mouth daily 30 tablet 3     Allergies   Allergen Reactions     Amoxicillin-Pot Clavulanate [[id number]+Fd&C Red #40 [facility]]      Headache, nausea and vomiting     Augmentin      Beesix [Pyridoxine]      Benadryl [Acetaminophen]      Ceclor [Cefaclor]      Cefaclor      Rash       Cephalexin      Cephalosporins      Codeine      Detrol [Tolterodine Tartrate] Other (See Comments)     Severe Dry mouth     Dust Mites      Latex      Metformin GI Disturbance     Severe diarrhea     Pollen Extract      Septra [Bactrim]      Septra [Sulfamethoxazole W-Trimethoprim]      Shellfish Allergy      Crab       Simvastatin      HA     Sorbitol Diarrhea     Sulfa Drugs      Sulfonamide Derivatives      Recent Labs   Lab Test  10/01/18   1424  09/19/18   0919  05/30/18   0930  02/09/18   0737   11/14/16   1004   08/31/15   1144   03/03/14   0906   A1C   --   8.7*  14.4*  12.8*   < >   --    < >  11.4*   < >  12.3*   LDL   --    --    --   124*   --   90   --   83   < >   --    HDL   --    --    --   88   --   52   --   65   --    --    TRIG   --    --    --   243*   --   380*   --   301*   --    --    ALT   --    --    --   23   --   29   --   31   < >   --    CR  0.78   --    --   0.49*   < >  0.92   < >  0.80   < >   --    GFRESTIMATED  74   --    --   >90   < >  62   < >  73   < >   --    GFRESTBLACK  89   --    --   >90   < >  75   < >  88   < >   --    POTASSIUM  4.1   --    --   4.0   < >  5.0   < >  4.7   < >   --    TSH   --    --    --    --    --   0.35*   --    --    --   0.54    < > = values in this interval not displayed.      BP Readings from Last 3 Encounters:   10/06/18 146/72   10/01/18 107/72   09/20/18 131/81    Wt Readings from  Last 3 Encounters:   10/01/18 113 lb (51.3 kg)   09/20/18 111 lb 12.8 oz (50.7 kg)   09/19/18 111 lb 12.8 oz (50.7 kg)                  Labs reviewed in EPIC    Reviewed and updated as needed this visit by clinical staff  Tobacco  Allergies  Meds       Reviewed and updated as needed this visit by Provider         ROS:  Constitutional, HEENT, cardiovascular, pulmonary, gi and gu systems are negative, except as otherwise noted.    OBJECTIVE:     /72 (BP Location: Left arm)  Pulse 68  Temp 98.5  F (36.9  C) (Oral)  LMP  (LMP Unknown)  SpO2 98%  There is no height or weight on file to calculate BMI.   GENERAL: healthy, alert and no distress    Diagnostic Test Results:  none     ASSESSMENT/PLAN:     Problem List Items Addressed This Visit     None      Visit Diagnoses     Chronic anticoagulation    -  Primary    Transverse sinus thrombosis        Relevant Medications    warfarin (COUMADIN) 2.5 MG tablet           ASSESSMENT/PLAN:      ICD-10-CM    1. Chronic anticoagulation Z79.01    2. Transverse sinus thrombosis G08 warfarin (COUMADIN) 2.5 MG tablet       Patient Instructions       Coumadin order completed and sent to [facility]    Take 7.5 mg (3 tablets) today, 10/6/18  and then 5 mg (2 tablets) on Monday 10/7/18 and 10/8/18   Then meet with coumadin clinic as scheduled on 10/9/18            Kayli Betancourt MD  [facility]

## 2018-10-09 ENCOUNTER — ANTICOAGULATION THERAPY VISIT (OUTPATIENT)
Dept: NURSING | Facility: CLINIC | Age: 63
End: 2018-10-09
Payer: COMMERCIAL

## 2018-10-09 DIAGNOSIS — G08 TRANSVERSE SINUS THROMBOSIS: ICD-10-CM

## 2018-10-09 DIAGNOSIS — I63.50 CEREBRAL ARTERY OCCLUSION WITH CEREBRAL INFARCTION (H): ICD-10-CM

## 2018-10-09 LAB — INR POINT OF CARE: 2 (ref 0.86–1.14)

## 2018-10-09 PROCEDURE — 85610 PROTHROMBIN TIME: CPT | Mod: QW

## 2018-10-09 PROCEDURE — 36416 COLLJ CAPILLARY BLOOD SPEC: CPT

## 2018-10-09 PROCEDURE — 99207 ZZC NO CHARGE NURSE ONLY: CPT

## 2018-10-09 RX ORDER — WARFARIN SODIUM 2.5 MG/1
TABLET ORAL
Qty: 210 TABLET | Refills: 1 | Status: SHIPPED | OUTPATIENT
Start: 2018-10-09 | End: 2019-01-04

## 2018-10-09 NOTE — MR AVS SNAPSHOT
Nathalie Harp   10/9/2018 11:40 AM   Anticoagulation Therapy Visit    Description:  63 year old female   Provider:  CP ANTI COAG   Department:  [facility] Nurse           INR as of 10/9/2018     Today's INR 2.0      Anticoagulation Summary as of 10/9/2018     INR goal 2.0-3.0   Today's INR 2.0   Full warfarin instructions 7.5 mg on Tue, Fri, Sat; 5 mg all other days   Next INR check 10/12/2018    Indications   Cerebral artery occlusion with cerebral infarction (H) [I63.50] [I63.50]         Your next Anticoagulation Clinic appointment(s)     Oct 09, 2018 11:40 AM CDT   Anticoagulation Visit with CP ANTI COAG   [facility]    [address] [facility] [id number] [phone]            Oct 12, 2018  1:40 PM CDT   Anticoagulation Visit with CP ANTI COAG   [facility]    [address] [facility] [id number] [phone]              Contact Numbers     [facility]  Please call [phone] or [phone]  to cancel and/or reschedule your appointment.   Please call [phone] with any problems or questions regarding your therapy          October 2018 Details    Sun Mon Tue Wed Thu Fri Sat      1               2               3               4               5               6                 7               8               9      7.5 mg   See details      10      5 mg         11      5 mg         12            13                 14               15               16               17               18               19               20                 21               22               23               24               25               26               27                 28               29               30               31                   Date Details   10/09 This INR check       Date of next INR:  10/12/2018         How to take your  warfarin dose     To take:  5 mg Take 2 of the 2.5 mg tablets.    To take:  7.5 mg Take 3 of the 2.5 mg tablets.

## 2018-10-09 NOTE — PROGRESS NOTES
ANTICOAGULATION FOLLOW-UP CLINIC VISIT    Patient Name:  Nathalie Harp  Date:  10/9/2018  Contact Type:  Face to Face    SUBJECTIVE: Continue twice weekly INR for new start up.     Patient Findings     Positives Initiation of therapy, No Problem Findings           OBJECTIVE    INR Protime   Date Value Ref Range Status   10/09/2018 2.0 (A) 0.86 - 1.14 Final       ASSESSMENT / PLAN  INR assessment THER    Recheck INR In: 3 DAYS    INR Location Clinic      Anticoagulation Summary as of 10/9/2018     INR goal 2.0-3.0   Today's INR 2.0   Warfarin maintenance plan 7.5 mg (2.5 mg x 3) on Tue, Fri, Sat; 5 mg (2.5 mg x 2) all other days   Full warfarin instructions 7.5 mg on Tue, Fri, Sat; 5 mg all other days   Weekly warfarin total 42.5 mg   Plan last modified Bijal Oswald, RN (10/9/2018)   Next INR check 10/12/2018   Target end date 12/25/2018    Indications   Cerebral artery occlusion with cerebral infarction (H) [I63.50] [I63.50]         Anticoagulation Episode Summary     INR check location     Preferred lab     Send INR reminders to  [facility]    Comments       Anticoagulation Care Providers     Provider Role Specialty Phone number    Mally Leung APRN CNP  Nurse Practitioner - [facility] [phone]            See the Encounter Report to view Anticoagulation Flowsheet and Dosing Calendar (Go to Encounters tab in chart review, and find the Anticoagulation Therapy Visit)    Dosage adjustment made based on physician directed care plan.    Bijal Oswald RN

## 2018-10-12 ENCOUNTER — ANTICOAGULATION THERAPY VISIT (OUTPATIENT)
Dept: NURSING | Facility: CLINIC | Age: 63
End: 2018-10-12
Payer: COMMERCIAL

## 2018-10-12 DIAGNOSIS — I63.50 CEREBRAL ARTERY OCCLUSION WITH CEREBRAL INFARCTION (H): ICD-10-CM

## 2018-10-12 LAB — INR POINT OF CARE: 3.3 (ref 0.86–1.14)

## 2018-10-12 PROCEDURE — 85610 PROTHROMBIN TIME: CPT | Mod: QW

## 2018-10-12 PROCEDURE — 36416 COLLJ CAPILLARY BLOOD SPEC: CPT

## 2018-10-12 PROCEDURE — 99207 ZZC NO CHARGE NURSE ONLY: CPT

## 2018-10-12 NOTE — PROGRESS NOTES
ANTICOAGULATION FOLLOW-UP CLINIC VISIT    Patient Name:  Nathalie Harp  Date:  10/12/2018  Contact Type:  Face to Face    SUBJECTIVE: recheck in 1 week     Patient Findings     Positives Initiation of therapy           OBJECTIVE    INR Protime   Date Value Ref Range Status   10/12/2018 3.3 (A) 0.86 - 1.14 Final       ASSESSMENT / PLAN  INR assessment SUPRA    Recheck INR In: 1 WEEK    INR Location Clinic      Anticoagulation Summary as of 10/12/2018     INR goal 2.0-3.0   Today's INR 3.3!   Warfarin maintenance plan 7.5 mg (2.5 mg x 3) on Tue; 5 mg (2.5 mg x 2) all other days   Full warfarin instructions 7.5 mg on Tue; 5 mg all other days   Weekly warfarin total 37.5 mg   Plan last modified Bijal Oswald RN (10/12/2018)   Next INR check 10/19/2018   Target end date 12/25/2018    Indications   Cerebral artery occlusion with cerebral infarction (H) [I63.50] [I63.50]         Anticoagulation Episode Summary     INR check location     Preferred lab     Send INR reminders to [facility]    Comments       Anticoagulation Care Providers     Provider Role Specialty Phone number    Mally Leung APRN CNP  Nurse Practitioner - [facility] [phone]            See the Encounter Report to view Anticoagulation Flowsheet and Dosing Calendar (Go to Encounters tab in chart review, and find the Anticoagulation Therapy Visit)    Dosage adjustment made based on physician directed care plan.    Bijal Oswald RN

## 2018-10-12 NOTE — MR AVS SNAPSHOT
Nathaliebereket Harp   10/12/2018 1:40 PM   Anticoagulation Therapy Visit    Description:  63 year old female   Provider:  RONY ANTI COAG   Department:  Cp Nurse           INR as of 10/12/2018     Today's INR 3.3!      Anticoagulation Summary as of 10/12/2018     INR goal 2.0-3.0   Today's INR 3.3!   Full warfarin instructions 7.5 mg on Tue; 5 mg all other days   Next INR check 10/19/2018    Indications   Cerebral artery occlusion with cerebral infarction (H) [I63.50] [I63.50]         Your next Anticoagulation Clinic appointment(s)     Oct 19, 2018  1:20 PM CDT   Anticoagulation Visit with RONY ANTI COAG   [facility]    [address] [address] [phone]              Contact Numbers     [facility]  Please call [phone] or [phone]  to cancel and/or reschedule your appointment.   Please call [phone] with any problems or questions regarding your therapy          October 2018 Details    Sun Mon Tue Wed Thu Fri Sat      1               2               3               4               5               6                 7               8               9               10               11               12      5 mg   See details      13      5 mg           14      5 mg         15      5 mg         16      7.5 mg         17      5 mg         18      5 mg         19            20                 21               22               23               24               25               26               27                 28               29               30               31                   Date Details   10/12 This INR check       Date of next INR:  10/19/2018         How to take your warfarin dose     To take:  5 mg Take 2 of the 2.5 mg tablets.    To take:  7.5 mg Take 3 of the 2.5 mg tablets.

## 2018-10-19 ENCOUNTER — ANTICOAGULATION THERAPY VISIT (OUTPATIENT)
Dept: NURSING | Facility: CLINIC | Age: 63
End: 2018-10-19
Payer: COMMERCIAL

## 2018-10-19 DIAGNOSIS — I63.50 CEREBRAL ARTERY OCCLUSION WITH CEREBRAL INFARCTION (H): ICD-10-CM

## 2018-10-19 LAB — INR POINT OF CARE: 3 (ref 0.86–1.14)

## 2018-10-19 PROCEDURE — 36416 COLLJ CAPILLARY BLOOD SPEC: CPT

## 2018-10-19 PROCEDURE — 99207 ZZC NO CHARGE NURSE ONLY: CPT

## 2018-10-19 PROCEDURE — 85610 PROTHROMBIN TIME: CPT | Mod: QW

## 2018-10-19 NOTE — PROGRESS NOTES
ANTICOAGULATION FOLLOW-UP CLINIC VISIT    Patient Name:  Nathalie Harp  Date:  10/19/2018  Contact Type:  Face to Face    SUBJECTIVE: Plan to slightly reduce weekly warfarin dose to 5 mg daily.     Patient Findings     Positives Initiation of therapy           OBJECTIVE    INR Protime   Date Value Ref Range Status   10/19/2018 3.0 (A) 0.86 - 1.14 Final       ASSESSMENT / PLAN  INR assessment THER    Recheck INR In: 1 WEEK    INR Location Clinic      Anticoagulation Summary as of 10/19/2018     INR goal 2.0-3.0   Today's INR 3.0   Warfarin maintenance plan 5 mg (2.5 mg x 2) every day   Full warfarin instructions 5 mg every day   Weekly warfarin total 35 mg   Plan last modified Bijal Oswald RN (10/19/2018)   Next INR check 10/26/2018   Target end date 12/25/2018    Indications   Cerebral artery occlusion with cerebral infarction (H) [I63.50] [I63.50]         Anticoagulation Episode Summary     INR check location     Preferred lab     Send INR reminders to [facility]    Comments       Anticoagulation Care Providers     Provider Role Specialty Phone number    Mally Leung APRN CNP  Nurse Practitioner - [facility] [phone]            See the Encounter Report to view Anticoagulation Flowsheet and Dosing Calendar (Go to Encounters tab in chart review, and find the Anticoagulation Therapy Visit)    Dosage adjustment made based on physician directed care plan.    Bijal Oswald RN

## 2018-10-19 NOTE — MR AVS SNAPSHOT
Nathaliebereket Harp   10/19/2018 1:20 PM   Anticoagulation Therapy Visit    Description:  63 year old female   Provider:  RONY ANTI COAG   Department:  Cp Nurse           INR as of 10/19/2018     Today's INR 3.0      Anticoagulation Summary as of 10/19/2018     INR goal 2.0-3.0   Today's INR 3.0   Full warfarin instructions 5 mg every day   Next INR check 10/26/2018    Indications   Cerebral artery occlusion with cerebral infarction (H) [I63.50] [I63.50]         Your next Anticoagulation Clinic appointment(s)     Oct 26, 2018 10:20 AM CDT   Anticoagulation Visit with RONY ANTI COAG   [facility]    [address] [address] [phone]              Contact Numbers     [facility]  Please call [phone] or [phone]  to cancel and/or reschedule your appointment.   Please call [phone] with any problems or questions regarding your therapy          October 2018 Details    Sun Mon Tue Wed Thu Fri Sat      1               2               3               4               5               6                 7               8               9               10               11               12               13                 14               15               16               17               18               19      5 mg   See details      20      5 mg           21      5 mg         22      5 mg         23      5 mg         24      5 mg         25      5 mg         26            27                 28               29               30               31                   Date Details   10/19 This INR check       Date of next INR:  10/26/2018         How to take your warfarin dose     To take:  5 mg Take 2 of the 2.5 mg tablets.

## 2018-10-23 ENCOUNTER — PATIENT OUTREACH (OUTPATIENT)
Dept: CARE COORDINATION | Facility: CLINIC | Age: 63
End: 2018-10-23

## 2018-10-23 NOTE — PROGRESS NOTES
"Clinic Care Coordination Contact--Social Work Follow Up Call/Assessment  Care Team Conversations    Psychosocial: Incoming call from Patient.  SW recently met with Patient regarding food and housing resources.  She is requesting the # again for the [place] shelter that may accept animals.  Patient states however, she may not need a specific shelter as the one dog she has is registered and trained as a therapy dog.  Patient states her other dogs were \"stolen from her\" from the person at whose home they are currently staying.  She reports they have been gone since the second day she was in the hospital.  Patient states she and daughter have been given notice that they must leave by 10/31/2018.  Patient reports she was recently at [facility] for migraines, which she states she has had since her teens.  She reports she is now on many blood thinners and working with clinic INR RN.      Patient reports she and daughter each have Social Security resources for housing.  SW informed of shelter [facility] which is $7 a day and has case manage ment to assist with permanent housing needs.  This # given to Patient ([phone]) as well as [facility] Hotline to search other nearby shelters.      SW inquired on Patient's mood.  She sounds cheerful an is frequently on the phone.  Patient states it is \"okay today\".  Patient stated we must end our call to allow her to call shelters. We discussed agreed upon goal for Patient to contact crisis if needed, she agrees.    Patient thanked VERA and we agreed on return call in 2 weeks.  VERA will update PCP    Christina Dutton, LSW, MSW   [facility]   [phone]  10/23/2018 11:14 AM  "

## 2018-10-23 NOTE — PROGRESS NOTES
"Clinic Care Coordination Contact--Social Work Face to Face visit   Care Team Conversations    Psychosocial: PCP request to meet with Patient to discuss housing and food resources.  SW has met with Patient in the past to discuss resources.  Patient and daughter (disabled) have lived various places and are technically homeless.  Patient reported they are currently living with a friend, but are uncertain how long they will be allowed to stay there.  SW discussed alternate housing resources, specifically those with pets.  SW found a shelter in [place] ([facility] [phone]) that may accept pets, SW informed Patient it would be necessary to call and inquire.  Patient states desire to apply for [employer] and is frustrated with application fees and waiting lists.  She reported she is working with a  on housing.      SW discussed food benefits, Patient states she and daughter both receive Social Security but do not qualify for food resources as are currently homeless.  SW strongly suggested Patient and daughter attempt again as they may qualify as they currently have an address.  SW inquired if Patient has this contact information, she stated she does.  Patient reported having limited access to food and is often eating food from the dollar store and many bananas.  SW discussed [employer] bulk food program.  Patient stated she has a car now so this may be feasible.  SW provided this contact information.  Patient stated she does not think she will call them as soon they may have food resources.  SW encouraged Patient to call in the interim.  Patient stated she will consider.      Patient's mood varied from anxious to situational depression.  Patient stated she would not harm self.  She reported she had a \"#\" if needed if was thinking of doing so.  SW inquired if Patient had working phone, she stated she does.      SW and Patient agreed that Patient would call crisis if/when needed.    Plan: "   1) Patient will call crisis resources if/when in crisis  2) SW will call Patient in 2 weeks for update and needs assessment.  PCP was updated     MARGARITO Taveras, MSW   [facility]   [phone]  10/23/2018 10:59 AM

## 2018-10-23 NOTE — LETTER
[facility]  Complex Care Plan  About Me  Patient Name:  Nathalie Harp    YOB: 1955  Age:     63 year old   Moe MRN:   [id number] Telephone Information:    Home Phone [phone]   Mobile [phone]       Address:    [address] [facility] [address] Email address:  No e-mail address on record      Emergency Contact(s)  Name Relationship Lgl Grd Work Phone Home Phone Mobile Phone   1. RAJAN HARP* Daughter   [phone]   2. NO SECONDARY C*    none            Primary language:  English     needed? No   [employer]:  [phone] op. 1  Other communication barriers:    Preferred Method of Communication: Phone and in person   Current living arrangement:  In between housing, staying at a friend's home   Mobility Status/ Medical Equipment:  Cane     Health Maintenance  Health Maintenance Reviewed:  Not assessed     My Access Plan  Medical Emergency 911   Primary Clinic Line [facility] - [phone]   24 Hour Appointment Line [phone] or  [phone]) (toll-free)   24 Hour Nurse Line [phone] (toll-free)   Preferred Urgent Care  Unknown    Preferred Hospital  Unknown    Preferred Pharmacy [facility] [place], [place] - [address] [facility]     Behavioral Health Crisis Line The National Suicide Prevention Lifeline at 1-477.803.3068 or 911     My Care Team Members    Patient Care Team       Relationship Specialty Notifications Start End    Eva Lockhart MD PCP - General Internal Medicine  1/4/17     Phone: [phone] Fax: [phone] [address] [facility] [address]    Malissa Jack   Diabetes Education  3/10/14     Phone: [phone] Fax: [phone]         XX RESIGNED XX [facility] [address]    Farida    11/5/14     Comment:  Zamzam MCKEON     Phone: [phone]         Christina Dutton BSW Lead Care  Coordinator Primary Care -   4/30/18     Phone: [phone] Fax: [phone]                My Care Plans  Self Management and Treatment Plan  Goals and (Comments)  Goals        General    Financial Wellbeing (pt-stated)     Notes - Note created  4/27/2018 11:53 AM by Peter Woo, RN    Goal Statement: I will work with the care coordinators in applying for the public assistance that I may qualify for in the next 1-2 months.  Measure of Success: applications have been filled out  Supportive Steps to Achieve: rationale explained.  Barriers: none  Strengths: motivated  Date to Achieve By: 1-2 months        Healthy Eating (pt-stated)     Notes - Note created  4/27/2018 11:56 AM by Peter Woo RN    Goal Statement: I will work on planning and purchasing nutritious meals that will not raise my blood sugar levels, as evidenced by lower BG readings and lower A1C level.  Measure of Success: Lower BG and A1C levels  Supportive Steps to Achieve: rationale given to the patient  Barriers: none  Strengths: motivated  Date to Achieve By: ongoing        I will call crisis when in crisis  (pt-stated)     Notes - Note created  10/23/2018 11:01 AM by Christina Dutton BSW    Goal Statement: I will call crisis when in crisis   Measure of Success: Call crisis when in crisis   Supportive Steps to Achieve: Call crisis when in crisis   Barriers: Patient is hesitant to call   Strengths: Patient stated she will consider calling crisis or going to ED   Date to Achieve By: ongoing   Patient expressed understanding of goal: Yes        I will check my blood sugars four times per day (pt-stated)     Notes - Note created  3/10/2014 10:41 AM by Priti Orozco RN    As of today's date 3/10/2014 goal is met at 26 - 50%.   Goal Status:  Active      I will continue to work with Diabetic Educator (pt-stated)     Notes - Note created  3/10/2014 10:42 AM by Priti Orozco RN    As of today's date 3/10/2014 goal is met at 26 - 50%.   Goal Status:   Active      I will take insulins as prescribed, including sliding scale novolog (pt-stated)     Notes - Note created  3/10/2014 10:43 AM by Priti Orozco, RN    As of today's date 3/10/2014 goal is met at 51 - 75%.   Goal Status:  Active      Transportation (pt-stated)     Notes - Note created  4/27/2018 11:50 AM by Peter Woo RN    Goal Statement: I will apply for [employer] in the next 1-2 months.  Measure of Success: application complete  Supportive Steps to Achieve: rationale of less confusion for the patient  Barriers: none  Strengths: motivated  Date to Achieve By: 1-2 months               Action Plans on File:   Asthma     Advance Care Plans/Directives Type: None        My Medical and Care Information  Problem List   Patient Active Problem List   Diagnosis     Esophageal reflux     Irritable bowel syndrome     Female stress incontinence     Arthritis of knee, right     Disorder of bursae and tendons in shoulder region     Degeneration of thoracic or thoracolumbar intervertebral disc     Degeneration of cervical intervertebral disc     Other hammer toe (acquired)     Allergic rhinitis due to pollen     Menopausal symptoms     Need for SBE (subacute bacterial endocarditis) prophylaxis     Preventive measure     Hyperlipidemia LDL goal <100     Health Care Home     Anemia     Dizziness     Hammer toe     Microalbuminuria     ACP (advance care planning)     Allergic to shellfish     Falls frequently     Balance problems     CKD (chronic kidney disease) stage 2, GFR 60-89 ml/min     Type 2 diabetes mellitus with diabetic nephropathy (H)     Diarrhea     Iron deficiency anemia, unspecified iron deficiency anemia type     Right axillary hidradenitis     Uncontrolled type 2 diabetes mellitus without complication, with long-term current use of insulin (H)     Mild intermittent asthma without complication     Cerebral artery occlusion with cerebral infarction (H) [I63.50]          Care Coordination Start Date:  9/20/2018   Frequency of Care Coordination:  monthly   Form Last Updated: 10/23/2018

## 2018-10-26 ENCOUNTER — OFFICE VISIT (OUTPATIENT)
Dept: PHARMACY | Facility: CLINIC | Age: 63
End: 2018-10-26
Payer: COMMERCIAL

## 2018-10-26 ENCOUNTER — ANTICOAGULATION THERAPY VISIT (OUTPATIENT)
Dept: NURSING | Facility: CLINIC | Age: 63
End: 2018-10-26
Payer: COMMERCIAL

## 2018-10-26 VITALS — HEART RATE: 82 BPM | DIASTOLIC BLOOD PRESSURE: 70 MMHG | SYSTOLIC BLOOD PRESSURE: 134 MMHG

## 2018-10-26 DIAGNOSIS — I63.50 CEREBRAL ARTERY OCCLUSION WITH CEREBRAL INFARCTION (H): ICD-10-CM

## 2018-10-26 DIAGNOSIS — E11.21 TYPE 2 DIABETES MELLITUS WITH DIABETIC NEPHROPATHY, WITH LONG-TERM CURRENT USE OF INSULIN (H): Primary | ICD-10-CM

## 2018-10-26 DIAGNOSIS — Z79.4 TYPE 2 DIABETES MELLITUS WITH DIABETIC NEPHROPATHY, WITH LONG-TERM CURRENT USE OF INSULIN (H): Primary | ICD-10-CM

## 2018-10-26 LAB — INR POINT OF CARE: 4 (ref 0.86–1.14)

## 2018-10-26 PROCEDURE — 99606 MTMS BY PHARM EST 15 MIN: CPT | Performed by: PHARMACIST

## 2018-10-26 PROCEDURE — 85610 PROTHROMBIN TIME: CPT | Mod: QW

## 2018-10-26 PROCEDURE — 99607 MTMS BY PHARM ADDL 15 MIN: CPT | Performed by: PHARMACIST

## 2018-10-26 PROCEDURE — 99207 ZZC NO CHARGE NURSE ONLY: CPT

## 2018-10-26 PROCEDURE — 36416 COLLJ CAPILLARY BLOOD SPEC: CPT

## 2018-10-26 RX ORDER — FLASH GLUCOSE SENSOR
1 KIT MISCELLANEOUS CONTINUOUS
Qty: 1 DEVICE | Refills: 0 | Status: SHIPPED | OUTPATIENT
Start: 2018-10-26 | End: 2018-12-21

## 2018-10-26 RX ORDER — FLASH GLUCOSE SENSOR
1 KIT MISCELLANEOUS
Qty: 2 EACH | Refills: 11 | Status: SHIPPED | OUTPATIENT
Start: 2018-10-26 | End: 2019-02-15

## 2018-10-26 NOTE — PROGRESS NOTES
ANTICOAGULATION FOLLOW-UP CLINIC VISIT    Patient Name:  Nathalie Harp  Date:  10/26/2018  Contact Type:  Face to Face    SUBJECTIVE: INR went up despite lowering weekly warfarin dose (unclear if faulty / false high medical recalled test strips are the reason).  Plan less warfarin this week and recheck next Friday.  See flowsheet.     Patient Findings     Positives Nose bleeds (x 1 today), Initiation of therapy           OBJECTIVE    INR Protime   Date Value Ref Range Status   10/26/2018 4.0 (A) 0.86 - 1.14 Final       ASSESSMENT / PLAN  INR assessment SUPRA    Recheck INR In: 1 WEEK    INR Location Clinic      Anticoagulation Summary as of 10/26/2018     INR goal 2.0-3.0   Today's INR 4.0!   Warfarin maintenance plan 2.5 mg (2.5 mg x 1) on Mon, Wed, Sat; 5 mg (2.5 mg x 2) all other days   Full warfarin instructions 10/26: Hold; Otherwise 2.5 mg on Mon, Wed, Sat; 5 mg all other days   Weekly warfarin total 27.5 mg   Plan last modified Bijal Oswald, RN (10/26/2018)   Next INR check 11/2/2018   Target end date 12/25/2018    Indications   Cerebral artery occlusion with cerebral infarction (H) [I63.50] [I63.50]         Anticoagulation Episode Summary     INR check location     Preferred lab     Send INR reminders to [facility]    Comments       Anticoagulation Care Providers     Provider Role Specialty Phone number    Mally Leung APRN CNP  Nurse Practitioner - [facility] [phone]            See the Encounter Report to view Anticoagulation Flowsheet and Dosing Calendar (Go to Encounters tab in chart review, and find the Anticoagulation Therapy Visit)    Dosage adjustment made based on physician directed care plan.    Bijal Oswald, RN

## 2018-10-26 NOTE — PATIENT INSTRUCTIONS
Recommendations from today's MTM visit:                                                        1. I've sent a prescription for Vgo insulin patch and Novolog insulin vials to [facility]. The pharmacy phone number is [phone]. They will probably call you to confirm all of your billing information. Or you can contact them if you don't hear anything by next week. You may request that they send your supplies to [facility].    2. When you receive the Vgo, we will meet again to review how to set it up properly.    3. I also sent in a prescription for a new blood sugar meter that continuously checks your blood sugar -- the meter isn't available until November, so we'll talk more about it at that time.    4. The number for [facility] is [phone]. The number for [facility] in [place] is [phone].      Next MTM visit: Friday, November 9th at 10:30am    To schedule another MTM appointment, please call the clinic directly or you may call the MTM scheduling line at [phone] or toll-free at [phone].     My Clinical Pharmacist's contact information:                                                      It was a pleasure talking with you today!  Please feel free to contact me with any questions or concerns you have.      Jovana Loo, Pharm.D., [facility]  Medication Therapy Management Pharmacist  [phone]    You may receive a survey about the MTM services you received.  I would appreciate your feedback to help me serve you better in the future. Please fill it out and return it when you can. Your comments will be anonymous.      My healthcare goals:                                                      Diabetes Goals:    Home Monitoring of Blood Sugars:Fasting  mg/dL and 2 hours after a meal less than 180 mg/dL.    Hemoglobin A1C: Less than 8%. Yours is   Lab Results   Component Value Date    A1C 8.7 09/19/2018    A1C 14.4 05/30/2018     A1C 12.8 02/09/2018    A1C 13.8 07/03/2017    A1C 12.3 12/21/2016     Blood Pressure: Less than 140/90mmHg. Yours is   BP Readings from Last 3 Encounters:   10/26/18 134/70   10/06/18 146/72   10/01/18 107/72

## 2018-10-26 NOTE — MR AVS SNAPSHOT
Nathalie KATHERINE Harp   10/26/2018 10:20 AM   Anticoagulation Therapy Visit    Description:  63 year old female   Provider:  RONY ANTI COAG   Department:  Cp Nurse           INR as of 10/26/2018     Today's INR 4.0!      Anticoagulation Summary as of 10/26/2018     INR goal 2.0-3.0   Today's INR 4.0!   Full warfarin instructions 10/26: Hold; Otherwise 2.5 mg on Mon, Wed, Sat; 5 mg all other days   Next INR check 11/2/2018    Indications   Cerebral artery occlusion with cerebral infarction (H) [I63.50] [I63.50]         Your next Anticoagulation Clinic appointment(s)     Nov 02, 2018 10:40 AM CDT   Anticoagulation Visit with RONY ANTI COAG   [facility]    [address] [place] [id number] [phone]              Contact Numbers     [facility]  Please call [phone] or [phone]  to cancel and/or reschedule your appointment.   Please call [phone] with any problems or questions regarding your therapy          October 2018 Details    Sun Mon Tue Wed Thu Fri Sat      1               2               3               4               5               6                 7               8               9               10               11               12               13                 14               15               16               17               18               19               20                 21               22               23               24               25               26      Hold   See details      27      2.5 mg           28      5 mg         29      2.5 mg         30      5 mg         31      2.5 mg             Date Details   10/26 This INR check               How to take your warfarin dose     To take:  2.5 mg Take 1 of the 2.5 mg tablets.    To take:  5 mg Take 2 of the 2.5 mg tablets.    Hold Do not take your warfarin dose. See the Details table to the right for additional instructions.                 November 2018 Details    Sun Mon Tue Wed Thu Fri Sat         1      5 mg         2            3                 4               5               6               7               8               9               10                 11               12               13               14               15               16               17                 18               19               20               21               22               23               24                 25               26               27               28               29               30                 Date Details   No additional details    Date of next INR:  11/2/2018         How to take your warfarin dose     To take:  5 mg Take 2 of the 2.5 mg tablets.

## 2018-10-26 NOTE — MR AVS SNAPSHOT
After Visit Summary   10/26/2018    Nathalie Harp    MRN: [id number]           Patient Information     Date Of Birth          1955        Visit Information        Provider Department      10/26/2018 10:30 AM Jovana Loo, [facility] MTM        Today's Diagnoses     Type 2 diabetes mellitus with diabetic nephropathy, with long-term current use of insulin (H)    -  1      Care Instructions    Recommendations from today's MT visit:                                                        1. I've sent a prescription for Vgo insulin patch and Novolog insulin vials to [facility]. The pharmacy phone number is [phone]. They will probably call you to confirm all of your billing information. Or you can contact them if you don't hear anything by next week. You may request that they send your supplies to [facility].    2. When you receive the Vgo, we will meet again to review how to set it up properly.    3. I also sent in a prescription for a new blood sugar meter that continuously checks your blood sugar -- the meter isn't available until November, so we'll talk more about it at that time.    4. The number for [facility] is [phone]. The number for [facility] in [place] is [phone].      Next MTM visit: Friday, November 9th at 10:30am    To schedule another MTM appointment, please call the clinic directly or you may call the MTM scheduling line at [phone] or toll-free at [phone].     My Clinical Pharmacist's contact information:                                                      It was a pleasure talking with you today!  Please feel free to contact me with any questions or concerns you have.      Jovana Loo, Pharm.D., BCACP  Medication Therapy Management Pharmacist  [phone]    You may receive a survey about the MTM services you received.  I would appreciate your  feedback to help me serve you better in the future. Please fill it out and return it when you can. Your comments will be anonymous.      My healthcare goals:                                                      Diabetes Goals:    Home Monitoring of Blood Sugars:Fasting  mg/dL and 2 hours after a meal less than 180 mg/dL.    Hemoglobin A1C: Less than 8%. Yours is   Lab Results   Component Value Date    A1C 8.7 09/19/2018    A1C 14.4 05/30/2018    A1C 12.8 02/09/2018    A1C 13.8 07/03/2017    A1C 12.3 12/21/2016     Blood Pressure: Less than 140/90mmHg. Yours is   BP Readings from Last 3 Encounters:   10/26/18 134/70   10/06/18 146/72   10/01/18 107/72               Follow-ups after your visit        Your next 10 appointments already scheduled     Nov 02, 2018 10:40 AM CDT   Anticoagulation Visit with CP ANTI COAG   [facility]    [address] [id number] [phone]            Nov 09, 2018 10:30 AM CST   Office Visit with Jovana Loo RPH   [facility] ([facility])    [address] [id number] [phone]           Bring a current list of meds and any records pertaining to this visit. For Physicals, please bring immunization records and any forms needing to be filled out. Please arrive 10 minutes early to complete paperwork.              Who to contact     If you have questions or need follow up information about today's clinic visit or your schedule please contact [facility] directly at [phone].  Normal or non-critical lab and imaging results will be communicated to you by MyChart, letter or phone within 4 business days after the clinic has received the results. If you do not hear from us within 7 days, please contact the clinic through MyChart or phone. If you have a critical or abnormal  lab result, we will notify you by phone as soon as possible.  Submit refill requests through [facility] or call your pharmacy and they will forward the refill request to us. Please allow 3 business days for your refill to be completed.          Additional Information About Your Visit        Care EveryWhere ID     This is your Care EveryWhere ID. This could be used by other organizations to access your [facility] medical records  [id number]        Your Vitals Were     Pulse Last Period                82 (LMP Unknown)           Blood Pressure from Last 3 Encounters:   10/26/18 134/70   10/06/18 146/72   10/01/18 107/72    Weight from Last 3 Encounters:   10/01/18 113 lb (51.3 kg)   09/20/18 111 lb 12.8 oz (50.7 kg)   09/19/18 111 lb 12.8 oz (50.7 kg)              Today, you had the following     No orders found for display         Today's Medication Changes          These changes are accurate as of 10/26/18 11:30 AM.  If you have any questions, ask your nurse or doctor.               Start taking these medicines.        Dose/Directions    FREESTYLE LALO 14 DAY SENSOR Misc   Used for:  Type 2 diabetes mellitus with diabetic nephropathy, with long-term current use of insulin (H)   Started by:  Jovana Loo RPH        Dose:  1 Device   1 Device every 14 days   Quantity:  2 each   Refills:  11       FREESTYLE LALO READER Ana   Used for:  Type 2 diabetes mellitus with diabetic nephropathy, with long-term current use of insulin (H)   Started by:  Jovana Loo RPH        Dose:  1 Device   1 Device continuous   Quantity:  1 Device   Refills:  0       wearable insulin delivery device kit   Used for:  Type 2 diabetes mellitus with diabetic nephropathy, with long-term current use of insulin (H)   Started by:  Jovana Loo RPH        Insulin delivery device to be changed every 24 hours   Quantity:  30 each   Refills:  4         These medicines have changed or have updated prescriptions.         Dose/Directions    * blood glucose monitoring test strip   Commonly known as:  ONETOUCH ULTRA   This may have changed:  Another medication with the same name was added. Make sure you understand how and when to take each.   Used for:  Type 2 diabetes mellitus without complication, with long-term current use of insulin (H)   Changed by:  Jovana Loo RPH        TEST BLOOD SUGARS 3 TIMES DAILY.   Quantity:  300 each   Refills:  3       * blood glucose monitoring test strip   Commonly known as:  FREESTYLE PRECISION EMILIO TEST   This may have changed:  You were already taking a medication with the same name, and this prescription was added. Make sure you understand how and when to take each.   Used for:  Type 2 diabetes mellitus with diabetic nephropathy, with long-term current use of insulin (H)   Changed by:  Jovana Loo RPH        Use to test blood sugar 4 times daily or as directed.   Quantity:  100 strip   Refills:  11       * NovoLOG FLEXPEN 100 UNIT/ML injection   This may have changed:  Another medication with the same name was added. Make sure you understand how and when to take each.   Used for:  Uncontrolled type 2 diabetes mellitus without complication, with long-term current use of insulin (H)   Generic drug:  insulin aspart   Changed by:  Jovana Loo RPH        Dose:  12-15 Units   Inject 12-15 Units Subcutaneous 3 times daily (with meals) Patient additionally taking 4 units with snack.   Quantity:  15 mL   Refills:  3       * insulin aspart 100 UNITS/ML injection   Commonly known as:  NovoLOG VIAL   This may have changed:  You were already taking a medication with the same name, and this prescription was added. Make sure you understand how and when to take each.   Used for:  Type 2 diabetes mellitus with diabetic nephropathy, with long-term current use of insulin (H)   Changed by:  Jovana Loo RPH        Use with Vgo 30, inject up to 66units per day.   Quantity:   20 mL   Refills:  11       * Notice:  This list has 4 medication(s) that are the same as other medications prescribed for you. Read the directions carefully, and ask your doctor or other care provider to review them with you.         Where to get your medicines      These medications were sent to [facility] - [place], [place] - [address]   [address] , [facility] [address]    Hours:  Mon-Fri 8:30am-5:00pm Toll Free [phone] Phone:  [phone]     blood glucose monitoring test strip    FREESTYLE LALO 14 DAY SENSOR Misc    FREESTYLE LALO READER Ana    insulin aspart 100 UNITS/ML injection    wearable insulin delivery device kit                Primary Care Provider Office Phone # Fax #    Eva Lockhart [phone]       [address] [id number]        Goals        General    Financial Wellbeing (pt-stated)     Notes - Note created  4/27/2018 11:53 AM by Peter Woo RN    Goal Statement: I will work with the care coordinators in applying for the public assistance that I may qualify for in the next 1-2 months.  Measure of Success: applications have been filled out  Supportive Steps to Achieve: rationale explained.  Barriers: none  Strengths: motivated  Date to Achieve By: 1-2 months        Healthy Eating (pt-stated)     Notes - Note created  4/27/2018 11:56 AM by Peter Woo, RN    Goal Statement: I will work on planning and purchasing nutritious meals that will not raise my blood sugar levels, as evidenced by lower BG readings and lower A1C level.  Measure of Success: Lower BG and A1C levels  Supportive Steps to Achieve: rationale given to the patient  Barriers: none  Strengths: motivated  Date to Achieve By: ongoing        I will call crisis when in crisis  (pt-stated)     Notes - Note created  10/23/2018 11:01 AM by Christina Dutton BSW    Goal Statement: I will call crisis when in crisis   Measure of Success:  Call crisis when in crisis   Supportive Steps to Achieve: Call crisis when in crisis   Barriers: Patient is hesitant to call   Strengths: Patient stated she will consider calling crisis or going to ED   Date to Achieve By: ongoing   Patient expressed understanding of goal: Yes        I will check my blood sugars four times per day (pt-stated)     Notes - Note created  3/10/2014 10:41 AM by Priti Orozco, RN    As of today's date 3/10/2014 goal is met at 26 - 50%.   Goal Status:  Active      I will continue to work with Diabetic Educator (pt-stated)     Notes - Note created  3/10/2014 10:42 AM by Priti Orozco, RN    As of today's date 3/10/2014 goal is met at 26 - 50%.   Goal Status:  Active      I will take insulins as prescribed, including sliding scale novolog (pt-stated)     Notes - Note created  3/10/2014 10:43 AM by Priti Orozco, RN    As of today's date 3/10/2014 goal is met at 51 - 75%.   Goal Status:  Active      Transportation (pt-stated)     Notes - Note created  4/27/2018 11:50 AM by Peter Woo, RN    Goal Statement: I will apply for [employer] in the next 1-2 months.  Measure of Success: application complete  Supportive Steps to Achieve: rationale of less confusion for the patient  Barriers: none  Strengths: motivated  Date to Achieve By: 1-2 months          Equal Access to Services     ADI LOPEZ AH: Hadii tracey ku hadasho Soomaali, waaxda luqadaha, qaybta kaalmada adeegyada, sandra waller. So [facility] [phone].    ATENCIÓN: Si habla español, tiene a tompkins disposición servicios gratuitos de asistencia lingüística. Llame al [phone].    We comply with applicable federal civil rights laws and [place] laws. We do not discriminate on the basis of race, color, national origin, age, disability, sex, sexual orientation, or gender identity.            Thank you!     Thank you for choosing [facility]  for your care. Our goal is always to provide you  with excellent care. Hearing back from our patients is one way we can continue to improve our services. Please take a few minutes to complete the written survey that you may receive in the mail after your visit with us. Thank you!             Your Updated Medication List - Protect others around you: Learn how to safely use, store and throw away your medicines at www.disposemymeds.org.          This list is accurate as of 10/26/18 11:30 AM.  Always use your most recent med list.                   Brand Name Dispense Instructions for use Diagnosis    ASPIRIN NOT PRESCRIBED    INTENTIONAL    0 each    Please choose reason not prescribed, below    Diabetic ketoacidosis without coma associated with type 2 diabetes mellitus (H)       atorvastatin 40 MG tablet    LIPITOR    90 tablet    Take 1 tablet (40 mg) by mouth daily    Hyperlipidemia LDL goal <100       blood glucose lancets standard    no brand specified    100 each    Use to test blood sugar 3 times daily or as directed.        blood glucose monitoring meter device kit    no brand specified    1 kit    Use to test blood sugar 3 times daily or as directed    Type 2 diabetes mellitus without complication, with long-term current use of insulin (H)       * blood glucose monitoring test strip    ONETOUCH ULTRA    300 each    TEST BLOOD SUGARS 3 TIMES DAILY.    Type 2 diabetes mellitus without complication, with long-term current use of insulin (H)       * blood glucose monitoring test strip    FREESTYLE PRECISION EMILIO TEST    100 strip    Use to test blood sugar 4 times daily or as directed.    Type 2 diabetes mellitus with diabetic nephropathy, with long-term current use of insulin (H)       EPINEPHrine 0.3 MG/0.3ML injection 2-pack    EPIPEN/ADRENACLICK/or ANY BX GENERIC EQUIV    0.3 mL    INJECT ONE DEVICE INTO THE MUSCLE AS NEEDED FOR ALLERGIC REACTION    Allergic to shellfish       fexofenadine 180 MG tablet    ALLEGRA    30 tablet    Take 1 tablet (180 mg) by  mouth daily    Chronic seasonal allergic rhinitis, unspecified trigger       fluticasone 50 MCG/ACT spray    FLONASE    3 Bottle    Spray 1-2 sprays into both nostrils daily    Chronic seasonal allergic rhinitis, unspecified trigger       FREESTYLE LALO 14 DAY SENSOR Misc     2 each    1 Device every 14 days    Type 2 diabetes mellitus with diabetic nephropathy, with long-term current use of insulin (H)       FREESTYLE LALO READER Ana     1 Device    1 Device continuous    Type 2 diabetes mellitus with diabetic nephropathy, with long-term current use of insulin (H)       * gabapentin 300 MG capsule    NEURONTIN    90 capsule    Take 1 tablet (300 mg) at night.    Type 2 diabetes mellitus with diabetic neuropathy, with long-term current use of insulin (H)       * gabapentin 100 MG capsule    NEURONTIN    90 capsule    Take 1 capsule (100 mg) by mouth every morning    Type 2 diabetes mellitus with diabetic nephropathy, with long-term current use of insulin (H)       glucosamine chondroitin 1500 complex Caps      Take 1 capsule by mouth daily        insulin detemir 100 UNIT/ML injection    LEVEMIR FLEXPEN/FLEXTOUCH    30 mL    Inject 30units under the skin twice daily - every morning AND at bedtime.    Uncontrolled type 2 diabetes mellitus without complication, with long-term current use of insulin (H)       insulin pen needle 31G X 5 MM    B-D U/F    200 each    Use 4 daily as directed. This replaces the auto-cover pen needle.    Uncontrolled type 2 diabetes mellitus without complication, with long-term current use of insulin (H)       losartan-hydrochlorothiazide 100-25 MG per tablet    HYZAAR    90 tablet    Take 1 tablet by mouth every morning    Type 2 diabetes mellitus with diabetic nephropathy, with long-term current use of insulin (H)       * NovoLOG FLEXPEN 100 UNIT/ML injection   Generic drug:  insulin aspart     15 mL    Inject 12-15 Units Subcutaneous 3 times daily (with meals) Patient additionally  taking 4 units with snack.    Uncontrolled type 2 diabetes mellitus without complication, with long-term current use of insulin (H)       * insulin aspart 100 UNITS/ML injection    NovoLOG VIAL    20 mL    Use with Vgo 30, inject up to 66units per day.    Type 2 diabetes mellitus with diabetic nephropathy, with long-term current use of insulin (H)       omeprazole 20 MG CR capsule    priLOSEC    90 capsule    Take 1 capsule (20 mg) by mouth daily    Gastroesophageal reflux disease, esophagitis presence not specified       VENTOLIN  (90 Base) MCG/ACT inhaler   Generic drug:  albuterol     54 g    INHALE 2 PUFFS INTO THE LUNGS EVERY 6 HOURS AS NEEDED FOR SHORTNESS OF BREATH, DIFFICULTY BREATHING OR WHEEZING.    Mild intermittent asthma without complication       warfarin 2.5 MG tablet    COUMADIN    210 tablet    Take 2 - 3 tablets (5mg - 7.5mg) by mouth daily as directed by INR    Transverse sinus thrombosis       wearable insulin delivery device kit     30 each    Insulin delivery device to be changed every 24 hours    Type 2 diabetes mellitus with diabetic nephropathy, with long-term current use of insulin (H)       * Notice:  This list has 6 medication(s) that are the same as other medications prescribed for you. Read the directions carefully, and ask your doctor or other care provider to review them with you.

## 2018-10-26 NOTE — PROGRESS NOTES
SUBJECTIVE/OBJECTIVE:                Nathalie Harp is a 63 year old female coming in for a follow-up visit for Medication Therapy Management.  She was referred to me from Eva Lockhart     Chief Complaint: Follow up from our visit on 2018.  Diabetes follow-up. Hospitalized [facility] -18 for DKA, likely precipitated by pyelonephritis and non-adherece to insulin for at least 5 days. Cerebral venous Thrombosis also found on CTA obtained in ED for altered mental status.    Also needs to find housing by end of next week. Is working with Christina Dutton, our . She asks again today, for the phone numbers to shelters locally and in [place] that may accept pets.    Tobacco: No tobacco use  Alcohol: none    Medication Adherence: Issues found see below.    Diabetes:  Pt currently taking Levemir 30units QAM, Novolog 5-15units with meals (but reports rarely taking doses higher than 10units). I have recently observed her inject Levemir in clinic - no issues noted; injection techinque was correct. Reports she misses Novolog with meals a few times per week. However, I highly question her compliance -- BG inpatient were well within goal on her currently prescribed doses of insulin : 143, 131, 137, 124, 130, 142, 99, 96, 105, 144  Symptoms of low blood sugar? Shaky, shivering with low-normal BG's.  Symptoms of high blood sugar? None reported.   SMBd avg (n16) 239, 30d avg (n31) 211  Date FBG/ 2hours post Lunch/2hours post Dinner /2hours post   10/26 211     10/25 309     10/24 227     10/23 161     10/22   293   10/21 303 487    10/20 165     10/19 212     10/18 72     10/18 226     10/17 296     10/16 398       Lab Results   Component Value Date    A1C 8.7 2018    A1C 14.4 2018    A1C 12.8 2018    A1C 13.8 2017    A1C 12.3 2016     CVA: Following with CP [facility], seen earlier today and adjustment to warfarin made. No s/s excessive bruising, bleeding, blood in the  stool.  Lab Results   Component Value Date    INR 4.0 10/26/2018    INR 3.0 10/19/2018    INR 3.3 10/12/2018    INR 2.0 10/09/2018    INR 1.1 10/05/2018    INR 1.6 10/01/2018    INR 2.2 09/28/2018       Today's Vitals:  /70  Pulse 82  LMP  (LMP Unknown)      ASSESSMENT:              Current medications were reviewed today as discussed above.      Medication Adherence: Needs improvement.    Diabetes: Needs Improvement. Patient is not meeting A1c goal of < 8%, although has improved in the last few months with improved compliance with insulin injections. However, compliance has always been questionable and BG ranges WNL while hospitalized indicates poor compliance while at home. She may benefit from trial of VGo 30 insulin patch, which would deliver 30units of basal insulin throughout the day automatically, and give her the option to inject bolus insulin 2units at a time with meals (up to an additional 36units/day of bolus insulin). She may also benefit from CGM given history of poor diabetes control.    CVA: Needs further monitoring. INR above goal 2-3 today; plan in place with ACC.    PLAN:                  1. Discontinue Levemir and Novolog insulin pens. (Leaving on med list until she obtains the VGo from pharmacy).  2. Start VGo 30 insulin patch with Novolog U100 vial. Reviewed appropriate use of device in detail, with plan to review again after she obtains from the pharmacy. Rx sent to pharmacy.  3.  Rx for freestyle karey sent to pharmacy -this should be available at [facility] mid November.  We will plan to review blood sugar monitoring in detail when she is able to get this from the pharmacy.    I spent 45 minutes with this patient today. All changes were made via collaborative practice agreement with Eva Lockhart. A copy of the visit note was provided to the patient's primary care provider.     Will follow up in 1 month.     The patient was provided a summary of these recommendations as an after  visit summary.    Jovana Loo, Pharm.D., [facility]  Medication Therapy Management Pharmacist  [phone]

## 2018-11-01 ENCOUNTER — TRANSFERRED RECORDS (OUTPATIENT)
Dept: HEALTH INFORMATION MANAGEMENT | Facility: CLINIC | Age: 63
End: 2018-11-01

## 2018-11-02 ENCOUNTER — ANTICOAGULATION THERAPY VISIT (OUTPATIENT)
Dept: NURSING | Facility: CLINIC | Age: 63
End: 2018-11-02
Payer: COMMERCIAL

## 2018-11-02 DIAGNOSIS — I63.50 CEREBRAL ARTERY OCCLUSION WITH CEREBRAL INFARCTION (H): ICD-10-CM

## 2018-11-02 LAB — INR POINT OF CARE: 1.9 (ref 0.86–1.14)

## 2018-11-02 PROCEDURE — 99207 ZZC NO CHARGE NURSE ONLY: CPT

## 2018-11-02 PROCEDURE — 85610 PROTHROMBIN TIME: CPT | Mod: QW

## 2018-11-02 PROCEDURE — 36416 COLLJ CAPILLARY BLOOD SPEC: CPT

## 2018-11-02 NOTE — MR AVS SNAPSHOT
Nathalie Harp   11/2/2018 10:40 AM   Anticoagulation Therapy Visit    Description:  63 year old female   Provider:  RONY ANTI COAG   Department:  Cp Nurse           INR as of 11/2/2018     Today's INR 1.9!      Anticoagulation Summary as of 11/2/2018     INR goal 2.0-3.0   Today's INR 1.9!   Full warfarin instructions 2.5 mg on Mon, Wed, Sat; 5 mg all other days   Next INR check 11/9/2018    Indications   Cerebral artery occlusion with cerebral infarction (H) [I63.50] [I63.50]         Your next Anticoagulation Clinic appointment(s)     Nov 09, 2018 10:20 AM CST   Anticoagulation Visit with CP ANTI COAG   [facility] ([facility]    [address] [address] [phone]              Contact Numbers     [facility]  Please call [phone] or [phone]  to cancel and/or reschedule your appointment.   Please call [phone] with any problems or questions regarding your therapy          November 2018 Details    Sun Mon Tue Wed Thu Fri Sat         1               2      5 mg   See details      3      2.5 mg           4      5 mg         5      2.5 mg         6      5 mg         7      2.5 mg         8      5 mg         9            10                 11               12               13               14               15               16               17                 18               19               20               21               22               23               24                 25               26               27               28               29               30                 Date Details   11/02 This INR check       Date of next INR:  11/9/2018         How to take your warfarin dose     To take:  2.5 mg Take 1 of the 2.5 mg tablets.    To take:  5 mg Take 2 of the 2.5 mg tablets.

## 2018-11-02 NOTE — PROGRESS NOTES
ANTICOAGULATION FOLLOW-UP CLINIC VISIT    Patient Name:  Nathalie Harp  Date:  11/2/2018  Contact Type:  Face to Face    SUBJECTIVE: Continue 1 week rechecks     Patient Findings     Positives No Problem Findings           OBJECTIVE    INR Protime   Date Value Ref Range Status   11/02/2018 1.9 (A) 0.86 - 1.14 Final       ASSESSMENT / PLAN  INR assessment THER    Recheck INR In: 1 WEEK    INR Location Clinic      Anticoagulation Summary as of 11/2/2018     INR goal 2.0-3.0   Today's INR 1.9!   Warfarin maintenance plan 2.5 mg (2.5 mg x 1) on Mon, Wed; 5 mg (2.5 mg x 2) all other days   Full warfarin instructions 2.5 mg on Mon, Wed; 5 mg all other days   Weekly warfarin total 30 mg   Plan last modified Bijal Oswald, RN (11/2/2018)   Next INR check 11/9/2018   Target end date 12/25/2018    Indications   Cerebral artery occlusion with cerebral infarction (H) [I63.50] [I63.50]         Anticoagulation Episode Summary     INR check location     Preferred lab     Send INR reminders to [facility]    Comments       Anticoagulation Care Providers     Provider Role Specialty Phone number    Mally Leung APRN CNP  Nurse Practitioner - Adult Health [phone]            See the Encounter Report to view Anticoagulation Flowsheet and Dosing Calendar (Go to Encounters tab in chart review, and find the Anticoagulation Therapy Visit)    Dosage adjustment made based on physician directed care plan.    Bijal Oswald, SORAIDA

## 2018-11-09 ENCOUNTER — ANTICOAGULATION THERAPY VISIT (OUTPATIENT)
Dept: NURSING | Facility: CLINIC | Age: 63
End: 2018-11-09
Payer: COMMERCIAL

## 2018-11-09 ENCOUNTER — OFFICE VISIT (OUTPATIENT)
Dept: PHARMACY | Facility: CLINIC | Age: 63
End: 2018-11-09
Payer: COMMERCIAL

## 2018-11-09 VITALS — HEART RATE: 68 BPM | SYSTOLIC BLOOD PRESSURE: 136 MMHG | DIASTOLIC BLOOD PRESSURE: 70 MMHG

## 2018-11-09 DIAGNOSIS — I63.50 CEREBRAL ARTERY OCCLUSION WITH CEREBRAL INFARCTION (H): ICD-10-CM

## 2018-11-09 DIAGNOSIS — G63 POLYNEUROPATHY ASSOCIATED WITH UNDERLYING DISEASE (H): Primary | ICD-10-CM

## 2018-11-09 LAB — INR POINT OF CARE: 2.7 (ref 0.86–1.14)

## 2018-11-09 PROCEDURE — 99607 MTMS BY PHARM ADDL 15 MIN: CPT | Performed by: PHARMACIST

## 2018-11-09 PROCEDURE — 36416 COLLJ CAPILLARY BLOOD SPEC: CPT

## 2018-11-09 PROCEDURE — 85610 PROTHROMBIN TIME: CPT | Mod: QW

## 2018-11-09 PROCEDURE — 99207 ZZC NO CHARGE NURSE ONLY: CPT

## 2018-11-09 PROCEDURE — 99606 MTMS BY PHARM EST 15 MIN: CPT | Performed by: PHARMACIST

## 2018-11-09 NOTE — PROGRESS NOTES
"SUBJECTIVE/OBJECTIVE:                Nathalie Harp is a 63 year old female coming in for a follow-up visit for Medication Therapy Management.  She was referred to me from Eva Lockhart     Chief Complaint: Follow up from our visit on 10/26/2018.  Diabetes follow-up.   Currently staying at [facility] on a 30 day pass. Time limit on her Sec 8 has . Looking for more permanent housing.    Tobacco: No tobacco use  Alcohol: none    Medication Adherence: Issues found see below.    Diabetes:  Pt currently taking Levemir 30units QAM, Novolog 5-12units with meals. At last visit I prescribed Vgo 30 insulin patch to be used with Novolog vial, as her compliance with insulin is highly questionable (see recent hospitalization, BG well controlled inpatient). She has not received any Vgo to date. I contacted the pharmacy and was informed this product is not covered under Nathalie's insurance. She is still interested in trying it if possible, so I will need to contact the Vgo Rep. She is not interested in trying a more permanent insulin pump.   Symptoms of low blood sugar? Shaky, shivering with low-normal BG's. None recently.  Symptoms of high blood sugar? None reported.   SMBd avg (n13) 232, 30d avg (n30) 233  Date FBG/ 2hours post Lunch/2hours post    199     195     276     345     271     286     199    10/31 213 156   10/30 242    10/29 223    10/28 226    10/27 186    Meals provided at the [facility]. Breakfast and lunch are \"iffy\" - hard cereals she can't eat and sandwiches with cheese and she is lactose intolerant. Reports dinners have been pretty good \"and they always have dessert\".    Lab Results   Component Value Date    A1C 8.7 2018    A1C 14.4 2018    A1C 12.8 2018    A1C 13.8 2017    A1C 12.3 2016     Neuropathy: Currently taking Gabapentin 100mg QAM (takes this once in a while - doesn't like to feel fatigued/lethargic when) and " "300mg QPM. Feels neuropathy is currently \"a lot better that it was last spring and summer\". Feels symptoms are currently manageable and she can be up and on her feet.    CVA: On warfarin. Following with [facility], seen earlier today. No s/s excessive bruising, bleeding, blood in the stool.  Lab Results   Component Value Date    INR 2.7 11/09/2018    INR 1.9 11/02/2018    INR 4.0 10/26/2018    INR 3.0 10/19/2018    INR 3.3 10/12/2018    INR 2.0 10/09/2018    INR 1.1 10/05/2018    INR 1.6 10/01/2018    INR 2.2 09/28/2018     Today's Vitals:  /70  Pulse 68  LMP  (LMP Unknown) Weight deferred d/t seen in [facility] just prior.      ASSESSMENT:              Current medications were reviewed today as discussed above.      Medication Adherence: Needs improvement.    Diabetes: Needs Improvement. Patient is not meeting A1c goal of < 8%, although has improved in the last few months with retraining on insulin injection technique. Vgo may be a good option for her to help improve compliance, if we can get it covered. In the meantime, she needs an increase to her Levemir dose based on current FBG not at goal.She may also benefit from CGM given history of poor diabetes control; the Freestyle Bernice meter is covered and should be available for her later this month.    Neuropathy: Stable.    CVA: Stable. INR at goal 2-3.    PLAN:                  1. Increase Levemir to 35units QAM.  2. I will reach out to [employer] rep to see if there is a way to coverage through the company.  3. Pt will contact [facility] to check on status of Freestyle Bernice CGM availability. We will plan to review appropriate set up and use at our next visit.    I spent 45 minutes with this patient today. All changes were made via collaborative practice agreement with Eva Lockhart. A copy of the visit note was provided to the patient's primary care provider.     Will follow up in 1 month.     The patient was provided a summary of these " recommendations as an after visit summary.    Jovana Loo, Pharm.D., [facility]  Medication Therapy Management Pharmacist  [phone]

## 2018-11-09 NOTE — MR AVS SNAPSHOT
Nathalie Harp   11/9/2018 10:20 AM   Anticoagulation Therapy Visit    Description:  63 year old female   Provider:  CP ANTI COAG   Department:  Cp Nurse           INR as of 11/9/2018     Today's INR 2.7      Anticoagulation Summary as of 11/9/2018     INR goal 2.0-3.0   Today's INR 2.7   Full warfarin instructions 2.5 mg on Mon, Wed, Sat; 5 mg all other days   Next INR check 11/16/2018    Indications   Cerebral artery occlusion with cerebral infarction (H) [I63.50] [I63.50]         Your next Anticoagulation Clinic appointment(s)     Nov 09, 2018 10:20 AM CST   Anticoagulation Visit with CP ANTI COAG   [facility] ([facility]    [address] [id number] [phone]            Nov 16, 2018 10:20 AM CST   Anticoagulation Visit with CP ANTI COAG   [facility] ([facility])    [address] [id number] [phone]              Contact Numbers     [facility]  Please call [phone] or [phone]  to cancel and/or reschedule your appointment.   Please call [phone] with any problems or questions regarding your therapy          November 2018 Details    Sun Mon Tue Wed Thu Fri Sat         1               2               3                 4               5               6               7               8               9      5 mg   See details      10      2.5 mg           11      5 mg         12      2.5 mg         13      5 mg         14      2.5 mg         15      5 mg         16            17                 18               19               20               21               22               23               24                 25               26               27               28               29               30                 Date Details   11/09 This INR check       Date of next INR:  11/16/2018         How to take your  warfarin dose     To take:  2.5 mg Take 1 of the 2.5 mg tablets.    To take:  5 mg Take 2 of the 2.5 mg tablets.

## 2018-11-09 NOTE — PATIENT INSTRUCTIONS
Recommendations from today's MTM visit:                                                        1. INCREASE Levemir to 35units every morning. Continue taking Novolog as you have been. CHECK your blood sugar anytime youthink it could be low. If you are NOT below 70mg/dL, you are safe -- just take a little break, sit down, drink some water, and let the feeling pass. This is your body adjusting to lower, but NORMAL blood sugar levels.    2. I will contact the o rep to see if there is another way to get this insulin pump covered for you.    3. Call [facility] ([phone]) in a few weeks to set up delivery of your new Freestyle blood sugar meter. They will be able to deliver it to [facility].    Next MTM visit: Friday, December 7th. 10:15am in the lab, 10:30am with Jovana. You do not need to be fasting.    To schedule another MTM appointment, please call the clinic directly or you may call the MTM scheduling line at [phone] or toll-free at [phone].     My Clinical Pharmacist's contact information:                                                      It was a pleasure talking with you today!  Please feel free to contact me with any questions or concerns you have.      Jovana Loo, Pharm.D., [facility]  Medication Therapy Management Pharmacist  [phone]    You may receive a survey about the MTM services you received.  I would appreciate your feedback to help me serve you better in the future. Please fill it out and return it when you can. Your comments will be anonymous.      My healthcare goals:                                                      Diabetes Goals:    Home Monitoring of Blood Sugars:Fasting  mg/dL and 2 hours after a meal less than 180 mg/dL.    Hemoglobin A1C: Less than 8%. Yours is   Lab Results   Component Value Date    A1C 8.7 09/19/2018    A1C 14.4 05/30/2018    A1C 12.8 02/09/2018    A1C 13.8 07/03/2017    A1C 12.3 12/21/2016        Blood Pressure: Less than 140/90mmHg. Yours is /70  Pulse 68  LMP  (LMP Unknown).  BP Readings from Last 3 Encounters:   11/09/18 136/70   10/26/18 134/70   10/06/18 146/72

## 2018-11-09 NOTE — MR AVS SNAPSHOT
After Visit Summary   11/9/2018    Nathalie Harp    MRN: [id number]           Patient Information     Date Of Birth          1955        Visit Information        Provider Department      11/9/2018 10:30 AM Jovana Loo, HÉCTOR [facility] MTM        Today's Diagnoses     Uncontrolled type 2 diabetes mellitus without complication, with long-term current use of insulin (H)          Care Instructions    Recommendations from today's MT visit:                                                        1. INCREASE Levemir to 35units every morning. Continue taking Novolog as you have been. CHECK your blood sugar anytime youthink it could be low. If you are NOT below 70mg/dL, you are safe -- just take a little break, sit down, drink some water, and let the feeling pass. This is your body adjusting to lower, but NORMAL blood sugar levels.    2. I will contact the o rep to see if there is another way to get this insulin pump covered for you.    3. Call [facility] ([phone]) in a few weeks to set up delivery of your new Freestyle blood sugar meter. They will be able to deliver it to [facility].    Next MTM visit: Friday, December 7th. 10:15am in the lab, 10:30am with Jovana. You do not need to be fasting.    To schedule another MTM appointment, please call the clinic directly or you may call the MTM scheduling line at [phone] or toll-free at [phone].     My Clinical Pharmacist's contact information:                                                      It was a pleasure talking with you today!  Please feel free to contact me with any questions or concerns you have.      Jovana Loo, Pharm.D., [facility]  Medication Therapy Management Pharmacist  [phone]    You may receive a survey about the MTM services you received.  I would appreciate your feedback to help me serve you better in the future. Please fill it  out and return it when you can. Your comments will be anonymous.      My healthcare goals:                                                      Diabetes Goals:    Home Monitoring of Blood Sugars:Fasting  mg/dL and 2 hours after a meal less than 180 mg/dL.    Hemoglobin A1C: Less than 8%. Yours is   Lab Results   Component Value Date    A1C 8.7 09/19/2018    A1C 14.4 05/30/2018    A1C 12.8 02/09/2018    A1C 13.8 07/03/2017    A1C 12.3 12/21/2016       Blood Pressure: Less than 140/90mmHg. Yours is /70  Pulse 68  LMP  (LMP Unknown).  BP Readings from Last 3 Encounters:   11/09/18 136/70   10/26/18 134/70   10/06/18 146/72             Follow-ups after your visit        Your next 10 appointments already scheduled     Nov 12, 2018 10:20 AM CST   SHORT with Sapna Anand PA-C   [facility]    [address] [place] [id number]   [phone]            Nov 16, 2018 10:20 AM CST   Anticoagulation Visit with CP ANTI COAG   [facility]    [address] [place] [address]   [phone]            Dec 07, 2018 10:15 AM CST   LAB with CP LAB   [facility]    [address] [place] [address]   [phone]           Please do not eat 10-12 hours before your appointment if you are coming in fasting for labs on lipids, cholesterol, or glucose (sugar). This does not apply to pregnant women. Water, hot tea and black coffee (with nothing added) are okay. Do not drink other fluids, diet soda or chew gum.            Dec 07, 2018 10:30 AM CST   Office Visit with Jovana Loo RPH   [facility] ([facility])    [address] [place] [address]   [phone]           Bring a current  list of meds and any records pertaining to this visit. For Physicals, please bring immunization records and any forms needing to be filled out. Please arrive 10 minutes early to complete paperwork.              Who to contact     If you have questions or need follow up information about today's clinic visit or your schedule please contact [facility] MTM directly at [phone].  Normal or non-critical lab and imaging results will be communicated to you by MyChart, letter or phone within 4 business days after the clinic has received the results. If you do not hear from us within 7 days, please contact the clinic through MyChart or phone. If you have a critical or abnormal lab result, we will notify you by phone as soon as possible.  Submit refill requests through [employer] or call your pharmacy and they will forward the refill request to us. Please allow 3 business days for your refill to be completed.          Additional Information About Your Visit        Care EveryWhere ID     This is your Care EveryWhere ID. This could be used by other organizations to access your [facility] medical records  [id number]        Your Vitals Were     Pulse Last Period                68 (LMP Unknown)           Blood Pressure from Last 3 Encounters:   11/09/18 136/70   10/26/18 134/70   10/06/18 146/72    Weight from Last 3 Encounters:   10/01/18 113 lb (51.3 kg)   09/20/18 111 lb 12.8 oz (50.7 kg)   09/19/18 111 lb 12.8 oz (50.7 kg)              Today, you had the following     No orders found for display         Today's Medication Changes          These changes are accurate as of 11/9/18 11:07 AM.  If you have any questions, ask your nurse or doctor.               These medicines have changed or have updated prescriptions.        Dose/Directions    insulin detemir 100 UNIT/ML injection   Commonly known as:  LEVEMIR FLEXPEN/FLEXTOUCH   This may have changed:  additional instructions   Used for:  Uncontrolled type  2 diabetes mellitus without complication, with long-term current use of insulin (H)   Changed by:  Jovana Loo RPH        Inject 35units under the skin every morning   Quantity:  15 mL   Refills:  11       NovoLOG FLEXPEN 100 UNIT/ML injection   This may have changed:  Another medication with the same name was removed. Continue taking this medication, and follow the directions you see here.   Used for:  Uncontrolled type 2 diabetes mellitus without complication, with long-term current use of insulin (H)   Generic drug:  insulin aspart   Changed by:  Jovana Loo RPH        Dose:  12-15 Units   Inject 12-15 Units Subcutaneous 3 times daily (with meals) Patient additionally taking 4 units with snack.   Quantity:  15 mL   Refills:  3         Stop taking these medicines if you haven't already. Please contact your care team if you have questions.     wearable insulin delivery device kit   Stopped by:  Jovana Loo RPH                Where to get your medicines      These medications were sent to [facility] [address]  [address] [facility] [address]     Phone:  [phone]     insulin detemir 100 UNIT/ML injection                Primary Care Provider Office Phone # Fax #    Eva Lockhart [phone]       [address] [place], [place]. \[facility].\"" [id number]        Goals        General    Financial Wellbeing (pt-stated)     Notes - Note created  4/27/2018 11:53 AM by Peter Woo, RN    Goal Statement: I will work with the care coordinators in applying for the public assistance that I may qualify for in the next 1-2 months.  Measure of Success: applications have been filled out  Supportive Steps to Achieve: rationale explained.  Barriers: none  Strengths: motivated  Date to Achieve By: 1-2 months        Healthy Eating (pt-stated)     Notes - Note created  4/27/2018 11:56 AM by Peter Woo, RN     Goal Statement: I will work on planning and purchasing nutritious meals that will not raise my blood sugar levels, as evidenced by lower BG readings and lower A1C level.  Measure of Success: Lower BG and A1C levels  Supportive Steps to Achieve: rationale given to the patient  Barriers: none  Strengths: motivated  Date to Achieve By: ongoing        I will call crisis when in crisis  (pt-stated)     Notes - Note created  10/23/2018 11:01 AM by Christina Dutton BSW    Goal Statement: I will call crisis when in crisis   Measure of Success: Call crisis when in crisis   Supportive Steps to Achieve: Call crisis when in crisis   Barriers: Patient is hesitant to call   Strengths: Patient stated she will consider calling crisis or going to ED   Date to Achieve By: ongoing   Patient expressed understanding of goal: Yes        I will check my blood sugars four times per day (pt-stated)     Notes - Note created  3/10/2014 10:41 AM by Priti Orozco, RN    As of today's date 3/10/2014 goal is met at 26 - 50%.   Goal Status:  Active      I will continue to work with Diabetic Educator (pt-stated)     Notes - Note created  3/10/2014 10:42 AM by Priti Orozco, RN    As of today's date 3/10/2014 goal is met at 26 - 50%.   Goal Status:  Active      I will take insulins as prescribed, including sliding scale novolog (pt-stated)     Notes - Note created  3/10/2014 10:43 AM by Priti Orozco, RN    As of today's date 3/10/2014 goal is met at 51 - 75%.   Goal Status:  Active      Transportation (pt-stated)     Notes - Note created  4/27/2018 11:50 AM by Peter Woo, RN    Goal Statement: I will apply for [employer] in the next 1-2 months.  Measure of Success: application complete  Supportive Steps to Achieve: rationale of less confusion for the patient  Barriers: none  Strengths: motivated  Date to Achieve By: 1-2 months          Equal Access to Services     ADI LOPEZ : Narcisa Cano, daniel anderson, liset bran  sandra agarwalpham anthonygrace raviaan ah. [facility] [phone].    ATENCIÓN: Si habla lauro, tiene a tompkins disposición servicios gratuitos de asistencia lingüística. Tatiana al [phone].    We comply with applicable federal civil rights laws and [place] laws. We do not discriminate on the basis of race, color, national origin, age, disability, sex, sexual orientation, or gender identity.            Thank you!     Thank you for choosing [facility]  for your care. Our goal is always to provide you with excellent care. Hearing back from our patients is one way we can continue to improve our services. Please take a few minutes to complete the written survey that you may receive in the mail after your visit with us. Thank you!             Your Updated Medication List - Protect others around you: Learn how to safely use, store and throw away your medicines at www.disposemymeds.org.          This list is accurate as of 11/9/18 11:07 AM.  Always use your most recent med list.                   Brand Name Dispense Instructions for use Diagnosis    ASPIRIN NOT PRESCRIBED    INTENTIONAL    0 each    Please choose reason not prescribed, below    Diabetic ketoacidosis without coma associated with type 2 diabetes mellitus (H)       atorvastatin 40 MG tablet    LIPITOR    90 tablet    Take 1 tablet (40 mg) by mouth daily    Hyperlipidemia LDL goal <100       blood glucose lancets standard    no brand specified    100 each    Use to test blood sugar 3 times daily or as directed.        blood glucose monitoring meter device kit    no brand specified    1 kit    Use to test blood sugar 3 times daily or as directed    Type 2 diabetes mellitus without complication, with long-term current use of insulin (H)       * blood glucose monitoring test strip    ONETOUCH ULTRA    300 each    TEST BLOOD SUGARS 3 TIMES DAILY.    Type 2 diabetes mellitus without complication, with long-term current use of  insulin (H)       * blood glucose monitoring test strip    FREESTYLE PRECISION EMILIO TEST    100 strip    Use to test blood sugar 4 times daily or as directed.    Type 2 diabetes mellitus with diabetic nephropathy, with long-term current use of insulin (H)       EPINEPHrine 0.3 MG/0.3ML injection 2-pack    EPIPEN/ADRENACLICK/or ANY BX GENERIC EQUIV    0.3 mL    INJECT ONE DEVICE INTO THE MUSCLE AS NEEDED FOR ALLERGIC REACTION    Allergic to shellfish       fexofenadine 180 MG tablet    ALLEGRA    30 tablet    Take 1 tablet (180 mg) by mouth daily    Chronic seasonal allergic rhinitis, unspecified trigger       fluticasone 50 MCG/ACT spray    FLONASE    3 Bottle    Spray 1-2 sprays into both nostrils daily    Chronic seasonal allergic rhinitis, unspecified trigger       FREESTYLE LALO 14 DAY SENSOR Misc     2 each    1 Device every 14 days    Type 2 diabetes mellitus with diabetic nephropathy, with long-term current use of insulin (H)       FREESTYLE LALO READER Ana     1 Device    1 Device continuous    Type 2 diabetes mellitus with diabetic nephropathy, with long-term current use of insulin (H)       * gabapentin 300 MG capsule    NEURONTIN    90 capsule    Take 1 tablet (300 mg) at night.    Type 2 diabetes mellitus with diabetic neuropathy, with long-term current use of insulin (H)       * gabapentin 100 MG capsule    NEURONTIN    90 capsule    Take 1 capsule (100 mg) by mouth every morning    Type 2 diabetes mellitus with diabetic nephropathy, with long-term current use of insulin (H)       glucosamine chondroitin 1500 complex Caps      Take 1 capsule by mouth daily        insulin detemir 100 UNIT/ML injection    LEVEMIR FLEXPEN/FLEXTOUCH    15 mL    Inject 35units under the skin every morning    Uncontrolled type 2 diabetes mellitus without complication, with long-term current use of insulin (H)       insulin pen needle 31G X 5 MM    B-D U/F    200 each    Use 4 daily as directed. This replaces the auto-cover  pen needle.    Uncontrolled type 2 diabetes mellitus without complication, with long-term current use of insulin (H)       losartan-hydrochlorothiazide 100-25 MG per tablet    HYZAAR    90 tablet    Take 1 tablet by mouth every morning    Type 2 diabetes mellitus with diabetic nephropathy, with long-term current use of insulin (H)       NovoLOG FLEXPEN 100 UNIT/ML injection   Generic drug:  insulin aspart     15 mL    Inject 12-15 Units Subcutaneous 3 times daily (with meals) Patient additionally taking 4 units with snack.    Uncontrolled type 2 diabetes mellitus without complication, with long-term current use of insulin (H)       omeprazole 20 MG CR capsule    priLOSEC    90 capsule    Take 1 capsule (20 mg) by mouth daily    Gastroesophageal reflux disease, esophagitis presence not specified       VENTOLIN  (90 Base) MCG/ACT inhaler   Generic drug:  albuterol     54 g    INHALE 2 PUFFS INTO THE LUNGS EVERY 6 HOURS AS NEEDED FOR SHORTNESS OF BREATH, DIFFICULTY BREATHING OR WHEEZING.    Mild intermittent asthma without complication       warfarin 2.5 MG tablet    COUMADIN    210 tablet    Take 2 - 3 tablets (5mg - 7.5mg) by mouth daily as directed by INR    Transverse sinus thrombosis       * Notice:  This list has 4 medication(s) that are the same as other medications prescribed for you. Read the directions carefully, and ask your doctor or other care provider to review them with you.

## 2018-11-09 NOTE — PROGRESS NOTES
ANTICOAGULATION FOLLOW-UP CLINIC VISIT    Patient Name:  Nathalie Harp  Date:  11/9/2018  Contact Type:  Face to Face    SUBJECTIVE: Plan same warfarin dose and 1 week recheck.     Patient Findings     Positives No Problem Findings           OBJECTIVE    INR Protime   Date Value Ref Range Status   11/09/2018 2.7 (A) 0.86 - 1.14 Final       ASSESSMENT / PLAN  INR assessment THER    Recheck INR In: 1 WEEK    INR Location Clinic      Anticoagulation Summary as of 11/9/2018     INR goal 2.0-3.0   Today's INR 2.7   Warfarin maintenance plan 2.5 mg (2.5 mg x 1) on Mon, Wed, Sat; 5 mg (2.5 mg x 2) all other days   Full warfarin instructions 2.5 mg on Mon, Wed, Sat; 5 mg all other days   Weekly warfarin total 27.5 mg   No change documented Bijal Oswald RN   Plan last modified Bijal Oswald RN (11/2/2018)   Next INR check 11/16/2018   Target end date 12/25/2018    Indications   Cerebral artery occlusion with cerebral infarction (H) [I63.50] [I63.50]         Anticoagulation Episode Summary     INR check location     Preferred lab     Send INR reminders to  [facility]    Comments       Anticoagulation Care Providers     Provider Role Specialty Phone number    Mally Leung APRN CNP  Nurse Practitioner - [facility] [phone]            See the Encounter Report to view Anticoagulation Flowsheet and Dosing Calendar (Go to Encounters tab in chart review, and find the Anticoagulation Therapy Visit)    Dosage adjustment made based on physician directed care plan.    Bijal Oswald RN

## 2018-11-12 ENCOUNTER — OFFICE VISIT (OUTPATIENT)
Dept: FAMILY MEDICINE | Facility: CLINIC | Age: 63
End: 2018-11-12
Payer: COMMERCIAL

## 2018-11-12 VITALS
TEMPERATURE: 98.2 F | WEIGHT: 118 LBS | SYSTOLIC BLOOD PRESSURE: 162 MMHG | BODY MASS INDEX: 26.46 KG/M2 | HEART RATE: 74 BPM | DIASTOLIC BLOOD PRESSURE: 89 MMHG

## 2018-11-12 DIAGNOSIS — R82.90 NONSPECIFIC FINDING ON EXAMINATION OF URINE: ICD-10-CM

## 2018-11-12 DIAGNOSIS — N89.8 VAGINAL IRRITATION: Primary | ICD-10-CM

## 2018-11-12 DIAGNOSIS — R04.0 EPISTAXIS: ICD-10-CM

## 2018-11-12 DIAGNOSIS — N30.00 ACUTE CYSTITIS WITHOUT HEMATURIA: ICD-10-CM

## 2018-11-12 LAB
ALBUMIN UR-MCNC: 30 MG/DL
APPEARANCE UR: CLEAR
BACTERIA #/AREA URNS HPF: ABNORMAL /HPF
BILIRUB UR QL STRIP: NEGATIVE
COLOR UR AUTO: YELLOW
GLUCOSE UR STRIP-MCNC: >=1000 MG/DL
HGB UR QL STRIP: ABNORMAL
KETONES UR STRIP-MCNC: NEGATIVE MG/DL
LEUKOCYTE ESTERASE UR QL STRIP: NEGATIVE
NITRATE UR QL: NEGATIVE
NON-SQ EPI CELLS #/AREA URNS LPF: ABNORMAL /LPF
PH UR STRIP: 6 PH (ref 5–7)
RBC #/AREA URNS AUTO: ABNORMAL /HPF
SOURCE: ABNORMAL
SP GR UR STRIP: 1.01 (ref 1–1.03)
SPECIMEN SOURCE: NORMAL
UROBILINOGEN UR STRIP-ACNC: 0.2 EU/DL (ref 0.2–1)
WBC #/AREA URNS AUTO: ABNORMAL /HPF
WBC CLUMPS #/AREA URNS HPF: PRESENT /HPF
WET PREP SPEC: NORMAL

## 2018-11-12 PROCEDURE — 81001 URINALYSIS AUTO W/SCOPE: CPT | Performed by: PHYSICIAN ASSISTANT

## 2018-11-12 PROCEDURE — 87086 URINE CULTURE/COLONY COUNT: CPT | Performed by: PHYSICIAN ASSISTANT

## 2018-11-12 PROCEDURE — 87186 SC STD MICRODIL/AGAR DIL: CPT | Performed by: PHYSICIAN ASSISTANT

## 2018-11-12 PROCEDURE — 87210 SMEAR WET MOUNT SALINE/INK: CPT | Performed by: PHYSICIAN ASSISTANT

## 2018-11-12 PROCEDURE — 99214 OFFICE O/P EST MOD 30 MIN: CPT | Performed by: PHYSICIAN ASSISTANT

## 2018-11-12 PROCEDURE — 87088 URINE BACTERIA CULTURE: CPT | Performed by: PHYSICIAN ASSISTANT

## 2018-11-12 RX ORDER — ECHINACEA PURPUREA EXTRACT 125 MG
1 TABLET ORAL DAILY PRN
Qty: 1 BOTTLE | Refills: 1 | Status: SHIPPED | OUTPATIENT
Start: 2018-11-12 | End: 2020-03-27

## 2018-11-12 RX ORDER — CIPROFLOXACIN 500 MG/1
500 TABLET, FILM COATED ORAL 2 TIMES DAILY
Qty: 10 TABLET | Refills: 0 | Status: SHIPPED | OUTPATIENT
Start: 2018-11-12 | End: 2019-02-15

## 2018-11-12 NOTE — PROGRESS NOTES
"  SUBJECTIVE:   Nathalie Harp is a 63 year old female who presents to clinic today for the following health issues:      Vaginal Symptoms  Onset: x2 weeks    Description:  Vaginal Discharge: none   Itching (Pruritis): YES  Burning sensation:  no   Odor: no     Accompanying Signs & Symptoms:  Pain with Urination: YES- sometimes yes sometimes no  Abdominal Pain: no   Fever: no     History:   Sexually active: no   New Partner: no   Possibility of Pregnancy:  No    Precipitating factors:   Recent Antibiotic Use: YES- August/September     Alleviating factors:      Therapies Tried and outcome: OTC cold medication    Has an itching, tingle and irritation. It is in the vagina. Has not seen seen a discharge.   Has been happening for 2 weeks, unchanged.   No new sexual partners.   No bleeding.    Having abdominal pain. Under a lot of stress. At a shelter with her daughter now. She notes that they aren't helping her with obtaining housing.   Patient has not had her BP meds in 4 days or so. She notes she 'thinks\" it is in car. She is taking her levemir daily, but only using the Novolog when she eats.      Patient with nose bleeds on warfarin. None are for extended durations.     Problem list and histories reviewed & adjusted, as indicated.  Additional history: as documented    Patient Active Problem List   Diagnosis     Esophageal reflux     Irritable bowel syndrome     Female stress incontinence     Arthritis of knee, right     Disorder of bursae and tendons in shoulder region     Degeneration of thoracic or thoracolumbar intervertebral disc     Degeneration of cervical intervertebral disc     Other hammer toe (acquired)     Allergic rhinitis due to pollen     Menopausal symptoms     Need for SBE (subacute bacterial endocarditis) prophylaxis     Preventive measure     Hyperlipidemia LDL goal <100     Health Care Home     Anemia     Dizziness     Hammer toe     Microalbuminuria     ACP (advance care planning)     " Allergic to shellfish     Falls frequently     Balance problems     CKD (chronic kidney disease) stage 2, GFR 60-89 ml/min     Type 2 diabetes mellitus with diabetic nephropathy (H)     Diarrhea     Iron deficiency anemia, unspecified iron deficiency anemia type     Right axillary hidradenitis     Uncontrolled type 2 diabetes mellitus without complication, with long-term current use of insulin (H)     Mild intermittent asthma without complication     Cerebral artery occlusion with cerebral infarction (H) [I63.50]     Past Surgical History:   Procedure Laterality Date     HC EXCIS PRIMARY GANGLION WRIST  1985 and 1987    right wrist     KNEE SURGERY  2008 approx    arthroscopic; Dr. Rivera       Social History   Substance Use Topics     Smoking status: Never Smoker     Smokeless tobacco: Never Used     Alcohol use Yes      Comment: rarely     Family History   Problem Relation Age of Onset     Cerebrovascular Disease Maternal Grandmother      Arthritis Maternal Grandmother      Asthma Mother      GASTROINTESTINAL DISEASE Mother      IBS     Alcohol/Drug Mother      Depression Mother      Neurologic Disorder Mother      migraines     GASTROINTESTINAL DISEASE Father      IBS     Diabetes Father      Alcohol/Drug Father      Neurologic Disorder Father      migraines     Obesity Father      GASTROINTESTINAL DISEASE Maternal Grandfather      Ulcers     Alcohol/Drug Maternal Grandfather      Diabetes Paternal Grandmother      Alcohol/Drug Paternal Grandmother      Arthritis Paternal Grandmother      Obesity Paternal Grandmother      Hypertension Sister      Hypertension Brother      Circulatory Sister      Asthma Sister      Asthma Sister      Asthma Brother      Thyroid Disease Sister      Thyroid Disease Sister      Obesity Sister            Reviewed and updated as needed this visit by clinical staff  Tobacco  Allergies  Meds  Problems  Med Hx  Surg Hx  Fam Hx  Soc Hx        Reviewed and updated as needed this  visit by Provider  Allergies  Meds  Problems         ROS:  Constitutional, HEENT, cardiovascular, pulmonary, gi and gu systems are negative, except as otherwise noted.    OBJECTIVE:     /89 (BP Location: Left arm, Patient Position: Chair, Cuff Size: Adult Regular)  Pulse 74  Temp 98.2  F (36.8  C) (Oral)  Wt 118 lb (53.5 kg)  LMP  (LMP Unknown)  Breastfeeding? No  BMI 26.46 kg/m2  Body mass index is 26.46 kg/(m^2).  GENERAL: healthy, alert and no distress  ABDOMEN: soft, nontender, no hepatosplenomegaly, no masses and bowel sounds normal  BACK: no CVA tenderness, no paralumbar tenderness    Diagnostic Test Results:  Results for orders placed or performed in visit on 11/12/18   UA with Microscopic reflex to Culture   Result Value Ref Range    Color Urine Yellow     Appearance Urine Clear     Glucose Urine >=1000 (A) NEG^Negative mg/dL    Bilirubin Urine Negative NEG^Negative    Ketones Urine Negative NEG^Negative mg/dL    Specific Gravity Urine 1.015 1.003 - 1.035    pH Urine 6.0 5.0 - 7.0 pH    Protein Albumin Urine 30 (A) NEG^Negative mg/dL    Urobilinogen Urine 0.2 0.2 - 1.0 EU/dL    Nitrite Urine Negative NEG^Negative    Blood Urine Small (A) NEG^Negative    Leukocyte Esterase Urine Negative NEG^Negative    Source Midstream Urine     WBC Urine  (A) OTO5^0 - 5 /HPF    RBC Urine O - 2 OTO2^O - 2 /HPF    WBC Clumps Present (A) NEG^Negative /HPF    Squamous Epithelial /LPF Urine Few FEW^Few /LPF    Bacteria Urine Few (A) NEG^Negative /HPF   Wet prep   Result Value Ref Range    Specimen Description Vagina     Wet Prep No Trichomonas seen     Wet Prep No clue cells seen     Wet Prep No yeast seen           ASSESSMENT/PLAN:       ICD-10-CM    1. Vaginal irritation N89.8 UA with Microscopic reflex to Culture     Wet prep   2. Nonspecific finding on examination of urine R82.90 Urine Culture Aerobic Bacterial   3. Acute cystitis without hematuria N30.00 ciprofloxacin (CIPRO) 500 MG tablet   4.  Epistaxis R04.0 sodium chloride (OCEAN) 0.65 % nasal spray   Will treat for UTI with cipro due to patient's extensive allergies. Discussed patient with INR RN and she recommended decreasing warfarin to 2.5mg daily until seen again. Patient advised of this.   Await urine culture.   Patient should continue to work with MTM on diabetes and SW on her housing options.   Nasal saline prn nose bleeds.       Sapna Anand PA-C  [facility]

## 2018-11-12 NOTE — MR AVS SNAPSHOT
After Visit Summary   11/12/2018    Nathalie Harp    MRN: [id number]           Patient Information     Date Of Birth          1955        Visit Information        Provider Department      11/12/2018 10:20 AM Sapna Anand PA-C [facility]        Today's Diagnoses     Vaginal irritation    -  1    Nonspecific finding on examination of urine        Acute cystitis without hematuria        Epistaxis           Follow-ups after your visit        Your next 10 appointments already scheduled     Nov 16, 2018 10:20 AM CST   Anticoagulation Visit with CP ANTI COAG   [facility] ([facility]    [address] [place] [id number]   [phone]            Dec 07, 2018 10:15 AM CST   LAB with CP LAB   [facility] ([facility])    [address] [place] [id number] [phone]           Please do not eat 10-12 hours before your appointment if you are coming in fasting for labs on lipids, cholesterol, or glucose (sugar). This does not apply to pregnant women. Water, hot tea and black coffee (with nothing added) are okay. Do not drink other fluids, diet soda or chew gum.            Dec 07, 2018 10:30 AM CST   Office Visit with Jovana Loo RPH   [facility] [place] ([facility])    [address] [place] [id number] [phone]           Bring a current list of meds and any records pertaining to this visit. For Physicals, please bring immunization records and any forms needing to be filled out. Please arrive 10 minutes early to complete paperwork.              Who to contact     If you have questions or need follow up information about today's clinic visit or your schedule please contact [facility] directly at [phone].  Normal or  non-critical lab and imaging results will be communicated to you by MyChart, letter or phone within 4 business days after the clinic has received the results. If you do not hear from us within 7 days, please contact the clinic through MyChart or phone. If you have a critical or abnormal lab result, we will notify you by phone as soon as possible.  Submit refill requests through friendfund or call your pharmacy and they will forward the refill request to us. Please allow 3 business days for your refill to be completed.          Additional Information About Your Visit        Care EveryWhere ID     This is your Care EveryWhere ID. This could be used by other organizations to access your [facility] medical records  [id number]        Your Vitals Were     Pulse Temperature Last Period Breastfeeding? BMI (Body Mass Index)       74 98.2  F (36.8  C) (Oral) (LMP Unknown) No 26.46 kg/m2        Blood Pressure from Last 3 Encounters:   11/12/18 162/89   11/09/18 136/70   10/26/18 134/70    Weight from Last 3 Encounters:   11/12/18 118 lb (53.5 kg)   10/01/18 113 lb (51.3 kg)   09/20/18 111 lb 12.8 oz (50.7 kg)              We Performed the Following     UA with Microscopic reflex to Culture     Urine Culture Aerobic Bacterial     Wet prep          Today's Medication Changes          These changes are accurate as of 11/12/18 11:45 AM.  If you have any questions, ask your nurse or doctor.               Start taking these medicines.        Dose/Directions    ciprofloxacin 500 MG tablet   Commonly known as:  CIPRO   Used for:  Acute cystitis without hematuria   Started by:  Sapna Anand PA-C        Dose:  500 mg   Take 1 tablet (500 mg) by mouth 2 times daily for 5 days   Quantity:  10 tablet   Refills:  0       sodium chloride 0.65 % nasal spray   Commonly known as:  OCEAN   Used for:  Epistaxis   Started by:  Sapna Anand PA-C        Dose:  1 spray   Spray 1 spray into both nostrils daily as needed for congestion   Quantity:   1 Bottle   Refills:  1            Where to get your medicines      These medications were sent to [facility] - [place], [place] - [address]  [address], [facility] [address]     Phone:  [phone]     ciprofloxacin 500 MG tablet    sodium chloride 0.65 % nasal spray                Primary Care Provider Office Phone # Fax #    Eva Lockhart [phone]       [address]  [facility] [address]        Goals        General    Financial Wellbeing (pt-stated)     Notes - Note created  4/27/2018 11:53 AM by Peter Woo, RN    Goal Statement: I will work with the care coordinators in applying for the public assistance that I may qualify for in the next 1-2 months.  Measure of Success: applications have been filled out  Supportive Steps to Achieve: rationale explained.  Barriers: none  Strengths: motivated  Date to Achieve By: 1-2 months        Healthy Eating (pt-stated)     Notes - Note created  4/27/2018 11:56 AM by Peter Woo, RN    Goal Statement: I will work on planning and purchasing nutritious meals that will not raise my blood sugar levels, as evidenced by lower BG readings and lower A1C level.  Measure of Success: Lower BG and A1C levels  Supportive Steps to Achieve: rationale given to the patient  Barriers: none  Strengths: motivated  Date to Achieve By: ongoing        I will call crisis when in crisis  (pt-stated)     Notes - Note created  10/23/2018 11:01 AM by Christina Dutton BSW    Goal Statement: I will call crisis when in crisis   Measure of Success: Call crisis when in crisis   Supportive Steps to Achieve: Call crisis when in crisis   Barriers: Patient is hesitant to call   Strengths: Patient stated she will consider calling crisis or going to ED   Date to Achieve By: ongoing   Patient expressed understanding of goal: Yes        I will check my blood sugars four times per day (pt-stated)     Notes - Note created   3/10/2014 10:41 AM by Priti Orozco RN    As of today's date 3/10/2014 goal is met at 26 - 50%.   Goal Status:  Active      I will continue to work with Diabetic Educator (pt-stated)     Notes - Note created  3/10/2014 10:42 AM by Priti Orozco RN    As of today's date 3/10/2014 goal is met at 26 - 50%.   Goal Status:  Active      I will take insulins as prescribed, including sliding scale novolog (pt-stated)     Notes - Note created  3/10/2014 10:43 AM by Priti Orozco RN    As of today's date 3/10/2014 goal is met at 51 - 75%.   Goal Status:  Active      Transportation (pt-stated)     Notes - Note created  4/27/2018 11:50 AM by Peter Woo RN    Goal Statement: I will apply for [employer] in the next 1-2 months.  Measure of Success: application complete  Supportive Steps to Achieve: rationale of less confusion for the patient  Barriers: none  Strengths: motivated  Date to Achieve By: 1-2 months          Equal Access to Services     [facility] AH: Hadii tracey ku hadasho Soomaali, waaxda luqadaha, qaybta kaalmada adeegyada, sandra charles . So [facility] [phone].    ATENCIÓN: Si habla español, tiene a tompkins disposición servicios gratuitos de asistencia lingüística. Llame al [phone].    We comply with applicable federal civil rights laws and [place] laws. We do not discriminate on the basis of race, color, national origin, age, disability, sex, sexual orientation, or gender identity.            Thank you!     Thank you for choosing [facility]  for your care. Our goal is always to provide you with excellent care. Hearing back from our patients is one way we can continue to improve our services. Please take a few minutes to complete the written survey that you may receive in the mail after your visit with us. Thank you!             Your Updated Medication List - Protect others around you: Learn how to safely use, store and throw away your medicines at  www.disposemymeds.org.          This list is accurate as of 11/12/18 11:45 AM.  Always use your most recent med list.                   Brand Name Dispense Instructions for use Diagnosis    ASPIRIN NOT PRESCRIBED    INTENTIONAL    0 each    Please choose reason not prescribed, below    Diabetic ketoacidosis without coma associated with type 2 diabetes mellitus (H)       atorvastatin 40 MG tablet    LIPITOR    90 tablet    Take 1 tablet (40 mg) by mouth daily    Hyperlipidemia LDL goal <100       blood glucose lancets standard    no brand specified    100 each    Use to test blood sugar 3 times daily or as directed.        blood glucose monitoring meter device kit    no brand specified    1 kit    Use to test blood sugar 3 times daily or as directed    Type 2 diabetes mellitus without complication, with long-term current use of insulin (H)       * blood glucose monitoring test strip    ONETOUCH ULTRA    300 each    TEST BLOOD SUGARS 3 TIMES DAILY.    Type 2 diabetes mellitus without complication, with long-term current use of insulin (H)       * blood glucose monitoring test strip    FREESTYLE PRECISION EMILIO TEST    100 strip    Use to test blood sugar 4 times daily or as directed.    Type 2 diabetes mellitus with diabetic nephropathy, with long-term current use of insulin (H)       ciprofloxacin 500 MG tablet    CIPRO    10 tablet    Take 1 tablet (500 mg) by mouth 2 times daily for 5 days    Acute cystitis without hematuria       EPINEPHrine 0.3 MG/0.3ML injection 2-pack    EPIPEN/ADRENACLICK/or ANY BX GENERIC EQUIV    0.3 mL    INJECT ONE DEVICE INTO THE MUSCLE AS NEEDED FOR ALLERGIC REACTION    Allergic to shellfish       fexofenadine 180 MG tablet    ALLEGRA    30 tablet    Take 1 tablet (180 mg) by mouth daily    Chronic seasonal allergic rhinitis, unspecified trigger       fluticasone 50 MCG/ACT spray    FLONASE    3 Bottle    Spray 1-2 sprays into both nostrils daily    Chronic seasonal allergic rhinitis,  unspecified trigger       FREESTYLE LALO 14 DAY SENSOR Misc     2 each    1 Device every 14 days    Type 2 diabetes mellitus with diabetic nephropathy, with long-term current use of insulin (H)       FREESTYLE LALO READER Ana     1 Device    1 Device continuous    Type 2 diabetes mellitus with diabetic nephropathy, with long-term current use of insulin (H)       * gabapentin 300 MG capsule    NEURONTIN    90 capsule    Take 1 tablet (300 mg) at night.    Type 2 diabetes mellitus with diabetic neuropathy, with long-term current use of insulin (H)       * gabapentin 100 MG capsule    NEURONTIN    90 capsule    Take 1 capsule (100 mg) by mouth every morning    Type 2 diabetes mellitus with diabetic nephropathy, with long-term current use of insulin (H)       glucosamine chondroitin 1500 complex Caps      Take 1 capsule by mouth daily        insulin detemir 100 UNIT/ML injection    LEVEMIR FLEXPEN/FLEXTOUCH    15 mL    Inject 35units under the skin every morning    Uncontrolled type 2 diabetes mellitus without complication, with long-term current use of insulin (H)       insulin pen needle 31G X 5 MM    B-D U/F    200 each    Use 4 daily as directed. This replaces the auto-cover pen needle.    Uncontrolled type 2 diabetes mellitus without complication, with long-term current use of insulin (H)       losartan-hydrochlorothiazide 100-25 MG per tablet    HYZAAR    90 tablet    Take 1 tablet by mouth every morning    Type 2 diabetes mellitus with diabetic nephropathy, with long-term current use of insulin (H)       NovoLOG FLEXPEN 100 UNIT/ML injection   Generic drug:  insulin aspart     15 mL    Inject 12-15 Units Subcutaneous 3 times daily (with meals) Patient additionally taking 4 units with snack.    Uncontrolled type 2 diabetes mellitus without complication, with long-term current use of insulin (H)       omeprazole 20 MG CR capsule    priLOSEC    90 capsule    Take 1 capsule (20 mg) by mouth daily     Gastroesophageal reflux disease, esophagitis presence not specified       sodium chloride 0.65 % nasal spray    OCEAN    1 Bottle    Spray 1 spray into both nostrils daily as needed for congestion    Epistaxis       VENTOLIN  (90 Base) MCG/ACT inhaler   Generic drug:  albuterol     54 g    INHALE 2 PUFFS INTO THE LUNGS EVERY 6 HOURS AS NEEDED FOR SHORTNESS OF BREATH, DIFFICULTY BREATHING OR WHEEZING.    Mild intermittent asthma without complication       warfarin 2.5 MG tablet    COUMADIN    210 tablet    Take 2 - 3 tablets (5mg - 7.5mg) by mouth daily as directed by INR    Transverse sinus thrombosis       * Notice:  This list has 4 medication(s) that are the same as other medications prescribed for you. Read the directions carefully, and ask your doctor or other care provider to review them with you.

## 2018-11-14 LAB
BACTERIA SPEC CULT: ABNORMAL
BACTERIA SPEC CULT: ABNORMAL
SPECIMEN SOURCE: ABNORMAL

## 2018-11-16 ENCOUNTER — ANTICOAGULATION THERAPY VISIT (OUTPATIENT)
Dept: NURSING | Facility: CLINIC | Age: 63
End: 2018-11-16
Payer: COMMERCIAL

## 2018-11-16 ENCOUNTER — PATIENT OUTREACH (OUTPATIENT)
Dept: CARE COORDINATION | Facility: CLINIC | Age: 63
End: 2018-11-16

## 2018-11-16 DIAGNOSIS — I63.50 CEREBRAL ARTERY OCCLUSION WITH CEREBRAL INFARCTION (H): ICD-10-CM

## 2018-11-16 LAB — INR POINT OF CARE: 1.6 (ref 0.86–1.14)

## 2018-11-16 PROCEDURE — 85610 PROTHROMBIN TIME: CPT | Mod: QW

## 2018-11-16 PROCEDURE — 99207 ZZC NO CHARGE NURSE ONLY: CPT

## 2018-11-16 PROCEDURE — 36416 COLLJ CAPILLARY BLOOD SPEC: CPT

## 2018-11-16 NOTE — MR AVS SNAPSHOT
Nathaliebereket Harp   11/16/2018 10:20 AM   Anticoagulation Therapy Visit    Description:  63 year old female   Provider:  RONY ANTI COAG   Department:  Cp Nurse           INR as of 11/16/2018     Today's INR 1.6!      Anticoagulation Summary as of 11/16/2018     INR goal 2.0-3.0   Today's INR 1.6!   Full warfarin instructions 2.5 mg on Mon, Wed, Sat; 5 mg all other days   Next INR check 11/23/2018    Indications   Cerebral artery occlusion with cerebral infarction (H) [I63.50] [I63.50]         Your next Anticoagulation Clinic appointment(s)     Nov 23, 2018 10:20 AM CST   [facility]agulation [facility] with CP ANTI COAG   [facility]    [address] [facility] [address] [phone]              Contact Numbers     [facility]  Please call [phone] or [phone]  to cancel and/or reschedule your appointment.   Please call [phone] with any problems or questions regarding your therapy          November 2018 Details    Sun Mon Tue Wed Thu Fri Sat         1               2               3                 4               5               6               7               8               9               10                 11               12               13               14               15               16      5 mg   See details      17      2.5 mg           18      5 mg         19      2.5 mg         20      5 mg         21      2.5 mg         22      5 mg         23            24                 25               26               27               28               29               30                 Date Details   11/16 This INR check       Date of next INR:  11/23/2018         How to take your warfarin dose     To take:  2.5 mg Take 1 of the 2.5 mg tablets.    To take:  5 mg Take 2 of the 2.5 mg tablets.

## 2018-11-16 NOTE — PROGRESS NOTES
ANTICOAGULATION FOLLOW-UP CLINIC VISIT    Patient Name:  Nathalie Harp  Date:  11/16/2018  Contact Type:  Face to Face    SUBJECTIVE: warfarin dose adjusted while on cipro with sub-therapeutic read today.  She finishes antibiotic tomorrow.  Plan to resume her usual warfarin dose and recheck next Friday.  Blood pressure:  157/79 pulse: 81.     Patient Findings     Positives Antibiotic use or infection (Cipro UTI)           OBJECTIVE    INR Protime   Date Value Ref Range Status   11/16/2018 1.6 (A) 0.86 - 1.14 Final       ASSESSMENT / PLAN  INR assessment SUB    Recheck INR In: 1 WEEK    INR Location Clinic      Anticoagulation Summary as of 11/16/2018     INR goal 2.0-3.0   Today's INR 1.6!   Warfarin maintenance plan 2.5 mg (2.5 mg x 1) on Mon, Wed, Sat; 5 mg (2.5 mg x 2) all other days   Full warfarin instructions 2.5 mg on Mon, Wed, Sat; 5 mg all other days   Weekly warfarin total 27.5 mg   No change documented Bijal Oswald RN   Plan last modified Bijal Oswald RN (11/2/2018)   Next INR check 11/23/2018   Target end date 12/25/2018    Indications   Cerebral artery occlusion with cerebral infarction (H) [I63.50] [I63.50]         Anticoagulation Episode Summary     INR check location     Preferred lab     Send INR reminders to  [facility]    Comments       Anticoagulation Care Providers     Provider Role Specialty Phone number    Mally Leung APRN CNP  Nurse Practitioner - [facility] [phone]            See the Encounter Report to view Anticoagulation Flowsheet and Dosing Calendar (Go to Encounters tab in chart review, and find the Anticoagulation Therapy Visit)    Dosage adjustment made based on physician directed care plan.    Bijal Oswald RN

## 2018-11-16 NOTE — PROGRESS NOTES
"Clinic Care Coordination Contact--Social Work Follow Up Call/Assessment  [facility]    Clinical Data: Care Coordinator Outreach. SW recently met with Patient regarding food and housing resources.  She is requesting the # again for the [place] shelter that may accept animals.  Patient states however, she may not need a specific shelter as the one dog she has is registered and trained as a therapy dog.  Patient states her other dogs were \"stolen from her\" from the person at whose home they are currently staying.  She reports they have been gone since the second day she was in the hospital.  Patient states she and daughter have been given notice that they must leave by 10/31/2018.  Patient reports she was recently at [facility] for migraines, which she states she has had since her teens.  She reports she is now on many blood thinners and working with clinic INR RN.       Patient reports she and daughter each have Social Security resources for housing.  SW informed of shelter [facility] which is $7 a day and has case manage ment to assist with permanent housing needs.  This # given to Patient ([phone]) as well as [facility] Hotline to search other nearby shelters.       SW inquired on Patient's mood.  She sounds cheerful an is frequently on the phone.  Patient states it is \"okay today\".  Patient stated we must end our call to allow her to call shelters. We discussed agreed upon goal for Patient to contact crisis if needed, she agrees.    Outreach attempted x 1.  Left message on voicemail with call back information and requested return call.    Per discussion with care team, Patient is staying in a shelter.  The details are uncertain and SW will clarify details when talks to Patient     Plan: Care Coordinator will try to reach patient again in 3-5 business days.    Christina Dutton, MARGARITO, MSW   [facility]   [phone]  11/16/2018 4:20 PM      "

## 2018-11-23 ENCOUNTER — ANTICOAGULATION THERAPY VISIT (OUTPATIENT)
Dept: NURSING | Facility: CLINIC | Age: 63
End: 2018-11-23
Payer: COMMERCIAL

## 2018-11-23 DIAGNOSIS — I63.50 CEREBRAL ARTERY OCCLUSION WITH CEREBRAL INFARCTION (H): ICD-10-CM

## 2018-11-23 LAB — INR POINT OF CARE: 2.2 (ref 0.86–1.14)

## 2018-11-23 PROCEDURE — 36416 COLLJ CAPILLARY BLOOD SPEC: CPT

## 2018-11-23 PROCEDURE — 85610 PROTHROMBIN TIME: CPT | Mod: QW

## 2018-11-23 PROCEDURE — 99207 ZZC NO CHARGE NURSE ONLY: CPT

## 2018-11-23 NOTE — MR AVS SNAPSHOT
Nathalie KATHERINE Harp   11/23/2018 10:20 AM   Anticoagulation Therapy Visit    Description:  63 year old female   Provider:  CP ANTI COAG   Department:  Cp Nurse           INR as of 11/23/2018     Today's INR 2.2      Anticoagulation Summary as of 11/23/2018     INR goal 2.0-3.0   Today's INR 2.2   Full warfarin instructions 2.5 mg on Mon, Wed, Sat; 5 mg all other days   Next INR check 12/5/2018    Indications   Cerebral artery occlusion with cerebral infarction (H) [I63.50] [I63.50]         Your next Anticoagulation Clinic appointment(s)     Dec 05, 2018  2:40 PM CST   Anticoagulation Visit with CP ANTI COAG   [facility]    [address] [address] [phone]              Contact Numbers     [facility]  Please call [phone] or [phone]  to cancel and/or reschedule your appointment.   Please call [phone] with any problems or questions regarding your therapy          November 2018 Details    Sun Mon Tue Wed Thu Fri Sat         1               2               3                 4               5               6               7               8               9               10                 11               12               13               14               15               16               17                 18               19               20               21               22               23      5 mg   See details      24      2.5 mg           25      5 mg         26      2.5 mg         27      5 mg         28      2.5 mg         29      5 mg         30      5 mg           Date Details   11/23 This INR check               How to take your warfarin dose     To take:  2.5 mg Take 1 of the 2.5 mg tablets.    To take:  5 mg Take 2 of the 2.5 mg tablets.           December 2018 Details    Sun Mon Tue Wed Thu Fri Sat           1      2.5 mg           2      5 mg         3      2.5 mg          4      5 mg         5            6               7               8                 9               10               11               12               13               14               15                 16               17               18               19               20               21               22                 23               24               25               26               27               28               29                 30               31                     Date Details   No additional details    Date of next INR:  12/5/2018         How to take your warfarin dose     To take:  2.5 mg Take 1 of the 2.5 mg tablets.    To take:  5 mg Take 2 of the 2.5 mg tablets.

## 2018-11-23 NOTE — PROGRESS NOTES
ANTICOAGULATION FOLLOW-UP CLINIC VISIT    Patient Name:  Nathalie Harp  Date:  11/23/2018  Contact Type:  Face to Face    SUBJECTIVE: continue same warfarin dose and 2 week recheck     Patient Findings     Positives No Problem Findings           OBJECTIVE    INR Protime   Date Value Ref Range Status   11/23/2018 2.2 (A) 0.86 - 1.14 Final       ASSESSMENT / PLAN  INR assessment THER    Recheck INR In: 2 WEEKS    INR Location Clinic      Anticoagulation Summary as of 11/23/2018     INR goal 2.0-3.0   Today's INR 2.2   Warfarin maintenance plan 2.5 mg (2.5 mg x 1) on Mon, Wed, Sat; 5 mg (2.5 mg x 2) all other days   Full warfarin instructions 2.5 mg on Mon, Wed, Sat; 5 mg all other days   Weekly warfarin total 27.5 mg   No change documented Bijal Oswald RN   Plan last modified Bijal Oswald RN (11/2/2018)   Next INR check 12/5/2018   Target end date 12/25/2018    Indications   Cerebral artery occlusion with cerebral infarction (H) [I63.50] [I63.50]         Anticoagulation Episode Summary     INR check location     Preferred lab     Send INR reminders to [facility]    Comments       Anticoagulation Care Providers     Provider Role Specialty Phone number    Mally Leung APRN CNP  Nurse Practitioner - [facility] [phone]            See the Encounter Report to view Anticoagulation Flowsheet and Dosing Calendar (Go to Encounters tab in chart review, and find the Anticoagulation Therapy Visit)    Dosage adjustment made based on physician directed care plan.    Bijal Oswald RN

## 2018-11-29 ENCOUNTER — DOCUMENTATION ONLY (OUTPATIENT)
Dept: LAB | Facility: CLINIC | Age: 63
End: 2018-11-29

## 2018-12-03 NOTE — PROGRESS NOTES
Primary Decision Maker (Health Care Agent): Abby Fortune Relationship to patient: Daughter Phone number: [phone] [x] Named in a scanned document  
[] Legal Next of Kin 
[] Guardian Secondary Decision Maker (First Alternate Health Care Agent): None ACP documents you current have include: 
[x] Advance Directive or Living Will 
[x] Durable Do Not Resuscitate 
[] Physician Orders for Scope of Treatment (POST) [] Medical Power of  
[] Other Palliative team of Dr Mary Johnson and Lottie Wise LCSW met with patient today, just prior to his planned discharge, to discuss his wishes, were he to come back to the hospital. Patient is alert, oriented X 3, able to tell us what brought him to the hospital (\"I was totally out of breath, I didn't know why\"). Patient was able to engage in a discussion regarding the AMD and he named his (step) daughter as his mPOA. He did not want any other names noted on this document. Patient able to tell us that he has harley in [place] and when my time comes it comes, I don't want to be kept alive and I want to go when it is my time\". Patient noted that he would not want to prolong his life if he is terminal, imminent or unresponsive with little to no hope of recovery. Patient is not an organ donor. Patient also signed a DDNR. He was relieved to have made these decisions and thanked the palliative team. Tammy Arango given to patient, with instructions to place the [facility] on his bedroom door when he does get home, after rehab. A phone call will be made to his step daughter Lauren Hutchins regarding both documents. (Patient did not remember her address or would have mailed her copies, if able to get this, will mail copies to Lauren Hutchins). Copy left in hard chart for scanning. SUBJECTIVE/OBJECTIVE:                Nathalie Harp is a 63 year old female coming in for a follow-up visit for Medication Therapy Management.  She was referred to me from Eva Lockhart     Chief Complaint: Follow up from our visit on 8/1/2018.  Diabetes follow-up.     Tobacco: No tobacco use  Alcohol: none    Medication Adherence: Issues found see below.    Diabetes:  Pt currently taking Levemir 30units QAM (did not increase to 34 as recommended at last visit), Novolog 8-15units with meals (at last visit reported taking 10-12units with meals; eats 2-3x/day) and was recommended to take 4units with snacks at last visit. I have recently observed her inject Levemir in clinic - no issues noted; injection techinque was correct. Reports she misses Novolog with meals a few times per week.  Symptoms of low blood sugar? Shaky, shivering with low-normal BG's.  Symptoms of high blood sugar? None reported.   Date FBG/ 2hours post Lunch/2hours post Dinner /2hours post HS   9/18 350      9/17 200      9/16 241 (5am), 344 (7am), 344 (9am), 227 (10:30am) 184 (11am), 172 (1240pm) 88 102   9/15 246  100 200   9/14 165 (6am)/200 (8am)/325 (9:15am)      9/13 242      9/12 268      9/11 416      9/10 157 561 (12:30pm) 66 (7:30pm)    9/9  /314     9/8 313      9/7 269      9/6 222      9/5 127      9/4 184  82/226    9/3 418      8/30 207 276       Lab Results   Component Value Date    A1C 8.7 09/19/2018    A1C 14.4 05/30/2018    A1C 12.8 02/09/2018    A1C 13.8 07/03/2017    A1C 12.3 12/21/2016   She continues to live in a friends basement with her daughter and pets. Meals have been challenging without a stable home at this time. She now has a car and feels this may increase her housing options (needs Sec 8) and is willing to meet with our  today.  Breakfast (getting a morning meal most days) - eggs and toast (comments that this is all she can eat without teeth) between 7 am and 10 am  Lunch/Dinner (sometimes  one or the other, not routinely both) - BBQ pork, whole wheat bun, with potato chips and water.  Otherwise will just eat a banana or applesauce container from the grocery store.    Neuropathy: Gabapentin 100mg QAM and 300mg QPM. Reports this dose seems to manage symptoms ok and staying off her feet also helps.    Hypertension/Microalbuminuria: Current medications include losartan/HCTZ 100/25 mg daily; reports she has been taking this since last visit, but she hasn't taken for the last 3 days because not feeling well.  Patient does not self-monitor BP.      BP Readings from Last 3 Encounters:   08/01/18 142/72   07/18/18 152/72   06/13/18 158/74       Today's Vitals:  /70  Wt 111 lb 12.8 oz (50.7 kg)  LMP  (LMP Unknown)  BMI 25.07 kg/m2 see above.        ASSESSMENT:              Current medications were reviewed today as discussed above.      Medication Adherence: Needs improvement.    Diabetes: Improved. Patient is not meeting A1c goal of < 7%, but significantly improved from previous likely due to correct administration of insulin.   Self monitoring of blood glucose is not at goal of fasting  mg/dL.  May benefit from increasing basal insulin to target the fasting BG.    Neuropathy: Stable.    Hypertension/Microalbuminuria: Needs Improvement/further monitoring. She has been off losartan/HCTZ for 3 days, she would benefit from improved compliance. Urine albumin not at goal <30mg/dL despite max dose losartan.      PLAN:                  1. Increase Levemir insulin to 35 units QAM.    2. Continue Novolog insulin 8-15 units with full meals and 4units with snacks.   3. Encouraged compliance with losartan/HCTZ.  4. Christina Dutton able to meet with Nathalie after our visit regarding housing.    I spent 45 minutes with this patient today. All changes were made via collaborative practice agreement with Eva Lockhart. A copy of the visit note was provided to the patient's primary care provider.     Will  follow up in 1 month.     The patient was provided a summary of these recommendations as an after visit summary.    Jovana Loo, Pharm.D., [facility]  Medication Therapy Management Pharmacist  [phone]

## 2018-12-04 ENCOUNTER — DOCUMENTATION ONLY (OUTPATIENT)
Dept: LAB | Facility: CLINIC | Age: 63
End: 2018-12-04

## 2018-12-05 ENCOUNTER — ANTICOAGULATION THERAPY VISIT (OUTPATIENT)
Dept: NURSING | Facility: CLINIC | Age: 63
End: 2018-12-05
Payer: COMMERCIAL

## 2018-12-05 DIAGNOSIS — I63.50 CEREBRAL ARTERY OCCLUSION WITH CEREBRAL INFARCTION (H): ICD-10-CM

## 2018-12-05 LAB — INR POINT OF CARE: 2.1 (ref 0.86–1.14)

## 2018-12-05 PROCEDURE — 36416 COLLJ CAPILLARY BLOOD SPEC: CPT

## 2018-12-05 PROCEDURE — 85610 PROTHROMBIN TIME: CPT | Mod: QW

## 2018-12-05 PROCEDURE — 99207 ZZC NO CHARGE NURSE ONLY: CPT

## 2018-12-05 NOTE — PROGRESS NOTES
ANTICOAGULATION FOLLOW-UP CLINIC VISIT    Patient Name:  Nathalie Harp  Date:  12/5/2018  Contact Type:  Face to Face    SUBJECTIVE:        OBJECTIVE    INR Protime   Date Value Ref Range Status   12/05/2018 2.1 (A) 0.86 - 1.14 Final       ASSESSMENT / PLAN  INR assessment THER    Recheck INR In: 2 WEEKS    INR Location Clinic      Anticoagulation Summary as of 12/5/2018     INR goal 2.0-3.0   Today's INR 2.1   Warfarin maintenance plan 2.5 mg (2.5 mg x 1) on Mon, Wed, Sat; 5 mg (2.5 mg x 2) all other days   Full warfarin instructions 2.5 mg on Mon, Wed, Sat; 5 mg all other days   Weekly warfarin total 27.5 mg   No change documented Wendy Quiroz RN   Plan last modified Bijal Oswald RN (11/2/2018)   Next INR check 12/21/2018   Target end date 12/25/2018    Indications   Cerebral artery occlusion with cerebral infarction (H) [I63.50] [I63.50]         Anticoagulation Episode Summary     INR check location     Preferred lab     Send INR reminders to [facility]    Comments       Anticoagulation Care Providers     Provider Role Specialty Phone number    Mally Leung APRN CNP  Nurse Practitioner - [facility] [phone]            See the Encounter Report to view Anticoagulation Flowsheet and Dosing Calendar (Go to Encounters tab in chart review, and find the Anticoagulation Therapy Visit)    Denies any increased signs of bruising or bleeding. Dosing based on [facility] Protocol and Provider directed care plan.      Wendy Quiroz, RN

## 2018-12-07 ENCOUNTER — OFFICE VISIT (OUTPATIENT)
Dept: PHARMACY | Facility: CLINIC | Age: 63
End: 2018-12-07
Payer: COMMERCIAL

## 2018-12-07 VITALS — WEIGHT: 120 LBS | BODY MASS INDEX: 26.9 KG/M2

## 2018-12-07 LAB
HBA1C MFR BLD: 9.5 % (ref 0–5.6)
TSH SERPL DL<=0.005 MIU/L-ACNC: 0.82 MU/L (ref 0.4–4)

## 2018-12-07 PROCEDURE — 83036 HEMOGLOBIN GLYCOSYLATED A1C: CPT | Performed by: PHYSICIAN ASSISTANT

## 2018-12-07 PROCEDURE — 99607 MTMS BY PHARM ADDL 15 MIN: CPT | Performed by: PHARMACIST

## 2018-12-07 PROCEDURE — 36415 COLL VENOUS BLD VENIPUNCTURE: CPT | Performed by: PHYSICIAN ASSISTANT

## 2018-12-07 PROCEDURE — 84443 ASSAY THYROID STIM HORMONE: CPT | Performed by: PHYSICIAN ASSISTANT

## 2018-12-07 PROCEDURE — 99606 MTMS BY PHARM EST 15 MIN: CPT | Performed by: PHARMACIST

## 2018-12-07 NOTE — PATIENT INSTRUCTIONS
Recommendations from today's MTM visit:                                                        1. Please increase Levemir to 35units once a day. This is slow acting, so should not drop your blood sugar if you take it before food.    2. Check your blood sugar whenever you want.    3. I will contact the pharmacy to send you test strips.      Next MTM visit: Friday, December 21st at 10:30am after your INR check    To schedule another MTM appointment, please call the clinic directly or you may call the MTM scheduling line at [phone] or toll-free at [phone].     My Clinical Pharmacist's contact information:                                                      It was a pleasure talking with you today!  Please feel free to contact me with any questions or concerns you have.      Jovana Loo, Pharm.D., [facility]  Medication Therapy Management Pharmacist  [phone]      You may receive a survey about the MTM services you received.  I would appreciate your feedback to help me serve you better in the future. Please fill it out and return it when you can. Your comments will be anonymous.      My healthcare goals:                                                      Lab Results   Component Value Date    A1C 9.5 12/07/2018    A1C 8.7 09/19/2018    A1C 14.4 05/30/2018    A1C 12.8 02/09/2018    A1C 13.8 07/03/2017

## 2018-12-07 NOTE — MR AVS SNAPSHOT
After Visit Summary   12/7/2018    Nathalie Harp    MRN: [id number]           Patient Information     Date Of Birth          1955        Visit Information        Provider Department      12/7/2018 10:30 AM Jovana Loo [facility] MTM        Care Instructions    Recommendations from today's MTM visit:                                                        1. Please increase Levemir to 35units once a day. This is slow acting, so should not drop your blood sugar if you take it before food.    2. Check your blood sugar whenever you want.    3. I will contact the pharmacy to send you test strips.      Next MTM visit: Friday, December 21st at 10:30am after your INR check    To schedule another MTM appointment, please call the clinic directly or you may call the MTM scheduling line at [phone] or toll-free at [phone].     My Clinical Pharmacist's contact information:                                                      It was a pleasure talking with you today!  Please feel free to contact me with any questions or concerns you have.      Jovana Loo, Pharm.D., BCACP  Medication Therapy Management Pharmacist  [phone]      You may receive a survey about the MTM services you received.  I would appreciate your feedback to help me serve you better in the future. Please fill it out and return it when you can. Your comments will be anonymous.      My healthcare goals:                                                      Lab Results   Component Value Date    A1C 9.5 12/07/2018    A1C 8.7 09/19/2018    A1C 14.4 05/30/2018    A1C 12.8 02/09/2018    A1C 13.8 07/03/2017                     Follow-ups after your visit        Your next 10 appointments already scheduled     Dec 21, 2018  9:20 AM CST   Anticoagulation Visit with CP ANTI COAG   [facility]    [address] [place] [address]  [facility] [address] [phone]            Dec 21, 2018 10:30 AM CST   Office Visit with Jovana Loo RPH   [facility] ([facility])    [address] [facility] [address] [phone]           Bring a current list of meds and any records pertaining to this visit. For Physicals, please bring immunization records and any forms needing to be filled out. Please arrive 10 minutes early to complete paperwork.              Who to contact     If you have questions or need follow up information about today's clinic visit or your schedule please contact [facility] directly at [phone].  Normal or non-critical lab and imaging results will be communicated to you by MyChart, letter or phone within 4 business days after the clinic has received the results. If you do not hear from us within 7 days, please contact the clinic through MyChart or phone. If you have a critical or abnormal lab result, we will notify you by phone as soon as possible.  Submit refill requests through [employer] or call your pharmacy and they will forward the refill request to us. Please allow 3 business days for your refill to be completed.          Additional Information About Your Visit        Care EveryWhere ID     This is your Care EveryWhere ID. This could be used by other organizations to access your [facility] medical records  [id number]        Your Vitals Were     Last Period BMI (Body Mass Index)                (LMP Unknown) 26.9 kg/m2           Blood Pressure from Last 3 Encounters:   11/12/18 162/89   11/09/18 136/70   10/26/18 134/70    Weight from Last 3 Encounters:   12/07/18 120 lb (54.4 kg)   11/12/18 118 lb (53.5 kg)   10/01/18 113 lb (51.3 kg)              Today, you had the following     No orders found for display       Primary Care Provider Office Phone # Fax #    Sapna Anand PA-C [phone]  [phone]       [address] [facility] [address]        Goals        General    I will call crisis when in crisis  (pt-stated)     Notes - Note created  10/23/2018 11:01 AM by Christina Dutton BSW    Goal Statement: I will call crisis when in crisis   Measure of Success: Call crisis when in crisis   Supportive Steps to Achieve: Call crisis when in crisis   Barriers: Patient is hesitant to call   Strengths: Patient stated she will consider calling crisis or going to ED   Date to Achieve By: ongoing   Patient expressed understanding of goal: Yes          Equal Access to Services     ADI LOPEZ AH: Hadii aad ku hadasho Soomaali, waaxda luqadaha, qaybta kaalmada adeegyada, waxay angelin haytri charles . So [facility] [phone].    ATENCIÓN: Si habla español, tiene a tompkins disposición servicios gratuitos de asistencia lingüística. Llame al [phone].    We comply with applicable federal civil rights laws and [place] laws. We do not discriminate on the basis of race, color, national origin, age, disability, sex, sexual orientation, or gender identity.            Thank you!     Thank you for choosing [facility]  for your care. Our goal is always to provide you with excellent care. Hearing back from our patients is one way we can continue to improve our services. Please take a few minutes to complete the written survey that you may receive in the mail after your visit with us. Thank you!             Your Updated Medication List - Protect others around you: Learn how to safely use, store and throw away your medicines at www.disposemymeds.org.          This list is accurate as of 12/7/18 11:15 AM.  Always use your most recent med list.                   Brand Name Dispense Instructions for use Diagnosis    ASPIRIN NOT PRESCRIBED    INTENTIONAL    0 each    Please choose reason not prescribed, below    Diabetic ketoacidosis without coma associated with type 2 diabetes  mellitus (H)       atorvastatin 40 MG tablet    LIPITOR    90 tablet    Take 1 tablet (40 mg) by mouth daily    Hyperlipidemia LDL goal <100       blood glucose lancets standard    NO BRAND SPECIFIED    100 each    Use to test blood sugar 3 times daily or as directed.        blood glucose monitoring meter device kit    NO BRAND SPECIFIED    1 kit    Use to test blood sugar 3 times daily or as directed    Type 2 diabetes mellitus without complication, with long-term current use of insulin (H)       * blood glucose monitoring test strip    ONETOUCH ULTRA    300 each    TEST BLOOD SUGARS 3 TIMES DAILY.    Type 2 diabetes mellitus without complication, with long-term current use of insulin (H)       * blood glucose monitoring test strip    FREESTYLE PRECISION EMILIO TEST    100 strip    Use to test blood sugar 4 times daily or as directed.    Type 2 diabetes mellitus with diabetic nephropathy, with long-term current use of insulin (H)       EPINEPHrine 0.3 MG/0.3ML injection 2-pack    EPIPEN/ADRENACLICK/or ANY BX GENERIC EQUIV    0.3 mL    INJECT ONE DEVICE INTO THE MUSCLE AS NEEDED FOR ALLERGIC REACTION    Allergic to shellfish       fexofenadine 180 MG tablet    ALLEGRA    30 tablet    Take 1 tablet (180 mg) by mouth daily    Chronic seasonal allergic rhinitis, unspecified trigger       fluticasone 50 MCG/ACT nasal spray    FLONASE    3 Bottle    Spray 1-2 sprays into both nostrils daily    Chronic seasonal allergic rhinitis, unspecified trigger       FREESTYLE LALO 14 DAY SENSOR Misc     2 each    1 Device every 14 days    Type 2 diabetes mellitus with diabetic nephropathy, with long-term current use of insulin (H)       FREESTYLE LALO READER Ana     1 Device    1 Device continuous    Type 2 diabetes mellitus with diabetic nephropathy, with long-term current use of insulin (H)       * gabapentin 300 MG capsule    NEURONTIN    90 capsule    Take 1 tablet (300 mg) at night.    Type 2 diabetes mellitus with diabetic  neuropathy, with long-term current use of insulin (H)       * gabapentin 100 MG capsule    NEURONTIN    90 capsule    Take 1 capsule (100 mg) by mouth every morning    Type 2 diabetes mellitus with diabetic nephropathy, with long-term current use of insulin (H)       glucosamine chondroitin 1500 complex Caps      Take 1 capsule by mouth daily        insulin detemir 100 UNIT/ML pen    LEVEMIR FLEXPEN/FLEXTOUCH    15 mL    Inject 35units under the skin every morning    Uncontrolled type 2 diabetes mellitus without complication, with long-term current use of insulin (H)       insulin pen needle 31G X 5 MM miscellaneous    B-D U/F    200 each    Use 4 daily as directed. This replaces the auto-cover pen needle.    Uncontrolled type 2 diabetes mellitus without complication, with long-term current use of insulin (H)       losartan-hydrochlorothiazide 100-25 MG tablet    HYZAAR    90 tablet    Take 1 tablet by mouth every morning    Type 2 diabetes mellitus with diabetic nephropathy, with long-term current use of insulin (H)       NovoLOG FLEXPEN 100 UNIT/ML pen   Generic drug:  insulin aspart     15 mL    Inject 12-15 Units Subcutaneous 3 times daily (with meals) Patient additionally taking 4 units with snack.    Uncontrolled type 2 diabetes mellitus without complication, with long-term current use of insulin (H)       omeprazole 20 MG DR capsule    priLOSEC    90 capsule    Take 1 capsule (20 mg) by mouth daily    Gastroesophageal reflux disease, esophagitis presence not specified       sodium chloride 0.65 % nasal spray    OCEAN    1 Bottle    Spray 1 spray into both nostrils daily as needed for congestion    Epistaxis       VENTOLIN  (90 Base) MCG/ACT inhaler   Generic drug:  albuterol     54 g    INHALE 2 PUFFS INTO THE LUNGS EVERY 6 HOURS AS NEEDED FOR SHORTNESS OF BREATH, DIFFICULTY BREATHING OR WHEEZING.    Mild intermittent asthma without complication       warfarin 2.5 MG tablet    COUMADIN    210 tablet     Take 2 - 3 tablets (5mg - 7.5mg) by mouth daily as directed by INR    Transverse sinus thrombosis       * Notice:  This list has 4 medication(s) that are the same as other medications prescribed for you. Read the directions carefully, and ask your doctor or other care provider to review them with you.

## 2018-12-07 NOTE — PROGRESS NOTES
"SUBJECTIVE/OBJECTIVE:                Nathalie Harp is a 63 year old female coming in for a follow-up visit for Medication Therapy Management.  She was referred to me from Eva Lockhart     Chief Complaint: Follow up from our visit on 2018.  Diabetes follow-up - picked up Marcio Queen and brings this in for teaching today. A1c today.  Currently staying at [facility] on a 30 day pass. Time limit on her Sec 8 has . Looking for more permanent housing, will be working with a  at the [facility] soon.    Tobacco: No tobacco use  Alcohol: none    Medication Adherence: Issues found see below.    Diabetes:  Pt currently taking Levemir 30units QAM, Novolog 5-12units with meals. She has not increased to 35units of levemir as previously prescribed. She is hesitant to take it without food in the mornings.  Symptoms of low blood sugar? Shaky, shivering with low-normal BG's. None recently.  Symptoms of high blood sugar? None reported.   SMBG: Brings in new Freestyle Bernice today, pharmacy did not include Garfield test strips; can't find her old meter.  Diet is not good right now. Meals provided at the shelter. .Breakfast and lunch are \"iffy\" - hard cereals she can't eat and sandwiches with cheese and she is lactose intolerant. Reports dinners have been pretty good \"and they always have dessert\".  Lab Results   Component Value Date    A1C 9.5 2018    A1C 8.7 2018    A1C 14.4 2018    A1C 12.8 2018    A1C 13.8 2017     Today's Vitals:  Wt 120 lb (54.4 kg)  LMP  (LMP Unknown)  BMI 26.9 kg/m2       ASSESSMENT:              Current medications were reviewed today as discussed above.      Medication Adherence: Needs improvement.    Diabetes: Needs Improvement. Patient is not meeting A1c goal of < 8%, worsned from previous which she attributes to poor diet, lack of control over diet and exercise while living at the shelter. She would benefit from Freestyle " Bernice CGM training today. I would still like to look into getting Vgo insulin patch covered through drug company, as it is not covered by insurance.     PLAN:                  1. Freestyle Bernice CGM sensor application and meter teaching provided today.  2. Increase Levemir to 35units QAM as previously recommended.  3. I will reach out to Vgo rep to see if there is a way to get coverage through the company.  4. I called [facility] - they will Resend Rx for Garfield Test strips. She will be able to pick these up at our pharmacy at her next visit.    I spent 45 minutes with this patient today. All changes were made via collaborative practice agreement with Eva Lockhart. A copy of the visit note was provided to the patient's primary care provider.     Will follow up in 1 month.     The patient was provided a summary of these recommendations as an after visit summary.    Jovana Loo, Pharm.D., BCACP  Medication Therapy Management Pharmacist  [phone]

## 2018-12-21 ENCOUNTER — ANTICOAGULATION THERAPY VISIT (OUTPATIENT)
Dept: NURSING | Facility: CLINIC | Age: 63
End: 2018-12-21
Payer: COMMERCIAL

## 2018-12-21 ENCOUNTER — OFFICE VISIT (OUTPATIENT)
Dept: PHARMACY | Facility: CLINIC | Age: 63
End: 2018-12-21
Payer: COMMERCIAL

## 2018-12-21 VITALS
WEIGHT: 119.4 LBS | SYSTOLIC BLOOD PRESSURE: 140 MMHG | DIASTOLIC BLOOD PRESSURE: 72 MMHG | BODY MASS INDEX: 26.77 KG/M2 | HEART RATE: 80 BPM

## 2018-12-21 DIAGNOSIS — I63.50 CEREBRAL ARTERY OCCLUSION WITH CEREBRAL INFARCTION (H): Primary | ICD-10-CM

## 2018-12-21 DIAGNOSIS — Z79.4 TYPE 2 DIABETES MELLITUS WITH DIABETIC NEPHROPATHY, WITH LONG-TERM CURRENT USE OF INSULIN (H): ICD-10-CM

## 2018-12-21 DIAGNOSIS — I63.50 CEREBRAL ARTERY OCCLUSION WITH CEREBRAL INFARCTION (H): ICD-10-CM

## 2018-12-21 DIAGNOSIS — E11.21 TYPE 2 DIABETES MELLITUS WITH DIABETIC NEPHROPATHY, WITH LONG-TERM CURRENT USE OF INSULIN (H): ICD-10-CM

## 2018-12-21 LAB — INR POINT OF CARE: 2.7 (ref 0.86–1.14)

## 2018-12-21 PROCEDURE — 99606 MTMS BY PHARM EST 15 MIN: CPT | Performed by: PHARMACIST

## 2018-12-21 PROCEDURE — 85610 PROTHROMBIN TIME: CPT | Mod: QW

## 2018-12-21 PROCEDURE — 36416 COLLJ CAPILLARY BLOOD SPEC: CPT

## 2018-12-21 PROCEDURE — 99607 MTMS BY PHARM ADDL 15 MIN: CPT | Performed by: PHARMACIST

## 2018-12-21 PROCEDURE — 99207 ZZC NO CHARGE NURSE ONLY: CPT

## 2018-12-21 RX ORDER — FLASH GLUCOSE SENSOR
1 KIT MISCELLANEOUS CONTINUOUS
Qty: 1 DEVICE | Refills: 0 | Status: SHIPPED | OUTPATIENT
Start: 2018-12-21 | End: 2019-07-03

## 2018-12-21 NOTE — PROGRESS NOTES
ANTICOAGULATION FOLLOW-UP CLINIC VISIT    Patient Name:  Nathalie Harp  Date:  2018  Contact Type:  Face to Face    SUBJECTIVE: continue same plan of care.  Patient to see primary provider next week to discuss duration of therapy.     Patient Findings     Positives:   No Problem Findings           OBJECTIVE    INR Protime   Date Value Ref Range Status   2018 2.7 (A) 0.86 - 1.14 Final       ASSESSMENT / PLAN  INR assessment THER    Recheck INR In: 2 WEEKS    INR Location Clinic      Anticoagulation Summary  As of 2018    INR goal:   2.0-3.0   TTR:   60.5 % (2.6 mo)   INR used for dosin.7 (2018)   Warfarin maintenance plan:   2.5 mg (2.5 mg x 1) every Mon, Wed, Sat; 5 mg (2.5 mg x 2) all other days   Full warfarin instructions:   2.5 mg every Mon, Wed, Sat; 5 mg all other days   Weekly warfarin total:   27.5 mg   No change documented:   Bijal Oswald RN   Plan last modified:   Bijal Oswald RN (2018)   Next INR check:   2019   Target end date:   2018    Indications    Cerebral artery occlusion with cerebral infarction (H) [I63.50] [I63.50]             Anticoagulation Episode Summary     INR check location:       Preferred lab:       Send INR reminders to:   [facility]    Comments:         Anticoagulation Care Providers     Provider Role Specialty Phone number    Mally Leung APRN CNP  Nurse Practitioner - Adult Health [phone]            See the Encounter Report to view Anticoagulation Flowsheet and Dosing Calendar (Go to Encounters tab in chart review, and find the Anticoagulation Therapy Visit)    Dosage adjustment made based on physician directed care plan.    Bijal Oswald RN

## 2018-12-21 NOTE — PATIENT INSTRUCTIONS
1. Replace your sensor today. Try to keep using your current reader.  2. I sent a prescription for a new reader - hopefully you can get a new one that works properly from the pharmacy.  3. Increase your Levemir to 40units once a day -- this is a slow and long acting insulin that should not cause a drop in your blood sugar.  4. Increase your Novolog (especially with breakfast) to a minimum of 7units.  5. Call me to let me know if you got a new Freestyle reader.      Follow-up in clinic on Friday, January 18th at 9am.    Jovana Loo, Pharm.D., [facility]  Medication Therapy Management Pharmacist  [phone]

## 2018-12-21 NOTE — PROGRESS NOTES
"SUBJECTIVE/OBJECTIVE:                Nathalie Harp is a 63 year old female coming in for a follow-up visit for Medication Therapy Management.  She was referred to me from Eva Lockhart     Chief Complaint: Follow up from our visit on 2018. Currently staying at [facility] on a 30 day pass. Time limit on her Sec 8 has . Looking for more permanent housing, will be working with a  at the shelter.    Tobacco: No tobacco use  Alcohol: none    Medication Adherence: Issues found see below.    Diabetes:  Pt currently taking Levemir 35units QAM, Novolog 5-12units with meals.   Symptoms of low blood sugar? Shaky, shivering with low-normal BG's, anything below 100mg/dL.   Symptoms of high blood sugar? None reported.   SMBG (per glucometer): Dropped the Freestyle karey reader in water on Wednesday, hasn't been working properly since. I was able to get some readings off the meter, then it kept shorting out on me .7d avg 239mg/dL. Over the last 7 days, BG avg's  12am-6am: 222mg/dL  6am-12pm: 291  12pm-6pm: 216  6pm-12am: 232  Date FBG/ 2hours post Lunch/2hours post Dinner /2hours post HS    169/271  237       90/193 141   Diet is not good right now. Meals provided at the nursing home. Breakfast and lunch are \"iffy\" - hard cereals she can't eat and sandwiches with cheese and she is lactose intolerant. Reports dinners have been pretty good \"and they always have dessert\".  Lab Results   Component Value Date    A1C 9.5 2018    A1C 8.7 2018    A1C 14.4 2018    A1C 12.8 2018    A1C 13.8 2017       Hx of stroke (2018):  Following with neurology, Dr. Castrejon at [facility] [place]. Currently on warfarin per [facility].  Most recent neuro visit 2018 was recommended to continue on warfarin and obtain CT venogram -- if no worsening thrombosis, consider discontinuing warfarin 3 months after initial thrombotic event.    Lab Results   Component Value Date    INR 2.7 " 12/21/2018    INR 2.1 12/05/2018    INR 2.2 11/23/2018     Today's Vitals:  /72   Pulse 80   Wt 119 lb 6.4 oz (54.2 kg)   LMP  (LMP Unknown)   BMI 26.77 kg/m        Wt Readings from Last 3 Encounters:   12/21/18 119 lb 6.4 oz (54.2 kg)   12/07/18 120 lb (54.4 kg)   11/12/18 118 lb (53.5 kg)     BP Readings from Last 3 Encounters:   11/12/18 162/89   11/09/18 136/70   10/26/18 134/70       ASSESSMENT:              Current medications were reviewed today as discussed above.      Medication Adherence: Needs improvement.    Diabetes: Needs Improvement. Patient is not meeting A1c goal of < 8%.  Self monitoring of blood glucose is not at goal of fasting/pre-prandial  mg/dL and post prandial < 180 mg/dL.  Pt would benefit from   SMBG: Freestyle karey working really well - needs a new reader device, current device waterlogged.  Basal Insulin (Levemir) :  increase dose  Bolus / Rapid Acting Insulin (Novolog) : increase dose, especially with breakfast, as 6am-12pm BG is highest of the day.    Hx of stroke (9/2018): Stable. Plan in place with neurology.    PLAN:                  1. Replace your sensor today. Try to keep using your current reader.  2. I sent a prescription for a new reader - hopefully you can get a new one that works properly from the pharmacy.  3. Increase your Levemir to 40units once a day -- this is a slow and long acting insulin that should not cause a drop in your blood sugar.  4. Increase your Novolog (especially with breakfast) to a minimum of 7units.  5. Call me to let me know if you got a new Freestyle reader.    I spent 60 minutes with this patient today. All changes were made via collaborative practice agreement with Eva Lockhart. A copy of the visit note was provided to the patient's primary care provider.     Will follow up in 1 month.     The patient was provided a summary of these recommendations as an after visit summary.    Jovana Loo, Pharm.D.,  [facility]  Medication Therapy Management Pharmacist  [phone]

## 2018-12-27 ENCOUNTER — TELEPHONE (OUTPATIENT)
Dept: FAMILY MEDICINE | Facility: CLINIC | Age: 63
End: 2018-12-27

## 2018-12-27 NOTE — TELEPHONE ENCOUNTER
I called and spoke to pharmacy, was advised by Jaimie that we can disregard, patient dropped and broke her monitor and insurance is going to cover replacement.    Dawn Davila RN  [facility]

## 2018-12-27 NOTE — TELEPHONE ENCOUNTER
Reason for Call:  Other     Detailed comments: Jaimie from  Specialty Pharmacy stated that it is a possibility insurance may not cover Continuous Blood Gluc  (FREESTYLE LALO READER), please call to discuss replacement.    Phone Number Patient can be reached at: Other phone number:  [phone]    Best Time: Anytime    Can we leave a detailed message on this number? YES    Call taken on 12/27/2018 at 1:51 PM by Alyssia Puentes

## 2019-01-04 ENCOUNTER — ANTICOAGULATION THERAPY VISIT (OUTPATIENT)
Dept: NURSING | Facility: CLINIC | Age: 64
End: 2019-01-04
Payer: COMMERCIAL

## 2019-01-04 DIAGNOSIS — I63.50 CEREBRAL ARTERY OCCLUSION WITH CEREBRAL INFARCTION (H): ICD-10-CM

## 2019-01-04 DIAGNOSIS — G08 TRANSVERSE SINUS THROMBOSIS: ICD-10-CM

## 2019-01-04 LAB — INR POINT OF CARE: 2.4 (ref 0.86–1.14)

## 2019-01-04 PROCEDURE — 85610 PROTHROMBIN TIME: CPT | Mod: QW

## 2019-01-04 PROCEDURE — 99207 ZZC NO CHARGE NURSE ONLY: CPT

## 2019-01-04 PROCEDURE — 36416 COLLJ CAPILLARY BLOOD SPEC: CPT

## 2019-01-04 RX ORDER — WARFARIN SODIUM 2.5 MG/1
TABLET ORAL
Qty: 140 TABLET | Refills: 1 | Status: SHIPPED | OUTPATIENT
Start: 2019-01-04 | End: 2019-03-15

## 2019-01-04 NOTE — PROGRESS NOTES
ANTICOAGULATION FOLLOW-UP CLINIC VISIT    Patient Name:  Nathalie Harp  Date:  2019  Contact Type:  Face to Face    SUBJECTIVE: continue 2 week rechecks due to frequent illnesses and medicine changes, see below.  She states she saw her neurologist and he extended warfarin therapy another 3 months, next visit is 3-12-19.     Patient Findings     Positives:   Antibiotic use or infection (doxy for sinus/bronchitis)           OBJECTIVE    INR Protime   Date Value Ref Range Status   2019 2.4 (A) 0.86 - 1.14 Final       ASSESSMENT / PLAN  INR assessment THER    Recheck INR In: 2 WEEKS    INR Location Clinic      Anticoagulation Summary  As of 2019    INR goal:   2.0-3.0   TTR:   77.9 % (2.8 mo)   INR used for dosin.4 (2019)   Warfarin maintenance plan:   2.5 mg (2.5 mg x 1) every Mon, Wed, Sat; 5 mg (2.5 mg x 2) all other days   Full warfarin instructions:   2.5 mg every Mon, Wed, Sat; 5 mg all other days   Weekly warfarin total:   27.5 mg   No change documented:   Bijal Oswald RN   Plan last modified:   Bijal Oswald RN (2018)   Next INR check:   2019   Target end date:   3/25/2019    Indications    Cerebral artery occlusion with cerebral infarction (H) [I63.50] [I63.50]             Anticoagulation Episode Summary     INR check location:       Preferred lab:       Send INR reminders to:   [facility]    Comments:   Neurologist extending warfarin therapy 3 more months (18 - 3-)      Anticoagulation Care Providers     Provider Role Specialty Phone number    Sapna Anand PA-C  Physician Assistant - Medical [phone]            See the Encounter Report to view Anticoagulation Flowsheet and Dosing Calendar (Go to Encounters tab in chart review, and find the Anticoagulation Therapy Visit)    Dosage adjustment made based on physician directed care plan.    Bijal Oswald RN

## 2019-01-07 ENCOUNTER — OFFICE VISIT (OUTPATIENT)
Dept: FAMILY MEDICINE | Facility: CLINIC | Age: 64
End: 2019-01-07
Payer: COMMERCIAL

## 2019-01-07 VITALS
HEART RATE: 85 BPM | TEMPERATURE: 98.3 F | WEIGHT: 124 LBS | BODY MASS INDEX: 27.9 KG/M2 | OXYGEN SATURATION: 95 % | DIASTOLIC BLOOD PRESSURE: 80 MMHG | RESPIRATION RATE: 20 BRPM | SYSTOLIC BLOOD PRESSURE: 160 MMHG | HEIGHT: 56 IN

## 2019-01-07 DIAGNOSIS — J20.9 ACUTE BRONCHITIS WITH SYMPTOMS > 10 DAYS: Primary | ICD-10-CM

## 2019-01-07 PROCEDURE — 99213 OFFICE O/P EST LOW 20 MIN: CPT | Performed by: PHYSICIAN ASSISTANT

## 2019-01-07 RX ORDER — AZITHROMYCIN 250 MG/1
TABLET, FILM COATED ORAL
Qty: 6 TABLET | Refills: 0 | Status: SHIPPED | OUTPATIENT
Start: 2019-01-07 | End: 2019-02-15

## 2019-01-07 RX ORDER — BENZONATATE 200 MG/1
200 CAPSULE ORAL 3 TIMES DAILY PRN
Qty: 24 CAPSULE | Refills: 0 | Status: SHIPPED | OUTPATIENT
Start: 2019-01-07 | End: 2019-02-18

## 2019-01-07 ASSESSMENT — MIFFLIN-ST. JEOR: SCORE: 965.46

## 2019-01-07 NOTE — PROGRESS NOTES
"  SUBJECTIVE:   Nathalie Harp is a 63 year old female who presents to clinic today for the following health issues:    ENT Symptoms             Symptoms: cc Present Absent Comment   Fever/Chills  x     Fatigue  x     Muscle Aches  x  sometimes   Eye Irritation   x    Sneezing  x     Nasal Gustabo/Drg  x     Sinus Pressure/Pain  x     Loss of smell  x     Dental pain   x    Sore Throat  x     Swollen Glands  x     Ear Pain/Fullness  x     Cough  x     Wheeze  x     Chest Pain  x  sometimes   Shortness of breath  x  somtimes   Rash  x     Other   x      Symptom duration:  two to three weeks ago    Symptom severity:  mild    Treatments tried:  cough drop, cough syrup    Contacts:  none         Problem list and histories reviewed & adjusted, as indicated.  Additional history: Patient was taking Doxycycline on an empty stomach. Stopped taking it from GI upset and has noticed no improvement in her symptoms.       Reviewed and updated as needed this visit by clinical staff  Tobacco  Allergies  Meds  Med Hx  Surg Hx  Fam Hx  Soc Hx      ROS:  Constitutional, HEENT, cardiovascular, pulmonary, gi and gu systems are negative, except as otherwise noted.    OBJECTIVE:     /80   Pulse 85   Temp 98.3  F (36.8  C) (Oral)   Resp 20   Ht 1.422 m (4' 8\")   Wt 56.2 kg (124 lb)   LMP  (LMP Unknown)   SpO2 95%   BMI 27.80 kg/m    Body mass index is 27.8 kg/m .  GEN: Well developed, well nourished in NAD.  HEENT: Normocephalic. Eyes: + conjunctival flushing noted. EARS: TMs WNL, Canals clear.  Nose: Edematous mucosa without lesion. Clear rhinorrhea. Mouth/Pharynx: sl cobblestoning and telangiectasia. No Percussed Sinus tenderness.  NECK: Supple with no anterior cervical lymphadenopathy.  No Thyromegaly  RESP: CTA with good air entry all fields. Coughs during exam  SKIN: No Exanthem noted.      Diagnostic Test Results:  none     ASSESSMENT/PLAN:   1. Acute bronchitis with symptoms > 10 days  - azithromycin " (ZITHROMAX) 250 MG tablet; Take 2 tablets (500 mg) by mouth daily for 1 day, THEN 1 tablet (250 mg) daily for 4 days.  Dispense: 6 tablet; Refill: 0  - benzonatate (TESSALON) 200 MG capsule; Take 1 capsule (200 mg) by mouth 3 times daily as needed for cough  Dispense: 24 capsule; Refill: 0  -Take 2.5mg Warfarin daily and follow up with INR RN in 4 days.    Use medication as directed.  Follow up if symptoms should persist, change or worsen.  Patient amenable to this follow up plan.      Jaime Sanon PA-C  [facility]

## 2019-01-18 ENCOUNTER — ANTICOAGULATION THERAPY VISIT (OUTPATIENT)
Dept: NURSING | Facility: CLINIC | Age: 64
End: 2019-01-18
Payer: COMMERCIAL

## 2019-01-18 ENCOUNTER — OFFICE VISIT (OUTPATIENT)
Dept: PHARMACY | Facility: CLINIC | Age: 64
End: 2019-01-18
Payer: COMMERCIAL

## 2019-01-18 VITALS
DIASTOLIC BLOOD PRESSURE: 86 MMHG | HEART RATE: 108 BPM | WEIGHT: 123.8 LBS | SYSTOLIC BLOOD PRESSURE: 150 MMHG | BODY MASS INDEX: 27.76 KG/M2

## 2019-01-18 DIAGNOSIS — E11.21 TYPE 2 DIABETES MELLITUS WITH DIABETIC NEPHROPATHY, WITH LONG-TERM CURRENT USE OF INSULIN (H): ICD-10-CM

## 2019-01-18 DIAGNOSIS — Z79.4 TYPE 2 DIABETES MELLITUS WITH DIABETIC NEPHROPATHY, WITH LONG-TERM CURRENT USE OF INSULIN (H): ICD-10-CM

## 2019-01-18 DIAGNOSIS — I63.50 CEREBRAL ARTERY OCCLUSION WITH CEREBRAL INFARCTION (H): ICD-10-CM

## 2019-01-18 DIAGNOSIS — I15.9 SECONDARY HYPERTENSION: Primary | ICD-10-CM

## 2019-01-18 LAB — INR POINT OF CARE: 2.8 (ref 0.86–1.14)

## 2019-01-18 PROCEDURE — 99605 MTMS BY PHARM NP 15 MIN: CPT | Performed by: PHARMACIST

## 2019-01-18 PROCEDURE — 99207 ZZC NO CHARGE NURSE ONLY: CPT

## 2019-01-18 PROCEDURE — 85610 PROTHROMBIN TIME: CPT | Mod: QW

## 2019-01-18 PROCEDURE — 99607 MTMS BY PHARM ADDL 15 MIN: CPT | Performed by: PHARMACIST

## 2019-01-18 PROCEDURE — 36416 COLLJ CAPILLARY BLOOD SPEC: CPT

## 2019-01-18 RX ORDER — METOPROLOL SUCCINATE 25 MG/1
25 TABLET, EXTENDED RELEASE ORAL DAILY
Qty: 90 TABLET | Refills: 3 | Status: SHIPPED | OUTPATIENT
Start: 2019-01-18 | End: 2019-05-29 | Stop reason: DRUGHIGH

## 2019-01-18 NOTE — PATIENT INSTRUCTIONS
Recommendations from today's MTM visit:                                                      1. Replace your blood sugar sensor and start using your new Freestyle Bernice reader regularly throughout the day.    2. Increase your Levemir to to 25units today and stay on this dose for the next week. Increase your Levemir by 5units each week. If your blood sugar is staying below 130mg/dL in the mornings, then you don't have to increase the Levemir dose.    3. If your blood sugar is staying in the 200's during the day, then take Novolog 7-12units with meals.    4. Start metoprolol XL 25mg one tablet a day for your blood pressure, in addition to the losartan/HCTZ.    Next MTM visit:     To schedule another MTM appointment, please call the clinic directly or you may call the MTM scheduling line at [phone] or toll-free at [phone].     My Clinical Pharmacist's contact information:                                                      It was a pleasure talking with you today!  Please feel free to contact me with any questions or concerns you have.      Jovana Loo, Pharm.D., [facility]  Medication Therapy Management Pharmacist  [phone]    You may receive a survey about the MTM services you received.  I would appreciate your feedback to help me serve you better in the future. Please fill it out and return it when you can. Your comments will be anonymous.

## 2019-01-18 NOTE — PROGRESS NOTES
"SUBJECTIVE/OBJECTIVE:                Nathalie Harp is a 63 year old female coming in for a follow-up visit for Medication Therapy Management.  She was referred to me from Eva Lockhart     Chief Complaint: Follow up from our visit on 2018. Was hospitalized -1/15 at [facility] for nausea/vomiting/diarrhea and hyperglycemia. Discharge summary indicates viral gastroenteritis.    Currently staying at [facility] on a 30 day pass. Time limit on her Sec 8 has . Looking for more permanent housing, will be working with a  at the shelter.    Tobacco: No tobacco use  Alcohol: none    Medication Adherence: Issues found see below.    Diabetes:  Pt currently prescribed Levemir 40units QAM, Novolog 7-12units with meals. Levemir reduced to 20units while inpatient and she has been taking that dose the last 2 days since discharge.  Symptoms of low blood sugar? Shaky, shivering with low-normal BG's, anything below 100mg/dL.   Symptoms of high blood sugar? None reported.   SMBG (per glucometer): Dropped the Freestyle karey reader in water prior to our last visit. She was able to get a new reader, but hasn't started using it yet. She has been using her old glucometer. Reoprts 119mg/dL fasting yesterday, has not checked BG yet today.   BG readings while hospitalized mostly in the 200's. Presented to ED with BG of 531mg/dL    Diet is not good right now. Meals provided at the senior living. Breakfast and lunch are \"iffy\" - hard cereals she can't eat and sandwiches with cheese and she is lactose intolerant. Reports dinners have been pretty good \"and they always have dessert\".  Lab Results   Component Value Date    A1C 9.5 2018    A1C 8.7 2018    A1C 14.4 2018    A1C 12.8 2018    A1C 13.8 2017       Hx of stroke (2018):  Currently taking warfarin 2.5mg one tablet daily per hospital discharge. Is following up with [facility] today for INR and dose adjustment. " Also following with neurology, Dr. Castrejon at [facility] [place]. Most recent neuro visit 12/4/2018 was recommended to continue on warfarin and obtain CT venogram -- if no worsening thrombosis, consider discontinuing warfarin 3 months after initial thrombotic event.  Lab Results   Component Value Date    INR 2.8 01/18/2019    INR 2.4 01/04/2019    INR 2.7 12/21/2018       Hypertension: Current medications include losartan/HCTZ 100/25mg daily.  Patient does not self-monitor BP.  Patient reports no current medication side effects.    Today's Vitals:  /86   Pulse 108   Wt 123 lb 12.8 oz (56.2 kg)   LMP  (LMP Unknown)   BMI 27.76 kg/m        Wt Readings from Last 3 Encounters:   01/07/19 124 lb (56.2 kg)   12/21/18 119 lb 6.4 oz (54.2 kg)   12/07/18 120 lb (54.4 kg)     BP Readings from Last 3 Encounters:   01/07/19 160/80   12/21/18 140/72   11/12/18 162/89       ASSESSMENT:              Current medications were reviewed today as discussed above.      Medication Adherence: Needs improvement.    Diabetes: Needs Improvement. Patient is not meeting A1c goal of < 8%.   Self monitoring of blood glucose is not WNL based on hospital records.   Pt would benefit from   SMBG: Restarting use of Freestyle bernice and increased monitoring.  Basal Insulin (Levemir) : titrate back up to PTA dose.  Bolus / Rapid Acting Insulin (Novolog) : titrate back up to PTA dose.    Hx of stroke (9/2018): Stable. Plan in place with neurology/[facility].    Hypertension: Needs Improvement. Patient is not meeting BP goal of < 140/90mmHg and has been above goal for the majority of recent readings. She would benefit from starting a beta-blocker, especially given HR of 108bpm today.        PLAN:                  1. Replace your sensor today and restart use of Freestyle Bernice glucometer.  2. Increase your Levemir to 25units once a day x1 week and continue to increase by 5units per week if FBG is above goal 80-130mg/dL.  3. Increase your Novolog (especially with  breakfast) to a minimum of 7units as long as daytime BG are running in the 200's.  4. Start metoprolol XL 25mg QD. Reviewed appropriate use, benefits, risks and monitoring at length. Rx sent to pharmacy.    I spent 60 minutes with this patient today. All changes were made via collaborative practice agreement with Sapna Anand. A copy of the visit note was provided to the patient's primary care provider.     Will follow up in 1 month.     The patient was provided a summary of these recommendations as an after visit summary.    Jovana Loo, Pharm.D., [facility]  Medication Therapy Management Pharmacist  [phone]

## 2019-01-18 NOTE — PROGRESS NOTES
ANTICOAGULATION FOLLOW-UP CLINIC VISIT    Patient Name:  Nathalie Harp  Date:  2019  Contact Type:  Face to Face    SUBJECTIVE: loyda 2 wks     Patient Findings     Positives:   Change in medications (metoprolol new , diab med changed), Other complaints (hospital with norovirus)           OBJECTIVE    INR Protime   Date Value Ref Range Status   2019 2.8 (A) 0.86 - 1.14 Final       ASSESSMENT / PLAN  INR assessment THER    Recheck INR In: 2 WEEKS    INR Location Clinic      Anticoagulation Summary  As of 2019    INR goal:   2.0-3.0   TTR:   81.1 % (3.3 mo)   INR used for dosin.8 (2019)   Warfarin maintenance plan:   5 mg (2.5 mg x 2) every Tue, Thu, Sat; 2.5 mg (2.5 mg x 1) all other days   Full warfarin instructions:   : 2.5 mg; Otherwise 5 mg every Tue, Thu, Sat; 2.5 mg all other days   Weekly warfarin total:   25 mg   Plan last modified:   Bijal Oswald RN (2019)   Next INR check:   2019   Target end date:   3/25/2019    Indications    Cerebral artery occlusion with cerebral infarction (H) [I63.50] [I63.50]             Anticoagulation Episode Summary     INR check location:       Preferred lab:       Send INR reminders to:    [facility]    Comments:   Neurologist extending warfarin therapy 3 more months (18 - 3-)      Anticoagulation Care Providers     Provider Role Specialty Phone number    Sapna Anand PA-C  Physician Assistant - Medical [phone]            See the Encounter Report to view Anticoagulation Flowsheet and Dosing Calendar (Go to Encounters tab in chart review, and find the Anticoagulation Therapy Visit)    Dosage adjustment made based on physician directed care plan.    Bijal Oswald RN

## 2019-01-29 ENCOUNTER — PATIENT OUTREACH (OUTPATIENT)
Dept: CARE COORDINATION | Facility: CLINIC | Age: 64
End: 2019-01-29

## 2019-01-29 NOTE — PROGRESS NOTES
Clinic Care Coordination Contact--Social Work Chart Review     Situation: Patient chart reviewed by care coordinator.    Background: Patient in need of housing, she and daughter (disabled) have been staying with acquaintances and have no permanent housing     Assessment: Per chart review, Patient at [facility] -1/15/2019 for viral gastroenteritis.  She is staying on 30 day pass at [facility], her Section 8 voucher has .  She is working with a .      Plan/Recommendations: SW will close to Care Coordination as Patient currently working with a .  Will update PCP and close     MARGARITO Taveras, MSW   [facility]   [phone]  2019 10:54 AM

## 2019-02-01 ENCOUNTER — ANTICOAGULATION THERAPY VISIT (OUTPATIENT)
Dept: NURSING | Facility: CLINIC | Age: 64
End: 2019-02-01
Payer: COMMERCIAL

## 2019-02-01 DIAGNOSIS — I63.50 CEREBRAL ARTERY OCCLUSION WITH CEREBRAL INFARCTION (H): ICD-10-CM

## 2019-02-01 LAB — INR POINT OF CARE: 1.8 (ref 0.86–1.14)

## 2019-02-01 PROCEDURE — 99207 ZZC NO CHARGE NURSE ONLY: CPT

## 2019-02-01 PROCEDURE — 85610 PROTHROMBIN TIME: CPT | Mod: QW

## 2019-02-01 PROCEDURE — 36416 COLLJ CAPILLARY BLOOD SPEC: CPT

## 2019-02-01 NOTE — PROGRESS NOTES
ANTICOAGULATION FOLLOW-UP CLINIC VISIT    Patient Name:  Nathalie Harp  Date:  2019  Contact Type:  Face to Face    SUBJECTIVE: continue 2 wk loyda     Patient Findings     Positives:   Other complaints (sick vomit / diarrhea - states allergic reaction to clam chowder)           OBJECTIVE    INR Protime   Date Value Ref Range Status   2019 1.8 (A) 0.86 - 1.14 Final       ASSESSMENT / PLAN  INR assessment THER    Recheck INR In: 2 WEEKS    INR Location Clinic      Anticoagulation Summary  As of 2019    INR goal:   2.0-3.0   TTR:   80.9 % (3.8 mo)   INR used for dosin.8! (2019)   Warfarin maintenance plan:   5 mg (2.5 mg x 2) every Tue, Thu, Sat; 2.5 mg (2.5 mg x 1) all other days   Full warfarin instructions:   : 5 mg; Otherwise 5 mg every Tue, Thu, Sat; 2.5 mg all other days   Weekly warfarin total:   25 mg   Plan last modified:   Bijal Oswald RN (2019)   Next INR check:   2/15/2019   Target end date:   3/25/2019    Indications    Cerebral artery occlusion with cerebral infarction (H) [I63.50] [I63.50]             Anticoagulation Episode Summary     INR check location:       Preferred lab:       Send INR reminders to:    [facility]    Comments:   Neurologist extending warfarin therapy 3 more months (18 - 3-)      Anticoagulation Care Providers     Provider Role Specialty Phone number    Sapna Anand, PA-C  Physician Assistant - Medical [phone]            See the Encounter Report to view Anticoagulation Flowsheet and Dosing Calendar (Go to Encounters tab in chart review, and find the Anticoagulation Therapy Visit)    Dosage adjustment made based on physician directed care plan.    Bijal Oswald RN

## 2019-02-05 ENCOUNTER — TELEPHONE (OUTPATIENT)
Dept: FAMILY MEDICINE | Facility: CLINIC | Age: 64
End: 2019-02-05

## 2019-02-15 ENCOUNTER — ANTICOAGULATION THERAPY VISIT (OUTPATIENT)
Dept: NURSING | Facility: CLINIC | Age: 64
End: 2019-02-15
Payer: COMMERCIAL

## 2019-02-15 ENCOUNTER — OFFICE VISIT (OUTPATIENT)
Dept: PHARMACY | Facility: CLINIC | Age: 64
End: 2019-02-15
Payer: COMMERCIAL

## 2019-02-15 VITALS — SYSTOLIC BLOOD PRESSURE: 128 MMHG | DIASTOLIC BLOOD PRESSURE: 72 MMHG | HEART RATE: 80 BPM

## 2019-02-15 DIAGNOSIS — I10 BENIGN ESSENTIAL HYPERTENSION: ICD-10-CM

## 2019-02-15 DIAGNOSIS — Z91.013 ALLERGIC TO SHELLFISH: ICD-10-CM

## 2019-02-15 DIAGNOSIS — J30.2 CHRONIC SEASONAL ALLERGIC RHINITIS: ICD-10-CM

## 2019-02-15 DIAGNOSIS — J45.20 MILD INTERMITTENT ASTHMA WITHOUT COMPLICATION: ICD-10-CM

## 2019-02-15 DIAGNOSIS — J30.89 NON-SEASONAL ALLERGIC RHINITIS DUE TO OTHER ALLERGIC TRIGGER: ICD-10-CM

## 2019-02-15 DIAGNOSIS — E11.21 TYPE 2 DIABETES MELLITUS WITH DIABETIC NEPHROPATHY, WITH LONG-TERM CURRENT USE OF INSULIN (H): Primary | ICD-10-CM

## 2019-02-15 DIAGNOSIS — Z79.4 TYPE 2 DIABETES MELLITUS WITH DIABETIC NEPHROPATHY, WITH LONG-TERM CURRENT USE OF INSULIN (H): Primary | ICD-10-CM

## 2019-02-15 DIAGNOSIS — I63.50 CEREBRAL ARTERY OCCLUSION WITH CEREBRAL INFARCTION (H): ICD-10-CM

## 2019-02-15 LAB — INR POINT OF CARE: 2.5 (ref 0.86–1.14)

## 2019-02-15 PROCEDURE — 36416 COLLJ CAPILLARY BLOOD SPEC: CPT

## 2019-02-15 PROCEDURE — 85610 PROTHROMBIN TIME: CPT | Mod: QW

## 2019-02-15 PROCEDURE — 99207 ZZC NO CHARGE NURSE ONLY: CPT

## 2019-02-15 PROCEDURE — 99207 ZZC NO CHARGE LOS: CPT | Performed by: PHARMACIST

## 2019-02-15 RX ORDER — EPINEPHRINE 0.3 MG/.3ML
INJECTION SUBCUTANEOUS
Qty: 2 ML | Refills: 1 | Status: SHIPPED | OUTPATIENT
Start: 2019-02-15 | End: 2019-08-28

## 2019-02-15 RX ORDER — INSULIN ASPART 100 [IU]/ML
12-15 INJECTION, SOLUTION INTRAVENOUS; SUBCUTANEOUS
Qty: 15 ML | Refills: 11 | Status: SHIPPED | OUTPATIENT
Start: 2019-02-15 | End: 2020-03-02

## 2019-02-15 RX ORDER — FLASH GLUCOSE SENSOR
1 KIT MISCELLANEOUS
Qty: 2 EACH | Refills: 11 | Status: SHIPPED | OUTPATIENT
Start: 2019-02-15 | End: 2019-07-03

## 2019-02-15 RX ORDER — GUAIFENESIN 600 MG/1
600 TABLET, EXTENDED RELEASE ORAL 2 TIMES DAILY
Qty: 60 TABLET | Refills: 0 | Status: SHIPPED | OUTPATIENT
Start: 2019-02-15 | End: 2019-08-28

## 2019-02-15 RX ORDER — FEXOFENADINE HCL 180 MG/1
180 TABLET ORAL DAILY
Qty: 30 TABLET | Refills: 1 | Status: SHIPPED | OUTPATIENT
Start: 2019-02-15 | End: 2019-05-09

## 2019-02-15 RX ORDER — FLUTICASONE PROPIONATE 50 MCG
1-2 SPRAY, SUSPENSION (ML) NASAL DAILY
Qty: 9.9 ML | Refills: 11 | Status: SHIPPED | OUTPATIENT
Start: 2019-02-15 | End: 2020-03-27

## 2019-02-15 RX ORDER — ALBUTEROL SULFATE 90 UG/1
AEROSOL, METERED RESPIRATORY (INHALATION)
Qty: 8.5 G | Refills: 11 | Status: SHIPPED | OUTPATIENT
Start: 2019-02-15 | End: 2020-06-11

## 2019-02-15 NOTE — PROGRESS NOTES
SUBJECTIVE/OBJECTIVE:                Nathalie Harp is a 63 year old female coming in for a follow-up visit for Medication Therapy Management.  She was referred to me from Eva Lockhart     Chief Complaint: Follow up from our visit on 1/18/19. Patient reports she discovered she has an allergy to clams and feels this has been a large contributor to her GI issues and diarrhea. She was previously staying at [facility] and she moved on Sunday (2/10) into housing with someone from her [employer] group. She reports the space is limited and she has a housemate who smokes, which has triggered her allergies.     Tobacco: No tobacco use  Alcohol: none    Medication Adherence: Issues found see below.    Diabetes:  Pt currently taking Levemir 35units QAM, Novolog 5-15units with meals. Adherence has been sporadic, has missed doses of both long acting and mealtime insulin due to stress of moving. Reports some of her insulin pens had been stored in the car with below freezing temperatures.   Symptoms of low blood sugar? None recently.   Symptoms of high blood sugar? Reports frequent urination and fatigue, varies and has been a problem long-term.   SMBG (per glucometer): Currently using old meter, has been out of Freestyle Bernice sensors. See below. 30d avg (n=18): 276, 14d avg (n=9): 314.   Diet is not good right now. Working on finding resources for food assistance and better food options. Reports she currently has protein bars, made a large batch of chili, and likes gyro sandwiches. Her exercise is limited due to size of housing, not able to fit exercise bike at [facility].  Date FBG/ 2hours post Lunch/2hours post   2/15     2/14 351 (7:41am)    2/13 260    2/12 311    2/11 528 (1:25am)    2/8  348 (12:18pm)   2/6  225    2/4 192    2/3 311    2/2 301    2/1 360      Lab Results   Component Value Date    A1C 9.5 12/07/2018    A1C 8.7 09/19/2018    A1C 14.4 05/30/2018    A1C 12.8 02/09/2018    A1C 13.8  07/03/2017     Allergies: Patient reports she is currently living with someone who smokes and feels this is triggering allergies and cough. Requests refills on allergy/asthma medications: Allegra-D, Flonase, and Ventolin.    Hypertension: Current medications include losartan/HCTZ 100/25mg daily, metoprolol XL 25 mg once daily started at last visit.  Patient does not self-monitor BP.  Patient reports no current medication side effects. Reports some dizziness but thinks this could be attributed to stress, hyperglycemia, or allergies.     BP Readings from Last 3 Encounters:   02/15/19 128/72   01/18/19 150/86   01/07/19 160/80     Today's Vitals:  /72   Pulse 80   LMP  (LMP Unknown)        ASSESSMENT:              Current medications were reviewed today as discussed above.      Medication Adherence: Needs improvement.    Diabetes: Needs Improvement. Patient is not meeting A1c goal of < 8%.   Self monitoring of blood glucose is not at goal of FBG  mg/dL and has been >200mg/dL other times of day. Pt would benefit from improved adherence to current insulin regimen as she settles into new living situation. She would also benefit from increasing Novolog dose with meals.    Allergies: Needs improvement. She would benefit from restarting her allergy medications, however chronic use of pseudoephedrine is not recommended, especially given her HTN. She may benefit from trial of guaifenesin to help with mucus buildup.    Hypertension: Stable. Patient is meeting BP goal of < 140/90mmHg since starting metoprolol.       PLAN:                  1. Continue Levemir 35 units every day.   2. Increase Novolog to 10-15units with each meal.  3. Refills sent to pharmacy for: Freestyle Bernice sensors, fluticasone nasal spray, Ventolin, Epi pens, fexofenadine.   4. New Rx sent for mucinex ER 600mg BID.     I spent 45 minutes with this patient today. All changes were made via collaborative practice agreement with Sapna Anand  copy of the visit note was provided to the patient's primary care provider.     Will follow up in 1 month.     The patient was provided a summary of these recommendations as an after visit summary.    Rupal Haskins, PharmD IV Student   Yasemin Fry.NOEMI., [facility]  Medication Therapy Management Pharmacist  [phone]

## 2019-02-15 NOTE — PROGRESS NOTES
ANTICOAGULATION FOLLOW-UP CLINIC VISIT    Patient Name:  Nathalie Harp  Date:  2/15/2019  Contact Type:  Face to Face    SUBJECTIVE: INR stable on current dose.  If she has any medicine changes or illness in-between visits she will call INR office.  She will see Neurology in March.     Patient Findings     Positives:   OTC meds (allergy medicine and mucinex)           OBJECTIVE    INR Protime   Date Value Ref Range Status   02/15/2019 2.5 (A) 0.86 - 1.14 Final       ASSESSMENT / PLAN  INR assessment THER    Recheck INR In: 4 WEEKS    INR Location Clinic      Anticoagulation Summary  As of 2/15/2019    INR goal:   2.0-3.0   TTR:   79.9 % (4.2 mo)   INR used for dosin.5 (2/15/2019)   Warfarin maintenance plan:   5 mg (2.5 mg x 2) every Tue, Thu, Sat; 2.5 mg (2.5 mg x 1) all other days   Full warfarin instructions:   5 mg every Tue, Thu, Sat; 2.5 mg all other days   Weekly warfarin total:   25 mg   No change documented:   Bijal Oswald RN   Plan last modified:   Bijal Oswald RN (2019)   Next INR check:   3/15/2019   Target end date:   3/25/2019    Indications    Cerebral artery occlusion with cerebral infarction (H) [I63.50] [I63.50]             Anticoagulation Episode Summary     INR check location:       Preferred lab:       Send INR reminders to:   [facility]    Comments:   Neurologist extending warfarin therapy 3 more months (18 - 3-)      Anticoagulation Care Providers     Provider Role Specialty Phone number    Sapna Anand PA-C  Physician Assistant - Medical [phone]            See the Encounter Report to view Anticoagulation Flowsheet and Dosing Calendar (Go to Encounters tab in chart review, and find the Anticoagulation Therapy Visit)    Dosage adjustment made based on physician directed care plan.    Bijal Oswald RN

## 2019-02-15 NOTE — PATIENT INSTRUCTIONS
1. Continue on Levemir 35units once daily for now.  new pens at the pharmacy to ensure your have insulin that has not been frozen. Store your insulin the fridge, but don't leave in your car overnight this time of year.     2. Increase Novolog to 10-15units with each meal.    3. I sent refills for your allergy medications to the pharmacy. Take Mucinex with your allegra to help with drainage and phlegm buildup.     Follow-up: Friday, March 15th at 9:00am    Jovana Loo, Pharm.D., [facility]  Medication Therapy Management Pharmacist  [phone]

## 2019-02-18 ENCOUNTER — OFFICE VISIT (OUTPATIENT)
Dept: FAMILY MEDICINE | Facility: CLINIC | Age: 64
End: 2019-02-18
Payer: COMMERCIAL

## 2019-02-18 VITALS
DIASTOLIC BLOOD PRESSURE: 75 MMHG | BODY MASS INDEX: 26.81 KG/M2 | TEMPERATURE: 98.2 F | OXYGEN SATURATION: 97 % | HEART RATE: 107 BPM | WEIGHT: 119.6 LBS | SYSTOLIC BLOOD PRESSURE: 147 MMHG

## 2019-02-18 DIAGNOSIS — Z79.4 TYPE 2 DIABETES MELLITUS WITH DIABETIC NEPHROPATHY, WITH LONG-TERM CURRENT USE OF INSULIN (H): ICD-10-CM

## 2019-02-18 DIAGNOSIS — M50.30 DEGENERATION OF CERVICAL INTERVERTEBRAL DISC: ICD-10-CM

## 2019-02-18 DIAGNOSIS — E11.21 TYPE 2 DIABETES MELLITUS WITH DIABETIC NEPHROPATHY, WITH LONG-TERM CURRENT USE OF INSULIN (H): ICD-10-CM

## 2019-02-18 DIAGNOSIS — M20.41 HAMMER TOE OF RIGHT FOOT: Primary | ICD-10-CM

## 2019-02-18 PROCEDURE — 99213 OFFICE O/P EST LOW 20 MIN: CPT | Performed by: PHYSICIAN ASSISTANT

## 2019-02-18 RX ORDER — EPINEPHRINE 0.3 MG/.3ML
INJECTION SUBCUTANEOUS
Qty: 2 ML | Refills: 1 | Status: CANCELLED | OUTPATIENT
Start: 2019-02-18

## 2019-02-18 RX ORDER — CYCLOBENZAPRINE HCL 10 MG
10 TABLET ORAL 3 TIMES DAILY PRN
Qty: 20 TABLET | Refills: 1 | Status: SHIPPED | OUTPATIENT
Start: 2019-02-18 | End: 2021-01-01

## 2019-02-18 NOTE — PATIENT INSTRUCTIONS
1. Please schedule your annual physical. You should come fasting to this appointment.   2. Please schedule your annual eye exam. Please have them send us the results. [phone]

## 2019-02-18 NOTE — PROGRESS NOTES
SUBJECTIVE:   Nathalie Harp is a 63 year old female who presents to clinic today for the following health issues:      Pt needs order for orthopedic shoes. Patient notes she has bunions and hammer toes. She also has a high arch. No current sores or break down on her feet.     Pt needs refills on medication.  She would like a refill on cyclobenzaprine. She notes that she uses it prn for her back pain. When sitting in chairs it is painful. She notes that flexeril plus a heating pack can really help the pain. Uses between 0-10 per month.     Pt is wondering if there is a medication that would help her being around cigarette smoke. Pt recently had to move into a friends house and friend smokes. Friend smokes outside pt said but she can tell that she used to smoke inside.     Patient continues to work with our [facility] pharmacist on her diabetes. See her last office visit note.     Problem list and histories reviewed & adjusted, as indicated.  Additional history: as documented    Patient Active Problem List   Diagnosis     Esophageal reflux     Irritable bowel syndrome     Female stress incontinence     Arthritis of knee, right     Disorder of bursae and tendons in shoulder region     Degeneration of thoracic or thoracolumbar intervertebral disc     Degeneration of cervical intervertebral disc     Allergic rhinitis due to pollen     Menopausal symptoms     Need for SBE (subacute bacterial endocarditis) prophylaxis     Preventive measure     Hyperlipidemia LDL goal <100     Health Care Home     Anemia     Dizziness     Hammer toe     Microalbuminuria     ACP (advance care planning)     Allergic to shellfish     Falls frequently     Balance problems     CKD (chronic kidney disease) stage 2, GFR 60-89 ml/min     Type 2 diabetes mellitus with diabetic nephropathy (H)     Diarrhea     Iron deficiency anemia, unspecified iron deficiency anemia type     Right axillary hidradenitis     Uncontrolled type 2 diabetes mellitus  without complication, with long-term current use of insulin (H)     Mild intermittent asthma without complication     Cerebral artery occlusion with cerebral infarction (H) [I63.50]     Past Surgical History:   Procedure Laterality Date     HC EXCIS PRIMARY GANGLION WRIST  1985 and 1987    right wrist     KNEE SURGERY  2008 approx    arthroscopic; Dr. Rivera       Social History     Tobacco Use     Smoking status: Never Smoker     Smokeless tobacco: Never Used   Substance Use Topics     Alcohol use: Yes     Comment: rarely     Family History   Problem Relation Age of Onset     Cerebrovascular Disease Maternal Grandmother      Arthritis Maternal Grandmother      Asthma Mother      Gastrointestinal Disease Mother         IBS     Alcohol/Drug Mother      Depression Mother      Neurologic Disorder Mother         migraines     Gastrointestinal Disease Father         IBS     Diabetes Father      Alcohol/Drug Father      Neurologic Disorder Father         migraines     Obesity Father      Gastrointestinal Disease Maternal Grandfather         Ulcers     Alcohol/Drug Maternal Grandfather      Diabetes Paternal Grandmother      Alcohol/Drug Paternal Grandmother      Arthritis Paternal Grandmother      Obesity Paternal Grandmother      Hypertension Sister      Hypertension Brother      Circulatory Sister      Asthma Sister      Asthma Sister      Asthma Brother      Thyroid Disease Sister      Thyroid Disease Sister      Obesity Sister            Reviewed and updated as needed this visit by clinical staff  Tobacco  Allergies  Meds  Problems  Med Hx  Surg Hx  Fam Hx  Soc Hx        Reviewed and updated as needed this visit by Provider  Tobacco  Allergies  Meds  Problems  Med Hx  Surg Hx  Fam Hx         ROS:  Constitutional, HEENT, cardiovascular, pulmonary, gi and gu systems are negative, except as otherwise noted.    OBJECTIVE:     /75 (BP Location: Right arm, Patient Position: Chair, Cuff Size: Adult  Regular)   Pulse 107   Temp 98.2  F (36.8  C) (Oral)   Wt 54.3 kg (119 lb 9.6 oz)   LMP  (LMP Unknown)   SpO2 97%   Breastfeeding? No   BMI 26.81 kg/m    Body mass index is 26.81 kg/m .  GENERAL: healthy, alert and no distress  Diabetic foot exam: normal DP and PT pulses, no trophic changes or ulcerative lesions, normal sensory exam and Hammer toes noted bilaterally on second digits.     Diagnostic Test Results:  none     ASSESSMENT/PLAN:       ICD-10-CM    1. Hammer toe of right foot M20.41    2. Type 2 diabetes mellitus with diabetic nephropathy, with long-term current use of insulin (H) E11.21     Z79.4    3. Degeneration of cervical intervertebral disc M50.30 cyclobenzaprine (FLEXERIL) 10 MG tablet   Patient with hammer toes and diabetes putting her at risk for ulcerations. Ok for diabetic shoes.   Refilled flexeril for prn use.   Patient due for her physical.     FUTURE APPOINTMENTS:       - Follow-up for annual visit or as needed    Sapna Anand PA-C  [facility]

## 2019-02-18 NOTE — TELEPHONE ENCOUNTER
Requested information faxed to number provided.  [facility]  Team 3 Coordinator

## 2019-03-11 NOTE — TELEPHONE ENCOUNTER
"Requested Prescriptions   Pending Prescriptions Disp Refills     insulin pen needle (B-D U/F) 31G X 5 MM miscellaneous [Pharmacy Med Name: BD PEN NEEDLE MINI  31G X 5 MM MISC] 200 each prn    Last Written Prescription Date:  2-28-18  Last Fill Quantity: 200,  # refills: prn   Last office visit: 2/15/2019 with prescribing provider:  2-9-18   Future Office Visit:   Next 5 appointments (look out 90 days)    Mar 15, 2019  9:00 AM CDT  Office Visit with Jovana Loo RPH  [facility] [place] ([facility]) [address] [place] [id number]  [phone]   Mar 18, 2019 10:40 AM CDT  PHYSICAL with Sapna Anand PA-C  [facility] ([facility]) [address] [place] [id number]  [phone]          Sig: USE FOUR TIMES A DAY OR AS DIRECTED    Diabetic Supplies Protocol Passed - 3/11/2019  9:48 AM       Passed - Medication is active on med list       Passed - Patient is 18 years of age or older       Passed - Recent (6 mo) or future (30 days) visit within the authorizing provider's specialty    Patient had office visit in the last 6 months or has a visit in the next 30 days with authorizing provider.  See \"Patient Info\" tab in inbasket, or \"Choose Columns\" in Meds & Orders section of the refill encounter.              "

## 2019-03-12 ENCOUNTER — TRANSFERRED RECORDS (OUTPATIENT)
Dept: HEALTH INFORMATION MANAGEMENT | Facility: CLINIC | Age: 64
End: 2019-03-12

## 2019-03-13 NOTE — TELEPHONE ENCOUNTER
Prescription approved per [facility] Refill Protocol.  Bessie Fritz, RN CPC Triage.

## 2019-03-15 ENCOUNTER — OFFICE VISIT (OUTPATIENT)
Dept: PHARMACY | Facility: CLINIC | Age: 64
End: 2019-03-15
Payer: COMMERCIAL

## 2019-03-15 ENCOUNTER — ANTICOAGULATION THERAPY VISIT (OUTPATIENT)
Dept: NURSING | Facility: CLINIC | Age: 64
End: 2019-03-15
Payer: COMMERCIAL

## 2019-03-15 VITALS — BODY MASS INDEX: 27.22 KG/M2 | WEIGHT: 121.4 LBS

## 2019-03-15 DIAGNOSIS — I63.50 CEREBRAL ARTERY OCCLUSION WITH CEREBRAL INFARCTION (H): ICD-10-CM

## 2019-03-15 LAB — INR POINT OF CARE: 1.2 (ref 0.86–1.14)

## 2019-03-15 PROCEDURE — 85610 PROTHROMBIN TIME: CPT | Mod: QW

## 2019-03-15 PROCEDURE — 99606 MTMS BY PHARM EST 15 MIN: CPT | Performed by: PHARMACIST

## 2019-03-15 PROCEDURE — 36416 COLLJ CAPILLARY BLOOD SPEC: CPT

## 2019-03-15 PROCEDURE — 99607 MTMS BY PHARM ADDL 15 MIN: CPT | Performed by: PHARMACIST

## 2019-03-15 PROCEDURE — 99207 ZZC NO CHARGE NURSE ONLY: CPT

## 2019-03-15 NOTE — PROGRESS NOTES
SUBJECTIVE/OBJECTIVE:                Nathalie Harp is a 63 year old female coming in for a follow-up visit for Medication Therapy Management.  She was referred to me from Eva Lockhart     Chief Complaint: Follow up from our visit on 2/15/19. Has been living with a friend from [employer], able to stay through April 10th and then will need to find new housing. Is on waiting list for [place].    Tobacco: No tobacco use  Alcohol: none    Medication Adherence: Issues found see below.    Diabetes:  Pt currently taking Levemir 40units QAM (made this change on her own), Novolog 10-15units with meals. Adherence is improved, hasn't missed doses recently.  Symptoms of low blood sugar? None recently.   Symptoms of high blood sugar? Reports frequent urination and fatigue, varies and has been a problem long-term.   SMBG (per glucometer): 14d avg (n13) 149, 30d avg (n23) 194mg/dL. Still using old meter, has not picked up Freestyle sensors at the pharmacy.  Diet: One decent meal per day and maybe a second small meal. Notes her roommate seems to have an unhealthy relationship with food -- rarely eats.  Eggs/toast/coffee in the mornings, leftovers later in the day.  More active right now too. No longer sitting in car waiting for shelter to open.  Labs scheduled for this coming Monday  Date FBG/ 2hours post Lunch/2hours post   3/15 198    3/14 223    3/13 107    3/12 110    3/11 167    3/10 113    3/8 101    3/7 208    3/6 172    3/5 166    3/4 87    3/3 173    3/2 106    3/1 246    2/27  183   2/25 217    2/23 241      Lab Results   Component Value Date    A1C 9.5 12/07/2018    A1C 8.7 09/19/2018    A1C 14.4 05/30/2018    A1C 12.8 02/09/2018    A1C 13.8 07/03/2017       Today's Vitals:  Wt 121 lb 6.4 oz (55.1 kg)   LMP  (LMP Unknown)   BMI 27.22 kg/m   Did not have time for BP check today.       ASSESSMENT:              Current medications were reviewed today as discussed above.      Medication Adherence: Needs  improvement.    Diabetes: Needs Improvement. Patient is not meeting A1c goal of < 8%.   Self monitoring of blood glucose is not at goal of FBG  mg/dL but is significantly improved from previous likely due to improved adherence, more stable home life, less stress and more activity. Will have her continue present medications but prefer she restart Freestyle Bernice for further assessment of BG and appropriate insulin doses.     PLAN:                  1. Continue Levemir 40 units every day.   2. Continue Novolog to 10-15units with each meal.  3. Restart Freestyle Bernice sensors - Rx filled at pharmacy today and sensor applied today in clinic.    I spent 45 minutes with this patient today. All changes were made via collaborative practice agreement with Sapna Anand. A copy of the visit note was provided to the patient's primary care provider.     Will follow up in 1 month.     The patient was provided a summary of these recommendations as an after visit summary.    Jovana Loo, Pharm.D., BCACP  Medication Therapy Management Pharmacist  [phone]

## 2019-03-18 ENCOUNTER — OFFICE VISIT (OUTPATIENT)
Dept: FAMILY MEDICINE | Facility: CLINIC | Age: 64
End: 2019-03-18
Payer: COMMERCIAL

## 2019-03-18 VITALS
HEART RATE: 91 BPM | SYSTOLIC BLOOD PRESSURE: 131 MMHG | DIASTOLIC BLOOD PRESSURE: 71 MMHG | OXYGEN SATURATION: 97 % | TEMPERATURE: 98.7 F | WEIGHT: 123 LBS | BODY MASS INDEX: 27.58 KG/M2

## 2019-03-18 DIAGNOSIS — J45.20 MILD INTERMITTENT ASTHMA WITHOUT COMPLICATION: ICD-10-CM

## 2019-03-18 DIAGNOSIS — Z79.4 TYPE 2 DIABETES MELLITUS WITH DIABETIC NEPHROPATHY, WITH LONG-TERM CURRENT USE OF INSULIN (H): ICD-10-CM

## 2019-03-18 DIAGNOSIS — Z78.0 ASYMPTOMATIC POSTMENOPAUSAL STATUS: ICD-10-CM

## 2019-03-18 DIAGNOSIS — Z11.4 SCREENING FOR HIV (HUMAN IMMUNODEFICIENCY VIRUS): ICD-10-CM

## 2019-03-18 DIAGNOSIS — Z12.11 SCREEN FOR COLON CANCER: ICD-10-CM

## 2019-03-18 DIAGNOSIS — Z00.00 ROUTINE GENERAL MEDICAL EXAMINATION AT A HEALTH CARE FACILITY: Primary | ICD-10-CM

## 2019-03-18 DIAGNOSIS — E11.21 TYPE 2 DIABETES MELLITUS WITH DIABETIC NEPHROPATHY, WITH LONG-TERM CURRENT USE OF INSULIN (H): ICD-10-CM

## 2019-03-18 DIAGNOSIS — Z09 RADIOTHERAPY FOLLOW-UP EXAMINATION: ICD-10-CM

## 2019-03-18 DIAGNOSIS — K21.9 GASTROESOPHAGEAL REFLUX DISEASE, ESOPHAGITIS PRESENCE NOT SPECIFIED: ICD-10-CM

## 2019-03-18 LAB
ALBUMIN UR-MCNC: NEGATIVE MG/DL
APPEARANCE UR: CLEAR
BILIRUB UR QL STRIP: NEGATIVE
CHOLEST SERPL-MCNC: 229 MG/DL
COLOR UR AUTO: YELLOW
CREAT UR-MCNC: 20 MG/DL
GLUCOSE UR STRIP-MCNC: NEGATIVE MG/DL
HBA1C MFR BLD: 9.2 % (ref 0–5.6)
HDLC SERPL-MCNC: 74 MG/DL
HGB UR QL STRIP: NEGATIVE
KETONES UR STRIP-MCNC: NEGATIVE MG/DL
LDLC SERPL CALC-MCNC: 120 MG/DL
LEUKOCYTE ESTERASE UR QL STRIP: NEGATIVE
MICROALBUMIN UR-MCNC: 190 MG/L
MICROALBUMIN/CREAT UR: 954.77 MG/G CR (ref 0–25)
NITRATE UR QL: NEGATIVE
NONHDLC SERPL-MCNC: 155 MG/DL
PH UR STRIP: 5.5 PH (ref 5–7)
SOURCE: NORMAL
SP GR UR STRIP: <=1.005 (ref 1–1.03)
TRIGL SERPL-MCNC: 177 MG/DL
UROBILINOGEN UR STRIP-ACNC: 0.2 EU/DL (ref 0.2–1)

## 2019-03-18 PROCEDURE — 81003 URINALYSIS AUTO W/O SCOPE: CPT | Performed by: NURSE PRACTITIONER

## 2019-03-18 PROCEDURE — 82043 UR ALBUMIN QUANTITATIVE: CPT | Performed by: PHYSICIAN ASSISTANT

## 2019-03-18 PROCEDURE — 87389 HIV-1 AG W/HIV-1&-2 AB AG IA: CPT | Performed by: PHYSICIAN ASSISTANT

## 2019-03-18 PROCEDURE — 36415 COLL VENOUS BLD VENIPUNCTURE: CPT | Performed by: PHYSICIAN ASSISTANT

## 2019-03-18 PROCEDURE — 80061 LIPID PANEL: CPT | Performed by: PHYSICIAN ASSISTANT

## 2019-03-18 PROCEDURE — 99397 PER PM REEVAL EST PAT 65+ YR: CPT | Performed by: PHYSICIAN ASSISTANT

## 2019-03-18 PROCEDURE — 83036 HEMOGLOBIN GLYCOSYLATED A1C: CPT | Performed by: PHYSICIAN ASSISTANT

## 2019-03-18 PROCEDURE — 99213 OFFICE O/P EST LOW 20 MIN: CPT | Mod: 25 | Performed by: PHYSICIAN ASSISTANT

## 2019-03-18 ASSESSMENT — ENCOUNTER SYMPTOMS
CHILLS: 0
ARTHRALGIAS: 1
HEMATOCHEZIA: 0
PARESTHESIAS: 0
PALPITATIONS: 0
MYALGIAS: 1
DIZZINESS: 0
HEMATURIA: 0
FREQUENCY: 1
DIARRHEA: 1
SHORTNESS OF BREATH: 0
ABDOMINAL PAIN: 0
DYSURIA: 0
HEARTBURN: 1
NAUSEA: 0
SORE THROAT: 0
HEADACHES: 1
WEAKNESS: 1
CONSTIPATION: 1
EYE PAIN: 0
BREAST MASS: 0

## 2019-03-18 ASSESSMENT — ACTIVITIES OF DAILY LIVING (ADL): CURRENT_FUNCTION: TRANSPORTATION REQUIRES ASSISTANCE

## 2019-03-18 ASSESSMENT — PATIENT HEALTH QUESTIONNAIRE - PHQ9
SUM OF ALL RESPONSES TO PHQ QUESTIONS 1-9: 12
10. IF YOU CHECKED OFF ANY PROBLEMS, HOW DIFFICULT HAVE THESE PROBLEMS MADE IT FOR YOU TO DO YOUR WORK, TAKE CARE OF THINGS AT HOME, OR GET ALONG WITH OTHER PEOPLE: SOMEWHAT DIFFICULT
SUM OF ALL RESPONSES TO PHQ QUESTIONS 1-9: 12

## 2019-03-18 NOTE — Clinical Note
FYI- A1C is a bit better. At your next office visit with her can you repeat her ACT. It was low today, likely from living with the smoker, if still low we might need to think about an ICS. Patient will be moving 4/1/19. She is not sure where yet though.

## 2019-03-18 NOTE — NURSING NOTE
VIS for Shingrix given on same date of administration.  Staff signature/Title: CHANCE Steve MA    Prior to injection, verified patient identity using patient's name and date of birth.  Due to injection administration, patient instructed to remain in clinic for 15 minutes  afterwards, and to report any adverse reaction to me immediately.    CHANCE Steve MA

## 2019-03-18 NOTE — LETTER
[facility]   [address] [place]  [address]  [phone]                                   March 19, 2019    Nathalie Harp  [address]  [facility] [address]        Dear Nathalie,    Your urine continues to show protein, which is likely from the elevated blood sugars. Please make sure you follow up with Jovana next month as planned.   Your cholesterol is also still elevated. I will have Jovana speak with you about whether we should increase your cholesterol medication.   Your HIV screening was negative.     Results for orders placed or performed in visit on 03/18/19   HIV Screening   Result Value Ref Range    HIV Antigen Antibody Combo Nonreactive NR^Nonreactive       HEMOGLOBIN A1C   Result Value Ref Range    Hemoglobin A1C 9.2 (H) 0 - 5.6 %   Lipid panel reflex to direct LDL Fasting   Result Value Ref Range    Cholesterol 229 (H) <200 mg/dL    Triglycerides 177 (H) <150 mg/dL    HDL Cholesterol 74 >49 mg/dL    LDL Cholesterol Calculated 120 (H) <100 mg/dL    Non HDL Cholesterol 155 (H) <130 mg/dL   Albumin Random Urine Quantitative with Creat Ratio   Result Value Ref Range    Creatinine Urine 20 mg/dL    Albumin Urine mg/L 190 mg/L    Albumin Urine mg/g Cr 954.77 (H) 0 - 25 mg/g Cr       If you have any questions please call the clinic at [phone]    Sincerely,    Sapna Anand PA-C  [facility]

## 2019-03-18 NOTE — PATIENT INSTRUCTIONS
1.Schedule your colonoscopy.   2. Schedule your DEXA (bone density) scan.   3. Follow up with Jovana in April.     Preventive Health Recommendations    See your health care provider every year to    Review health changes.     Discuss preventive care.      Review your medicines if your doctor has prescribed any.      You no longer need a yearly Pap test unless you've had an abnormal Pap test in the past 10 years. If you have vaginal symptoms, such as bleeding or discharge, be sure to talk with your provider about a Pap test.      Every 1 to 2 years, have a mammogram.  If you are over 69, talk with your health care provider about whether or not you want to continue having screening mammograms.      Every 10 years, have a colonoscopy. Or, have a yearly FIT test (stool test). These exams will check for colon cancer.       Have a cholesterol test every 5 years, or more often if your doctor advises it.       Have a diabetes test (fasting glucose) every three years. If you are at risk for diabetes, you should have this test more often.       At age 65, have a bone density scan (DEXA) to check for osteoporosis (brittle bone disease).    Shots:    Get a flu shot each year.    Get a tetanus shot every 10 years.    Talk to your doctor about your pneumonia vaccines. There are now two you should receive - Pneumovax (PPSV 23) and Prevnar (PCV 13).    Talk to your pharmacist about the shingles vaccine.    Talk to your doctor about the hepatitis B vaccine.    Nutrition:     Eat at least 5 servings of fruits and vegetables each day.      Eat whole-grain bread, whole-wheat pasta and brown rice instead of white grains and rice.      Get adequate Calcium and Vitamin D.     Lifestyle    Exercise at least 150 minutes a week (30 minutes a day, 5 days a week). This will help you control your weight and prevent disease.      Limit alcohol to one drink per day.      No smoking.       Wear sunscreen to prevent skin cancer.       See your  dentist twice a year for an exam and cleaning.      See your eye doctor every 1 to 2 years to screen for conditions such as glaucoma, macular degeneration and cataracts.    Personalized Prevention Plan  You are due for the preventive services outlined below.  Your care team is available to assist you in scheduling these services.  If you have already completed any of these items, please share that information with your care team to update in your medical record.  Health Maintenance Due   Topic Date Due     HIV SCREEN (SYSTEM ASSIGNED)  05/15/1973     Zoster (Shingles) Vaccine (2 of 3) 09/14/2012     Annual Wellness Visit  05/15/2018     Bone Density Screening (Dexa)  05/15/2018     Eye Exam - yearly  12/29/2018     Colon Cancer Screening - every 10 years.  01/16/2019     Asthma Control Test - every 6 months  02/01/2019     Microalbumin Lab - yearly  02/09/2019

## 2019-03-18 NOTE — PROGRESS NOTES
"SUBJECTIVE:   Nathalie Harp is a 63 year old female who presents for Preventive Visit.    Are you in the first 12 months of your Medicare coverage?  No    Annual Wellness Visit     In general, how would you rate your overall health?  Fair    Frequency of exercise:  1 day/week    Duration of exercise:  Less than 15 minutes    Do you usually eat at least 4 servings of fruit and vegetables a day, include whole grains    & fiber and avoid regularly eating high fat or \"junk\" foods?  No    Taking medications regularly:  Yes    Medication side effects:  None    Ability to successfully perform activities of daily living:  Transportation requires assistance    Home Safety:  Throw rugs in the hallway and lack of grab bars in the bathroom    Hearing Impairment:  Difficulty following a conversation in a noisy restaurant or crowded room, difficulty following dialogue in the theater, difficult to understand a speaker at a public meeting or Taoism service, need to ask people to speak up or repeat themselves, difficulty understanding soft or whispered speech and difficulty understanding speech on the telephone    In the past 6 months, have you been bothered by leaking of urine? Yes    In general, how would you rate your overall mental or emotional health?  Fair    PHQ-2 Total Score: 3    Additional concerns today:  No    Do you feel safe in your environment? No    Do you have a Health Care Directive? Yes: Patient states has Advance Directive and will bring in a copy to clinic.      Asthma- living with a smoker, does not smoke in the house now, but used to in the past. Asthma has been worse since this. She will only be there a few more weeks.  She is only on the albuterol as needed. ACT is low today.   She will likely be homeless and in a shelter 4/1/19.     HTN-took her meds last night.     She is now done with her warfarin and anticoagulation. She is now to take an 81mg ASA daily.   Diabetes- MTM is comanging, and " adjusting her insulin. Blood sugars have been variable. Her A1C is down slightly, but still over goal. Patient's last eye exam was 11/2018.     Fall risk  Fallen 2 or more times in the past year?: Yes(tripping)  Any fall with injury in the past year?: No    Cognitive Screening   1) Repeat 3 items (Leader, Season, Table)    2) Clock draw: NORMAL  3) 3 item recall: Recalls 3 objects  Results: 3 items recalled: COGNITIVE IMPAIRMENT LESS LIKELY    Mini-CogTM Copyright S [employer]. Licensed by the author for use in [facility]; reprinted with permission (oma@[facility]). All rights reserved.      Do you have sleep apnea, excessive snoring or daytime drowsiness?: yes    Reviewed and updated as needed this visit by clinical staff  Tobacco  Allergies  Meds  Problems  Med Hx  Surg Hx  Fam Hx  Soc Hx          Reviewed and updated as needed this visit by Provider  Tobacco  Allergies  Meds  Problems  Med Hx  Surg Hx  Fam Hx        Social History     Tobacco Use     Smoking status: Never Smoker     Smokeless tobacco: Never Used   Substance Use Topics     Alcohol use: Yes     Comment: rarely       Alcohol Use 3/18/2019   If you drink alcohol do you typically have greater than 3 drinks per day OR greater than 7 drinks per week? No           Current providers sharing in care for this patient include:   Patient Care Team:  Sapna Anand PA-C as PCP - General (Physician Assistant - Medical)  Malissa Jack (Diabetes Education)  Farida as   Sapna Anand PA-C as Assigned PCP    The following health maintenance items are reviewed in Epic and correct as of today:  Health Maintenance   Topic Date Due     HIV SCREEN (SYSTEM ASSIGNED)  05/15/1971     ZOSTER IMMUNIZATION (2 of 3) 09/14/2012     MEDICARE ANNUAL WELLNESS VISIT  05/15/2018     DEXA SCAN SCREENING (SYSTEM ASSIGNED)  05/15/2018     EYE EXAM Q1 YEAR  12/29/2018     COLON CANCER SCREEN (SYSTEM ASSIGNED)  01/16/2019     ASTHMA CONTROL TEST Q6  MOS  02/01/2019     MICROALBUMIN Q1 YEAR  02/09/2019     ASTHMA ACTION PLAN Q1 YR  04/27/2019     FALL RISK ASSESSMENT  05/30/2019     A1C Q3 MO  06/18/2019     CREATININE Q1 YEAR  10/01/2019     FOOT EXAM Q1 YEAR  02/18/2020     MAMMO SCREEN Q2 YR (SYSTEM ASSIGNED)  02/26/2020     LIPID MONITORING Q1 YEAR  03/18/2020     PHQ-2 Q1 YR  03/18/2020     TSH W/ FREE T4 REFLEX Q2 YEAR  12/07/2020     DTAP/TDAP/TD IMMUNIZATION (3 - Td) 09/01/2021     ADVANCE DIRECTIVE PLANNING Q5 YRS  04/21/2022     MIGRAINE ACTION PLAN  Completed     INFLUENZA VACCINE  Completed     PNEUMOCOCCAL IMMUNIZATION 65+ LOW/MEDIUM RISK  Completed     HEPATITIS C SCREENING  Completed     IPV IMMUNIZATION  Aged Out     MENINGITIS IMMUNIZATION  Aged Out     Labs reviewed in EPIC  BP Readings from Last 3 Encounters:   03/18/19 (!) 164/98   02/18/19 147/75   02/15/19 128/72    Wt Readings from Last 3 Encounters:   03/18/19 55.8 kg (123 lb)   03/15/19 55.1 kg (121 lb 6.4 oz)   02/18/19 54.3 kg (119 lb 9.6 oz)                  Mammogram Screening: Mammogram Screening: Patient over age 50, mutual decision to screen reflected in health maintenance.  Last 3 Pap and HPV Results:   PAP / HPV Latest Ref Rng & Units 2/9/2018 12/17/2014 6/5/2009   PAP - NIL NIL NIL   HPV 16 DNA NEG:Negative Negative - -   HPV 18 DNA NEG:Negative Negative - -   OTHER HR HPV NEG:Negative Negative - -       Review of Systems   Constitutional: Negative for chills.   HENT: Positive for congestion, ear pain and hearing loss. Negative for sore throat.    Eyes: Negative for pain and visual disturbance.   Respiratory: Negative for shortness of breath.    Cardiovascular: Negative for chest pain, palpitations and peripheral edema.   Gastrointestinal: Positive for constipation (alternating with diarrhea), diarrhea and heartburn (omeprazole does help). Negative for abdominal pain, hematochezia and nausea.   Breasts:  Negative for tenderness, breast mass and discharge.   Genitourinary:  "Positive for frequency. Negative for dysuria, genital sores, hematuria, pelvic pain, urgency and vaginal discharge.   Musculoskeletal: Positive for arthralgias and myalgias.   Skin: Negative for rash.   Neurological: Positive for weakness and headaches. Negative for dizziness and paresthesias.   Psychiatric/Behavioral: Negative for mood changes.     Constitutional, HEENT, cardiovascular, pulmonary, gi and gu systems are negative, except as otherwise noted.    OBJECTIVE:   BP (!) 164/98 (BP Location: Left arm, Patient Position: Chair, Cuff Size: Adult Regular)   Pulse 108   Temp 98.7  F (37.1  C) (Oral)   Wt 55.8 kg (123 lb)   LMP  (LMP Unknown)   SpO2 97%   BMI 27.58 kg/m   Estimated body mass index is 27.58 kg/m  as calculated from the following:    Height as of 1/7/19: 1.422 m (4' 8\").    Weight as of this encounter: 55.8 kg (123 lb).  Physical Exam  GENERAL: healthy, alert and no distress  EYES: Eyes grossly normal to inspection, PERRL and conjunctivae and sclerae normal  HENT: ear canals and TM's normal, nose and mouth without ulcers or lesions  NECK: no adenopathy, no asymmetry, masses, or scars and thyroid normal to palpation  RESP: lungs clear to auscultation - no rales, rhonchi or wheezes  BREAST: normal without masses, tenderness or nipple discharge and no palpable axillary masses or adenopathy  CV: regular rate and rhythm, normal S1 S2, no S3 or S4, no murmur, click or rub, no peripheral edema and peripheral pulses strong  ABDOMEN: soft, nontender, no hepatosplenomegaly, no masses and bowel sounds normal  MS: no gross musculoskeletal defects noted, no edema  SKIN: no suspicious lesions or rashes  NEURO: Normal strength and tone, mentation intact and speech normal  PSYCH: mentation appears normal, affect normal/bright    Diagnostic Test Results:  none     ASSESSMENT / PLAN:   1. Routine general medical examination at a health care facility  - JUST IN CASE  - zoster vaccine recombinant adjuvanted " "(SHINGRIX) injection; Inject 0.5 mLs into the muscle once for 1 dose  Dispense: 0.5 mL; Refill: 1    2. Type 2 diabetes mellitus with diabetic nephropathy, with long-term current use of insulin (H)  Continue with MTM, no changes to medication. A1C slightly improved. Should see MTM next month.   - HEMOGLOBIN A1C  - Lipid panel reflex to direct LDL Fasting  - Albumin Random Urine Quantitative with Creat Ratio    3. Screen for colon cancer  Due for repeat. Referral placed.   - GASTROENTEROLOGY ADULT REF PROCEDURE ONLY Other; [facility] [phone]    4. Asymptomatic postmenopausal status  Advised on screening.   - DEXA HIP/PELVIS/SPINE - Future; Future    5. Screening for HIV (human immunodeficiency virus)  Patient agreeable to screening.   - HIV Screening    6. Gastroesophageal reflux disease, esophagitis presence not specified  COntinue with omeprazole, work on diet cahnges.     7. Mild intermittent asthma without complication  Uncontrolled. Low ACT. Patient only on albuterol, likely worsening due to current living situation. Will repeat ACT in 4 weeks with MTM, if still low will need to add a steroid inhaler.       End of Life Planning:  Patient currently has an advanced directive: No.  I have verified the patient's ablity to prepare an advanced directive/make health care decisions.  Literature was provided to assist patient in preparing an advanced directive.    COUNSELING:  Reviewed preventive health counseling, as reflected in patient instructions       Regular exercise       Healthy diet/nutrition       Colon cancer screening       HIV screening for high risk patient    BP Readings from Last 1 Encounters:   03/18/19 (!) 164/98     Estimated body mass index is 27.58 kg/m  as calculated from the following:    Height as of 1/7/19: 1.422 m (4' 8\").    Weight as of this encounter: 55.8 kg (123 lb).      Weight management plan: Discussed healthy diet and exercise guidelines     reports that  has never smoked. she has " never used smokeless tobacco.      Appropriate preventive services were discussed with this patient, including applicable screening as appropriate for cardiovascular disease, diabetes, osteopenia/osteoporosis, and glaucoma.  As appropriate for age/gender, discussed screening for colorectal cancer, prostate cancer, breast cancer, and cervical cancer. Checklist reviewing preventive services available has been given to the patient.    Reviewed patients plan of care and provided an AVS. The Intermediate Care Plan ( asthma action plan, low back pain action plan, and migraine action plan) for Nathalie meets the Care Plan requirement. This Care Plan has been established and reviewed with the Patient.    Counseling Resources:  ATP IV Guidelines  Pooled Cohorts Equation Calculator  Breast Cancer Risk Calculator  FRAX Risk Assessment  ICSI Preventive Guidelines  Dietary Guidelines for Americans, 2010  [employer]s MyPlate  ASA Prophylaxis  Lung CA Screening    Sapna Anand PA-C  [facility]  Answers for HPI/ROS submitted by the patient on 3/18/2019   Annual Exam:  If you checked off any problems, how difficult have these problems made it for you to do your work, take care of things at home, or get along with other people?: Somewhat difficult  PHQ9 TOTAL SCORE: 12

## 2019-03-19 LAB — HIV 1+2 AB+HIV1 P24 AG SERPL QL IA: NONREACTIVE

## 2019-03-19 ASSESSMENT — PATIENT HEALTH QUESTIONNAIRE - PHQ9: SUM OF ALL RESPONSES TO PHQ QUESTIONS 1-9: 12

## 2019-03-19 ASSESSMENT — ASTHMA QUESTIONNAIRES: ACT_TOTALSCORE: 13

## 2019-03-19 NOTE — RESULT ENCOUNTER NOTE
Kannan Zelaya,     Your urine continues to show protein, which is likely from the elevated blood sugars. Please make sure you follow up with Joavna next month as planned.   Your cholesterol is also still elevated. I will have Jovana speak with you about whether we should increase your cholesterol medication.   Your HIV screening was negative.   Sapna Anand PA-C

## 2019-03-25 ENCOUNTER — ANCILLARY PROCEDURE (OUTPATIENT)
Dept: MAMMOGRAPHY | Facility: CLINIC | Age: 64
End: 2019-03-25
Payer: COMMERCIAL

## 2019-03-25 DIAGNOSIS — Z12.31 VISIT FOR SCREENING MAMMOGRAM: ICD-10-CM

## 2019-03-25 PROCEDURE — 77067 SCR MAMMO BI INCL CAD: CPT | Mod: TC

## 2019-03-27 ENCOUNTER — ANCILLARY PROCEDURE (OUTPATIENT)
Dept: BONE DENSITY | Facility: CLINIC | Age: 64
End: 2019-03-27
Attending: PHYSICIAN ASSISTANT
Payer: COMMERCIAL

## 2019-03-27 DIAGNOSIS — Z78.0 ASYMPTOMATIC POSTMENOPAUSAL STATUS: ICD-10-CM

## 2019-03-27 PROCEDURE — 77080 DXA BONE DENSITY AXIAL: CPT | Performed by: INTERNAL MEDICINE

## 2019-03-27 NOTE — LETTER
[facility]   [address] [place]  [address]  [phone]                                   2019    Nathalie Harp  [address] [facility] [address]        Dear Nathalie,    Your DEXA (bone density scan) shows that you have Osteopenia. This is the early stages of Osteoporosis. In order to prevent your bones from further weakening, I would recommend that you take a daily calcium plus vitamin D supplement. You should also make sure you are doing some weight bearing exercsise-walking is fine. We can discuss this more at your upcoming appointment.     Results for orders placed or performed in visit on 19   DEXA HIP/PELVIS/SPINE - Future    Narrative    BONE DENSITOMETRY  [facility] - [address] [address]  3/27/2019      PATIENT: Nathalie Harp  CHART: [id number]   :  1955  AGE:  63 year old  SEX:  female   REFERRING PROVIDER:  Sapna Anand PA-C     PROCEDURE:  Bone density scanning was performed using DXA technology of   the lumbar spine and hip.  Scanning was performed on a Lunar Prodigy   scanner.  Reporting is completed in the form of a T-score.  The T-score   represents the standard deviation from peak bone mass based on a young   healthy adult.     REFERENCE T-SCORES:       Normal                -1.0 and greater                                 Osteopenia         Between -1.0 and -2.5                                             Osteoporosis     -2.5 and less                                       RISK FACTORS:  Post-menopausal    CURRENT TREATMENT:  none listed     FINDINGS:               Lumbar Spine (L1-L3)      T-score:  0.0, marked degenerative   changes present at L4, so only L1-3 are evaluated.                Left Femoral Neck            T-score:  -1.1               Right Femoral Neck          T-score:  -1.3                           Lumbar (L1-L3) BMD: 1.243 Previous: 1.230               Total  "Hip Mean BMD: 0.943   Previous: 1.121    Comparison is made to another DXA performed on a different Lunar machine   on 10/22/2007.    IMPRESSION  Osteopenia.    Comparisons from different scanners that have not been cross calibrated,   are not necessarily valid. Such a comparison has been performed here; one   should interpret with caution.   There has been probable significant decrease in bone density of the lumbar   spine. There has been probable significant decrease in bone density of the   hip(s).    Recommendations include ensuring adequate Calcium and Vitamin D.    The current NOF Guidelines recommend treatment for patients with prior hip   or vertebral fracture, T-score -2.5 or below, or 10 year risk of any major   osteoporotic fracture >20% or 10 year risk of hip fracture >3%, as   calculated using the FRAX calculator (www.shef.ac.uk/FRAX or you can   google \"FRAX\").      This patient's risks based on available information, with the use of FRAX,   are 8.4 % for major osteoporotic fracture and 0.8 % for hip fracture.   Based on these guidelines, treatment (in addition to calcium and vitamin   D) is not recommended for this patient, after ruling out other causes of   osteoporosis.  This is meant as an aid to clinical decision making; one must still use   clinical judgement.      Follow up can be considered in 5 years.   ___________________  Zenaida Beck M.D.  Electronically signed             If you have any questions please call the clinic at [phone]    Sincerely,    Sapna Anand PA-C  bmd  "

## 2019-03-29 NOTE — RESULT ENCOUNTER NOTE
Nathalie,     Your DEXA (bone density scan) shows that you have Osteopenia. This is the early stages of Osteoporosis. In order to prevent your bones from further weakening, I would recommend that you take a daily calcium plus vitamin D supplement. You should also make sure you are doing some weight bearing exercsise-walking is fine. We can discuss this more at your upcoming appointment.   Sapna Anand PA-C

## 2019-04-01 ENCOUNTER — TELEPHONE (OUTPATIENT)
Dept: FAMILY MEDICINE | Facility: CLINIC | Age: 64
End: 2019-04-01

## 2019-04-01 NOTE — LETTER
April 1, 2019    Nathalie Harp  [address] [facility] [address]    Dear Nathalie    We care about your health and have reviewed your health plan. We have reviewed your medical conditions, medication list, and lab results and are making recommendations based on this review, to better manage your health.    You are in particular need of attention regarding:  - Your Asthma  - Completing a Colon Cancer Screening (FIT) - Please complete FIT test as soon as possible and mail completed test to clinic.      Here is a list of Health Maintenance topics that are due now or due soon:  Health Maintenance Due   Topic Date Due     COLON CANCER SCREEN (SYSTEM ASSIGNED)  01/16/2019     ASTHMA ACTION PLAN Q1 YR  04/27/2019     We will be calling you in the next couple of weeks to help you schedule any appointments that are needed.  Please call us at [phone] (or use [employer]) to address the above recommendations.     Thank you for trusting [facility] and we appreciate the opportunity to serve you.  We look forward to supporting your healthcare needs in the future.    Healthy Regards,    ALG/ML

## 2019-04-01 NOTE — LETTER
May 1, 2019    Nathalie Harp  [address] [facility] [address]      Dear Nathalie Harp,     We have tried to contact you about your health, but have been unable to reach you.  Please call us as soon as possible so we can provide you with the best care possible.  We will continue to check in with you throughout the year to complete these items of care, if you are not able to complete these items at this time.  If you would like to complete the missing items for your care, please contact us at [phone].    We recommend the following:  -schedule a FOLLOWUP OFFICE APPOINTMENT with your provider.           Sincerely,     Your Care Team at [facility]

## 2019-05-09 ENCOUNTER — OFFICE VISIT (OUTPATIENT)
Dept: FAMILY MEDICINE | Facility: CLINIC | Age: 64
End: 2019-05-09
Payer: COMMERCIAL

## 2019-05-09 VITALS
WEIGHT: 131 LBS | OXYGEN SATURATION: 98 % | BODY MASS INDEX: 29.47 KG/M2 | DIASTOLIC BLOOD PRESSURE: 85 MMHG | HEIGHT: 56 IN | SYSTOLIC BLOOD PRESSURE: 166 MMHG | TEMPERATURE: 98.4 F | HEART RATE: 68 BPM

## 2019-05-09 DIAGNOSIS — Z79.4 TYPE 2 DIABETES MELLITUS WITH DIABETIC NEPHROPATHY, WITH LONG-TERM CURRENT USE OF INSULIN (H): ICD-10-CM

## 2019-05-09 DIAGNOSIS — E11.21 TYPE 2 DIABETES MELLITUS WITH DIABETIC NEPHROPATHY, WITH LONG-TERM CURRENT USE OF INSULIN (H): ICD-10-CM

## 2019-05-09 DIAGNOSIS — J30.89 NON-SEASONAL ALLERGIC RHINITIS DUE TO OTHER ALLERGIC TRIGGER: ICD-10-CM

## 2019-05-09 DIAGNOSIS — R60.0 PERIPHERAL EDEMA: Primary | ICD-10-CM

## 2019-05-09 DIAGNOSIS — Z59.00 HOMELESS: ICD-10-CM

## 2019-05-09 PROCEDURE — 99214 OFFICE O/P EST MOD 30 MIN: CPT | Performed by: PHYSICIAN ASSISTANT

## 2019-05-09 RX ORDER — FEXOFENADINE HCL 180 MG/1
180 TABLET ORAL DAILY
Qty: 30 TABLET | Refills: 1 | Status: SHIPPED | OUTPATIENT
Start: 2019-05-09 | End: 2019-07-03

## 2019-05-09 SDOH — ECONOMIC STABILITY - HOUSING INSECURITY: HOMELESSNESS UNSPECIFIED: Z59.00

## 2019-05-09 ASSESSMENT — MIFFLIN-ST. JEOR: SCORE: 997.21

## 2019-05-09 ASSESSMENT — PATIENT HEALTH QUESTIONNAIRE - PHQ9: SUM OF ALL RESPONSES TO PHQ QUESTIONS 1-9: 15

## 2019-05-09 NOTE — PROGRESS NOTES
"  SUBJECTIVE:   Nathalie Harp is a 63 year old female who presents to clinic today for the following   health issues:        Acute Illness   Acute illness concerns: Ear Problem  Onset: x2 weeks    Fever: no    Chills/Sweats: no    Headache (location?): no    Sinus Pressure:no    Conjunctivitis:  no    Ear Pain: YES: right    Rhinorrhea: no     Congestion: no     Sore Throat: no      Cough: no    Wheeze: no     Decreased Appetite: no     Nausea: no     Vomiting: no     Diarrhea:  no     Dysuria/Freq.: no     Fatigue/Achiness: no     Sick/Strep Exposure: no      Therapies Tried and outcome: none      Concern: Pt has been having concerns with swelling in both her legs and both feet. Pt thinks it has been about x2 weeks since this started. No reported change in her diet.     \"life sucks\" She is back living in her car. She notes they don't want to go back to the shelter. Patient doesn't feel safe there. Has been in the car for 4 weeks right now.   Patient notes she is not keeping her insulin refrigerated. Is not currently working with anyone from the [facility]. She notes she doesn't meet the [facility] requirements. No suicidal thoughts, some hopelessness.     She is getting her food from restaurants. She is \"macrina\" to get 1 meal per day.     She is taking the levemir when she wakes up and the novlog when she eats. If she only eats once per day she only uses the novolog once per day.   She was out of her other medications for about a week but she has been taking them again.   She has not been using her sensor for the continuous glucose monitor. She doesn't know where the replacements are. It could be in her storage area. Patient notes she can't get in there. She has been using the regular meter. She notes yesterday it was 225. Her BS yesterday afternoon was 65. She did levemir 40 and 10-12 of the novolog that morning. She had yogurt and a donut when she took the Novlog.     She has noticed that both legs have been " swollen from the ankles up to the knees. Has been at least a couple of weeks. No redness. Does not have compression stockings.   trying to avoid high salt foods.     The right ear has been bothered quite a bit for a few weeks. Off and on. Never going all the way away.     Additional history: as documented    Reviewed  and updated as needed this visit by clinical staff  Tobacco  Allergies  Meds  Problems  Med Hx  Surg Hx  Fam Hx  Soc Hx          Reviewed and updated as needed this visit by Provider  Tobacco  Allergies  Meds  Problems  Med Hx  Surg Hx  Fam Hx         Patient Active Problem List   Diagnosis     Esophageal reflux     Irritable bowel syndrome     Female stress incontinence     Arthritis of knee, right     Disorder of bursae and tendons in shoulder region     Degeneration of thoracic or thoracolumbar intervertebral disc     Degeneration of cervical intervertebral disc     Allergic rhinitis due to pollen     Menopausal symptoms     Need for SBE (subacute bacterial endocarditis) prophylaxis     Preventive measure     Hyperlipidemia LDL goal <100     Health Care Home     Anemia     Dizziness     Hammer toe     Microalbuminuria     ACP (advance care planning)     Allergic to shellfish     Falls frequently     Balance problems     CKD (chronic kidney disease) stage 2, GFR 60-89 ml/min     Type 2 diabetes mellitus with diabetic nephropathy (H)     Diarrhea     Iron deficiency anemia, unspecified iron deficiency anemia type     Right axillary hidradenitis     Uncontrolled type 2 diabetes mellitus without complication, with long-term current use of insulin (H)     Mild intermittent asthma without complication     Cerebral artery occlusion with cerebral infarction (H) [I63.50]     Past Surgical History:   Procedure Laterality Date     HC EXCIS PRIMARY GANGLION WRIST  1985 and 1987    right wrist     KNEE SURGERY  2008 approx    arthroscopic; Dr. Rivera       Social History     Tobacco Use     Smoking  "status: Never Smoker     Smokeless tobacco: Never Used   Substance Use Topics     Alcohol use: Yes     Comment: rarely     Family History   Problem Relation Age of Onset     Cerebrovascular Disease Maternal Grandmother      Arthritis Maternal Grandmother      Asthma Mother      Gastrointestinal Disease Mother         IBS     Alcohol/Drug Mother      Depression Mother      Neurologic Disorder Mother         migraines     Gastrointestinal Disease Father         IBS     Diabetes Father      Alcohol/Drug Father      Neurologic Disorder Father         migraines     Obesity Father      Gastrointestinal Disease Maternal Grandfather         Ulcers     Alcohol/Drug Maternal Grandfather      Diabetes Paternal Grandmother      Alcohol/Drug Paternal Grandmother      Arthritis Paternal Grandmother      Obesity Paternal Grandmother      Hypertension Sister      Hypertension Brother      Circulatory Sister      Asthma Sister      Asthma Sister      Asthma Brother      Thyroid Disease Sister      Thyroid Disease Sister      Obesity Sister            ROS:  Constitutional, HEENT, cardiovascular, pulmonary, gi and gu systems are negative, except as otherwise noted.    OBJECTIVE:     /85 (BP Location: Right arm, Patient Position: Chair, Cuff Size: Adult Regular)   Pulse 68   Temp 98.4  F (36.9  C) (Oral)   Ht 1.422 m (4' 8\")   Wt 59.4 kg (131 lb)   LMP  (LMP Unknown)   SpO2 98%   Breastfeeding? No   BMI 29.37 kg/m    Body mass index is 29.37 kg/m .  GENERAL: healthy, alert and no distress  HENT: ear canals and TM's normal with fluid seen on the right, nose and mouth without ulcers or lesions  NECK: no adenopathy, n  RESP: lungs clear to auscultation - no rales, rhonchi or wheezes  CV: regular rate and rhythm, normal S1 S2, no S3 or S4, no murmur, click or rub, 1+ non pitting peripheral edema bilaterally and peripheral pulses strong    Diagnostic Test Results:  none     ASSESSMENT/PLAN:       ICD-10-CM    1. Peripheral " edema R60.9 order for DME   2. Non-seasonal allergic rhinitis due to other allergic trigger J30.89 fexofenadine (ALLEGRA) 180 MG tablet   3. Type 2 diabetes mellitus with diabetic nephropathy, with long-term current use of insulin (H) E11.21     Z79.4    4. Homeless Z59.0    Patient with a very poor social situation. Currently living in her car. Patient is getting 1 meal per day. She will try to keep her insulin in a cooler pack as right now none of it is refrigerated. She will restart her Allegra as allergies are likely contributing to the ear pain. The peripheral edema is likely contributed by her eating out and increased salt and living in the car. She has no where to elevate her feet.   Patient encouraged to try compression socks, make sure she is taking her BP meds, blood pressure high today. She should try again some of the local shelters.   I encouraged her to monitor her blood sugar regularly particularly as she doesn't always have access to food.     FUTURE APPOINTMENTS:       - Follow-up visit in 2 weeks with MTM for co visit.     Sapna Anand PA-C  [facility]

## 2019-05-10 ASSESSMENT — ASTHMA QUESTIONNAIRES: ACT_TOTALSCORE: 18

## 2019-05-16 ENCOUNTER — TELEPHONE (OUTPATIENT)
Dept: FAMILY MEDICINE | Facility: CLINIC | Age: 64
End: 2019-05-16

## 2019-05-16 NOTE — TELEPHONE ENCOUNTER
Reason for Call:  Form, our goal is to have forms completed with 72 hours, however, some forms may require a visit or additional information.    Type of letter, form or note:  Insulin Treated Diabetes Mellitus Report - DMV    Who is the form from?: Patient    Where did the form come from: Patient or family brought in       What clinic location was the form placed at?: [facility])    Where the form was placed: Box Box/Folder    What number is listed as a contact on the form?: DMV: [phone]       Additional comments: Please call patient at [phone] when completed, she will .     Call taken on 5/16/2019 at 9:00 AM by Claudine Logan

## 2019-05-29 ENCOUNTER — OFFICE VISIT (OUTPATIENT)
Dept: PHARMACY | Facility: CLINIC | Age: 64
End: 2019-05-29
Payer: COMMERCIAL

## 2019-05-29 ENCOUNTER — OFFICE VISIT (OUTPATIENT)
Dept: FAMILY MEDICINE | Facility: CLINIC | Age: 64
End: 2019-05-29
Payer: COMMERCIAL

## 2019-05-29 VITALS
WEIGHT: 127 LBS | HEART RATE: 75 BPM | TEMPERATURE: 98.4 F | OXYGEN SATURATION: 98 % | SYSTOLIC BLOOD PRESSURE: 142 MMHG | DIASTOLIC BLOOD PRESSURE: 68 MMHG | BODY MASS INDEX: 28.47 KG/M2

## 2019-05-29 DIAGNOSIS — I10 HTN, GOAL BELOW 140/90: ICD-10-CM

## 2019-05-29 DIAGNOSIS — J30.89 NON-SEASONAL ALLERGIC RHINITIS DUE TO OTHER ALLERGIC TRIGGER: ICD-10-CM

## 2019-05-29 DIAGNOSIS — Z79.4 TYPE 2 DIABETES MELLITUS WITH DIABETIC NEUROPATHY, WITH LONG-TERM CURRENT USE OF INSULIN (H): Primary | ICD-10-CM

## 2019-05-29 DIAGNOSIS — E78.5 HYPERLIPIDEMIA LDL GOAL <100: ICD-10-CM

## 2019-05-29 DIAGNOSIS — E11.40 TYPE 2 DIABETES MELLITUS WITH DIABETIC NEUROPATHY, WITH LONG-TERM CURRENT USE OF INSULIN (H): Primary | ICD-10-CM

## 2019-05-29 DIAGNOSIS — I15.9 SECONDARY HYPERTENSION: ICD-10-CM

## 2019-05-29 DIAGNOSIS — K21.9 GASTROESOPHAGEAL REFLUX DISEASE, ESOPHAGITIS PRESENCE NOT SPECIFIED: ICD-10-CM

## 2019-05-29 DIAGNOSIS — J45.20 MILD INTERMITTENT ASTHMA WITHOUT COMPLICATION: ICD-10-CM

## 2019-05-29 PROCEDURE — 99607 MTMS BY PHARM ADDL 15 MIN: CPT | Performed by: PHARMACIST

## 2019-05-29 PROCEDURE — 99606 MTMS BY PHARM EST 15 MIN: CPT | Performed by: PHARMACIST

## 2019-05-29 PROCEDURE — 99215 OFFICE O/P EST HI 40 MIN: CPT | Performed by: PHYSICIAN ASSISTANT

## 2019-05-29 RX ORDER — METOPROLOL SUCCINATE 50 MG/1
50 TABLET, EXTENDED RELEASE ORAL DAILY
Qty: 90 TABLET | Refills: 3 | Status: SHIPPED | OUTPATIENT
Start: 2019-05-29 | End: 2020-08-10

## 2019-05-29 RX ORDER — ATORVASTATIN CALCIUM 40 MG/1
40 TABLET, FILM COATED ORAL DAILY
Qty: 90 TABLET | Refills: 3 | Status: SHIPPED | OUTPATIENT
Start: 2019-05-29 | End: 2020-08-10

## 2019-05-29 ASSESSMENT — ANXIETY QUESTIONNAIRES
7. FEELING AFRAID AS IF SOMETHING AWFUL MIGHT HAPPEN: SEVERAL DAYS
6. BECOMING EASILY ANNOYED OR IRRITABLE: NOT AT ALL
3. WORRYING TOO MUCH ABOUT DIFFERENT THINGS: NOT AT ALL
2. NOT BEING ABLE TO STOP OR CONTROL WORRYING: SEVERAL DAYS
GAD7 TOTAL SCORE: 4
5. BEING SO RESTLESS THAT IT IS HARD TO SIT STILL: NOT AT ALL
1. FEELING NERVOUS, ANXIOUS, OR ON EDGE: SEVERAL DAYS
IF YOU CHECKED OFF ANY PROBLEMS ON THIS QUESTIONNAIRE, HOW DIFFICULT HAVE THESE PROBLEMS MADE IT FOR YOU TO DO YOUR WORK, TAKE CARE OF THINGS AT HOME, OR GET ALONG WITH OTHER PEOPLE: SOMEWHAT DIFFICULT

## 2019-05-29 ASSESSMENT — PATIENT HEALTH QUESTIONNAIRE - PHQ9
SUM OF ALL RESPONSES TO PHQ QUESTIONS 1-9: 6
5. POOR APPETITE OR OVEREATING: SEVERAL DAYS

## 2019-05-29 NOTE — PROGRESS NOTES
Subjective     Nathalie Harp is a 64 year old female who presents to clinic today for the following health issues:    HPI   Diabetes Follow-up      How often are you checking your blood sugar? One time daily    What time of day are you checking your blood sugars (select all that apply)?  After meals    Have you had any blood sugars above 200?  Yes     Have you had any blood sugars below 70?  Yes     What symptoms do you notice when your blood sugar is low?  None    What concerns do you have today about your diabetes? None     Do you have any of these symptoms? (Select all that apply)  No numbness or tingling in feet.  No redness, sores or blisters on feet.  No complaints of excessive thirst.  No reports of blurry vision.  No significant changes to weight.     Have you had a diabetic eye exam in the last 12 months? Yes- Date of last eye exam: Last summer 2018    BP Readings from Last 2 Encounters:   05/29/19 142/68   05/09/19 166/85     Hemoglobin A1C (%)   Date Value   03/18/2019 9.2 (H)   12/07/2018 9.5 (H)     LDL Cholesterol Calculated (mg/dL)   Date Value   03/18/2019 120 (H)   02/09/2018 124 (H)       Diabetes Management Resources    Amount of exercise or physical activity: None    Problems taking medications regularly: No    Medication side effects: none    Diet: diabetic       Still with 1 meal per day. Not much option to cook.   Living out of her car, with the option of staying in her [employer] at times.     She is using levemir 40 units and usually 10 units of Novolog with the meal.   Her swelling is improving. She notes being able to get more sleep has helped. She has not gotten the compression socks.   Took her BP meds last night at 9pm.     She did restart her Allegra and that has helped her allergy symptoms.     none          Reviewed and updated as needed this visit by Provider  Tobacco  Allergies  Meds  Problems  Med Hx  Surg Hx  Fam Hx         Review of Systems   ROS COMP: Constitutional,  HEENT, cardiovascular, pulmonary, gi and gu systems are negative, except as otherwise noted.      Objective    /68   Pulse 75   Temp 98.4  F (36.9  C) (Oral)   Wt 57.6 kg (127 lb)   LMP  (LMP Unknown)   SpO2 98%   BMI 28.47 kg/m    Body mass index is 28.47 kg/m .  Physical Exam   GENERAL: healthy, alert and no distress  PSYCH: mentation appears normal, affect normal/bright    Diagnostic Test Results:  none         Assessment & Plan     1. Uncontrolled type 2 diabetes mellitus without complication, with long-term current use of insulin (H)  Not well controlled. See MTM note regarding blood sugars. Will increase levemir as planned by MTM    2. Hyperlipidemia LDL goal <100  Stable refilled.   - atorvastatin (LIPITOR) 40 MG tablet; Take 1 tablet (40 mg) by mouth daily  Dispense: 90 tablet; Refill: 3    3. Gastroesophageal reflux disease, esophagitis presence not specified  Stable. Refilled.   - omeprazole (PRILOSEC) 20 MG DR capsule; Take 1 capsule (20 mg) by mouth daily  Dispense: 90 capsule; Refill: 3    4. HTN, goal below 140/90  Not controlled. Increased metoprolol, recheck in 1 month.   - metoprolol succinate ER (TOPROL-XL) 50 MG 24 hr tablet; Take 1 tablet (50 mg) by mouth daily  Dispense: 90 tablet; Refill: 3    5. Asthma   Not well controlled. Likely from allergies. Repeat ACT in 1 month.         Return in about 1 month (around 6/26/2019) for Diabetes.    Sapna Anand PA-C  [facility]  >50% of the visit spent in counseling and coordination of care about co visit with MTM regarding diabetes, asthma, and HTN. Visit length 40 minutes.

## 2019-05-29 NOTE — LETTER
My Depression Action Plan  Name: Nathalie Harp   Date of Birth 1955  Date: 5/29/2019    My doctor: Sapna Anand   My clinic: [address] [address] [phone]          GREEN    ZONE   Good Control    What it looks like:     Things are going generally well. You have normal up s and down s. You may even feel depressed from time to time, but bad moods usually last less than a day.   What you need to do:  1. Continue to care for yourself (see self care plan)  2. Check your depression survival kit and update it as needed  3. Follow your physician s recommendations including any medication.  4. Do not stop taking medication unless you consult with your physician first.           YELLOW         ZONE Getting Worse    What it looks like:     Depression is starting to interfere with your life.     It may be hard to get out of bed; you may be starting to isolate yourself from others.    Symptoms of depression are starting to last most all day and this has happened for several days.     You may have suicidal thoughts but they are not constant.   What you need to do:     1. Call your care team, your response to treatment will improve if you keep your care team informed of your progress. Yellow periods are signs an adjustment may need to be made.     2. Continue your self-care, even if you have to fake it!    3. Talk to someone in your support network    4. Open up your depression survival kit           RED    ZONE Medical Alert - Get Help    What it looks like:     Depression is seriously interfering with your life.     You may experience these or other symptoms: You can t get out of bed most days, can t work or engage in other necessary activities, you have trouble taking care of basic hygiene, or basic responsibilities, thoughts of suicide or death that will not go away, self-injurious behavior.     What you need to  do:  1. Call your care team and request a same-day appointment. If they are not available (weekends or after hours) call your local crisis line, emergency room or 911.            Depression Self Care Plan / Survival Kit    Self-Care for Depression  Here s the deal. Your body and mind are really not as separate as most people think.  What you do and think affects how you feel and how you feel influences what you do and think. This means if you do things that people who feel good do, it will help you feel better.  Sometimes this is all it takes.  There is also a place for medication and therapy depending on how severe your depression is, so be sure to consult with your medical provider and/ or Behavioral Health Consultant if your symptoms are worsening or not improving.     In order to better manage my stress, I will:    Exercise  Get some form of exercise, every day. This will help reduce pain and release endorphins, the  feel good  chemicals in your brain. This is almost as good as taking antidepressants!  This is not the same as joining a gym and then never going! (they count on that by the way ) It can be as simple as just going for a walk or doing some gardening, anything that will get you moving.      Hygiene   Maintain good hygiene (Get out of bed in the morning, Make your bed, Brush your teeth, Take a shower, and Get dressed like you were going to work, even if you are unemployed).  If your clothes don't fit try to get ones that do.    Diet  I will strive to eat foods that are good for me, drink plenty of water, and avoid excessive sugar, caffeine, alcohol, and other mood-altering substances.  Some foods that are helpful in depression are: complex carbohydrates, B vitamins, flaxseed, fish or fish oil, fresh fruits and vegetables.    Psychotherapy  I agree to participate in Individual Therapy (if recommended).    Medication  If prescribed medications, I agree to take them.  Missing doses can result in serious  side effects.  I understand that drinking alcohol, or other illicit drug use, may cause potential side effects.  I will not stop my medication abruptly without first discussing it with my provider.    Staying Connected With Others  I will stay in touch with my friends, family members, and my primary care provider/team.    Use your imagination  Be creative.  We all have a creative side; it doesn t matter if it s oil painting, sand castles, or mud pies! This will also kick up the endorphins.    Witness Beauty  (AKA stop and smell the roses) Take a look outside, even in mid-winter. Notice colors, textures. Watch the squirrels and birds.     Service to others  Be of service to others.  There is always someone else in need.  By helping others we can  get out of ourselves  and remember the really important things.  This also provides opportunities for practicing all the other parts of the program.    Humor  Laugh and be silly!  Adjust your TV habits for less news and crime-drama and more comedy.    Control your stress  Try breathing deep, massage therapy, biofeedback, and meditation. Find time to relax each day.     My support system    Clinic Contact:  Phone number:    Contact 1:  Phone number:    Contact 2:  Phone number:    Jewish/:  Phone number:    Therapist:  Phone number:    Local crisis center:    Phone number:    Other community support:  Phone number:

## 2019-05-29 NOTE — PATIENT INSTRUCTIONS
1. Increase Levemir to 45units for 2 weeks, then increase again 50units every morning. Keep taking your Novolog 10units with the first bite of each meal or snack.    2. Increase metoprolol to 50mg once a day. You can take two of your 25mg tablets until gone, then  the 50mg tablets and take one per day.    Follow-up in 1 month with Jabari.    Jovana Loo, Pharm.D., [facility]  Medication Therapy Management Pharmacist  [phone]

## 2019-05-29 NOTE — PROGRESS NOTES
SUBJECTIVE/OBJECTIVE:                Nathalie Harp is a 64 year old female coming in for a follow-up visit for Medication Therapy Management.  She was referred to me from Sapna Anand. Seen as a co-visit with Sapna today.     Chief Complaint: Follow up from our visit on 2/15/19 and Sapna's visit 5/9/19.   Nathalie continues to live mostly out of her car with her daughter and therapy dog. However, she has been allowed access to her [employer] during most days and many nights. She also reports she is on top of list for permanent housing in [place]. She will know more about this in a few days.    Tobacco: No tobacco use  Alcohol: none    Medication Adherence: Issues found see below.    Diabetes:  Pt currently taking Levemir 40units QAM, Novolog 10units with meals - takes immediately prior to eating or during her meal. Adherence is likely somewhat sporadic due inconsistent meals and not having access to store insulin in a refrigerator.   Symptoms of low blood sugar? Shaky, dizzy. Checks at these times and does have a few readings <70mg/dL over the last month 68, 63 on 5/8  Symptoms of high blood sugar? None  SMBG (per glucometer): See below. 30d avg (n=33): 211, 14d avg (n=19): 205  Date FBG/ 2hours post Lunch/2hours post Dinner /2hours post   5/28 308     5/27 153, 200     5/26 284     5/25 263     5/24 190 73 133   5/23 202     5/22 290     5/21 260  72   5/20 323     5/19 166     5/18 168     5/17 204       Lab Results   Component Value Date    A1C 9.2 03/18/2019    A1C 9.5 12/07/2018    A1C 8.7 09/19/2018    A1C 14.4 05/30/2018    A1C 12.8 02/09/2018     Allergies/Asthma: Restarted Allegra and this is helping with allergy symptoms. Her current environment is exacerbating her asthma - hot/humid in [employer] basement, carpet. She has albuterol on hand and uses this when needed.  ACT Total Scores 8/1/2018 3/18/2019 5/9/2019   ACT TOTAL SCORE - - -   ASTHMA ER VISITS - - -   ASTHMA HOSPITALIZATIONS - - -   ACT TOTAL SCORE  "(Goal Greater than or Equal to 20) 20 13 18   In the past 12 months, how many times did you visit the emergency room for your asthma without being admitted to the hospital? 0 0 0   In the past 12 months, how many times were you hospitalized overnight because of your asthma? 0 0 0       Hypertension: Current medications include losartan/HCTZ 100/25mg daily, metoprolol XL 25 mg once daily.  Patient does not self-monitor BP.  Patient reports no current medication side effects.  BP Readings from Last 3 Encounters:   05/29/19 162/77   05/09/19 166/85   03/18/19 131/71     Today's Vitals:     BP Readings from Last 1 Encounters:   05/29/19 162/77     Pulse Readings from Last 1 Encounters:   05/29/19 75     Wt Readings from Last 1 Encounters:   05/29/19 127 lb (57.6 kg)     Ht Readings from Last 1 Encounters:   05/09/19 4' 8\" (1.422 m)     Estimated body mass index is 28.47 kg/m  as calculated from the following:    Height as of 5/9/19: 4' 8\" (1.422 m).    Weight as of an earlier encounter on 5/29/19: 127 lb (57.6 kg).    Temp Readings from Last 1 Encounters:   05/29/19 98.4  F (36.9  C) (Oral)         ASSESSMENT:              Current medications were reviewed today as discussed above.      Medication Adherence: Needs improvement.    Diabetes: Needs Improvement. Patient is not meeting A1c goal of < 8%.   Self monitoring of blood glucose is not at goal of FBG  mg/dL but has been <200mg/dL other times of day. Pt would benefit from increased dose of Levemir, continue same Novolog dose for now.    Allergies/Asthma: Needs improvement. Asthma symptoms seem exacerbated by allergies and her current living situation. Will monitor for now and reconsider treatment in 1 month.     Hypertension: Needs improvement. Patient is not meeting BP goal of < 140/90mmHg. She would benefit from increase dose of metoprolol.      PLAN:                  1. Increase Levemir to 40units once daily x2 weeks, then increase again to 50units once " daily. Updated Rx sent to pharmacy.  2. Increase metoprolol XR to 50mg daily. Sapna sent updated Rx.    I spent 45 minutes with this patient today. All changes were made via collaborative practice agreement with Sapna Anand. A copy of the visit note was provided to the patient's primary care provider.     Will follow up in 1 month co visit with Sapna.     The patient was provided a summary of these recommendations as an after visit summary.    Jovana Loo, Pharm.D., [facility]  Medication Therapy Management Pharmacist  [phone]

## 2019-05-30 ASSESSMENT — ANXIETY QUESTIONNAIRES: GAD7 TOTAL SCORE: 4

## 2019-05-30 ASSESSMENT — ASTHMA QUESTIONNAIRES: ACT_TOTALSCORE: 17

## 2019-07-03 ENCOUNTER — OFFICE VISIT (OUTPATIENT)
Dept: PHARMACY | Facility: CLINIC | Age: 64
End: 2019-07-03
Payer: COMMERCIAL

## 2019-07-03 ENCOUNTER — OFFICE VISIT (OUTPATIENT)
Dept: FAMILY MEDICINE | Facility: CLINIC | Age: 64
End: 2019-07-03
Payer: COMMERCIAL

## 2019-07-03 VITALS
BODY MASS INDEX: 28.79 KG/M2 | TEMPERATURE: 98.2 F | HEIGHT: 56 IN | WEIGHT: 128 LBS | SYSTOLIC BLOOD PRESSURE: 132 MMHG | HEART RATE: 73 BPM | DIASTOLIC BLOOD PRESSURE: 68 MMHG

## 2019-07-03 DIAGNOSIS — E11.21 TYPE 2 DIABETES MELLITUS WITH DIABETIC NEPHROPATHY, WITH LONG-TERM CURRENT USE OF INSULIN (H): Primary | ICD-10-CM

## 2019-07-03 DIAGNOSIS — Z79.4 TYPE 2 DIABETES MELLITUS WITH DIABETIC NEPHROPATHY, WITH LONG-TERM CURRENT USE OF INSULIN (H): Primary | ICD-10-CM

## 2019-07-03 DIAGNOSIS — I15.9 SECONDARY HYPERTENSION: ICD-10-CM

## 2019-07-03 DIAGNOSIS — J45.20 MILD INTERMITTENT ASTHMA WITHOUT COMPLICATION: ICD-10-CM

## 2019-07-03 DIAGNOSIS — J30.89 NON-SEASONAL ALLERGIC RHINITIS DUE TO OTHER ALLERGIC TRIGGER: ICD-10-CM

## 2019-07-03 DIAGNOSIS — I10 HTN, GOAL BELOW 140/90: ICD-10-CM

## 2019-07-03 LAB — HBA1C MFR BLD: 9.2 % (ref 0–5.6)

## 2019-07-03 PROCEDURE — 99215 OFFICE O/P EST HI 40 MIN: CPT | Performed by: PHYSICIAN ASSISTANT

## 2019-07-03 PROCEDURE — 36415 COLL VENOUS BLD VENIPUNCTURE: CPT | Performed by: PHYSICIAN ASSISTANT

## 2019-07-03 PROCEDURE — 99606 MTMS BY PHARM EST 15 MIN: CPT | Performed by: PHARMACIST

## 2019-07-03 PROCEDURE — 83036 HEMOGLOBIN GLYCOSYLATED A1C: CPT | Performed by: PHYSICIAN ASSISTANT

## 2019-07-03 PROCEDURE — 99607 MTMS BY PHARM ADDL 15 MIN: CPT | Performed by: PHARMACIST

## 2019-07-03 RX ORDER — FEXOFENADINE HCL 180 MG/1
180 TABLET ORAL DAILY
Qty: 90 TABLET | Refills: 1 | Status: SHIPPED | OUTPATIENT
Start: 2019-07-03 | End: 2020-01-31

## 2019-07-03 ASSESSMENT — MIFFLIN-ST. JEOR: SCORE: 978.6

## 2019-07-03 NOTE — PATIENT INSTRUCTIONS
INCREASE YOUR LEVEMIR TO 50 UNITS DAILY.         Upcoming Events  [facility]  Sat Jul 27th, 2019  [facility]  Sat Aug 10th, 2019  [facility]  Sat Aug 24th, 2019  [facility]  Sat Sep 14th, 2019  [facility]  Sat Sep 28th, 2019  8:30-11:30am

## 2019-07-03 NOTE — PROGRESS NOTES
SUBJECTIVE/OBJECTIVE:                Nathalie Harp is a 64 year old female coming in for a follow-up visit for Medication Therapy Management.  She was referred to me from Sapna Anand. Seen as a co-visit with Sapna today.     Chief Complaint: Follow up from our visit on 5/29/19.     Continues to be homeless. She spends most time in the basement of her [employer], [employer]. She was hopeful she would be in permanent housing byJune 1st, but she has had a hard time getting her references turned in to management, so she continues to wait on this.    Tobacco: No tobacco use  Alcohol: none    Medication Adherence: Issues found see below.    Diabetes:  Pt currently taking Levemir 45units QAM (forgot to increase to 50units as previously discussed), Novolog 10units with meals - takes immediately prior to eating or during her meal. Adherence is likely sporadic mostly due to poor access to food, and poor quality food when it is available. [employer] resources were discussed and provided to her today so she can have access to fresh produce over the summer.   Symptoms of low blood sugar? Shaky, dizzy which happens maybe a few times per month. She doesn't always check BG at these times. Did feel this way yesterday morning with BG of 101mg/dL.  Symptoms of high blood sugar? None  SMBG (per glucometer): See below. 30d avg (n=29): 236, 14d avg (n=14): 223  Date FBG/ 2hours post Lunch/2hours post   7/3 276    7/2 101    7/1 149    6/30 333    6/29 139    6/28 217/302    6/27 157    6/26 275    6/25 199    6/23  299   6/22 364    6/21 172    6/20 136      Lab Results   Component Value Date    A1C 9.2 07/03/2019    A1C 9.2 03/18/2019    A1C 9.5 12/07/2018    A1C 8.7 09/19/2018    A1C 14.4 05/30/2018       Allergies/Asthma: Currently taking Allegra and this is helping with allergy symptoms - needs refill today. Her current environment is exacerbating her asthma - hot/humid in [employer] basement, carpet, sometimes sleeping in car. She has  "albuterol on hand and uses this when needed, but it doesn't seem to help much. She has her albuterol inhaler with her today - it  2018.  ACT Total Scores 2019 2019 7/3/2019   ACT TOTAL SCORE - - -   ASTHMA ER VISITS - - -   ASTHMA HOSPITALIZATIONS - - -   ACT TOTAL SCORE (Goal Greater than or Equal to 20) 18 17 16   In the past 12 months, how many times did you visit the emergency room for your asthma without being admitted to the hospital? 0 0 0   In the past 12 months, how many times were you hospitalized overnight because of your asthma? 0 0 0       Hypertension: Current medications include losartan/HCTZ 100/25mg daily, metoprolol XL 50 mg once daily (dose increased at last visit).  Patient does not self-monitor BP.  Patient reports no current medication side effects. BP high initially today, but on recheck 132/68  BP Readings from Last 3 Encounters:   19 159/74   19 142/68   19 166/85     Today's Vitals:     BP Readings from Last 1 Encounters:   19 132/68     Pulse Readings from Last 1 Encounters:   19 73     Wt Readings from Last 1 Encounters:   19 128 lb (58.1 kg)     Ht Readings from Last 1 Encounters:   19 4' 8\" (1.422 m)     Estimated body mass index is 28.7 kg/m  as calculated from the following:    Height as of an earlier encounter on 7/3/19: 4' 8\" (1.422 m).    Weight as of an earlier encounter on 7/3/19: 128 lb (58.1 kg).      ASSESSMENT:              Current medications were reviewed today as discussed above.      Medication Adherence: Needs improvement.    Diabetes: Needs Improvement. Patient is not meeting A1c goal of < 8%. It is stable from previous. Will have her increase long-acting Levemir today to help improve FBG. No changes to Novolog, as her diet is too erratic at this time.    Allergies/Asthma: Needs improvement. Albuterol inhaler , needs to refill.    Hypertension: Stable. Patient is meeting BP goal of < 140/90mmHg. "       PLAN:                  1. Increase Levemir to 50units once daily.  2. Pick new albuterol inhaler at pharmacy.    I spent 45 minutes with this patient today. All changes were made via collaborative practice agreement with Sapna Anand. A copy of the visit note was provided to the patient's primary care provider.     Will follow up in 1 month.     The patient was provided a summary of these recommendations as an after visit summary from Sapna's encounter.    Jovana Loo, Pharm.D., [facility]  Medication Therapy Management Pharmacist  [phone]
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
## 2019-07-04 ASSESSMENT — ASTHMA QUESTIONNAIRES: ACT_TOTALSCORE: 16

## 2019-07-31 ENCOUNTER — OFFICE VISIT (OUTPATIENT)
Dept: PHARMACY | Facility: CLINIC | Age: 64
End: 2019-07-31
Payer: COMMERCIAL

## 2019-07-31 ENCOUNTER — ALLIED HEALTH/NURSE VISIT (OUTPATIENT)
Dept: NURSING | Facility: CLINIC | Age: 64
End: 2019-07-31
Payer: COMMERCIAL

## 2019-07-31 VITALS
HEART RATE: 60 BPM | BODY MASS INDEX: 28.74 KG/M2 | WEIGHT: 128.2 LBS | DIASTOLIC BLOOD PRESSURE: 72 MMHG | SYSTOLIC BLOOD PRESSURE: 150 MMHG

## 2019-07-31 DIAGNOSIS — J30.89 NON-SEASONAL ALLERGIC RHINITIS DUE TO OTHER ALLERGIC TRIGGER: ICD-10-CM

## 2019-07-31 DIAGNOSIS — I15.9 SECONDARY HYPERTENSION: ICD-10-CM

## 2019-07-31 DIAGNOSIS — Z23 NEED FOR SHINGLES VACCINE: Primary | ICD-10-CM

## 2019-07-31 PROCEDURE — 99607 MTMS BY PHARM ADDL 15 MIN: CPT | Performed by: PHARMACIST

## 2019-07-31 PROCEDURE — 99207 ZZC NO CHARGE NURSE ONLY: CPT

## 2019-07-31 PROCEDURE — 99606 MTMS BY PHARM EST 15 MIN: CPT | Performed by: PHARMACIST

## 2019-07-31 PROCEDURE — 90471 IMMUNIZATION ADMIN: CPT

## 2019-07-31 NOTE — NURSING NOTE
Screening Questionnaire for Adult Immunization    Are you sick today?   No   Do you have allergies to medications, food, a vaccine component or latex?   Yes   Have you ever had a serious reaction after receiving a vaccination?   No   Do you have a long-term health problem with heart disease, lung disease, asthma, kidney disease, metabolic disease (e.g. diabetes), anemia, or other blood disorder?   No   Do you have cancer, leukemia, HIV/AIDS, or any other immune system problem?   No   In the past 3 months, have you taken medications that affect  your immune system, such as prednisone, other steroids, or anticancer drugs; drugs for the treatment of rheumatoid arthritis, Crohn s disease, or psoriasis; or have you had radiation treatments?   No   Have you had a seizure, or a brain or other nervous system problem?   Yes   During the past year, have you received a transfusion of blood or blood     products, or been given immune (gamma) globulin or antiviral drug?   No   For women: Are you pregnant or is there a chance you could become        pregnant during the next month?   No   Have you received any vaccinations in the past 4 weeks?   No     Immunization questionnaire was positive for at least one answer.  Notified .        Per orders of Dr. Cheng MURPHY, injection of Second Shingrix Vaccines given by Oralia Larson. Patient instructed to remain in clinic for 15 minutes afterwards, and to report any adverse reaction to me immediately.     Clinic Administered Medication Documentation      Injectable Medication Documentation    Patient was given Second Shingrix Vaccines . Prior to medication administration, verified patients identity using patient s name and date of birth. Please see MAR and medication order for additional information. Patient instructed to remain in clinic for 15 minutes and report any adverse reaction to staff immediately .      Was entire vial of medication used? Yes  Vial/Syringe: Single dose  vial  Expiration Date:  07/04/2021  Was this medication supplied by the patient? Yes, Medication was received directly from patient in a tamper proof bag (follow site specific policies)  Screening performed by Oralia Larson on 7/31/2019 at 11:27 AM.

## 2019-07-31 NOTE — PATIENT INSTRUCTIONS
Recommendations from today's MTM visit:                                                      1. Start Jardiance 10mg one tablet every morning. I sent a prescription for this to the pharmacy.    2. Start a Cranberry supplement one daily to help prevent UTI. You can  downstairs or get the [employer].    3. Gather all of your Freestyle Bernice supplies, and bring back in this Friday so we can get you restarted on it. Stop at the pharmacy today and request a refill of your Freestyle sensors.     Next MTM visit: This Friday, August 2nd at 8:30am to place your Freestyle Sensor and Wednesday, August 28th at 12:30pm    To schedule another MTM appointment, please call the clinic directly or you may call the MTM scheduling line at [phone] or toll-free at [phone].     My Clinical Pharmacist's contact information:                                                      It was a pleasure talking with you today!  Please feel free to contact me with any questions or concerns you have.      Jovana Loo, Pharm.D., [facility]  Medication Therapy Management Pharmacist  [phone]    You may receive a survey about the MTM services you received by email and/or US Mail.  I would appreciate your feedback to help me serve you better in the future. Your comments will be anonymous.
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
## 2019-08-28 ENCOUNTER — OFFICE VISIT (OUTPATIENT)
Dept: PHARMACY | Facility: CLINIC | Age: 64
End: 2019-08-28
Payer: COMMERCIAL

## 2019-08-28 VITALS
BODY MASS INDEX: 28.97 KG/M2 | DIASTOLIC BLOOD PRESSURE: 68 MMHG | WEIGHT: 129.2 LBS | HEART RATE: 74 BPM | SYSTOLIC BLOOD PRESSURE: 124 MMHG

## 2019-08-28 DIAGNOSIS — J45.20 MILD INTERMITTENT ASTHMA WITHOUT COMPLICATION: ICD-10-CM

## 2019-08-28 DIAGNOSIS — M62.838 MUSCLE SPASM: ICD-10-CM

## 2019-08-28 DIAGNOSIS — Z91.013 ALLERGIC TO SHELLFISH: ICD-10-CM

## 2019-08-28 DIAGNOSIS — Z12.11 COLON CANCER SCREENING: Primary | ICD-10-CM

## 2019-08-28 DIAGNOSIS — K21.9 GASTROESOPHAGEAL REFLUX DISEASE, ESOPHAGITIS PRESENCE NOT SPECIFIED: ICD-10-CM

## 2019-08-28 DIAGNOSIS — G63 POLYNEUROPATHY ASSOCIATED WITH UNDERLYING DISEASE (H): ICD-10-CM

## 2019-08-28 PROCEDURE — 99607 MTMS BY PHARM ADDL 15 MIN: CPT | Performed by: PHARMACIST

## 2019-08-28 PROCEDURE — 99606 MTMS BY PHARM EST 15 MIN: CPT | Performed by: PHARMACIST

## 2019-08-28 RX ORDER — EPINEPHRINE 0.3 MG/.3ML
INJECTION SUBCUTANEOUS
Qty: 2 ML | Refills: 1 | Status: SHIPPED | OUTPATIENT
Start: 2019-08-28 | End: 2021-01-01

## 2019-08-28 RX ORDER — MONTELUKAST SODIUM 10 MG/1
10 TABLET ORAL AT BEDTIME
Qty: 30 TABLET | Refills: 11 | Status: SHIPPED | OUTPATIENT
Start: 2019-08-28 | End: 2020-10-22

## 2019-08-28 RX ORDER — FLASH GLUCOSE SCANNING READER
1 EACH MISCELLANEOUS CONTINUOUS
Qty: 1 DEVICE | Refills: 0 | Status: SHIPPED | OUTPATIENT
Start: 2019-08-28

## 2019-08-28 NOTE — PROGRESS NOTES
SUBJECTIVE/OBJECTIVE:                Nathalie Hapr is a 64 year old female coming in for a follow-up visit for Medication Therapy Management.  She was referred to me from Sapna Anand     Chief Complaint: Follow up from our visit on 7/31/19. Stable in her apartment. Not sleeping well because she doesn't have a bed yet.    Tobacco: No tobacco use  Alcohol: none    Medication Adherence: Issues found see below.    Diabetes:  Pt currently taking Levemir 50units QAM, Novolog 10units with meals (usually takes 10-12units) - takes immediately prior to eating or during her meal, reports she is changing her pen needle with each use. Prescribed Jardiance 10mg daily at last visit -- reports she had lost it and just found it at home this morning. She hasn't started yet.   SMBG Ranges (based on glucometer readings):  10d avg (n14) = 317  Brings in Freestyle Bernice sensors, but not sure where her reader is. Will see if pharmacy can refill and she will also check at home.  Date FBG/ 2hours post   8/28 205   8/27 354   8/26 333   8/23 276   8/22 243   8/21 360   8/20 327   8/19 506   8/18 401   8/16 166   8/13 228   8/12 412   Symptoms of low blood sugar? dizzy, weak with BG <100mg/dL.   Symptoms of high blood sugar? fatigue  Eye exam: due  Foot exam: due  Diet/Exercise: Usually eating twice a day right now -- rarely lunch if she has leftovers  Breakfast: Eggs and toast  Dinner: spaghetti, chili, potatoes   Aspirin: Taking 81mg daily and denies side effects  Statin: Yes: atorvastatin 40mg daily and denies side effects  ACEi/ARB: Yes: losartan/HCTZ 100/25mg daily. Urine albumin is   Lab Results   Component Value Date    UMALCR 954.77 (H) 03/18/2019       Lab Results   Component Value Date    A1C 9.2 07/03/2019    A1C 9.2 03/18/2019    A1C 9.5 12/07/2018    A1C 8.7 09/19/2018    A1C 14.4 05/30/2018      Wt Readings from Last 3 Encounters:   07/31/19 128 lb 3.2 oz (58.2 kg)   07/03/19 128 lb (58.1 kg)   05/29/19 127 lb (57.6  kg)     Creatinine   Date Value Ref Range Status   10/01/2018 0.78 0.52 - 1.04 mg/dL Final     GFR Estimate   Date Value Ref Range Status   10/01/2018 74 >60 mL/min/1.7m2 Final     Comment:     Non  GFR Calc       Allergies/Asthma:  Taking albuterol inhaler once a day. Does not currently have a maintenance inhaler, has never been on montelukast in the past. Currently taking Allegra and this is helping with allergy symptoms and fluticasone and saline nasal spray PRN which is helpful. Hasn;t needed Epi-pen in a while but thinks she should probably get a refill, [place] and is allergic to bee stings.  ACT Total Scores 5/29/2019 7/3/2019 8/28/2019   ACT TOTAL SCORE - - -   ASTHMA ER VISITS - - -   ASTHMA HOSPITALIZATIONS - - -   ACT TOTAL SCORE (Goal Greater than or Equal to 20) 17 16 14   In the past 12 months, how many times did you visit the emergency room for your asthma without being admitted to the hospital? 0 0 0   In the past 12 months, how many times were you hospitalized overnight because of your asthma? 0 0 0       Hypertension: Current medications include losartan/HCTZ 100/25mg daily, metoprolol XL 50 mg once daily. Takes all her meds at night and denies nocturia. Patient does not self-monitor BP.  Patient reports no current medication side effects.   BP Readings from Last 3 Encounters:   08/28/19 124/68   07/31/19 (!) 150/72   07/03/19 132/68     GERD: Current medications include: Prilosec (omeprazole) 20mg once daily. Pt c/o no current symptoms.  Patient feels that current regimen is effective.    Neuropathy: Gabapentin 100mg QAM (sometimes, depending on symptoms or plans for the day) and 300mg at bedtime. It does make her tired, which is why she doesn't always take AM dose. But it is really helpful for symptoms.    Muscle spasm: Has an rx for cyclobenzaprine 10mg and takes rarely.    Today's Vitals:     BP Readings from Last 1 Encounters:   08/28/19 124/68      Pulse Readings from Last 1 Encounters:   08/28/19 74     Wt Readings from Last 1 Encounters:   08/28/19 129 lb 3.2 oz (58.6 kg)     ASSESSMENT:              Current medications were reviewed today as discussed above.      Medication Adherence: Needs improvement.    Diabetes: Needs Improvement. Patient is not meeting A1c goal of < 8%. Self monitoring of blood glucose is not at goal of fasting  mg/dL and post prandial < 180 mg/dL.  She would benefit from Freestyle Bernice CGM to better assess her SMBG.  Pt would benefit from Basal Insulin (Levemir) :  stay on the same dose.  Bolus / Rapid Acting Insulin (Novolog) : stay on the same dose.  SGLT2 inhibitor (Jardiance) :  Initiate for BG lowering, weight loss, cardiac and renal-protective benefits.    Allergies/Asthma: Worsened. She may benefit from initiation of montelukast at this time. Would consider ICS at next visit if no improvement.     Hypertension: At goal of < 140/90mmHg.    GERD: Stable.  Chronic PPI use places patient at an increased risk of C. Diff, hypomagnesemia and lower bone mineral density, these risks were reviewed with the patient.     Neuropathy: Stable.    Muscle Spasm: Stable.    PLAN:                  1. We'll see if the pharmacy (downstairs OR mail order) can refill the Freestyle Bernice [place]. If so, bring it to our next appointment and we'll set up the meter.    2. Start Jardiance 10mg one tablet by mouth daily today. This medication helps your body get rid of excess sugar through the urine and also promotes weight loss. You may notice increased urination and there is a chance of developing a yeast infection or urinary tract infection. Please also start a daily cranberry supplement (either a daily pill or natural, sugar free cranberry juice every morning).     3. Start montelukast 10mg one tablet every evening. This can help with allergies and asthma -- particularly asthma that is caused by allergic reactions.    4. Stop in the lab and   a FIT test.      I spent 45 minutes with this patient today. All changes were made via collaborative practice agreement with Sapna Anand. A copy of the visit note was provided to the patient's primary care provider.     Will follow up in 1 month.     The patient was provided a summary of these recommendations as an after visit summary from Sapna's encounter.    Jovana Loo, Pharm.D., BCACP  Medication Therapy Management Pharmacist  [phone]

## 2019-08-28 NOTE — PATIENT INSTRUCTIONS
Recommendations from today's MTM visit:                                                      1. We'll see if the pharmacy (downstairs OR mail order) can refill the Freestyle Bernice [place]. If so, bring it to our next appointment and we'll set up the meter.    2. Start Jardiance 10mg one tablet by mouth daily today. This medication helps your body get rid of excess sugar through the urine and also promotes weight loss. You may notice increased urination and there is a chance of developing a yeast infection or urinary tract infection. Please also start a daily cranberry supplement (either a daily pill or natural, sugar free cranberry juice every morning).     3. Start montelukast 10mg one tablet every evening. This can help with allergies and asthma -- particularly asthma that is caused by allergic reactions.    4. Stop in the lab and  a FIT test.    It was great to speak with you today.  I value your experience and would be very thankful for your time with providing feedback on our clinic survey. You may receive a survey via email or text message in the next few days.     Next MTM visit: Wednesday, September 18th at 9am.    To schedule another MTM appointment, please call the clinic directly or you may call the MTM scheduling line at [phone] or toll-free at [phone].     My Clinical Pharmacist's contact information:                                                      It was a pleasure talking with you today!  Please feel free to contact me with any questions or concerns you have.      Jovana Loo, Pharm.D., [facility]  Medication Therapy Management Pharmacist  [phone]

## 2019-08-29 ASSESSMENT — ASTHMA QUESTIONNAIRES: ACT_TOTALSCORE: 14

## 2019-09-18 ENCOUNTER — OFFICE VISIT (OUTPATIENT)
Dept: PHARMACY | Facility: CLINIC | Age: 64
End: 2019-09-18
Payer: COMMERCIAL

## 2019-09-18 PROCEDURE — 99607 MTMS BY PHARM ADDL 15 MIN: CPT | Performed by: PHARMACIST

## 2019-09-18 PROCEDURE — 99606 MTMS BY PHARM EST 15 MIN: CPT | Performed by: PHARMACIST

## 2019-09-18 NOTE — PATIENT INSTRUCTIONS
Recommendations from today's MT visit:                                                      1. The first hour after you place the sensor is the warm up period (black out period).  You will need to carry your blood glucose meter and check blood glucose manually during this time.    2. Check blood sugar if feeling symptoms of low blood sugar, but meter is not displaying a low number. There may be some variability between sensor and meter at times.    3. Change sensor every 14 days.    4. Read/scan blood sugars every 8 hours to ensure all of your blood sugar data is captured.     5. Watch trend arrows. These will let you know if blood sugars are rising, falling or stable at the time you check.    6. It is okay to shower, bathe, and swim (up to 3 feet deep for 30 minutes) with sensor. Do not get reader wet.    7. Remove the sensor if you need to have an MRI or CT scan.    8. Do not cover the sensor with extra adhesive (the small hole in the center of the sensor must remain uncovered).  If you have trouble with sensor falling off, these are approved products to help with sensor adhesion: Torbot Skin Tac, SKIN-PREP Protective Barrier Wipe and Mastisol Liquid Adhesive.    It was great to speak with you today.  I value your experience and would be very thankful for your time with providing feedback on our clinic survey. You may receive a survey via email or text message in the next few days.     Next MT visit: Wednesday, October 2nd at 2pm -- I'll see if we can schedule you for a flu shot immediately after    To schedule another [facility] appointment, please call the clinic directly or you may call the [facility] scheduling line at [phone] or toll-free at [phone].     My Clinical Pharmacist's contact information:                                                      It was a pleasure talking with you today!  Please feel free to contact me with any questions or concerns you have.      Jovana Loo, Pharm.D.,  [facility]  Medication Therapy Management Pharmacist  [phone]

## 2019-09-18 NOTE — PROGRESS NOTES
SUBJECTIVE/OBJECTIVE:                Nathalie Harp is a 64 year old female coming in for a follow-up visit for Medication Therapy Management.  She was referred to me from Sapna Anand     Chief Complaint: Follow up from our visit on 8/28/19.    Tobacco: No tobacco use  Alcohol: none    Medication Adherence: Issues found see below.    Diabetes:  Pt currently taking Levemir 50units QAM, Novolog 10units with meals (usually takes 10-12units) - takes immediately prior to eating or during her meal. Started Jardiance 10mg daily following last visit and reports she has been on it consistently for the last 4 days.  SMBG Ranges (based on glucometer readings):  10d avg (n14) = 313  Brings in Freestyle Bernice sensors and new reader, would like this set up today.  Date FBG/ 2hours post   9/17` 100   9/16 125   9/13 142   9/12 321   9/11 545   9/9 470   9/8 249   9/7 273   9/6 507   9/5 397   9/2 310   8/31 372   Symptoms of low blood sugar? dizzy, weak with BG <100mg/dL.   Symptoms of high blood sugar? Fatigue -- confusion which she feels has slightly improved over the last few days.  Diet/Exercise: Usually eating twice a day right now -- rarely lunch if she has leftovers. Notes she has been eating more protein. Working on getting into a grocery routine in her new apartment.  Breakfast: Eggs and toast  Dinner: spaghetti, chili, potatoes   Aspirin: Taking 81mg daily and denies side effects  Statin: Yes: atorvastatin 40mg daily and denies side effects  ACEi/ARB: Yes: losartan/HCTZ 100/25mg daily. Urine albumin is   Lab Results   Component Value Date    UMALCR 954.77 (H) 03/18/2019       Lab Results   Component Value Date    A1C 9.2 07/03/2019    A1C 9.2 03/18/2019    A1C 9.5 12/07/2018    A1C 8.7 09/19/2018    A1C 14.4 05/30/2018      Wt Readings from Last 3 Encounters:   08/28/19 129 lb 3.2 oz (58.6 kg)   07/31/19 128 lb 3.2 oz (58.2 kg)   07/03/19 128 lb (58.1 kg)     Creatinine   Date Value Ref Range Status    10/01/2018 0.78 0.52 - 1.04 mg/dL Final     GFR Estimate   Date Value Ref Range Status   10/01/2018 74 >60 mL/min/1.7m2 Final     Comment:     Non  GFR Calc       Today's Vitals: Deferred due to time, patient arrived late for visit today.    ASSESSMENT:              Current medications were reviewed today as discussed above.      Medication Adherence: Needs improvement.    Diabetes: Needs Improvement. Patient is not meeting A1c goal of < 8%. Self monitoring of blood glucose is not consistently at goal of fasting  mg/dL, but significantly improved over last 3 days she has been on Jardiance.  She would benefit from Freestyle Bernice CGM teaching today to better assess her SMBG.  Pt would benefit from Basal Insulin (Levemir) :  stay on the same dose.  Bolus / Rapid Acting Insulin (Novolog) : stay on the same dose.  SGLT2 inhibitor (Jardiance) :  Complaince strongly encouraged, as her blood sugars are significantly improved since starting it.       PLAN:                  1. Bernice set up today. Application and meter technique reviewed at length.  2. Continue present medications -- STRONGLY encouraged compliance with Jardiance and SMBG regularly throughout the day.  3. Will find out if we can also schedule her for flu shot following next visit.      I spent 30 minutes with this patient today. All changes were made via collaborative practice agreement with Sapna Anand. A copy of the visit note was provided to the patient's primary care provider.     Will follow up in 2 weeks.     The patient was provided a summary of these recommendations as an after visit summary from Sapna's encounter.    Jovana Loo, Pharm.D., BCACP  Medication Therapy Management Pharmacist  [phone]

## 2019-10-02 ENCOUNTER — IMMUNIZATION (OUTPATIENT)
Dept: NURSING | Facility: CLINIC | Age: 64
End: 2019-10-02
Payer: COMMERCIAL

## 2019-10-02 ENCOUNTER — OFFICE VISIT (OUTPATIENT)
Dept: PHARMACY | Facility: CLINIC | Age: 64
End: 2019-10-02
Payer: COMMERCIAL

## 2019-10-02 VITALS
SYSTOLIC BLOOD PRESSURE: 130 MMHG | WEIGHT: 129.9 LBS | DIASTOLIC BLOOD PRESSURE: 64 MMHG | BODY MASS INDEX: 29.12 KG/M2 | HEART RATE: 60 BPM

## 2019-10-02 DIAGNOSIS — Z23 NEED FOR PROPHYLACTIC VACCINATION AND INOCULATION AGAINST INFLUENZA: Primary | ICD-10-CM

## 2019-10-02 LAB
ANION GAP SERPL CALCULATED.3IONS-SCNC: 5 MMOL/L (ref 3–14)
BUN SERPL-MCNC: 49 MG/DL (ref 7–30)
CALCIUM SERPL-MCNC: 10.8 MG/DL (ref 8.5–10.1)
CHLORIDE SERPL-SCNC: 97 MMOL/L (ref 94–109)
CO2 SERPL-SCNC: 28 MMOL/L (ref 20–32)
CREAT SERPL-MCNC: 1.03 MG/DL (ref 0.52–1.04)
GFR SERPL CREATININE-BSD FRML MDRD: 56 ML/MIN/{1.73_M2}
GLUCOSE SERPL-MCNC: 467 MG/DL (ref 70–99)
HBA1C MFR BLD: 11.4 % (ref 0–5.6)
POTASSIUM SERPL-SCNC: 4.9 MMOL/L (ref 3.4–5.3)
SODIUM SERPL-SCNC: 130 MMOL/L (ref 133–144)

## 2019-10-02 PROCEDURE — 80048 BASIC METABOLIC PNL TOTAL CA: CPT | Performed by: PHYSICIAN ASSISTANT

## 2019-10-02 PROCEDURE — G0008 ADMIN INFLUENZA VIRUS VAC: HCPCS

## 2019-10-02 PROCEDURE — 99606 MTMS BY PHARM EST 15 MIN: CPT | Performed by: PHARMACIST

## 2019-10-02 PROCEDURE — 90662 IIV NO PRSV INCREASED AG IM: CPT

## 2019-10-02 PROCEDURE — 36415 COLL VENOUS BLD VENIPUNCTURE: CPT | Performed by: PHYSICIAN ASSISTANT

## 2019-10-02 PROCEDURE — 99607 MTMS BY PHARM ADDL 15 MIN: CPT | Performed by: PHARMACIST

## 2019-10-02 PROCEDURE — 83036 HEMOGLOBIN GLYCOSYLATED A1C: CPT | Performed by: PHYSICIAN ASSISTANT

## 2019-10-02 NOTE — PROGRESS NOTES
SUBJECTIVE/OBJECTIVE:                Nathalie Harp is a 64 year old female coming in for a follow-up visit for Medication Therapy Management.  She was referred to me from Sapna Anand     Chief Complaint: Follow up from our visit on 9/18/19.    Tobacco: No tobacco use  Alcohol: none    Medication Adherence: Issues found see below.    Diabetes:  Pt currently taking Jardiance 10mg daily (has been on for about 3 weeks), Levemir 50units QAM, Novolog 10units with meals (usually takes 10-12units) - takes immediately prior to eating or during her meal. Reports she has been consistently taking the Jardiance. Admits she has missed some doses of her insulin over the past few weeks, did not take her Levemir this morning because in a hurry.  SMBG Ranges (based on glucometer readings):  Has Freestyle Bernice, but sensor came off last week in her sleep. In meantime, has been using old meter: 14d avg (n9) 201  Date FBG/ 2hours post Lunch/2hours post Dinner /2hours post HS   10/2 386      10/1 74      9/30 250   188   9/29 194      9/28 196  69    8/27 127      9/26 327      Per Freestyle Bernice: 14d avg 269mg/dL  12am-6am = 300mg/dL  6am-12pm = 207mg/dL  12pm-6pm = 324mg/dL  6pm-12am =270mg/dL          Symptoms of low blood sugar? Shaky, dizzy, weak with BG <100mg/dL.   Symptoms of high blood sugar? Fatigue  Diet/Exercise: Usually eating twice a day right now -- rarely lunch if she has leftovers. Notes she has been eating more protein. Working on getting into a grocery routine in her new apartment.  Breakfast: Eggs and toast  Dinner: spaghetti, chili, potatoes   Aspirin: Taking 81mg daily and denies side effects  Statin: Yes: atorvastatin 40mg daily and denies side effects  ACEi/ARB: Yes: losartan/HCTZ 100/25mg daily. Urine albumin is   Lab Results   Component Value Date    UMALCR 954.77 (H) 03/18/2019       Lab Results   Component Value Date    A1C 9.2 07/03/2019    A1C 9.2 03/18/2019    A1C 9.5 12/07/2018    A1C 8.7  09/19/2018    A1C 14.4 05/30/2018      Wt Readings from Last 3 Encounters:   08/28/19 129 lb 3.2 oz (58.6 kg)   07/31/19 128 lb 3.2 oz (58.2 kg)   07/03/19 128 lb (58.1 kg)     Creatinine   Date Value Ref Range Status   10/01/2018 0.78 0.52 - 1.04 mg/dL Final     GFR Estimate   Date Value Ref Range Status   10/01/2018 74 >60 mL/min/1.7m2 Final     Comment:     Non  GFR Calc       Today's Vitals:   BP Readings from Last 1 Encounters:   10/02/19 130/64     Pulse Readings from Last 1 Encounters:   10/02/19 60     Wt Readings from Last 1 Encounters:   10/02/19 129 lb 14.4 oz (58.9 kg)       ASSESSMENT:              Current medications were reviewed today as discussed above.      Medication Adherence: Needs improvement.    Diabetes: Needs Improvement. Patient is not meeting A1c goal of < 8%. Self monitoring of blood glucose is not at goal of fasting  mg/dL or post-prandial <180mg/dL. Her blood sugar readings appear to reflect a combination of not enough insulin, particularly with meals and likely non-compliance.  She would benefit from:  Freestyle Bernice CGM: replace sensor  Basal Insulin (Levemir) :  stay on the same dose.  Bolus / Rapid Acting Insulin (Novolog) : increase dose.  SGLT2 inhibitor (Jardiance) :  Finish current supply of 10mg tablets, then pending normal BMP will plan to increase to 25mg dose.       PLAN:                  1. Bernice sensor replaced today.  2. BMP and A1c today.  3. Flu shot today.  4. Increase Novolog to 15units with each meal.    I spent 45 minutes with this patient today. All changes were made via collaborative practice agreement with Sapna Anand. A copy of the visit note was provided to the patient's primary care provider.     Will follow up in 2 weeks.     The patient was provided a summary of these recommendations as an after visit summary from Sapna's encounter.    Jovana Loo, Pharm.D., [facility]  Medication Therapy Management Pharmacist  [phone]

## 2019-10-02 NOTE — PATIENT INSTRUCTIONS
Recommendations from today's MTM visit:                                                      1. Increase your Novolog to 15units each time you eat.    2. Continue taking Jardiance 10mg one tablet daily for now. We'll recheck your electrolytes and kidney function, and if everything is normal, we can go up to the 25mg dose.    3. Make sure you are not missing your medications at all -- they don't work if you don't take them. The only time I would suggest skipping your diabetes medicines is if you are not feeling well AND you are having blood sugar levels under 70mg/dL.    4. Try to scan your sensor/check your sugar at least 4 times per day.    5. Flu shot given today and updated labs.    It was great to speak with you today.  I value your experience and would be very thankful for your time with providing feedback on our clinic survey. You may receive a survey via email or text message in the next few days.     Next MTM visit:     To schedule another MTM appointment, please call the clinic directly or you may call the MTM scheduling line at [phone] or toll-free at [phone].     My Clinical Pharmacist's contact information:                                                      It was a pleasure talking with you today!  Please feel free to contact me with any questions or concerns you have.      Jovana Loo, Pharm.D., [facility]  Medication Therapy Management Pharmacist  [phone]

## 2019-10-04 ENCOUNTER — TELEPHONE (OUTPATIENT)
Dept: FAMILY MEDICINE | Facility: CLINIC | Age: 64
End: 2019-10-04

## 2019-10-04 NOTE — TELEPHONE ENCOUNTER
"I am on call on 10-2-19 and received a page regarding this patient with the message \"criticl glucose level\". I called the [facility] laboratory to discuss the concern. Glucose was 467 no other laboratory abnormalities reported.   I called the patient andwas able to speak with her.   Her speech was clear and she denied any acute symptom(s). She reports that her blood sugar(s) often run above 300. She just ate and then took her insulin. I advised that she check her blood sugar(s) in 30 minute(s) and contact me via FNA if her sugars are remaining elevated more than usual for her.     "

## 2019-11-01 DIAGNOSIS — E11.21 TYPE 2 DIABETES MELLITUS WITH DIABETIC NEPHROPATHY, WITH LONG-TERM CURRENT USE OF INSULIN (H): Primary | ICD-10-CM

## 2019-11-01 DIAGNOSIS — Z79.4 TYPE 2 DIABETES MELLITUS WITH DIABETIC NEPHROPATHY, WITH LONG-TERM CURRENT USE OF INSULIN (H): Primary | ICD-10-CM

## 2019-11-01 NOTE — TELEPHONE ENCOUNTER
Requested Prescriptions   Pending Prescriptions Disp Refills     Continuous Blood Gluc Sensor (FREESTYLE LALO 14 DAY SENSOR) MISC [Pharmacy Med Name: FREESTYLE LALO 14 DAY SENSO  MISC] 2 each 10     Sig: CHANGE EVERY 14 DAYS       There is no refill protocol information for this order         Last Written Prescription Date:  8/28/19  Last Fill Quantity: 1,   # refills: 0  Last Office Visit: 7/3/19  Future Office visit:       Routing refill request to provider for review/approval because:  Drug not on the [facility], P or [facility] refill protocol or controlled substance

## 2019-11-04 RX ORDER — FLASH GLUCOSE SENSOR
KIT MISCELLANEOUS
Qty: 2 EACH | Refills: 10 | Status: SHIPPED | OUTPATIENT
Start: 2019-11-04 | End: 2020-10-22

## 2019-12-11 ENCOUNTER — TELEPHONE (OUTPATIENT)
Dept: FAMILY MEDICINE | Facility: CLINIC | Age: 64
End: 2019-12-11

## 2019-12-11 NOTE — TELEPHONE ENCOUNTER
Type of outreach:    Called pt and was unable to leave voicemail. Will try calling pt back in 7 days.    Questions for provider review:    None                                                                                                                                    CHANCE Steve MA       Chart routed to Care Team .

## 2019-12-11 NOTE — LETTER
January 8, 2020    Nathalie Harp  [address] [place] [id number]    Dear Nathalie    We care about your health and have reviewed your health plan. We have reviewed your medical conditions, medication list, and lab results and are making recommendations based on this review, to better manage your health.    You are in particular need of attention regarding:  - Your Diabetes      Here is a list of Health Maintenance topics that are due now or due soon:  Health Maintenance Due   Topic Date Due     COLONOSCOPY  01/16/2019     ASTHMA ACTION PLAN  04/27/2019     EYE EXAM  11/01/2019     PHQ-2  01/01/2020     A1C  01/02/2020       Please call us at [phone] (or use [employer]Call) to address the above recommendations. If we do not hear from you in the next couple of weeks we will be reaching out to you again.    Thank you for trusting [facility] and we appreciate the opportunity to serve you.  We look forward to supporting your healthcare needs in the future.    Healthy Regards,    ALG/ML

## 2019-12-13 RX ORDER — EMPAGLIFLOZIN 10 MG/1
TABLET, FILM COATED ORAL
Qty: 30 TABLET | Refills: 0 | Status: SHIPPED | OUTPATIENT
Start: 2019-12-13 | End: 2020-01-20

## 2019-12-13 NOTE — TELEPHONE ENCOUNTER
Prescription approved per [facility] Refill Protocol.    Joycelyn Smith, RN, BSN, PHN  [facility] [place]

## 2019-12-13 NOTE — TELEPHONE ENCOUNTER
Requested Prescriptions   Pending Prescriptions Disp Refills     JARDIANCE 10 MG TABS tablet [Pharmacy Med Name: JARDIANCE 10MG TABS] 30 tablet 0     Sig: TAKE ONE TABLET BY MOUTH ONCE DAILY   Last Written Prescription Date:  10/10/19  Last Fill Quantity: 30,  # refills: 0   Last office visit: 10/2/2019 with prescribing provider:     Future Office Visit:        Sodium Glucose Co-Transport Inhibitor Agents Passed - 12/13/2019 12:59 PM        Passed - Blood pressure less than 140/90 in past 6 months     BP Readings from Last 3 Encounters:   10/02/19 130/64   08/28/19 124/68   07/31/19 (!) 150/72                 Passed - Patient has documented LDL within the past 12 mos.     Recent Labs   Lab Test 03/18/19  1009   *             Passed - Patient has had a Microalbumin in the past 15 mos.     Recent Labs   Lab Test 03/18/19  1026  11/17/11  1541   [id number]  --   --  5.0   [id number]  --   --  65.1*   MICROL 190   < >  --    UMALCR 954.77*   < >  --     < > = values in this interval not displayed.             Passed - Patient has documented A1c within the specified period of time.     If HgbA1C is 8 or greater, it needs to be on file within the past 3 months.  If less than 8, must be on file within the past 6 months.     Recent Labs   Lab Test 10/02/19  1507   A1C 11.4*             Passed - No creatinine >1.4 or GFR <45 within the past 12 mos     Recent Labs   Lab Test 10/02/19  1507   GFRESTIMATED 56*   GFRESTBLACK 65       Recent Labs   Lab Test 10/02/19  1507   CR 1.03             Passed - Medication is active on med list        Passed - Patient is age 18 or older        Passed - Patient is not pregnant        Passed - Patient has documented normal Potassium within the last 12 mos.     Recent Labs   Lab Test 10/02/19  1507   POTASSIUM 4.9             Passed - Patient has no positive pregnancy test within the past 12 mos.        Passed - Recent (6 mo) or future (30 days) visit within the authorizing provider's  "specialty     Patient had office visit in the last 6 months or has a visit in the next 30 days with authorizing provider or within the authorizing provider's specialty.  See \"Patient Info\" tab in inbasket, or \"Choose Columns\" in Meds & Orders section of the refill encounter.              "

## 2020-01-01 ENCOUNTER — TELEPHONE (OUTPATIENT)
Dept: PHARMACY | Facility: CLINIC | Age: 65
End: 2020-01-01

## 2020-01-01 ENCOUNTER — VIRTUAL VISIT (OUTPATIENT)
Dept: PHARMACY | Facility: CLINIC | Age: 65
End: 2020-01-01
Payer: COMMERCIAL

## 2020-01-01 DIAGNOSIS — Z79.4 TYPE 2 DIABETES MELLITUS WITH DIABETIC NEUROPATHY, WITH LONG-TERM CURRENT USE OF INSULIN (H): Primary | ICD-10-CM

## 2020-01-01 DIAGNOSIS — E11.40 TYPE 2 DIABETES MELLITUS WITH DIABETIC NEUROPATHY, WITH LONG-TERM CURRENT USE OF INSULIN (H): Primary | ICD-10-CM

## 2020-01-01 PROCEDURE — 99606 MTMS BY PHARM EST 15 MIN: CPT | Mod: TEL | Performed by: PHARMACIST

## 2020-01-01 PROCEDURE — 99607 MTMS BY PHARM ADDL 15 MIN: CPT | Mod: TEL | Performed by: PHARMACIST

## 2020-01-08 NOTE — TELEPHONE ENCOUNTER
Type of outreach:    Sent letter.    Questions for provider review:    None                                                                                                                                    CHANCE Steve MA       Chart routed to Care Team .

## 2020-01-15 ENCOUNTER — TRANSFERRED RECORDS (OUTPATIENT)
Dept: HEALTH INFORMATION MANAGEMENT | Facility: CLINIC | Age: 65
End: 2020-01-15

## 2020-01-15 LAB — RETINOPATHY: NORMAL

## 2020-01-29 ENCOUNTER — TELEPHONE (OUTPATIENT)
Dept: FAMILY MEDICINE | Facility: CLINIC | Age: 65
End: 2020-01-29

## 2020-01-29 DIAGNOSIS — Z79.4 TYPE 2 DIABETES MELLITUS WITH DIABETIC NEPHROPATHY, WITH LONG-TERM CURRENT USE OF INSULIN (H): ICD-10-CM

## 2020-01-29 DIAGNOSIS — J30.89 NON-SEASONAL ALLERGIC RHINITIS DUE TO OTHER ALLERGIC TRIGGER: ICD-10-CM

## 2020-01-29 DIAGNOSIS — E11.21 TYPE 2 DIABETES MELLITUS WITH DIABETIC NEPHROPATHY, WITH LONG-TERM CURRENT USE OF INSULIN (H): ICD-10-CM

## 2020-01-29 NOTE — TELEPHONE ENCOUNTER
Prior Authorization Retail Medication Request OR Change Medication  New formulary!  Medication/Dose: Levimir   ICD code (if different than what is on RX):    Previously Tried and Failed:    Rationale:  Closed Formulary--  The insurance prefers [facility]    Insurance Name:  [employer] through [employer]  Insurance ID:  [id number]      Pharmacy Information (if different than what is on RX)  Name:  Melissa Mendiola CPhT  [facility]  [phone]

## 2020-01-29 NOTE — TELEPHONE ENCOUNTER
"Requested Prescriptions   Pending Prescriptions Disp Refills     losartan-hydrochlorothiazide (HYZAAR) 100-25 MG tablet [Pharmacy Med Name: LOSARTAN/HCTZ 100-25MG TAB] 90 tablet 0     Sig: TAKE ONE TABLET BY MOUTH EVERY MORNING   Last Written Prescription Date:  10/10/19  Last Fill Quantity: 90,  # refills: 0   Last office visit: 10/2/2019 with prescribing provider:     Future Office Visit:        Angiotensin-II Receptors Passed - 1/29/2020  3:02 PM        Passed - Last blood pressure under 140/90 in past 12 months     BP Readings from Last 3 Encounters:   10/02/19 130/64   08/28/19 124/68   07/31/19 (!) 150/72                 Passed - Recent (12 mo) or future (30 days) visit within the authorizing provider's specialty     Patient has had an office visit with the authorizing provider or a provider within the authorizing providers department within the previous 12 mos or has a future within next 30 days. See \"Patient Info\" tab in inbasket, or \"Choose Columns\" in Meds & Orders section of the refill encounter.              Passed - Medication is active on med list        Passed - Patient is age 18 or older        Passed - No active pregnancy on record        Passed - Normal serum creatinine on file in past 12 months     Recent Labs   Lab Test 10/02/19  1507   CR 1.03             Passed - Normal serum potassium on file in past 12 months     Recent Labs   Lab Test 10/02/19  1507   POTASSIUM 4.9                    Passed - No positive pregnancy test in past 12 months          "

## 2020-01-29 NOTE — TELEPHONE ENCOUNTER
"Requested Prescriptions   Pending Prescriptions Disp Refills     fexofenadine (ALLEGRA) 180 MG tablet [Pharmacy Med Name: FEXOFENADINE HCL 180MG TABS] 90 tablet 1     Sig: TAKE ONE TABLET BY MOUTH ONCE DAILY   Last Written Prescription Date:  7/3/19  Last Fill Quantity: 90,  # refills: 1   Last office visit: 7/3/2019 with prescribing provider:     Future Office Visit:        Antihistamines Protocol Failed - 1/29/2020  3:02 PM        Failed - Patient is 3-64 years of age     Apply weight-based dosing for peds patients age 3 - 12 years of age.    Forward request to provider for patients under the age of 3 or over the age of 64.          Passed - Recent (12 mo) or future (30 days) visit within the authorizing provider's specialty     Patient has had an office visit with the authorizing provider or a provider within the authorizing providers department within the previous 12 mos or has a future within next 30 days. See \"Patient Info\" tab in inbasket, or \"Choose Columns\" in Meds & Orders section of the refill encounter.              Passed - Medication is active on med list          "

## 2020-01-31 RX ORDER — LOSARTAN POTASSIUM AND HYDROCHLOROTHIAZIDE 25; 100 MG/1; MG/1
TABLET ORAL
Qty: 90 TABLET | Refills: 0 | Status: SHIPPED | OUTPATIENT
Start: 2020-01-31 | End: 2020-03-27

## 2020-01-31 RX ORDER — FEXOFENADINE HCL 180 MG/1
TABLET ORAL
Qty: 90 TABLET | Refills: 1 | Status: SHIPPED | OUTPATIENT
Start: 2020-01-31 | End: 2020-08-10

## 2020-01-31 NOTE — TELEPHONE ENCOUNTER
Prescription approved per [facility] Refill Protocol.    LIZ DuffN, RN  [facility]

## 2020-01-31 NOTE — TELEPHONE ENCOUNTER
Routing refill request to provider for review/approval because:  Patient is over 64 years old, needs PCP approval    LIZ DuffN, RN  [facility]

## 2020-02-01 ENCOUNTER — TRANSFERRED RECORDS (OUTPATIENT)
Dept: MULTI SPECIALTY CLINIC | Facility: CLINIC | Age: 65
End: 2020-02-01

## 2020-02-01 LAB — RETINOPATHY: NORMAL

## 2020-02-03 ENCOUNTER — TRANSFERRED RECORDS (OUTPATIENT)
Dept: HEALTH INFORMATION MANAGEMENT | Facility: CLINIC | Age: 65
End: 2020-02-03

## 2020-02-03 NOTE — TELEPHONE ENCOUNTER
Prior Authorization Approval    Authorization Effective Date: 1/4/2020  Authorization Expiration Date: 2/2/2021  Medication: Levimir-APPROVED  Approved Dose/Quantity:   Reference #:     Insurance Company: [employer] - Phone [phone] Fax [phone]  Expected CoPay:       CoPay Card Available:      Foundation Assistance Needed:    Which Pharmacy is filling the prescription (Not needed for infusion/clinic administered): [facility] [place], [place] - [address] [place] [address]  Pharmacy Notified: Yes  Patient Notified: No    Pharmacy will notify patient when medication is ready.

## 2020-02-03 NOTE — TELEPHONE ENCOUNTER
Central Prior Authorization Team   Phone: [phone]      PA Initiation    Medication: Levimir-Initiated  Insurance Company: [employer] - Phone [phone] Fax [phone]  Pharmacy Filling the Rx: [facility] - [place], [place] - [address]  Filling Pharmacy Phone: [phone]  Filling Pharmacy Fax:    Start Date: 2/3/2020

## 2020-02-28 NOTE — TELEPHONE ENCOUNTER
Requested Prescriptions   Pending Prescriptions Disp Refills     JARDIANCE 10 MG TABS tablet [Pharmacy Med Name: JARDIANCE 10MG TABS] 30 tablet 0     Sig: TAKE ONE TABLET BY MOUTH ONCE DAILY ( NEED TO MAKE DOCTOR APPOINTMENT)   Last Written Prescription Date:  1/20/2020  Last Fill Quantity: 30,  # refills: 0   Last office visit: 10/2/2019 with prescribing provider:     Future Office Visit:        Sodium Glucose Co-Transport Inhibitor Agents Failed - 2/28/2020 11:37 AM        Failed - Patient has documented A1c within the specified period of time.     If HgbA1C is 8 or greater, it needs to be on file within the past 3 months.  If less than 8, must be on file within the past 6 months.     Recent Labs   Lab Test 10/02/19  1507   A1C 11.4*             Passed - Blood pressure less than 140/90 in past 6 months     BP Readings from Last 3 Encounters:   10/02/19 130/64   08/28/19 124/68   07/31/19 (!) 150/72                 Passed - Patient has documented LDL within the past 12 mos.     Recent Labs   Lab Test 03/18/19  1009   *             Passed - Patient has had a Microalbumin in the past 15 mos.     Recent Labs   Lab Test 03/18/19  1026   MICROL 190   UMALCR 954.77*             Passed - No creatinine >1.4 or GFR <45 within the past 12 mos     Recent Labs   Lab Test 10/02/19  1507   GFRESTIMATED 56*   GFRESTBLACK 65       Recent Labs   Lab Test 10/02/19  1507   CR 1.03             Passed - Medication is active on med list        Passed - Patient is age 18 or older        Passed - Patient is not pregnant        Passed - Patient has documented normal Potassium within the last 12 mos.     Recent Labs   Lab Test 10/02/19  1507   POTASSIUM 4.9             Passed - Patient has no positive pregnancy test within the past 12 mos.        Passed - Recent (6 mo) or future (30 days) visit within the authorizing provider's specialty     Patient had office visit in the last 6 months or has a visit in the next 30 days with  "authorizing provider or within the authorizing provider's specialty.  See \"Patient Info\" tab in inbasket, or \"Choose Columns\" in Meds & Orders section of the refill encounter.            NOVOLOG FLEXPEN 100 UNIT/ML soln [Pharmacy Med Name: NOVOLOG FLEXPEN 100UNIT/ML SOPN] 15 mL 11     Sig: INJECT 12-15 UNITS THREE TIMES A DAY WITH MEALS. MAX OF 45 UNITS PER DAY   Last Written Prescription Date:  2/15/19  Last Fill Quantity: 15,  # refills: 11   Last office visit: 10/2/2019 with prescribing provider:     Future Office Visit:        Short Acting Insulin Protocol Failed - 2/28/2020 11:37 AM        Failed - HgbA1C in past 3 or 6 months     If HgbA1C is 8 or greater, it needs to be on file within the past 3 months.  If less than 8, must be on file within the past 6 months.     Recent Labs   Lab Test 10/02/19  1507   A1C 11.4*             Passed - Blood pressure less than 140/90 in past 6 months     BP Readings from Last 3 Encounters:   10/02/19 130/64   08/28/19 124/68   07/31/19 (!) 150/72                 Passed - LDL on file in past 12 months     Recent Labs   Lab Test 03/18/19  1009   *             Passed - Microalbumin on file in past 12 months     Recent Labs   Lab Test 03/18/19  1026   MICROL 190   UMALCR 954.77*             Passed - Serum creatinine on file in past 12 months     Recent Labs   Lab Test 10/02/19  1507   CR 1.03             Passed - Medication is active on med list        Passed - Patient is age 18 or older        Passed - Recent (6 mo) or future (30 days) visit within the authorizing provider's specialty     Patient had office visit in the last 6 months or has a visit in the next 30 days with authorizing provider or within the authorizing provider's specialty.  See \"Patient Info\" tab in inbasket, or \"Choose Columns\" in Meds & Orders section of the refill encounter.              "

## 2020-02-28 NOTE — TELEPHONE ENCOUNTER
Attempt # 1  Called patient at home number.  Was call answered? Yes,  Informed needs appointment - scheduled with PCP Wednesday.    Routing refill request to provider for review/approval because:  Failed protocols          Katie Curiel RN  [facility]

## 2020-03-02 ENCOUNTER — TELEPHONE (OUTPATIENT)
Dept: FAMILY MEDICINE | Facility: CLINIC | Age: 65
End: 2020-03-02

## 2020-03-02 RX ORDER — INSULIN ASPART 100 [IU]/ML
INJECTION, SOLUTION INTRAVENOUS; SUBCUTANEOUS
Qty: 15 ML | Refills: 11 | Status: SHIPPED | OUTPATIENT
Start: 2020-03-02 | End: 2020-03-27

## 2020-03-02 RX ORDER — EMPAGLIFLOZIN 10 MG/1
TABLET, FILM COATED ORAL
Qty: 30 TABLET | Refills: 0 | Status: SHIPPED | OUTPATIENT
Start: 2020-03-02 | End: 2020-03-27

## 2020-03-02 NOTE — TELEPHONE ENCOUNTER
"Prior Authorization Retail Medication Request    Medication/Dose: Jardiance 10 mg tablets  ICD code (if different than what is on RX):  e1165  Previously Tried and Failed:  Unk  Rationale:  Unk    Insurance Name: [employer]  Insurance ID:  [id number]      Pharmacy Information (if different than what is on RX)  Name:  [facility]  Phone:  [phone]    Was covered last month as a \"transition fill\", now requires a P/A or alt product (ins lists Invokana or Farxiga)  "

## 2020-03-03 ENCOUNTER — TELEPHONE (OUTPATIENT)
Dept: FAMILY MEDICINE | Facility: CLINIC | Age: 65
End: 2020-03-03

## 2020-03-03 ENCOUNTER — OFFICE VISIT (OUTPATIENT)
Dept: FAMILY MEDICINE | Facility: CLINIC | Age: 65
End: 2020-03-03
Payer: COMMERCIAL

## 2020-03-03 VITALS
HEIGHT: 56 IN | BODY MASS INDEX: 28.62 KG/M2 | OXYGEN SATURATION: 99 % | TEMPERATURE: 97.6 F | SYSTOLIC BLOOD PRESSURE: 120 MMHG | DIASTOLIC BLOOD PRESSURE: 73 MMHG | WEIGHT: 127.2 LBS | HEART RATE: 67 BPM

## 2020-03-03 DIAGNOSIS — Z12.11 SPECIAL SCREENING FOR MALIGNANT NEOPLASMS, COLON: ICD-10-CM

## 2020-03-03 DIAGNOSIS — E11.21 TYPE 2 DIABETES MELLITUS WITH DIABETIC NEPHROPATHY, WITH LONG-TERM CURRENT USE OF INSULIN (H): Primary | ICD-10-CM

## 2020-03-03 DIAGNOSIS — Z79.4 TYPE 2 DIABETES MELLITUS WITH DIABETIC NEPHROPATHY, WITH LONG-TERM CURRENT USE OF INSULIN (H): Primary | ICD-10-CM

## 2020-03-03 DIAGNOSIS — R21 RASH: ICD-10-CM

## 2020-03-03 DIAGNOSIS — Z12.31 ENCOUNTER FOR SCREENING MAMMOGRAM FOR BREAST CANCER: ICD-10-CM

## 2020-03-03 LAB
ANION GAP SERPL CALCULATED.3IONS-SCNC: 11 MMOL/L (ref 3–14)
BUN SERPL-MCNC: 35 MG/DL (ref 7–30)
CALCIUM SERPL-MCNC: 10.1 MG/DL (ref 8.5–10.1)
CHLORIDE SERPL-SCNC: 95 MMOL/L (ref 94–109)
CHOLEST SERPL-MCNC: 212 MG/DL
CO2 SERPL-SCNC: 25 MMOL/L (ref 20–32)
CREAT SERPL-MCNC: 0.84 MG/DL (ref 0.52–1.04)
GFR SERPL CREATININE-BSD FRML MDRD: 72 ML/MIN/{1.73_M2}
GLUCOSE SERPL-MCNC: 428 MG/DL (ref 70–99)
HBA1C MFR BLD: 13.3 % (ref 0–5.6)
HDLC SERPL-MCNC: 53 MG/DL
LDLC SERPL CALC-MCNC: ABNORMAL MG/DL
LDLC SERPL DIRECT ASSAY-MCNC: 101 MG/DL
NONHDLC SERPL-MCNC: 159 MG/DL
POTASSIUM SERPL-SCNC: 3.4 MMOL/L (ref 3.4–5.3)
SODIUM SERPL-SCNC: 131 MMOL/L (ref 133–144)
TRIGL SERPL-MCNC: 454 MG/DL

## 2020-03-03 PROCEDURE — 80048 BASIC METABOLIC PNL TOTAL CA: CPT | Performed by: PHYSICIAN ASSISTANT

## 2020-03-03 PROCEDURE — 99214 OFFICE O/P EST MOD 30 MIN: CPT | Performed by: PHYSICIAN ASSISTANT

## 2020-03-03 PROCEDURE — 83721 ASSAY OF BLOOD LIPOPROTEIN: CPT | Mod: 59 | Performed by: PHYSICIAN ASSISTANT

## 2020-03-03 PROCEDURE — 82043 UR ALBUMIN QUANTITATIVE: CPT | Performed by: PHYSICIAN ASSISTANT

## 2020-03-03 PROCEDURE — 83036 HEMOGLOBIN GLYCOSYLATED A1C: CPT | Performed by: PHYSICIAN ASSISTANT

## 2020-03-03 PROCEDURE — 80061 LIPID PANEL: CPT | Performed by: PHYSICIAN ASSISTANT

## 2020-03-03 PROCEDURE — 36415 COLL VENOUS BLD VENIPUNCTURE: CPT | Performed by: PHYSICIAN ASSISTANT

## 2020-03-03 RX ORDER — TRIAMCINOLONE ACETONIDE 1 MG/G
OINTMENT TOPICAL 2 TIMES DAILY
Qty: 30 G | Refills: 0 | Status: SHIPPED | OUTPATIENT
Start: 2020-03-03

## 2020-03-03 ASSESSMENT — MIFFLIN-ST. JEOR: SCORE: 976.6

## 2020-03-03 NOTE — PATIENT INSTRUCTIONS
1. Please follow up with Jovana ERNANDEZ about your medications. PLEASE BRING YOUR GLUCOMETER  2. Please return your stool test in the mail.

## 2020-03-03 NOTE — PROGRESS NOTES
Subjective     Nathalie Harp is a 64 year old female who presents to clinic today for the following health issues:    HPI   Diabetes Follow-up      How often are you checking your blood sugar? Couple times a day    What concerns do you have today about your diabetes? Other: high numbers - she notes 200-500 range.      Do you have any of these symptoms? (Select all that apply)  Numbness in feet and Burning in feet    Have you had a diabetic eye exam in the last 12 months? Yes- Date of last eye exam: 1 month ago,  Location: [facility]        BP Readings from Last 2 Encounters:   03/03/20 120/73   10/02/19 130/64     Hemoglobin A1C (%)   Date Value   10/02/2019 11.4 (H)   07/03/2019 9.2 (H)     LDL Cholesterol Calculated (mg/dL)   Date Value   03/18/2019 120 (H)   02/09/2018 124 (H)               Hyperlipidemia Follow-Up      Are you regularly taking any medication or supplement to lower your cholesterol?   No    Are you having muscle aches or other side effects that you think could be caused by your cholesterol lowering medication?  No      How many servings of fruits and vegetables do you eat daily?  little    On average, how many sweetened beverages do you drink each day (Examples: soda, juice, sweet tea, etc.  Do NOT count diet or artificially sweetened beverages)?   rare    How many days per week do you exercise enough to make your heart beat faster? PT and have exercise bike about 4 days/week    How many minutes a day do you exercise enough to make your heart beat faster? 5 miles in the bike    How many days per week do you miss taking your medication? A few times due to getting cold    Has her new housing. She has been able to afford it.   She notes that she now has a set of pots and pans and is able to cook. She has figured out how to get groceries. She has not been using [employer].     She notes she was feeling bad for over 1 month with stomach upset. She is not sure if she was using her insulin  "at that time.   Now that she is feeling better she has been using the Levemir (50U)and the Novlog(12 U) regularly.   Has been out of the Jardiance for a few weeks.   She notes that her eye exam recently did show diabetic changes. Will request records.     She has been taking her BP meds daily.     Reviewed and updated as needed this visit by Provider  Tobacco  Allergies  Meds  Problems  Med Hx  Surg Hx  Fam Hx         Review of Systems   ROS COMP: Constitutional, HEENT, cardiovascular, pulmonary, gi and gu systems are negative, except as otherwise noted.      Objective    /73 (BP Location: Left arm, Patient Position: Chair, Cuff Size: Adult Regular)   Pulse 67   Temp 97.6  F (36.4  C) (Oral)   Ht 1.425 m (4' 8.1\")   Wt 57.7 kg (127 lb 3.2 oz)   LMP  (LMP Unknown)   SpO2 99%   BMI 28.41 kg/m    Body mass index is 28.41 kg/m .  Physical Exam   GENERAL: healthy, alert and no distress  RESP: lungs clear to auscultation - no rales, rhonchi or wheezes  CV: regular rate and rhythm, normal S1 S2, no S3 or S4, no murmur, click or rub, no peripheral edema and peripheral pulses strong  MS: no gross musculoskeletal defects noted, no edema- using cane to ambulate  Skin:small red rash on the right posterior leg   Diabetic foot exam: normal DP and PT pulses, no trophic changes or ulcerative lesions and normal sensory exam- onychomycosis noted bilaterally.     Diagnostic Test Results:  none         Assessment & Plan     1. Type 2 diabetes mellitus with diabetic nephropathy, with long-term current use of insulin (H)  Likely uncontrolled. Patient is wearing her karey monitor today. However, I am uncertain how often she is actually using her insulin. Will have patient follow up with MTM for further management.   - Hemoglobin A1c  - Lipid panel reflex to direct LDL Non-fasting  - Albumin Random Urine Quantitative with Creat Ratio  - Basic metabolic panel    2. Rash  Trial of kenalog cream.   - triamcinolone " (KENALOG) 0.1 % external ointment; Apply topically 2 times daily On bilateral legs  Dispense: 30 g; Refill: 0    3. Special screening for malignant neoplasms, colon  Patient will return.   - Fecal colorectal cancer screen (FIT); Future    4. Encounter for screening mammogram for breast cancer  Due next month.   - *MA Screening Digital Bilateral; Future     No follow-ups on file.    Sapna Anand PA-C  [facility]

## 2020-03-03 NOTE — LETTER
[facility]   [address] [place]  [address]  [phone]                                   March 4, 2020    Nathalie Harp  [address]   [place] [address]        Dear Nathalie,    As expected the diabetes is not well controlled right now. Please keep your appointment with Jovana so we can make adjustments to your insulin. Please also make sure you are taking your atorvastatin as your cholesterol numbers were really high.     Results for orders placed or performed in visit on 03/03/20   Hemoglobin A1c     Status: Abnormal   Result Value Ref Range    Hemoglobin A1C 13.3 (H) 0 - 5.6 %   Lipid panel reflex to direct LDL Non-fasting     Status: Abnormal   Result Value Ref Range    Cholesterol 212 (H) <200 mg/dL    Triglycerides 454 (H) <150 mg/dL    HDL Cholesterol 53 >49 mg/dL    LDL Cholesterol Calculated  <100 mg/dL     Cannot estimate LDL when triglyceride exceeds 400 mg/dL    Non HDL Cholesterol 159 (H) <130 mg/dL   Albumin Random Urine Quantitative with Creat Ratio     Status: Abnormal   Result Value Ref Range    Creatinine Urine 42 mg/dL    Albumin Urine mg/L 100 mg/L    Albumin Urine mg/g Cr 239.81 (H) 0 - 25 mg/g Cr   Basic metabolic panel     Status: Abnormal   Result Value Ref Range    Sodium 131 (L) 133 - 144 mmol/L    Potassium 3.4 3.4 - 5.3 mmol/L    Chloride 95 94 - 109 mmol/L    Carbon Dioxide 25 20 - 32 mmol/L    Anion Gap 11 3 - 14 mmol/L    Glucose 428 (H) 70 - 99 mg/dL    Urea Nitrogen 35 (H) 7 - 30 mg/dL    Creatinine 0.84 0.52 - 1.04 mg/dL    GFR Estimate 72 >60 mL/min/[1.73_m2]    GFR Estimate If Black 83 >60 mL/min/[1.73_m2]    Calcium 10.1 8.5 - 10.1 mg/dL   LDL cholesterol direct     Status: Abnormal   Result Value Ref Range    LDL Cholesterol Direct 101 (H) <100 mg/dL       If you have any questions please call the clinic at [phone]    Sincerely,    Sapna villanueva

## 2020-03-04 LAB
CREAT UR-MCNC: 42 MG/DL
MICROALBUMIN UR-MCNC: 100 MG/L
MICROALBUMIN/CREAT UR: 239.81 MG/G CR (ref 0–25)

## 2020-03-04 ASSESSMENT — ASTHMA QUESTIONNAIRES: ACT_TOTALSCORE: 16

## 2020-03-04 NOTE — TELEPHONE ENCOUNTER
Central Prior Authorization Team   Phone: [phone]    PA Initiation    Medication: Jardiance 10 mg tablets  Insurance Company: [employer] - Phone [phone] Fax [phone]  Pharmacy Filling the Rx: [facility] [place], [place] - [address]  Filling Pharmacy Phone: [phone]  Filling Pharmacy Fax: [phone]  Start Date: 3/4/2020

## 2020-03-04 NOTE — TELEPHONE ENCOUNTER
Sapna, received a call regarding critical BG value for your patient.  Reviewing record for her, seems this is not abnormal for her.  Recommend follow up with you.    Richard Valiente MD, FAAFP  Family Medicine Physician  [facility] [place]  [address] [address] [facility], [place] [address]

## 2020-03-04 NOTE — RESULT ENCOUNTER NOTE
Nathalie,     As expected the diabetes is not well controlled right now. Please keep your appointment with Jovana so we can make adjustments to your insulin. Please also make sure you are taking your atorvastatin as your cholesterol numbers were really high.   Sapna Anand PA-C

## 2020-03-05 ENCOUNTER — TELEPHONE (OUTPATIENT)
Dept: FAMILY MEDICINE | Facility: CLINIC | Age: 65
End: 2020-03-05

## 2020-03-05 NOTE — TELEPHONE ENCOUNTER
Panel Management Review      Patient has the following on her problem list:     Asthma review     ACT Total Scores 3/3/2020   ACT TOTAL SCORE -   ASTHMA ER VISITS -   ASTHMA HOSPITALIZATIONS -   ACT TOTAL SCORE (Goal Greater than or Equal to 20) 16   In the past 12 months, how many times did you visit the emergency room for your asthma without being admitted to the hospital? 0   In the past 12 months, how many times were you hospitalized overnight because of your asthma? 0      1. Is Asthma diagnosis on the Problem List? Yes    2. Is Asthma listed on Health Maintenance? Yes    3. Patient is due for:  AAP    Diabetes    ASA: Passed    Last A1C  Lab Results   Component Value Date    A1C 13.3 03/03/2020    A1C 11.4 10/02/2019    A1C 9.2 07/03/2019    A1C 9.2 03/18/2019    A1C 9.5 12/07/2018     A1C tested: FAILED    Last LDL:    Lab Results   Component Value Date    CHOL 212 03/03/2020     Lab Results   Component Value Date    HDL 53 03/03/2020     Lab Results   Component Value Date    LDL  03/03/2020     Cannot estimate LDL when triglyceride exceeds 400 mg/dL     03/03/2020     Lab Results   Component Value Date    TRIG 454 03/03/2020     Lab Results   Component Value Date    CHOLHDLRATIO 3.2 08/31/2015     Lab Results   Component Value Date    NHDL 159 03/03/2020       Is the patient on a Statin? YES             Is the patient on Aspirin? YES    Medications     HMG CoA Reductase Inhibitors     atorvastatin (LIPITOR) 40 MG tablet       Salicylates     aspirin (ASA) 81 MG tablet             Last three blood pressure readings:  BP Readings from Last 3 Encounters:   03/03/20 120/73   10/02/19 130/64   08/28/19 124/68       Date of last diabetes office visit: 3/3/20     Tobacco History:     History   Smoking Status     Never Smoker   Smokeless Tobacco     Never Used           Composite cancer screening  Chart review shows that this patient is due/due soon for the following Fecal Colorectal  (FIT)  Summary:    Patient is due/failing the following:   Eye exam, FIT, diabetes, A1C, lipid, ACT, mammo.    Action needed:   Patient had an office visit on 3/3/20 for diabetes. Eye exam was done at [facility] in February 2020. ACT done with score of 16. Pt wants to wait to schedule mammo at the same time as her daughter. Labs were done also and FIT ordered.    Type of outreach:    None    Questions for provider review:    None                                                                                                                                    Loretta See MIKE Medley

## 2020-03-27 ENCOUNTER — ALLIED HEALTH/NURSE VISIT (OUTPATIENT)
Dept: PHARMACY | Facility: CLINIC | Age: 65
End: 2020-03-27
Payer: COMMERCIAL

## 2020-03-27 DIAGNOSIS — E11.21 TYPE 2 DIABETES MELLITUS WITH DIABETIC NEPHROPATHY, WITH LONG-TERM CURRENT USE OF INSULIN (H): Primary | ICD-10-CM

## 2020-03-27 DIAGNOSIS — Z79.4 TYPE 2 DIABETES MELLITUS WITH DIABETIC NEPHROPATHY, WITH LONG-TERM CURRENT USE OF INSULIN (H): Primary | ICD-10-CM

## 2020-03-27 PROCEDURE — 99606 MTMS BY PHARM EST 15 MIN: CPT | Performed by: PHARMACIST

## 2020-03-27 PROCEDURE — 99607 MTMS BY PHARM ADDL 15 MIN: CPT | Performed by: PHARMACIST

## 2020-03-27 RX ORDER — LOSARTAN POTASSIUM AND HYDROCHLOROTHIAZIDE 25; 100 MG/1; MG/1
1 TABLET ORAL EVERY MORNING
Qty: 90 TABLET | Refills: 1 | Status: SHIPPED | OUTPATIENT
Start: 2020-03-27 | End: 2020-06-30

## 2020-03-27 RX ORDER — INSULIN ASPART 100 [IU]/ML
20 INJECTION, SOLUTION INTRAVENOUS; SUBCUTANEOUS
Qty: 30 ML | Refills: 11 | Status: SHIPPED | OUTPATIENT
Start: 2020-03-27 | End: 2020-04-24

## 2020-03-27 NOTE — PROGRESS NOTES
MTM ENCOUNTER  SUBJECTIVE/OBJECTIVE:                           Nathalie Harp is a 64 year old female called for a follow-up visit. She was referred to me from Sapna Anand. Today's visit is a follow-up MTM visit from 10/2/2019 and PCP visit 3/3/2020.     Chief Complaint: Uncontrolled diabetes.    Tobacco: No tobacco use  Alcohol: none    Medication Adherence/Access: issues found, see below.    Diabetes:  Pt currently taking Jardiance 10mg daily (recently restarted a few weeks ago), Levemir 50units QAM (finishing up current supply then will be switching to, Novolog 12units TID with meals (admits she doesn't always remember to take this, remembers about 1/2 the time -- doesn't always eat 3 meals per day).   She is finishing up her supply of Novolog, then will start on Humalog per insurance coverage.  She thinks putting her Novolog insulin next to her chair in the living room where she eats her meals will help her remember to take it.  SMBG Ranges (patient reported): Using Bernice CGM most of the time. She does not have a computer at home, so was unable to sign up for remote monitoring of blood sugar.  14d avg 314mg/dL, 7d avg 330mg/dL  Above range 83% of the time  In Range 16% of the time  Below 1% of the time  12am-6am: 348  6am-12pm: 261  12pm-6pm: 358  6pm-12am: 398  Reports fasting blood glucose 105mg/dL today, 120mg/dL yesterday, 98mg/dL on Wednesday. But states numbers go up during the day to 300-400's. Wonders if her cough syrup could contribute to this?   Symptoms of low blood sugar? shaky, dizzy, weak with BG <100mg/dL.   Symptoms of high blood sugar? vision changes and polyuria -- could be due to high BG and/or Jardiance. I is not experiencing itching or burning with urination.  Diet/Exercise: Has been riding 5 miles on her stationary bike almost every day at 11am or 4pm -- times it with the news and Jeopardy.  Aspirin: Taking 81mg daily   Statin: Yes: atorvastatin 40mg daily  ACEi/ARB: Yes:  losartan/HCTZ 100/25mg QAM. Urine albumin is   Lab Results   Component Value Date    UMALCR 239.81 (H) 03/03/2020       Lab Results   Component Value Date    A1C 13.3 03/03/2020    A1C 11.4 10/02/2019    A1C 9.2 07/03/2019    A1C 9.2 03/18/2019    A1C 9.5 12/07/2018      GFR Estimate   Date Value Ref Range Status   03/03/2020 72 >60 mL/min/[1.73_m2] Final     Comment:     Non  GFR Calc  Starting 12/18/2018, serum creatinine based estimated GFR (eGFR) will be   calculated using the Chronic Kidney Disease Epidemiology Collaboration   (CKD-EPI) equation.     10/02/2019 56 (L) >60 mL/min/[1.73_m2] Final     Comment:     Non  GFR Calc  Starting 12/18/2018, serum creatinine based estimated GFR (eGFR) will be   calculated using the Chronic Kidney Disease Epidemiology Collaboration   (CKD-EPI) equation.     10/01/2018 74 >60 mL/min/1.7m2 Final     Comment:     Non  GFR Calc       Today's Vitals: LMP  (LMP Unknown)  None taken, phone visit.      ASSESSMENT:                              Medication Adherence: poor, needs improvement - see below    Diabetes: Needs Improvement. Patient is not meeting A1c goal of < 8%.  Self monitoring of blood glucose is not at goal of fasting  mg/dL and post prandial < 180 mg/dL.  Pt would benefit from  SMBG: Continue with CGM.  Basal Insulin (Lantus) :  stay on the same dose for now.  Bolus / Rapid Acting Insulin (Novolog/Humalog) : increase dose and needs improved compliance.  SGLT2 inhibitor (Jardiance) :  stay on the same dose for now.  There is room to increase however I would like to wait until we are able to recheck her kidney function.    PLAN:                            1. Increase Novolog (or Humalog) to 20units with meals. Keep insulin pen next to chair where you eat your meals.    I spent 45 minutes with this patient today. All changes were made via collaborative practice agreement with Sapna Anand A copy of the visit note  was provided to the patient's primary care provider.    Will follow up in 2 weeks over the phone.    The patient was given a summary of these recommendations.     Jovana Loo, Pharm.D., Psychiatric  Medication Therapy Management Pharmacist  [phone]

## 2020-04-06 ENCOUNTER — TELEPHONE (OUTPATIENT)
Dept: FAMILY MEDICINE | Facility: CLINIC | Age: 65
End: 2020-04-06

## 2020-04-06 NOTE — TELEPHONE ENCOUNTER
Forms received from: [employer]   Phone number listed: [phone]   Fax listed: [phone]  Date received: 04.06.20  Form description: Medical Need  Once forms are completed, please return to [employer] via Fax.  Is patient requesting to be contacted when forms are completed: NA    Form placed: in providers bhavin Champagne

## 2020-04-10 ENCOUNTER — ALLIED HEALTH/NURSE VISIT (OUTPATIENT)
Dept: PHARMACY | Facility: CLINIC | Age: 65
End: 2020-04-10
Payer: COMMERCIAL

## 2020-04-10 DIAGNOSIS — Z79.4 TYPE 2 DIABETES MELLITUS WITH DIABETIC NEPHROPATHY, WITH LONG-TERM CURRENT USE OF INSULIN (H): Primary | ICD-10-CM

## 2020-04-10 DIAGNOSIS — E11.21 TYPE 2 DIABETES MELLITUS WITH DIABETIC NEPHROPATHY, WITH LONG-TERM CURRENT USE OF INSULIN (H): Primary | ICD-10-CM

## 2020-04-10 PROCEDURE — 99607 MTMS BY PHARM ADDL 15 MIN: CPT | Performed by: PHARMACIST

## 2020-04-10 PROCEDURE — 99606 MTMS BY PHARM EST 15 MIN: CPT | Performed by: PHARMACIST

## 2020-04-10 NOTE — PROGRESS NOTES
MTM ENCOUNTER  SUBJECTIVE/OBJECTIVE:                           Nathalie Harp is a 64 year old female called for a follow-up visit. She was referred to me from Sapna Anand.  Today's visit is a follow-up MTM visit from 3/27/20     Patient consented to a telehealth visit: yes    Chief Complaint: Insulin titration.    Tobacco: No tobacco use  Alcohol: none    Medication Adherence/Access: issues found, see below.    Type 2 Diabetes: Pt currently taking Pt currently taking Jardiance 10mg daily, Levemir 50units QAM, Novolog 20units BID - TID with meals (she has been keeping it in a baggy next to her chair and this is helping with compliance).  SMBG Ranges (patient reported): Using Bernice CGM most of the time. She does not have a computer at home, so is unable to sign up for remote monitoring of blood sugar.  7d avg 271 down 330mg/dL  12am-6am: 243 down from 348  6am-12pm: 200 down from 261  12pm-6pm: 301 down from 358  6pm-12am: 357 down from 398  Above range 79 down from 83% of the time  In Range 20 up from 16% of the time  Below 1% of the time  Date FBG/ 2hours post Lunch/2hours post Dinner /2hours post HS   4/9 301 318, 199     4/8 385, 153      4/7  110, 131     4/6 136   357   4/5 256  372, 348 383   Symptoms of low blood sugar? shaky, dizzy, weak with BG <100mg/dL. And notices this usually after waking up in the morning.  Symptoms of high blood sugar? vision changes and polyuria -- could be due to high BG and/or Jardiance. I is not experiencing itching or burning with urination.  Diet/Exercise: Has been riding 5 miles on her stationary bike almost every day at 11am or 4pm -- times it with the news and Jeopardy.  Aspirin: Taking 81mg daily   Statin: Yes: atorvastatin 40mg daily  ACEi/ARB: Yes: losartan/HCTZ 100/25mg QAM. Urine albumin is .   Lab Results   Component Value Date    UMALCR 239.81 (H) 03/03/2020       Lab Results   Component Value Date    A1C 13.3 03/03/2020    A1C 11.4 10/02/2019    A1C 9.2  07/03/2019    A1C 9.2 03/18/2019    A1C 9.5 12/07/2018      GFR Estimate   Date Value Ref Range Status   03/03/2020 72 >60 mL/min/[1.73_m2] Final     Comment:     Non  GFR Calc  Starting 12/18/2018, serum creatinine based estimated GFR (eGFR) will be   calculated using the Chronic Kidney Disease Epidemiology Collaboration   (CKD-EPI) equation.     10/02/2019 56 (L) >60 mL/min/[1.73_m2] Final     Comment:     Non  GFR Calc  Starting 12/18/2018, serum creatinine based estimated GFR (eGFR) will be   calculated using the Chronic Kidney Disease Epidemiology Collaboration   (CKD-EPI) equation.     10/01/2018 74 >60 mL/min/1.7m2 Final     Comment:     Non  GFR Calc         Today's Vitals: LMP  (LMP Unknown)  Phone visit.      ASSESSMENT:                              Medication Adherence: fair    Type 2 Diabetes: Needs Improvement. Patient is not meeting A1c goal of < 8%.  Self monitoring of blood glucose is not at goal of fasting  mg/dL and post prandial < 180 mg/dL.  Pt would benefit from   Basal Insulin (Levemir) :  increase dose to help reduce FBG.  Bolus / Rapid Acting Insulin (Novolog) : stay on the same dose for now.  SGLT2 inhibitor (Jardiance) :  stay on the same dose for now.       PLAN:                            1. Increase Levemir to 60units subcutaneous once daily. Updated Rx sent to pharmacy.    I spent 30 minutes with this patient today. All changes were made via collaborative practice agreement with Sapna Anand. A copy of the visit note was provided to the patient's primary care provider.    Will follow up in 2 weeks over the phone.    The patient declined a summary of these recommendations.     Jovana Loo, Pharm.D., [facility]  Medication Therapy Management Pharmacist  [phone]

## 2020-04-24 ENCOUNTER — ALLIED HEALTH/NURSE VISIT (OUTPATIENT)
Dept: PHARMACY | Facility: CLINIC | Age: 65
End: 2020-04-24
Payer: COMMERCIAL

## 2020-04-24 DIAGNOSIS — Z79.4 TYPE 2 DIABETES MELLITUS WITH DIABETIC NEPHROPATHY, WITH LONG-TERM CURRENT USE OF INSULIN (H): Primary | ICD-10-CM

## 2020-04-24 DIAGNOSIS — E11.21 TYPE 2 DIABETES MELLITUS WITH DIABETIC NEPHROPATHY, WITH LONG-TERM CURRENT USE OF INSULIN (H): Primary | ICD-10-CM

## 2020-04-24 PROCEDURE — 99606 MTMS BY PHARM EST 15 MIN: CPT | Performed by: PHARMACIST

## 2020-04-24 RX ORDER — INSULIN ASPART 100 [IU]/ML
25 INJECTION, SOLUTION INTRAVENOUS; SUBCUTANEOUS
Qty: 30 ML | Refills: 11
Start: 2020-04-24 | End: 2020-06-12 | Stop reason: ALTCHOICE

## 2020-04-24 NOTE — PROGRESS NOTES
MTM ENCOUNTER  SUBJECTIVE/OBJECTIVE:                           Nathalie Harp is a 64 year old female called for a follow-up visit. She was referred to me from Sapna Anand  Today's visit is a follow-up MTM visit from 3/27/20     Patient consented to a telehealth visit: yes  Telemedicine Visit Details  Type of service:  Telephone visit  Start Time: 1:47 PM  End Time: 2:00 PM  Originating Location (pt. Location): Home  Distant Location (provider location):  [facility] MTM  Mode of Communication:  Telephone      Chief Complaint: Insulin titration.    Tobacco: No tobacco use  Alcohol: none    Medication Adherence/Access: issues found, see below.    Type 2 Diabetes: Pt currently taking Jardiance 10mg daily, Levemir 60units QAM, Novolog 15-20units BID - TID with meals (she has been keeping it in a baggy next to her chair and this is helping with compliance).  SMBG Ranges (patient reported): Using Bernice CGM most of the time. She does not have a computer at home, so is unable to sign up for remote monitoring of blood sugar.  7d avg 237 down from 271mg/dL  12am-6am: 247 stable from 243  6am-12pm: 187 down from 200  12pm-6pm: 233 down from 301  6pm-12am: 295 down from 357  Above range 79 down from 83% of the time  In Range 20 up from 16% of the time  Below 1% of the time  Symptoms of low blood sugar? 0 low events in the last week. Gets shaky, dizzy, weak with BG ~100mg/dL. And notices this usually after waking up in the morning.  Symptoms of high blood sugar? vision changes and polyuria -- could be due to high BG and/or Jardiance. I is not experiencing itching or burning with urination.  Diet/Exercise: Has been riding 5 miles on her stationary bike almost every day at 11am or 4pm -- times it with the news and Jeopardy. Reports being very careful not to eat late at night.  Aspirin: Taking 81mg daily   Statin: Yes: atorvastatin 40mg daily  ACEi/ARB: Yes: losartan/HCTZ 100/25mg QAM. Urine albumin  is .   Lab Results   Component Value Date    UMALCR 239.81 (H) 03/03/2020       Lab Results   Component Value Date    A1C 13.3 03/03/2020    A1C 11.4 10/02/2019    A1C 9.2 07/03/2019    A1C 9.2 03/18/2019    A1C 9.5 12/07/2018      GFR Estimate   Date Value Ref Range Status   03/03/2020 72 >60 mL/min/[1.73_m2] Final     Comment:     Non  GFR Calc  Starting 12/18/2018, serum creatinine based estimated GFR (eGFR) will be   calculated using the Chronic Kidney Disease Epidemiology Collaboration   (CKD-EPI) equation.     10/02/2019 56 (L) >60 mL/min/[1.73_m2] Final     Comment:     Non  GFR Calc  Starting 12/18/2018, serum creatinine based estimated GFR (eGFR) will be   calculated using the Chronic Kidney Disease Epidemiology Collaboration   (CKD-EPI) equation.     10/01/2018 74 >60 mL/min/1.7m2 Final     Comment:     Non  GFR Calc         Today's Vitals: LMP  (LMP Unknown)  Phone visit.      ASSESSMENT:                              Medication Adherence: fair    Type 2 Diabetes: Needs Improvement. Patient is not meeting A1c goal of < 8%.  Self monitoring of blood glucose is not at goal of fasting  mg/dL and post prandial < 180 mg/dL.  Pt would benefit from   Basal Insulin (Levemir) : stay on the same dose for now..  Bolus / Rapid Acting Insulin (Novolog) : increase dose to help reduce post-pradial BG.  SGLT2 inhibitor (Jardiance) :  stay on the same dose for now.       PLAN:                            1. Increase Novolog to 25units with meals. Updated Rx sent to pharmacy.    I spent 15 minutes with this patient today. All changes were made via collaborative practice agreement with Sapna Anand A copy of the visit note was provided to the patient's primary care provider.    Will follow up in 2 weeks over the phone.    The patient declined a summary of these recommendations.     Jovana Loo, Pharm.D., [facility]  Medication Therapy Management  Pharmacist  [phone]

## 2020-04-30 DIAGNOSIS — E11.21 TYPE 2 DIABETES MELLITUS WITH DIABETIC NEPHROPATHY, WITH LONG-TERM CURRENT USE OF INSULIN (H): ICD-10-CM

## 2020-04-30 DIAGNOSIS — Z79.4 TYPE 2 DIABETES MELLITUS WITH DIABETIC NEPHROPATHY, WITH LONG-TERM CURRENT USE OF INSULIN (H): ICD-10-CM

## 2020-04-30 RX ORDER — HYDROCHLOROTHIAZIDE 25 MG/1
TABLET ORAL
Qty: 90 TABLET | Refills: 0 | Status: SHIPPED | OUTPATIENT
Start: 2020-04-30 | End: 2020-09-14

## 2020-04-30 RX ORDER — GABAPENTIN 100 MG/1
CAPSULE ORAL
Qty: 90 CAPSULE | Refills: 0 | Status: SHIPPED | OUTPATIENT
Start: 2020-04-30 | End: 2020-08-10

## 2020-04-30 NOTE — TELEPHONE ENCOUNTER
"Requested Prescriptions   Pending Prescriptions Disp Refills     hydrochlorothiazide (HYDRODIURIL) 25 MG tablet [Pharmacy Med Name: HYDROCHLOROTHIAZIDE 25MG TABS] 90 tablet 0     Sig: TAKE ONE TABLET BY MOUTH EVERY MORNING         Last Written Prescription Date:  na  Last Fill Quantity: na,   # refills: na  Last Office Visit: 3/3/20  Future Office visit:       Routing refill request to provider for review/approval because:  Drug not active on patient's medication list    Diuretics (Including Combos) Protocol Failed - 4/30/2020  1:39 PM        Failed - Medication is active on med list        Failed - Normal serum sodium on file in past 12 months     Recent Labs   Lab Test 03/03/20  1031   *              Passed - Blood pressure under 140/90 in past 12 months     BP Readings from Last 3 Encounters:   03/03/20 120/73   10/02/19 130/64   08/28/19 124/68                 Passed - Recent (12 mo) or future (30 days) visit within the authorizing provider's specialty     Patient has had an office visit with the authorizing provider or a provider within the authorizing providers department within the previous 12 mos or has a future within next 30 days. See \"Patient Info\" tab in inbasket, or \"Choose Columns\" in Meds & Orders section of the refill encounter.              Passed - Patient is age 18 or older        Passed - No active pregancy on record        Passed - Normal serum creatinine on file in past 12 months     Recent Labs   Lab Test 03/03/20  1031   CR 0.84              Passed - Normal serum potassium on file in past 12 months     Recent Labs   Lab Test 03/03/20  1031   POTASSIUM 3.4                    Passed - No positive pregnancy test in past 12 months           gabapentin (NEURONTIN) 100 MG capsule [Pharmacy Med Name: GABAPENTIN 100MG CAPS] 90 capsule 0     Sig: TAKE ONE CAPSULE BY MOUTH EVERY MORNING       There is no refill protocol information for this order         Last Written Prescription Date:  " 5/29/19  Last Fill Quantity: 90,   # refills: 3  Last Office Visit: 3/3/20  Future Office visit:       Routing refill request to provider for review/approval because:  Drug not on the [facility], [facility] or [facility] refill protocol or controlled substance

## 2020-05-15 ENCOUNTER — ALLIED HEALTH/NURSE VISIT (OUTPATIENT)
Dept: PHARMACY | Facility: CLINIC | Age: 65
End: 2020-05-15
Payer: COMMERCIAL

## 2020-05-15 DIAGNOSIS — Z79.4 TYPE 2 DIABETES MELLITUS WITH DIABETIC NEPHROPATHY, WITH LONG-TERM CURRENT USE OF INSULIN (H): Primary | ICD-10-CM

## 2020-05-15 DIAGNOSIS — E11.21 TYPE 2 DIABETES MELLITUS WITH DIABETIC NEPHROPATHY, WITH LONG-TERM CURRENT USE OF INSULIN (H): Primary | ICD-10-CM

## 2020-05-15 PROCEDURE — 99606 MTMS BY PHARM EST 15 MIN: CPT | Performed by: PHARMACIST

## 2020-05-15 RX ORDER — FLASH GLUCOSE SCANNING READER
EACH MISCELLANEOUS
Qty: 1 EACH | Refills: 0 | OUTPATIENT
Start: 2020-05-15

## 2020-05-15 NOTE — TELEPHONE ENCOUNTER
Spoke with patient this morning. She has a functioning  at home. This must be an error.    Jovana Loo, Pharm.D., [facility]  Medication Therapy Management Pharmacist  [phone]

## 2020-05-15 NOTE — PROGRESS NOTES
MTM ENCOUNTER  SUBJECTIVE/OBJECTIVE:                           Nathalie Harp is a 64 year old female called for a follow-up visit. She was referred to me from Sapna Anand.  Today's visit is a follow-up MTM visit from 4/24/20     Patient consented to a telehealth visit: yes  Telemedicine Visit Details  Type of service:  Telephone visit  Start Time: 10:34 AM  End Time: 10:51 AM  Originating Location (pt. Location): Home  Distant Location (provider location):  [facility] MTM  Mode of Communication:  Telephone      Chief Complaint: Insulin titration.    Tobacco: No tobacco use  Alcohol: none    Medication Adherence/Access: issues found, see below.    Type 2 Diabetes: Pt currently taking Jardiance 10mg daily, Levemir 60units QAM, Novolog 25units BID - TID with meals - reports she has been taking 20units with meals.(she has been keeping it in a baggy next to her chair and this is helping with compliance).  SMBG Ranges (patient reported): Using [employer] CGM most of the time. She does not have a computer at home, so is unable to sign up for remote monitoring of blood sugar.  7d avg 227 down from 237mg/dL  12am-6am: 152 down from 247  6am-12pm: 160 down from 187  12pm-6pm: 286 up from 233  6pm-12am: 317 up from 295  Above range 57% down from 79% of the time  In Range 41% up from 20% of the time  Below 2% stable 1% of the time  Symptoms of low blood sugar? 1 low events in the last week in the 12-6am range. Gets shaky, dizzy, weak with BG ~100mg/dL. And notices this usually after waking up in the morning.  Symptoms of high blood sugar? vision changes and polyuria -- could be due to high BG and/or Jardiance. I is not experiencing itching or burning with urination.  Diet/Exercise: Has been riding 5 or 6 miles on her stationary bike almost every day at 11am or 4pm -- times it with the news and Jeopardy. Reports being very careful not to eat late at night.  Has eggs and toast almost every morning for  breakfast and grazes most of the day. Takes PM Novolog when she eats the most.  Aspirin: Taking 81mg daily   Statin: Yes: atorvastatin 40mg daily  ACEi/ARB: Yes: losartan/HCTZ 100/25mg QAM. Urine albumin is .   Lab Results   Component Value Date    UMALCR 239.81 (H) 03/03/2020       Lab Results   Component Value Date    A1C 13.3 03/03/2020    A1C 11.4 10/02/2019    A1C 9.2 07/03/2019    A1C 9.2 03/18/2019    A1C 9.5 12/07/2018      GFR Estimate   Date Value Ref Range Status   03/03/2020 72 >60 mL/min/[1.73_m2] Final     Comment:     Non  GFR Calc  Starting 12/18/2018, serum creatinine based estimated GFR (eGFR) will be   calculated using the Chronic Kidney Disease Epidemiology Collaboration   (CKD-EPI) equation.     10/02/2019 56 (L) >60 mL/min/[1.73_m2] Final     Comment:     Non  GFR Calc  Starting 12/18/2018, serum creatinine based estimated GFR (eGFR) will be   calculated using the Chronic Kidney Disease Epidemiology Collaboration   (CKD-EPI) equation.     10/01/2018 74 >60 mL/min/1.7m2 Final     Comment:     Non  GFR Calc         Today's Vitals: LMP  (LMP Unknown)  Phone visit.      ASSESSMENT:                              Medication Adherence: fair    Type 2 Diabetes: Needs Improvement. Patient is not meeting A1c goal of < 8%.  Self monitoring of blood glucose is not at goal of fasting  mg/dL and post prandial < 180 mg/dL. Morning numbers have improved, but evening numbers have worsened from previous.  Pt would benefit from   Basal Insulin (Levemir) : stay on the same dose for now..  Bolus / Rapid Acting Insulin (Novolog) : increase dose to help reduce post-pradial BG.  SGLT2 inhibitor (Jardiance) :  stay on the same dose for now.       PLAN:                            1. Increase Novolog to 25units with meals as previously recommended.    I spent 15 minutes with this patient today. All changes were made via collaborative practice agreement with Cheng  Sapna RIVAS A copy of the visit note was provided to the patient's primary care provider.    Will follow up in 2 weeks over the phone.    The patient declined a summary of these recommendations.     Jovana Loo, Pharm.D., [facility]  Medication Therapy Management Pharmacist  [phone]

## 2020-06-09 DIAGNOSIS — J45.20 MILD INTERMITTENT ASTHMA WITHOUT COMPLICATION: ICD-10-CM

## 2020-06-10 NOTE — TELEPHONE ENCOUNTER
Routing refill request to provider for review/approval because:  Failed: ACT <20      Stephanie Gomez RN

## 2020-06-11 RX ORDER — ALBUTEROL SULFATE 90 UG/1
AEROSOL, METERED RESPIRATORY (INHALATION)
Qty: 18 G | Refills: 11 | Status: SHIPPED | OUTPATIENT
Start: 2020-06-11 | End: 2021-01-01

## 2020-06-12 ENCOUNTER — ALLIED HEALTH/NURSE VISIT (OUTPATIENT)
Dept: PHARMACY | Facility: CLINIC | Age: 65
End: 2020-06-12
Payer: COMMERCIAL

## 2020-06-12 DIAGNOSIS — Z79.4 TYPE 2 DIABETES MELLITUS WITH DIABETIC NEPHROPATHY, WITH LONG-TERM CURRENT USE OF INSULIN (H): Primary | ICD-10-CM

## 2020-06-12 DIAGNOSIS — E11.21 TYPE 2 DIABETES MELLITUS WITH DIABETIC NEPHROPATHY, WITH LONG-TERM CURRENT USE OF INSULIN (H): Primary | ICD-10-CM

## 2020-06-12 PROCEDURE — 99606 MTMS BY PHARM EST 15 MIN: CPT | Performed by: PHARMACIST

## 2020-06-12 PROCEDURE — 99607 MTMS BY PHARM ADDL 15 MIN: CPT | Performed by: PHARMACIST

## 2020-06-12 RX ORDER — FLURBIPROFEN SODIUM 0.3 MG/ML
SOLUTION/ DROPS OPHTHALMIC
Qty: 100 EACH | Refills: 11 | Status: SHIPPED | OUTPATIENT
Start: 2020-06-12 | End: 2021-01-01

## 2020-06-12 NOTE — PROGRESS NOTES
MTM ENCOUNTER  SUBJECTIVE/OBJECTIVE:                           Nathalie Harp is a 64 year old female called for a follow-up visit. She was referred to me from Sapna Anand.  Today's visit is a follow-up MTM visit from 5/154/20     Patient consented to a telehealth visit: yes  Telemedicine Visit Details  Type of service:  Telephone visit  Start Time: 10:34 AM  End Time: 11:00 AM  Originating Location (pt. Location): Home  Distant Location (provider location):  [facility] MTM  Mode of Communication:  Telephone      Chief Complaint: Insulin titration.    Tobacco: No tobacco use  Alcohol: none    Medication Adherence/Access: issues found, see below.    Type 2 Diabetes: Pt currently taking Jardiance 10mg daily, Levemir 60units QAM, Novolog 25units BID - TID with meals (she has been keeping it in a baggy next to her chair and this is helping with compliance). Still taking 20units sometimes with meals - maybe 1/2 of the time. But also admits just not taking it as often because not eating as often. Just got both of these refilled yesterday.  SMBG Ranges (patient reported): Using Bernice CGM most of the time. She does not have a computer at home, so is unable to sign up for remote monitoring of blood sugar.  7d avg 300 mg/dL up from 227mg/dL  12am-6am: 265 up from 152  6am-12pm: 229 up from 160  12pm-6pm: 378 up from 286  6pm-12am: 354 up from 317  Feels BG are up due to upset stomach, stress, and hurt her foot - can of veggies fell on her foot 2 days ago. It was swollen initially but this is now resolved. She is able to walk on it so she doesn't think she broke any bones.   Symptoms of low blood sugar? 1 low events in the last week of 69mg/dL. Gets shaky, dizzy, weak with BG ~100mg/dL. Reports this usually happens after biking on her stationary bike and not eating. .  Symptoms of high blood sugar? vision changes and polyuria -- could be due to high BG and/or Jardiance. I is not experiencing  itching or burning with urination.  Diet/Exercise: Has been riding 5 or 6 miles on her stationary bike almost every day at 11am or 4pm -- times it with the news and Jeopardy. Reports being very careful not to eat late at night.  Aspirin: Taking 81mg daily   Statin: Yes: atorvastatin 40mg daily  ACEi/ARB: Yes: losartan/HCTZ 100/25mg QAM. Urine albumin is .   Lab Results   Component Value Date    UMALCR 239.81 (H) 03/03/2020       Lab Results   Component Value Date    A1C 13.3 03/03/2020    A1C 11.4 10/02/2019    A1C 9.2 07/03/2019    A1C 9.2 03/18/2019    A1C 9.5 12/07/2018      GFR Estimate   Date Value Ref Range Status   03/03/2020 72 >60 mL/min/[1.73_m2] Final     Comment:     Non  GFR Calc  Starting 12/18/2018, serum creatinine based estimated GFR (eGFR) will be   calculated using the Chronic Kidney Disease Epidemiology Collaboration   (CKD-EPI) equation.     10/02/2019 56 (L) >60 mL/min/[1.73_m2] Final     Comment:     Non  GFR Calc  Starting 12/18/2018, serum creatinine based estimated GFR (eGFR) will be   calculated using the Chronic Kidney Disease Epidemiology Collaboration   (CKD-EPI) equation.     10/01/2018 74 >60 mL/min/1.7m2 Final     Comment:     Non  GFR Calc         Today's Vitals: LMP  (LMP Unknown)  Phone visit.      ASSESSMENT:                              Medication Adherence: fair    Type 2 Diabetes: Needs Improvement. Patient is not meeting A1c goal of < 8%.  Self monitoring of blood glucose is not at goal of fasting  mg/dL and post prandial < 180 mg/dL. Home BG have all worsened since last visit. I suspect poor compliance with insulin is contributing, particularly skipping Novolog. She may benefit from switching to pre-mixed 70/30 insulin to help improve compliance.  Pt would benefit from   Basal Insulin (Levemir) : Discontinue.  Bolus / Rapid Acting Insulin (Novolog) : Discontinue  Pre-mixed Insulin (Novolog 70/30): initiate - slighly less  than 1:1 conversion of 120units of insulin daily, to reduce risk of hypoglycemia  SGLT2 inhibitor (Jardiance) :  stay on the same dose for now.       PLAN:                            1. Discontinue Basal/Bolus insulin regimen. Start Novolog 70/30 premixed insulin 50units twice a day with food. Rx sent to pharmacy.    I spent 25 minutes with this patient today. All changes were made via collaborative practice agreement with Sapna Anand. A copy of the visit note was provided to the patient's primary care provider.    Will follow up in 2 weeks over the phone.    The patient declined a summary of these recommendations.     Jovana Loo, Pharm.D., [facility]  Medication Therapy Management Pharmacist  [phone]

## 2020-06-19 DIAGNOSIS — E11.40 TYPE 2 DIABETES MELLITUS WITH DIABETIC NEUROPATHY, WITH LONG-TERM CURRENT USE OF INSULIN (H): ICD-10-CM

## 2020-06-19 DIAGNOSIS — Z79.4 TYPE 2 DIABETES MELLITUS WITH DIABETIC NEUROPATHY, WITH LONG-TERM CURRENT USE OF INSULIN (H): ICD-10-CM

## 2020-06-22 RX ORDER — GABAPENTIN 300 MG/1
CAPSULE ORAL
Qty: 90 CAPSULE | Refills: 3 | Status: SHIPPED | OUTPATIENT
Start: 2020-06-22 | End: 2021-01-01

## 2020-06-22 NOTE — TELEPHONE ENCOUNTER
Requested Prescriptions   Pending Prescriptions Disp Refills     gabapentin (NEURONTIN) 300 MG capsule [Pharmacy Med Name: GABAPENTIN 300MG CAPS] 90 capsule 3     Sig: TAKE ONE CAPSULE BY MOUTH EVERY NIGHT AT BEDTIME       There is no refill protocol information for this order        Last OV 3/3/20  Last refill 4/30/20    Routing refill request to provider for review/approval because:  Drug not on the [facility] refill protocol   Bessie Fritz RN,BSN  [facility]

## 2020-06-26 ENCOUNTER — ANCILLARY PROCEDURE (OUTPATIENT)
Dept: GENERAL RADIOLOGY | Facility: CLINIC | Age: 65
End: 2020-06-26
Attending: FAMILY MEDICINE
Payer: COMMERCIAL

## 2020-06-26 ENCOUNTER — OFFICE VISIT (OUTPATIENT)
Dept: FAMILY MEDICINE | Facility: CLINIC | Age: 65
End: 2020-06-26
Payer: COMMERCIAL

## 2020-06-26 VITALS
SYSTOLIC BLOOD PRESSURE: 122 MMHG | OXYGEN SATURATION: 97 % | DIASTOLIC BLOOD PRESSURE: 76 MMHG | WEIGHT: 141 LBS | HEART RATE: 67 BPM | BODY MASS INDEX: 31.5 KG/M2 | TEMPERATURE: 98.4 F

## 2020-06-26 DIAGNOSIS — E11.65 UNCONTROLLED TYPE 2 DIABETES MELLITUS WITH HYPERGLYCEMIA (H): ICD-10-CM

## 2020-06-26 DIAGNOSIS — S90.222A: ICD-10-CM

## 2020-06-26 DIAGNOSIS — M79.675 PAIN OF TOE OF LEFT FOOT: Primary | ICD-10-CM

## 2020-06-26 DIAGNOSIS — M79.675 PAIN OF TOE OF LEFT FOOT: ICD-10-CM

## 2020-06-26 DIAGNOSIS — G63 POLYNEUROPATHY ASSOCIATED WITH UNDERLYING DISEASE (H): ICD-10-CM

## 2020-06-26 PROCEDURE — 99214 OFFICE O/P EST MOD 30 MIN: CPT | Performed by: FAMILY MEDICINE

## 2020-06-26 PROCEDURE — 73660 X-RAY EXAM OF TOE(S): CPT | Mod: LT

## 2020-06-26 RX ORDER — LOSARTAN POTASSIUM 100 MG/1
TABLET ORAL DAILY
COMMUNITY
Start: 2020-01-31 | End: 2020-08-12

## 2020-06-26 ASSESSMENT — PAIN SCALES - GENERAL: PAINLEVEL: SEVERE PAIN (7)

## 2020-06-26 NOTE — PROGRESS NOTES
Subjective     Nathalie Harp is a 65 year old female who presents to clinic today for the following health issues:    HPI   Musculoskeletal problem/pain      Duration: 6 days    Description  Location: Left foot    Intensity:  7/10    Accompanying signs and symptoms: Discolored toenail    History  Previous similar problem: yes   Previous evaluation:  none    Precipitating or alleviating factors:  Trauma or overuse: YES- Trauma, tripped over her cart   Aggravating factors include: standing and walking    Therapies tried and outcome: Aleve, sturdy shoes, elevation, ice    She bumped her left fifth toe on a grocery cart in her apartment 6 days ago.  Her left fifth toenail has become discolored.  She is having discomfort in the region of the base of the fifth toe.  It seems to be getting a little bit better over time.  She has had prior surgeries on her feet for bunions, neuromas, etc.    She does have a history of poorly controlled diabetes.  She is working with our [facility] pharmacist, Jovana, on that.  Her chart does show a history of neuropathy.    Patient Active Problem List   Diagnosis     Esophageal reflux     Irritable bowel syndrome     Female stress incontinence     Arthritis of knee, right     Disorder of bursae and tendons in shoulder region     Degeneration of thoracic or thoracolumbar intervertebral disc     Degeneration of cervical intervertebral disc     Allergic rhinitis due to pollen     Menopausal symptoms     Need for SBE (subacute bacterial endocarditis) prophylaxis     Preventive measure     Hyperlipidemia LDL goal <100     Health Care Home     Anemia     Dizziness     Hammer toe     Microalbuminuria     ACP (advance care planning)     Allergic to shellfish     Falls frequently     Balance problems     CKD (chronic kidney disease) stage 2, GFR 60-89 ml/min     Type 2 diabetes mellitus with diabetic nephropathy (H)     Diarrhea     Iron deficiency anemia, unspecified iron deficiency anemia type      Right axillary hidradenitis     Uncontrolled type 2 diabetes mellitus without complication, with long-term current use of insulin     Mild intermittent asthma without complication     Cerebral artery occlusion with cerebral infarction (H) [I63.50]     Polyneuropathy associated with underlying disease (H)     Past Surgical History:   Procedure Laterality Date     HC EXCIS PRIMARY GANGLION WRIST  1985 and 1987    right wrist     KNEE SURGERY  2008 approx    arthroscopic; Dr. Rivera       Social History     Tobacco Use     Smoking status: Never Smoker     Smokeless tobacco: Never Used   Substance Use Topics     Alcohol use: Yes     Comment: rarely     Family History   Problem Relation Age of Onset     Cerebrovascular Disease Maternal Grandmother      Arthritis Maternal Grandmother      Asthma Mother      Gastrointestinal Disease Mother         IBS     Alcohol/Drug Mother      Depression Mother      Neurologic Disorder Mother         migraines     Gastrointestinal Disease Father         IBS     Diabetes Father      Alcohol/Drug Father      Neurologic Disorder Father         migraines     Obesity Father      Gastrointestinal Disease Maternal Grandfather         Ulcers     Alcohol/Drug Maternal Grandfather      Diabetes Paternal Grandmother      Alcohol/Drug Paternal Grandmother      Arthritis Paternal Grandmother      Obesity Paternal Grandmother      Hypertension Sister      Hypertension Brother      Circulatory Sister      Asthma Sister      Asthma Sister      Asthma Brother      Thyroid Disease Sister      Thyroid Disease Sister      Obesity Sister          Current Outpatient Medications   Medication Sig Dispense Refill     albuterol (PROAIR HFA/PROVENTIL HFA/VENTOLIN HFA) 108 (90 Base) MCG/ACT inhaler INHALE 2 PUFFS INTO THE LUNGS EVERY 6 HOURS AS NEEDED FOR SHORTNESS OF BREATH, DIFFICULTY BREATHING OR WHEEZING. 18 g 11     aspirin (ASA) 81 MG tablet Take 1 tablet (81 mg) by mouth daily       atorvastatin  (LIPITOR) 40 MG tablet Take 1 tablet (40 mg) by mouth daily 90 tablet 3     blood glucose (NO BRAND SPECIFIED) lancets standard Use to test blood sugar 3 times daily or as directed. 100 each 11     Continuous Blood Gluc  (FREESTYLE LALO 14 DAY READER) GIACOMO 1 Device continuous 1 Device 0     Continuous Blood Gluc Sensor (FREESTYLE LALO 14 DAY SENSOR) MISC CHANGE EVERY 14 DAYS 2 each 10     cyclobenzaprine (FLEXERIL) 10 MG tablet Take 1 tablet (10 mg) by mouth 3 times daily as needed for muscle spasms 20 tablet 1     empagliflozin (JARDIANCE) 10 MG TABS tablet TAKE ONE TABLET BY MOUTH ONCE DAILY 30 tablet 3     EPINEPHrine (EPIPEN/ADRENACLICK/OR ANY BX GENERIC EQUIV) 0.3 MG/0.3ML injection 2-pack INJECT ONE DEVICE INTO THE MUSCLE AS NEEDED FOR ALLERGIC REACTION 2 mL 1     fexofenadine (ALLEGRA) 180 MG tablet TAKE ONE TABLET BY MOUTH ONCE DAILY 90 tablet 1     gabapentin (NEURONTIN) 100 MG capsule TAKE ONE CAPSULE BY MOUTH EVERY MORNING 90 capsule 0     gabapentin (NEURONTIN) 300 MG capsule TAKE ONE CAPSULE BY MOUTH EVERY NIGHT AT BEDTIME 90 capsule 3     Glucosamine-Chondroit-Vit C-Mn (GLUCOSAMINE CHONDROITIN 1500 COMPLEX) CAPS Take 1 capsule by mouth daily       hydrochlorothiazide (HYDRODIURIL) 25 MG tablet TAKE ONE TABLET BY MOUTH EVERY MORNING 90 tablet 0     insulin aspart prot & aspart (NOVOLOG MIX 70/30 PEN) (70-30) 100 UNIT/ML pen Inject 50 Units Subcutaneous 2 times daily With food. This replaces Basal/bolus insulin 30 mL 11     insulin pen needle (B-D U/F) 31G X 5 MM miscellaneous USE TWO TIMES DAILY 100 each 11     losartan (COZAAR) 100 MG tablet daily       metoprolol succinate ER (TOPROL-XL) 50 MG 24 hr tablet Take 1 tablet (50 mg) by mouth daily 90 tablet 3     montelukast (SINGULAIR) 10 MG tablet Take 1 tablet (10 mg) by mouth At Bedtime 30 tablet 11     omeprazole (PRILOSEC) 20 MG DR capsule Take 1 capsule (20 mg) by mouth daily 90 capsule 3     triamcinolone (KENALOG) 0.1 % external ointment  Apply topically 2 times daily On bilateral legs 30 g 0     losartan-hydrochlorothiazide (HYZAAR) 100-25 MG tablet Take 1 tablet by mouth every morning (Patient not taking: Reported on 6/26/2020) 90 tablet 1     order for DME Equipment being ordered: Compression Stockings 15-20mm Hg-knee high (Patient not taking: Reported on 6/26/2020) 1 each 1     Allergies   Allergen Reactions     Amoxicillin-Pot Clavulanate [[id number]+Fd&C Red #40 Al Tristan]      Headache, nausea and vomiting     Augmentin      Beesix [Pyridoxine]      Benadryl [Acetaminophen]      Ceclor [Cefaclor]      Cefaclor      Rash       Cephalexin      Cephalosporins      Codeine      Detrol [Tolterodine Tartrate] Other (See Comments)     Severe Dry mouth     Dust Mites      Latex      Metformin GI Disturbance     Severe diarrhea     Pollen Extract      Septra [Bactrim]      Septra [Sulfamethoxazole W-Trimethoprim]      Shellfish Allergy      Crab       Simvastatin      HA     Sorbitol Diarrhea     Sulfa Drugs      Sulfonamide Derivatives        Reviewed and updated as needed this visit by Provider         Review of Systems   Constitutional, HEENT, cardiovascular, pulmonary, gi and gu systems are negative, except as otherwise noted.      Objective    LMP  (LMP Unknown)   Body mass index is 31.5 kg/m .   /76 (BP Location: Right arm, Patient Position: Sitting, Cuff Size: Adult Regular)   Pulse 67   Temp 98.4  F (36.9  C) (Oral)   Wt 64 kg (141 lb)   LMP  (LMP Unknown)   SpO2 97%   BMI 31.50 kg/m      Physical Exam   GENERAL: alert, no distress and over weight  EXT: She does have some discoloration of her left fifth toenail consistent with a subungual hematoma.  There is no gross deformity of the toe.  She does have some discomfort to palpation at the base of the left fifth toe in the region of the fifth MTP joint.  No specific pain more proximally in the foot or over the other toes.    Diagnostic Test Results:  Labs reviewed in Epic  X-rays of  "the left fifth toe and fifth metatarsal show some postsurgical changes at the left fifth MTP joint, but no apparent fractures.        Assessment & Plan       ICD-10-CM    1. Pain of toe of left foot  M79.675 XR Toe Left G/E 2 Views   2. Subungual hematoma of lesser toe of left foot  S90.222A    3. Uncontrolled type 2 diabetes mellitus with hyperglycemia (H)  E11.65    4. Polyneuropathy associated with underlying disease (H)  G63         BMI:   Estimated body mass index is 31.5 kg/m  as calculated from the following:    Height as of 3/3/20: 1.425 m (4' 8.1\").    Weight as of this encounter: 64 kg (141 lb).   Weight management plan: Discussed healthy diet and exercise guidelines    She appears to have a left fifth toe contusion and subungual hematoma  She will continue with symptomatic treatment and appropriate shoewear for this  Discussed that she may lose the toenail, but a new one  should grow back then eventually    I encouraged her to continue to try to control her diabetes better and to work with [facility] on that as she has been doing    Return for a recheck if symptoms worsen or fail to improve.    David Perez MD  [facility]    "

## 2020-06-30 ENCOUNTER — ALLIED HEALTH/NURSE VISIT (OUTPATIENT)
Dept: PHARMACY | Facility: CLINIC | Age: 65
End: 2020-06-30
Payer: COMMERCIAL

## 2020-06-30 DIAGNOSIS — Z79.4 TYPE 2 DIABETES MELLITUS WITH DIABETIC NEUROPATHY, WITH LONG-TERM CURRENT USE OF INSULIN (H): Primary | ICD-10-CM

## 2020-06-30 DIAGNOSIS — E11.40 TYPE 2 DIABETES MELLITUS WITH DIABETIC NEUROPATHY, WITH LONG-TERM CURRENT USE OF INSULIN (H): Primary | ICD-10-CM

## 2020-06-30 PROCEDURE — 99606 MTMS BY PHARM EST 15 MIN: CPT | Performed by: PHARMACIST

## 2020-06-30 NOTE — PROGRESS NOTES
MTM ENCOUNTER  SUBJECTIVE/OBJECTIVE:                           Nathalie Harp is a 65 year old female called for a follow-up visit. She was referred to me from Sapna Anand  Today's visit is a follow-up MTM visit from 6/12/20     Patient consented to a telehealth visit: yes  Telemedicine Visit Details  Type of service:  Telephone visit  Start Time: 10:34 AM  End Time: 10:49 AM  Originating Location (pt. Location): Home  Distant Location (provider location):  [facility] MTM  Mode of Communication:  Telephone      Chief Complaint: Insulin titration.    Tobacco: No tobacco use  Alcohol: none    Medication Adherence/Access: issues found, see below.    Type 2 Diabetes: Pt currently taking Jardiance 10mg daily and Novolog 70/30 50units BID -- switched from basal/bolus insulin at last visit.  SMBG Ranges (patient reported): Using Bernice CGM most of the time. She does not have a computer at home, so is unable to sign up for remote monitoring of blood sugar.  7d avg 188 down from 300 mg/dL  12am-6am: 166 down from 265   6am-12pm: 161 down from 229   12pm-6pm: 209 down from 378   6pm-12am: 232 down from 354   Symptoms of low blood sugar? 3 low events in the last week - 2 from 12-6am and 1 from 6am-noon. Reports her numbers were in the 50's each of these times. Gets shaky, dizzy, weak with BG ~100mg/dL.   Symptoms of high blood sugar? vision changes (eyes just feel sensitive to bright light) and polyuria -- could be due to high BG and/or Jardiance. Is not experiencing itching or burning with urination.  Aspirin: Taking 81mg daily   Statin: Yes: atorvastatin 40mg daily  ACEi/ARB: Yes: losartan 100mg QAM. Urine albumin is .   Lab Results   Component Value Date    UMALCR 239.81 (H) 03/03/2020       Lab Results   Component Value Date    A1C 13.3 03/03/2020    A1C 11.4 10/02/2019    A1C 9.2 07/03/2019    A1C 9.2 03/18/2019    A1C 9.5 12/07/2018      GFR Estimate   Date Value Ref Range Status   03/03/2020 72  >60 mL/min/[1.73_m2] Final     Comment:     Non  GFR Calc  Starting 12/18/2018, serum creatinine based estimated GFR (eGFR) will be   calculated using the Chronic Kidney Disease Epidemiology Collaboration   (CKD-EPI) equation.     10/02/2019 56 (L) >60 mL/min/[1.73_m2] Final     Comment:     Non  GFR Calc  Starting 12/18/2018, serum creatinine based estimated GFR (eGFR) will be   calculated using the Chronic Kidney Disease Epidemiology Collaboration   (CKD-EPI) equation.     10/01/2018 74 >60 mL/min/1.7m2 Final     Comment:     Non  GFR Calc         Today's Vitals: LMP  (LMP Unknown)  Phone visit.      ASSESSMENT:                              Medication Adherence: fair    Type 2 Diabetes: Needs Improvement. Patient is not meeting A1c goal of < 8%. Will want to recheck A1c in September after being on pre-mixed insulin x3 months.  Self monitoring of blood glucose is not at goal of fasting  mg/dL and post prandial < 180 mg/dL, but is much improved from previous. She does appear to be having some hypoglycemia overnight.  Pt would benefit from   Pre-mixed Insulin (Novolog 70/30): reduce PM dose to prevent nocturnal hypoglycemia, but will increase AM dose to better control BG during the day.   SGLT2 inhibitor (Jardiance) :  stay on the same dose for now.       PLAN:                            Novolog 70/30 - take 55units QAM and 45units QPM.    I spent 15 minutes with this patient today. All changes were made via collaborative practice agreement with Sapna Anand. A copy of the visit note was provided to the patient's primary care provider.    Will follow up in 2 weeks over the phone.    The patient declined a summary of these recommendations.     Jovana Loo, Pharm.D., [facility]  Medication Therapy Management Pharmacist  [phone]

## 2020-07-13 ENCOUNTER — ANCILLARY PROCEDURE (OUTPATIENT)
Dept: MAMMOGRAPHY | Facility: CLINIC | Age: 65
End: 2020-07-13
Attending: PHYSICIAN ASSISTANT
Payer: COMMERCIAL

## 2020-07-13 DIAGNOSIS — Z12.31 ENCOUNTER FOR SCREENING MAMMOGRAM FOR BREAST CANCER: ICD-10-CM

## 2020-07-13 PROCEDURE — 77067 SCR MAMMO BI INCL CAD: CPT | Mod: TC

## 2020-07-14 ENCOUNTER — ALLIED HEALTH/NURSE VISIT (OUTPATIENT)
Dept: PHARMACY | Facility: CLINIC | Age: 65
End: 2020-07-14
Payer: COMMERCIAL

## 2020-07-14 DIAGNOSIS — E11.40 TYPE 2 DIABETES MELLITUS WITH DIABETIC NEUROPATHY, WITH LONG-TERM CURRENT USE OF INSULIN (H): Primary | ICD-10-CM

## 2020-07-14 DIAGNOSIS — Z79.4 TYPE 2 DIABETES MELLITUS WITH DIABETIC NEUROPATHY, WITH LONG-TERM CURRENT USE OF INSULIN (H): Primary | ICD-10-CM

## 2020-07-14 PROCEDURE — 99606 MTMS BY PHARM EST 15 MIN: CPT | Performed by: PHARMACIST

## 2020-07-14 PROCEDURE — 99607 MTMS BY PHARM ADDL 15 MIN: CPT | Performed by: PHARMACIST

## 2020-07-14 NOTE — PROGRESS NOTES
MTM ENCOUNTER  SUBJECTIVE/OBJECTIVE:                           Nathalie Harp is a 65 year old female called for a follow-up visit. She was referred to me from Sapna Anand.  Today's visit is a follow-up MTM visit from 20.    Patient consented to a telehealth visit: yes  Telemedicine Visit Details  Type of service:  Telephone visit  Start Time: 11:05 AM  End Time: 11:25 AM  Originating Location (pt. Location): Home  Distant Location (provider location):  [facility] MTM  Mode of Communication:  Telephone    Chief Complaint: Insulin titration.  Also - she is agreeable to labs in September; would like to get her flu shot at the same time. Has a hard time getting to clinic due to use of public transportation.    Tobacco: No tobacco use  Alcohol: none    Medication Adherence/Access: issues found, see below. She reports she has skipped a few evening doses of insulin -- maybe 2 or 3 times and states she never forgets the morning dose.    Type 2 Diabetes: Pt currently taking Jardiance 10mg daily and Novolog 70/30 55units QAM and 45units QPM with food -- recently switched from basal/bolus insulin.  SMBG Ranges (patient reported): Using Bernice CGM most of the time. She does not have a computer at home, so is unable to sign up for remote monitoring of blood sugar.  7d avg 233 up from 188  12am-6am: 165 stable from 166   6am-12pm: 196 up from 161  12pm-6pm: 350 up from 209  6pm-12am: 231 stable from 232  Symptoms of low blood sugar? When asked, she states she has had several lows recently -- per glucometer, 0 lows in the last 7 days; 1 event in the last 14 days from 12-6am and she reports she has woken up a few times feeling really low. Review of BG lo, 84, 71, 73, 94, 117, 115 on ; 84, 92, 98, 86, 98 on . Gets shaky, dizzy, weak with BG ~100mg/dL.   Symptoms of high blood sugar? vision changes (eyes just feel sensitive to bright light) and polyuria -- could be due to high BG and/or  Jardiance. Is not experiencing itching or burning with urination.  Aspirin: Taking 81mg daily   Statin: Yes: atorvastatin 40mg daily  ACEi/ARB: Yes: losartan 100mg QAM. Urine albumin is .   Lab Results   Component Value Date    UMALCR 239.81 (H) 03/03/2020       Lab Results   Component Value Date    A1C 13.3 03/03/2020    A1C 11.4 10/02/2019    A1C 9.2 07/03/2019    A1C 9.2 03/18/2019    A1C 9.5 12/07/2018      GFR Estimate   Date Value Ref Range Status   03/03/2020 72 >60 mL/min/[1.73_m2] Final     Comment:     Non  GFR Calc  Starting 12/18/2018, serum creatinine based estimated GFR (eGFR) will be   calculated using the Chronic Kidney Disease Epidemiology Collaboration   (CKD-EPI) equation.     10/02/2019 56 (L) >60 mL/min/[1.73_m2] Final     Comment:     Non  GFR Calc  Starting 12/18/2018, serum creatinine based estimated GFR (eGFR) will be   calculated using the Chronic Kidney Disease Epidemiology Collaboration   (CKD-EPI) equation.     10/01/2018 74 >60 mL/min/1.7m2 Final     Comment:     Non  GFR Calc         Today's Vitals: LMP  (LMP Unknown)  Phone visit.      ASSESSMENT:                              Medication Adherence: fair    Type 2 Diabetes: Needs Improvement. Patient is not meeting A1c goal of < 8%. Will want to recheck A1c in September after being on pre-mixed insulin x3 months.  Self monitoring of blood glucose is not at goal of fasting  mg/dL and post prandial < 180 mg/dL. Overall BG averages have worsened since previous and I suspect this is due to compliance and patient perception of hypoglycemia.  She does not appear to be having true hypoglycemia overnight - no readings <70mg/dL. This is an indication that when she takes her current insulin, it is working well -- just needs more time to adjust to more normal BG levels and improved compliance (no skipping doses).  Pt would benefit from   Pre-mixed Insulin (Novolog 70/30): continue current dose  - do not skip doses.  SGLT2 inhibitor (Jardiance) :  stay on the same dose for now.       PLAN:                            Continue present medications. Compliance with insulin strongly encouraged; reviewed at length true hypoglycemia is <70mg/dL.    I spent 20 minutes with this patient today. All changes were made via collaborative practice agreement with Sapna Anand. A copy of the visit note was provided to the patient's primary care provider.    Will follow up in 1 month over the phone.    The patient declined a summary of these recommendations.     Jovana Loo, Pharm.D., [facility]  Medication Therapy Management Pharmacist  [phone]

## 2020-08-06 DIAGNOSIS — K21.9 GASTROESOPHAGEAL REFLUX DISEASE, ESOPHAGITIS PRESENCE NOT SPECIFIED: ICD-10-CM

## 2020-08-06 DIAGNOSIS — E11.21 TYPE 2 DIABETES MELLITUS WITH DIABETIC NEPHROPATHY, WITH LONG-TERM CURRENT USE OF INSULIN (H): ICD-10-CM

## 2020-08-06 DIAGNOSIS — I10 HTN, GOAL BELOW 140/90: ICD-10-CM

## 2020-08-06 DIAGNOSIS — J30.89 NON-SEASONAL ALLERGIC RHINITIS DUE TO OTHER ALLERGIC TRIGGER: ICD-10-CM

## 2020-08-06 DIAGNOSIS — E78.5 HYPERLIPIDEMIA LDL GOAL <100: ICD-10-CM

## 2020-08-06 DIAGNOSIS — Z79.4 TYPE 2 DIABETES MELLITUS WITH DIABETIC NEPHROPATHY, WITH LONG-TERM CURRENT USE OF INSULIN (H): ICD-10-CM

## 2020-08-10 RX ORDER — METOPROLOL SUCCINATE 50 MG/1
TABLET, EXTENDED RELEASE ORAL
Qty: 90 TABLET | Refills: 3 | Status: SHIPPED | OUTPATIENT
Start: 2020-08-10 | End: 2021-01-01

## 2020-08-10 RX ORDER — ATORVASTATIN CALCIUM 40 MG/1
TABLET, FILM COATED ORAL
Qty: 90 TABLET | Refills: 3 | Status: SHIPPED | OUTPATIENT
Start: 2020-08-10 | End: 2021-01-01

## 2020-08-10 RX ORDER — GABAPENTIN 100 MG/1
CAPSULE ORAL
Qty: 90 CAPSULE | Refills: 0 | Status: SHIPPED | OUTPATIENT
Start: 2020-08-10 | End: 2020-10-22

## 2020-08-10 RX ORDER — FEXOFENADINE HCL 180 MG/1
TABLET ORAL
Qty: 90 TABLET | Refills: 1 | Status: SHIPPED | OUTPATIENT
Start: 2020-08-10 | End: 2021-01-01

## 2020-08-10 NOTE — TELEPHONE ENCOUNTER
Routing refill request to provider for review/approval because:  Drug not on the [facility] refill protocol   Failed protocol: donell Gomez RN

## 2020-08-12 ENCOUNTER — ALLIED HEALTH/NURSE VISIT (OUTPATIENT)
Dept: PHARMACY | Facility: CLINIC | Age: 65
End: 2020-08-12
Payer: COMMERCIAL

## 2020-08-12 DIAGNOSIS — E11.40 TYPE 2 DIABETES MELLITUS WITH DIABETIC NEUROPATHY, WITH LONG-TERM CURRENT USE OF INSULIN (H): Primary | ICD-10-CM

## 2020-08-12 DIAGNOSIS — Z79.4 TYPE 2 DIABETES MELLITUS WITH DIABETIC NEUROPATHY, WITH LONG-TERM CURRENT USE OF INSULIN (H): Primary | ICD-10-CM

## 2020-08-12 PROCEDURE — 99607 MTMS BY PHARM ADDL 15 MIN: CPT | Performed by: PHARMACIST

## 2020-08-12 PROCEDURE — 99606 MTMS BY PHARM EST 15 MIN: CPT | Performed by: PHARMACIST

## 2020-08-12 RX ORDER — LOSARTAN POTASSIUM 100 MG/1
100 TABLET ORAL DAILY
Qty: 30 TABLET | Refills: 11 | Status: SHIPPED | OUTPATIENT
Start: 2020-08-12 | End: 2021-01-01

## 2020-08-12 NOTE — PROGRESS NOTES
"MTM ENCOUNTER  SUBJECTIVE/OBJECTIVE:                           Nathalie Harp is a 65 year old female called for a follow-up visit. She was referred to me from Sapna Anand.  Today's visit is a follow-up MTM visit from 7/14/20.    Patient consented to a telehealth visit: yes  Telemedicine Visit Details  Type of service:  Telephone visit  Start Time: 11:03 AM  End Time: 11:25 AM  Originating Location (pt. Location): Home  Distant Location (provider location):  [facility] MTM  Mode of Communication:  Telephone    Chief Complaint: Insulin titration.  Also - she is agreeable to labs in September; would like to get her flu shot at the same time; unsure when we will have flu vaccine available in the clinic. Has a hard time getting to clinic due to use of public transportation.    Tobacco:  reports that she has never smoked. She has never used smokeless tobacco.  Alcohol: none    Medication Adherence/Access: issues found, see below. She reports she has \"only missed a couple insulin doses here and there\", usually the evening dose. Also noticed that her original Rx for Jardiance was written for 4 months supply in March, and if she was taking it every day, she would be out by now.    Type 2 Diabetes: Pt currently taking Jardiance 10mg daily and Novolog 70/30 55units QAM and 45units QPM with food -- recently switched from basal/bolus insulin. She \"usually\" eats when taking evening dose.  SMBG Ranges (patient reported): 50-450mg/dL. Notes lows a few times per week that wake her up around 2-3am. She reports she will have a low, and then rebound hyperglycemia after correcting for low BG -- corrects with \"whatever I can find\"; usually grape juice or tea with 3 TBS sugar. Not at home right now, not able to read off BG readings to me.  Symptoms of low blood sugar? shaky, dizzy, weak with BG ~100mg/dL.   Symptoms of high blood sugar? polyuria -- could be due to high BG and/or Jardiance and states \"i've " "always had this problem\". Is not experiencing itching or burning with urination.  Aspirin: Taking 81mg daily   Statin: Yes: atorvastatin 40mg daily  ACEi/ARB: Yes: losartan 100mg QAM. Urine albumin is .   Lab Results   Component Value Date    UMALCR 239.81 (H) 03/03/2020       Lab Results   Component Value Date    A1C 13.3 03/03/2020    A1C 11.4 10/02/2019    A1C 9.2 07/03/2019    A1C 9.2 03/18/2019    A1C 9.5 12/07/2018      GFR Estimate   Date Value Ref Range Status   03/03/2020 72 >60 mL/min/[1.73_m2] Final     Comment:     Non  GFR Calc  Starting 12/18/2018, serum creatinine based estimated GFR (eGFR) will be   calculated using the Chronic Kidney Disease Epidemiology Collaboration   (CKD-EPI) equation.     10/02/2019 56 (L) >60 mL/min/[1.73_m2] Final     Comment:     Non  GFR Calc  Starting 12/18/2018, serum creatinine based estimated GFR (eGFR) will be   calculated using the Chronic Kidney Disease Epidemiology Collaboration   (CKD-EPI) equation.     10/01/2018 74 >60 mL/min/1.7m2 Final     Comment:     Non  GFR Calc         Today's Vitals: LMP  (LMP Unknown)  Phone visit.      ASSESSMENT:                              Medication Adherence: fair    Type 2 Diabetes: Needs Improvement. Patient is not meeting A1c goal of < 8%. Will want to recheck A1c in September after being on pre-mixed insulin x3 months.  Self monitoring of blood glucose is not within normal ranges of 80-180mg/dL. She reports signficant and frequent hypoglycemia overnight with subsequent rebound hyperglycemia.   Pt would benefit from   Pre-mixed Insulin (Novolog 70/30): reduce PM dose to prevent overniht hypoglycemia and be sure to take insulin dose with food.  SGLT2 inhibitor (Jardiance) :  stay on the same dose for now.       PLAN:                            1. Reduce PM dose of Novolog to 38units and make sure to always eat something when taking insulin.    I spent 22 minutes with this patient " today. All changes were made via collaborative practice agreement with Sapna Anand. A copy of the visit note was provided to the patient's primary care provider.    Will follow up in 1 week over the phone. Will also plan to schedule clinic visit with provider, labs, flu shot for September.    The patient declined a summary of these recommendations.     Jovana Loo, Pharm.D., [facility]  Medication Therapy Management Pharmacist  [phone]

## 2020-09-11 DIAGNOSIS — Z79.4 TYPE 2 DIABETES MELLITUS WITH DIABETIC NEPHROPATHY, WITH LONG-TERM CURRENT USE OF INSULIN (H): ICD-10-CM

## 2020-09-11 DIAGNOSIS — E11.21 TYPE 2 DIABETES MELLITUS WITH DIABETIC NEPHROPATHY, WITH LONG-TERM CURRENT USE OF INSULIN (H): ICD-10-CM

## 2020-09-11 NOTE — LETTER
[address] [address]  Phone: [phone]  Fax: [phone]        September 21, 2020      Nathalie Harp                                                                                                                                [address] [place] [id number]            Dear Ms. Harp,    We are concerned about your health care.  We recently provided you with a medication refill.  Many medications require routine follow-up with your Doctor.      At this time we ask that: You come to your clinic for routine labs for medication monitoring.        Your prescription: Has been refilled for 1 month so you may have time for the above noted follow-up.      Thank you,      Sapna Anand PA-C  [id number]

## 2020-09-14 RX ORDER — HYDROCHLOROTHIAZIDE 25 MG/1
TABLET ORAL
Qty: 30 TABLET | Refills: 0 | Status: SHIPPED | OUTPATIENT
Start: 2020-09-14 | End: 2020-10-22

## 2020-09-14 NOTE — TELEPHONE ENCOUNTER
Routing refill request to provider for review/approval because:  Labs out of range:    Sodium   Date Value Ref Range Status   03/03/2020 131 (L) 133 - 144 mmol/L Final       Joycelyn Smith, RN, BSN, PHN  [facility] [place]

## 2020-10-21 DIAGNOSIS — Z79.4 TYPE 2 DIABETES MELLITUS WITH DIABETIC NEPHROPATHY, WITH LONG-TERM CURRENT USE OF INSULIN (H): ICD-10-CM

## 2020-10-21 DIAGNOSIS — E11.21 TYPE 2 DIABETES MELLITUS WITH DIABETIC NEPHROPATHY, WITH LONG-TERM CURRENT USE OF INSULIN (H): ICD-10-CM

## 2020-10-21 NOTE — TELEPHONE ENCOUNTER
Requested Prescriptions   Pending Prescriptions Disp Refills    gabapentin (NEURONTIN) 100 MG capsule [Pharmacy Med Name: GABAPENTIN 100MG CAPS] 90 capsule 0     Sig: TAKE ONE CAPSULE BY MOUTH EVERY MORNING       There is no refill protocol information for this order        Routing refill request to provider for review/approval because:  Drug not on the [facility] refill protocol   Bessie Fritz RN,BSN  [facility]

## 2020-10-22 ENCOUNTER — OFFICE VISIT (OUTPATIENT)
Dept: FAMILY MEDICINE | Facility: CLINIC | Age: 65
End: 2020-10-22
Payer: COMMERCIAL

## 2020-10-22 VITALS
DIASTOLIC BLOOD PRESSURE: 65 MMHG | BODY MASS INDEX: 30.07 KG/M2 | TEMPERATURE: 98.3 F | HEART RATE: 59 BPM | SYSTOLIC BLOOD PRESSURE: 120 MMHG | WEIGHT: 134.6 LBS

## 2020-10-22 DIAGNOSIS — Z23 NEED FOR PROPHYLACTIC VACCINATION AND INOCULATION AGAINST INFLUENZA: ICD-10-CM

## 2020-10-22 DIAGNOSIS — J45.20 MILD INTERMITTENT ASTHMA WITHOUT COMPLICATION: ICD-10-CM

## 2020-10-22 DIAGNOSIS — J30.1 ALLERGIC RHINITIS DUE TO POLLEN, UNSPECIFIED SEASONALITY: ICD-10-CM

## 2020-10-22 DIAGNOSIS — Z12.11 COLON CANCER SCREENING: ICD-10-CM

## 2020-10-22 DIAGNOSIS — Z79.4 TYPE 2 DIABETES MELLITUS WITH DIABETIC NEPHROPATHY, WITH LONG-TERM CURRENT USE OF INSULIN (H): Primary | ICD-10-CM

## 2020-10-22 DIAGNOSIS — E11.21 TYPE 2 DIABETES MELLITUS WITH DIABETIC NEPHROPATHY, WITH LONG-TERM CURRENT USE OF INSULIN (H): Primary | ICD-10-CM

## 2020-10-22 DIAGNOSIS — R06.00 PND (PAROXYSMAL NOCTURNAL DYSPNEA): ICD-10-CM

## 2020-10-22 LAB — HBA1C MFR BLD: 10.4 % (ref 0–5.6)

## 2020-10-22 PROCEDURE — 83036 HEMOGLOBIN GLYCOSYLATED A1C: CPT | Performed by: PHYSICIAN ASSISTANT

## 2020-10-22 PROCEDURE — 90662 IIV NO PRSV INCREASED AG IM: CPT | Performed by: PHYSICIAN ASSISTANT

## 2020-10-22 PROCEDURE — 36415 COLL VENOUS BLD VENIPUNCTURE: CPT | Performed by: PHYSICIAN ASSISTANT

## 2020-10-22 PROCEDURE — G0008 ADMIN INFLUENZA VIRUS VAC: HCPCS | Performed by: PHYSICIAN ASSISTANT

## 2020-10-22 PROCEDURE — 99214 OFFICE O/P EST MOD 30 MIN: CPT | Mod: 25 | Performed by: PHYSICIAN ASSISTANT

## 2020-10-22 RX ORDER — HYDROCHLOROTHIAZIDE 25 MG/1
TABLET ORAL
Qty: 30 TABLET | Refills: 0 | OUTPATIENT
Start: 2020-10-22

## 2020-10-22 RX ORDER — MONTELUKAST SODIUM 10 MG/1
10 TABLET ORAL AT BEDTIME
Qty: 30 TABLET | Refills: 11 | Status: SHIPPED | OUTPATIENT
Start: 2020-10-22

## 2020-10-22 RX ORDER — GABAPENTIN 100 MG/1
CAPSULE ORAL
Qty: 90 CAPSULE | Refills: 0 | Status: SHIPPED | OUTPATIENT
Start: 2020-10-22 | End: 2020-10-22

## 2020-10-22 RX ORDER — HYDROCHLOROTHIAZIDE 25 MG/1
25 TABLET ORAL EVERY MORNING
Qty: 90 TABLET | Refills: 3 | Status: SHIPPED | OUTPATIENT
Start: 2020-10-22

## 2020-10-22 RX ORDER — GABAPENTIN 100 MG/1
CAPSULE ORAL
Qty: 90 CAPSULE | Refills: 3 | Status: SHIPPED | OUTPATIENT
Start: 2020-10-22

## 2020-10-22 RX ORDER — FLASH GLUCOSE SENSOR
KIT MISCELLANEOUS
Qty: 2 EACH | Refills: 10 | Status: SHIPPED | OUTPATIENT
Start: 2020-10-22 | End: 2021-01-01

## 2020-10-22 NOTE — PROGRESS NOTES
Subjective     Nathalie Harp is a 65 year old female who presents to clinic today for the following health issues:    HPI         Diabetes Follow-up    How often are you checking your blood sugar? Two times daily  Blood sugar testing frequency justification:  Uncontrolled diabetes  What time of day are you checking your blood sugars (select all that apply)?  Before and after meals  Have you had any blood sugars above 200?  Yes 128 this morning.  Have you had any blood sugars below 70?  No    What symptoms do you notice when your blood sugar is low?  None    What concerns do you have today about your diabetes? Blood sugar is often over 200     Do you have any of these symptoms? (Select all that apply)  No numbness or tingling in feet.  No redness, sores or blisters on feet.  No complaints of excessive thirst.  No reports of blurry vision.  No significant changes to weight.  No lows. A few weeks ago she had the machine read high. She is taking all her medications.   Her freestyle karey broke, she is has been manually checking her BS daily.     BP Readings from Last 2 Encounters:   10/22/20 120/65   06/26/20 122/76     Hemoglobin A1C (%)   Date Value   03/03/2020 13.3 (H)   10/02/2019 11.4 (H)     LDL Cholesterol Calculated (mg/dL)   Date Value   03/03/2020     Cannot estimate LDL when triglyceride exceeds 400 mg/dL   03/18/2019 120 (H)     LDL Cholesterol Direct (mg/dL)   Date Value   03/03/2020 101 (H)                 How many servings of fruits and vegetables do you eat daily?  2-3    On average, how many sweetened beverages do you drink each day (Examples: soda, juice, sweet tea, etc.  Do NOT count diet or artificially sweetened beverages)?   0    How many days per week do you exercise enough to make your heart beat faster? 3 or less    How many minutes a day do you exercise enough to make your heart beat faster? 9 or less    How many days per week do you miss taking your medication? 0    She has had a cough  for months. She feels like it is her asthma and allergies.   Flonase gives her nose bleeds. She feels like the allegra makes her PND worse. The PND causes a cough. She notes she used to go to asthma and allergy specialists. They didn't do a whole lot to help.     Review of Systems   Constitutional, HEENT, cardiovascular, pulmonary, gi and gu systems are negative, except as otherwise noted.      Objective    /65 (BP Location: Right arm, Patient Position: Chair, Cuff Size: Adult Regular)   Pulse 59   Temp 98.3  F (36.8  C) (Oral)   Wt 61.1 kg (134 lb 9.6 oz)   LMP  (LMP Unknown)   Breastfeeding No   BMI 30.07 kg/m    Body mass index is 30.07 kg/m .  Physical Exam   GENERAL: healthy, alert and no distress  RESP: lungs clear to auscultation - no rales, rhonchi or wheezes  CV: regular rate and rhythm, normal S1 S2, no S3 or S4, no murmur, c          Assessment & Plan     Type 2 diabetes mellitus with diabetic nephropathy, with long-term current use of insulin (H)  Not well controlled. Has been working with MTM on control. Needs refills for lalo monitor. No changes to medications today, follow up with MTM.   - HEMOGLOBIN A1C  - Continuous Blood Gluc Sensor ([employer]OOK Initiative [place]YLE LLAO 14 DAY SENSOR) [facility]; CHANGE EVERY 14 DAYS  - gabapentin (NEURONTIN) 100 MG capsule; TAKE ONE CAPSULE BY MOUTH EVERY MORNING  - hydrochlorothiazide (HYDRODIURIL) 25 MG tablet; Take 1 tablet (25 mg) by mouth every morning    Mild intermittent asthma without complication  Stable, no change in meds  - montelukast (SINGULAIR) 10 MG tablet; Take 1 tablet (10 mg) by mouth At Bedtime    Allergic rhinitis due to pollen, unspecified seasonality  PND and allergies, referral to ENT.   - OTOLARYNGOLOGY REFERRAL    PND (paroxysmal nocturnal dyspnea)  - OTOLARYNGOLOGY REFERRAL    Need for prophylactic vaccination and inoculation against influenza  - FLUZONE HIGH DOSE 65+  [78682]  - Vaccine Administration, Initial [02872]    Colon cancer  screening  Agreeable to Cologuard. Will order.               Return in about 4 weeks (around 11/19/2020) for Diabetes with Jovana. .    ESTELA [employer]

## 2020-10-22 NOTE — TELEPHONE ENCOUNTER
"Requested Prescriptions   Refused Prescriptions Disp Refills     hydrochlorothiazide (HYDRODIURIL) 25 MG tablet [Pharmacy Med Name: HYDROCHLOROTHIAZIDE 25MG TABS] 30 tablet 0     Sig: TAKE ONE TABLET BY MOUTH EVERY MORNING       Diuretics (Including Combos) Protocol Failed - 10/21/2020 12:41 PM        Failed - Normal serum sodium on file in past 12 months     Recent Labs   Lab Test 03/03/20  1031   *              Passed - Blood pressure under 140/90 in past 12 months     BP Readings from Last 3 Encounters:   10/22/20 120/65   06/26/20 122/76   03/03/20 120/73                 Passed - Recent (12 mo) or future (30 days) visit within the authorizing provider's specialty     Patient has had an office visit with the authorizing provider or a provider within the authorizing providers department within the previous 12 mos or has a future within next 30 days. See \"Patient Info\" tab in inbasket, or \"Choose Columns\" in Meds & Orders section of the refill encounter.              Passed - Medication is active on med list        Passed - Patient is age 18 or older        Passed - No active pregancy on record        Passed - Normal serum creatinine on file in past 12 months     Recent Labs   Lab Test 03/03/20  1031   CR 0.84              Passed - Normal serum potassium on file in past 12 months     Recent Labs   Lab Test 03/03/20  1031   POTASSIUM 3.4                    Passed - No positive pregnancy test in past 12 months           Refilled yesterday.  Bessie Fritz, LIZN-RN  [facility]    "

## 2020-10-22 NOTE — RESULT ENCOUNTER NOTE
Nathalie,     Your A1C has improved, but is still high. Please keep your appointment with Jovana to work on your diabetes control.   Sapna Anand PA-C

## 2020-10-22 NOTE — LETTER
[facility]   [address] [place]  [address]  [phone]                                   October 22, 2020    Nathalie Harp  [address]   [facility] [address]        Dear Nathalie,    Your A1C has improved, but is still high. Please keep your appointment with Jovana to work on your diabetes control.     Results for orders placed or performed in visit on 10/22/20   HEMOGLOBIN A1C     Status: Abnormal   Result Value Ref Range    Hemoglobin A1C 10.4 (H) 0 - 5.6 %       If you have any questions please call the clinic at [phone]    Sincerely,    Sapna Anand PA-C  [facility]

## 2020-10-23 ASSESSMENT — ASTHMA QUESTIONNAIRES: ACT_TOTALSCORE: 15

## 2020-11-18 NOTE — PROGRESS NOTES
MTM ENCOUNTER  SUBJECTIVE/OBJECTIVE:                           Nathalie Harp is a 65 year old female called for a follow-up visit. She was referred to me from Sapna Anand.  Today's visit is a follow-up MTM visit from 8/12/2020 and primary care provider visit 10/22/2020     Reason for visit: Uncontrolled diabetes.    Tobacco: She reports that she has never smoked. She has never used smokeless tobacco.  Alcohol: none    Medication Adherence/Access: some issues with blood sugar sensors, see below. Will likely be switching to [facility] with  Clinic closing or might consider  Mail order pharmacy.    Type 2 Diabetes: Currently taking Jardiance 10mg once daily (back on In the last few days -- couldn't find her supply at home, was off for about 2 weeks), Novolog 70/30 45-50units QAM and 25-40units at bedtime (is prescribed 50units twice daily with food), or skips if pre-bed blood sugar <100mg/dL.   SMBG Ranges (patient reported): Off of continuous glucose monitor for a few months, but plans to restart this hopefully in the next 1-2 weeks. Has had issues with sensors falling off in sleep. Doesn't have a computer at home to upload her readings. No blood sugar readings available to review today.   Symptoms of low blood sugar? shaky, dizzy, weak, sweaty, wakes up sometimes with blood sugar as low as 58mg/dL maybe 1-2x/month if she doesn't eat enough at night..   Symptoms of high blood sugar? None      Diet/Exercise: Feels she is moving better since she has been having PT. Able to ride her stationary bike at home.  Some food insecurity -- usually gets 2 meals per day, but not always. If she doesn't have dinner, this may be the reason she wakes up with a low blood sugar middle of the night.  Aspirin: Taking 81mg daily  Statin: Yes: atorvastatin 40mg daily  ACEi/ARB: Yes: losartan 100mg daily. Urine albumin is   Lab Results   Component Value Date    UMALCR 239.81 (H) 03/03/2020       Lab Results    Component Value Date    A1C 10.4 10/22/2020    A1C 13.3 03/03/2020    A1C 11.4 10/02/2019    A1C 9.2 07/03/2019    A1C 9.2 03/18/2019        Today's Vitals: LMP  (LMP Unknown)       ASSESSMENT:                              Medication Adherence: See below for considerations    Type 2 Diabetes:  Patient is not meeting A1c goal of < 8%.  Self monitoring of blood glucose is not at goal of being checked -- need to have her restart SMBG ASAP to better evaluate current insulin doses.      PLAN:                            1.  [employer] and restart continuous glucose monitoring ASAP. Will make insulin dose adjustments based on blood sugar values.    I spent 23 minutes with this patient today. All changes were made via collaborative practice agreement with Sapna Anand. A copy of the visit note was provided to the patient's primary care provider.    Will follow up in 1 month.    The patient declined a summary of these recommendations.     Jovana Loo, Pharm.D., BCACP  Medication Therapy Management Pharmacist  [phone]    Patient consented to a telehealth visit: yes  Telemedicine Visit Details  Type of service:  Telephone visit  Start Time: 3:39 PM  End Time: 4:02 PM  Originating Location (patient location): Home  Distant Location (provider location):  [facility]  Mode of Communication:  Telephone

## 2020-12-14 NOTE — TELEPHONE ENCOUNTER
Sallie Outbound Call:    Reason for call: MTM appointment today  Call attempt: yes to preferred phone.  Voicemail left: yes - requesting Patient call the Medication Therapy Management (MTM) Coordinators scheduling line: [phone] to complete appointment or make new appointment.    Teri Roberts PharmD  Medication Therapy Management (MTM) Pharmacist

## 2021-01-01 ENCOUNTER — OFFICE VISIT (OUTPATIENT)
Dept: OPTOMETRY | Facility: CLINIC | Age: 66
End: 2021-01-01
Payer: COMMERCIAL

## 2021-01-01 ENCOUNTER — APPOINTMENT (OUTPATIENT)
Dept: OPTOMETRY | Facility: CLINIC | Age: 66
End: 2021-01-01
Payer: COMMERCIAL

## 2021-01-01 ENCOUNTER — TELEPHONE (OUTPATIENT)
Dept: PHARMACY | Facility: CLINIC | Age: 66
End: 2021-01-01

## 2021-01-01 ENCOUNTER — TRANSFERRED RECORDS (OUTPATIENT)
Dept: HEALTH INFORMATION MANAGEMENT | Facility: CLINIC | Age: 66
End: 2021-01-01
Payer: COMMERCIAL

## 2021-01-01 ENCOUNTER — IMMUNIZATION (OUTPATIENT)
Dept: NURSING | Facility: CLINIC | Age: 66
End: 2021-01-01
Attending: FAMILY MEDICINE
Payer: COMMERCIAL

## 2021-01-01 ENCOUNTER — TRANSFERRED RECORDS (OUTPATIENT)
Dept: HEALTH INFORMATION MANAGEMENT | Facility: CLINIC | Age: 66
End: 2021-01-01

## 2021-01-01 ENCOUNTER — TELEPHONE (OUTPATIENT)
Dept: FAMILY MEDICINE | Facility: CLINIC | Age: 66
End: 2021-01-01

## 2021-01-01 ENCOUNTER — NURSE TRIAGE (OUTPATIENT)
Dept: FAMILY MEDICINE | Facility: CLINIC | Age: 66
End: 2021-01-01

## 2021-01-01 ENCOUNTER — IMMUNIZATION (OUTPATIENT)
Dept: NURSING | Facility: CLINIC | Age: 66
End: 2021-01-01
Payer: COMMERCIAL

## 2021-01-01 ENCOUNTER — LAB (OUTPATIENT)
Dept: LAB | Facility: CLINIC | Age: 66
End: 2021-01-01
Payer: COMMERCIAL

## 2021-01-01 ENCOUNTER — OFFICE VISIT (OUTPATIENT)
Dept: FAMILY MEDICINE | Facility: CLINIC | Age: 66
End: 2021-01-01
Payer: COMMERCIAL

## 2021-01-01 VITALS
DIASTOLIC BLOOD PRESSURE: 66 MMHG | BODY MASS INDEX: 29.06 KG/M2 | SYSTOLIC BLOOD PRESSURE: 105 MMHG | OXYGEN SATURATION: 97 % | TEMPERATURE: 98.1 F | WEIGHT: 129.6 LBS | HEART RATE: 77 BPM

## 2021-01-01 VITALS
OXYGEN SATURATION: 97 % | BODY MASS INDEX: 30.14 KG/M2 | HEIGHT: 56 IN | WEIGHT: 134 LBS | TEMPERATURE: 98 F | HEART RATE: 83 BPM | SYSTOLIC BLOOD PRESSURE: 160 MMHG | DIASTOLIC BLOOD PRESSURE: 82 MMHG

## 2021-01-01 DIAGNOSIS — E78.5 HYPERLIPIDEMIA LDL GOAL <100: ICD-10-CM

## 2021-01-01 DIAGNOSIS — E11.21 TYPE 2 DIABETES MELLITUS WITH DIABETIC NEPHROPATHY, WITH LONG-TERM CURRENT USE OF INSULIN (H): Primary | ICD-10-CM

## 2021-01-01 DIAGNOSIS — J30.89 NON-SEASONAL ALLERGIC RHINITIS DUE TO OTHER ALLERGIC TRIGGER: ICD-10-CM

## 2021-01-01 DIAGNOSIS — Z12.31 ENCOUNTER FOR SCREENING MAMMOGRAM FOR MALIGNANT NEOPLASM OF BREAST: ICD-10-CM

## 2021-01-01 DIAGNOSIS — Z79.4 TYPE 2 DIABETES MELLITUS WITH DIABETIC NEPHROPATHY, WITH LONG-TERM CURRENT USE OF INSULIN (H): ICD-10-CM

## 2021-01-01 DIAGNOSIS — E83.52 HYPERCALCEMIA: Primary | ICD-10-CM

## 2021-01-01 DIAGNOSIS — I10 HTN, GOAL BELOW 140/90: ICD-10-CM

## 2021-01-01 DIAGNOSIS — E11.21 TYPE 2 DIABETES MELLITUS WITH DIABETIC NEPHROPATHY, WITH LONG-TERM CURRENT USE OF INSULIN (H): ICD-10-CM

## 2021-01-01 DIAGNOSIS — Z01.01 ENCOUNTER FOR EXAMINATION OF EYES AND VISION WITH ABNORMAL FINDINGS: Primary | ICD-10-CM

## 2021-01-01 DIAGNOSIS — E83.52 HYPERCALCEMIA: ICD-10-CM

## 2021-01-01 DIAGNOSIS — E11.3293 MILD NONPROLIFERATIVE DIABETIC RETINOPATHY OF BOTH EYES WITHOUT MACULAR EDEMA ASSOCIATED WITH TYPE 2 DIABETES MELLITUS (H): ICD-10-CM

## 2021-01-01 DIAGNOSIS — Z13.220 SCREENING FOR HYPERLIPIDEMIA: ICD-10-CM

## 2021-01-01 DIAGNOSIS — Z96.1 PSEUDOPHAKIA OF BOTH EYES: ICD-10-CM

## 2021-01-01 DIAGNOSIS — H52.223 REGULAR ASTIGMATISM OF BOTH EYES: ICD-10-CM

## 2021-01-01 DIAGNOSIS — Z79.4 TYPE 2 DIABETES MELLITUS WITH DIABETIC NEUROPATHY, WITH LONG-TERM CURRENT USE OF INSULIN (H): ICD-10-CM

## 2021-01-01 DIAGNOSIS — H52.13 MYOPIA OF BOTH EYES: ICD-10-CM

## 2021-01-01 DIAGNOSIS — R79.89 ELEVATED SERUM CREATININE: ICD-10-CM

## 2021-01-01 DIAGNOSIS — J45.20 MILD INTERMITTENT ASTHMA WITHOUT COMPLICATION: ICD-10-CM

## 2021-01-01 DIAGNOSIS — Z12.11 SCREEN FOR COLON CANCER: ICD-10-CM

## 2021-01-01 DIAGNOSIS — R29.6 FALLS FREQUENTLY: Primary | ICD-10-CM

## 2021-01-01 DIAGNOSIS — H52.4 PRESBYOPIA: ICD-10-CM

## 2021-01-01 DIAGNOSIS — R82.71 ASYMPTOMATIC BACTERIURIA: Primary | ICD-10-CM

## 2021-01-01 DIAGNOSIS — M50.30 DEGENERATION OF CERVICAL INTERVERTEBRAL DISC: ICD-10-CM

## 2021-01-01 DIAGNOSIS — D50.9 IRON DEFICIENCY ANEMIA, UNSPECIFIED IRON DEFICIENCY ANEMIA TYPE: ICD-10-CM

## 2021-01-01 DIAGNOSIS — G63 POLYNEUROPATHY ASSOCIATED WITH UNDERLYING DISEASE (H): ICD-10-CM

## 2021-01-01 DIAGNOSIS — E11.40 TYPE 2 DIABETES MELLITUS WITH DIABETIC NEUROPATHY, WITH LONG-TERM CURRENT USE OF INSULIN (H): ICD-10-CM

## 2021-01-01 DIAGNOSIS — Z91.013 ALLERGIC TO SHELLFISH: ICD-10-CM

## 2021-01-01 DIAGNOSIS — Z79.4 TYPE 2 DIABETES MELLITUS WITH DIABETIC NEPHROPATHY, WITH LONG-TERM CURRENT USE OF INSULIN (H): Primary | ICD-10-CM

## 2021-01-01 LAB
ALBUMIN SERPL-MCNC: 3.9 G/DL (ref 3.4–5)
ALBUMIN SERPL-MCNC: 4.4 G/DL (ref 3.4–5)
ALBUMIN UR-MCNC: NEGATIVE MG/DL
ALP SERPL-CCNC: 75 U/L (ref 40–150)
ALP SERPL-CCNC: 97 U/L (ref 40–150)
ALT SERPL W P-5'-P-CCNC: 24 U/L (ref 0–50)
ALT SERPL W P-5'-P-CCNC: 35 U/L (ref 0–50)
ANION GAP SERPL CALCULATED.3IONS-SCNC: 4 MMOL/L (ref 3–14)
ANION GAP SERPL CALCULATED.3IONS-SCNC: 6 MMOL/L (ref 3–14)
ANION GAP SERPL CALCULATED.3IONS-SCNC: 8 MMOL/L (ref 3–14)
ANION GAP SERPL CALCULATED.3IONS-SCNC: 8 MMOL/L (ref 3–14)
APPEARANCE UR: CLEAR
AST SERPL W P-5'-P-CCNC: 16 U/L (ref 0–45)
AST SERPL W P-5'-P-CCNC: 22 U/L (ref 0–45)
BACTERIA #/AREA URNS HPF: ABNORMAL /HPF
BACTERIA UR CULT: ABNORMAL
BASOPHILS # BLD AUTO: 0 10E3/UL (ref 0–0.2)
BASOPHILS NFR BLD AUTO: 0 %
BILIRUB SERPL-MCNC: 0.5 MG/DL (ref 0.2–1.3)
BILIRUB SERPL-MCNC: 0.5 MG/DL (ref 0.2–1.3)
BILIRUB UR QL STRIP: NEGATIVE
BUN SERPL-MCNC: 33 MG/DL (ref 7–30)
BUN SERPL-MCNC: 37 MG/DL (ref 7–30)
BUN SERPL-MCNC: 43 MG/DL (ref 7–30)
BUN SERPL-MCNC: 47 MG/DL (ref 7–30)
CALCIUM SERPL-MCNC: 10.7 MG/DL (ref 8.5–10.1)
CALCIUM SERPL-MCNC: 10.9 MG/DL (ref 8.5–10.1)
CALCIUM SERPL-MCNC: 10.9 MG/DL (ref 8.5–10.1)
CALCIUM SERPL-MCNC: 11.6 MG/DL (ref 8.5–10.1)
CHLORIDE BLD-SCNC: 94 MMOL/L (ref 94–109)
CHLORIDE BLD-SCNC: 96 MMOL/L (ref 94–109)
CHLORIDE BLD-SCNC: 98 MMOL/L (ref 94–109)
CHLORIDE SERPL-SCNC: 100 MMOL/L (ref 94–109)
CHOLEST SERPL-MCNC: 194 MG/DL
CO2 SERPL-SCNC: 28 MMOL/L (ref 20–32)
CO2 SERPL-SCNC: 28 MMOL/L (ref 20–32)
CO2 SERPL-SCNC: 29 MMOL/L (ref 20–32)
CO2 SERPL-SCNC: 31 MMOL/L (ref 20–32)
COLOR UR AUTO: YELLOW
CREAT SERPL-MCNC: 0.98 MG/DL (ref 0.52–1.04)
CREAT SERPL-MCNC: 1.11 MG/DL (ref 0.52–1.04)
CREAT SERPL-MCNC: 1.23 MG/DL (ref 0.52–1.04)
CREAT SERPL-MCNC: 1.33 MG/DL (ref 0.52–1.04)
CREAT UR-MCNC: 32 MG/DL
DEPRECATED CALCIDIOL+CALCIFEROL SERPL-MC: 70 UG/L (ref 20–75)
EOSINOPHIL # BLD AUTO: 0.2 10E3/UL (ref 0–0.7)
EOSINOPHIL NFR BLD AUTO: 2 %
ERYTHROCYTE [DISTWIDTH] IN BLOOD BY AUTOMATED COUNT: 13.6 % (ref 10–15)
FERRITIN SERPL-MCNC: 94 NG/ML (ref 8–252)
GFR SERPL CREATININE-BSD FRML MDRD: 41 ML/MIN/1.73M2
GFR SERPL CREATININE-BSD FRML MDRD: 45 ML/MIN/1.73M2
GFR SERPL CREATININE-BSD FRML MDRD: 51 ML/MIN/1.73M2
GFR SERPL CREATININE-BSD FRML MDRD: 59 ML/MIN/{1.73_M2}
GLUCOSE BLD-MCNC: 259 MG/DL (ref 70–99)
GLUCOSE BLD-MCNC: 336 MG/DL (ref 70–99)
GLUCOSE BLD-MCNC: 520 MG/DL (ref 70–99)
GLUCOSE SERPL-MCNC: 101 MG/DL (ref 70–99)
GLUCOSE UR STRIP-MCNC: >=1000 MG/DL
HBA1C MFR BLD: 12.2 % (ref 0–5.6)
HBA1C MFR BLD: 12.6 % (ref 0–5.6)
HCT VFR BLD AUTO: 41.9 % (ref 35–47)
HDLC SERPL-MCNC: 75 MG/DL
HGB BLD-MCNC: 14.8 G/DL (ref 11.7–15.7)
HGB UR QL STRIP: ABNORMAL
KETONES UR STRIP-MCNC: NEGATIVE MG/DL
LDLC SERPL CALC-MCNC: 83 MG/DL
LEUKOCYTE ESTERASE UR QL STRIP: ABNORMAL
LYMPHOCYTES # BLD AUTO: 1.9 10E3/UL (ref 0.8–5.3)
LYMPHOCYTES NFR BLD AUTO: 23 %
MCH RBC QN AUTO: 33 PG (ref 26.5–33)
MCHC RBC AUTO-ENTMCNC: 35.3 G/DL (ref 31.5–36.5)
MCV RBC AUTO: 94 FL (ref 78–100)
MICROALBUMIN UR-MCNC: 152 MG/L
MICROALBUMIN/CREAT UR: 477.99 MG/G CR (ref 0–25)
MONOCYTES # BLD AUTO: 0.8 10E3/UL (ref 0–1.3)
MONOCYTES NFR BLD AUTO: 10 %
NEUTROPHILS # BLD AUTO: 5.3 10E3/UL (ref 1.6–8.3)
NEUTROPHILS NFR BLD AUTO: 64 %
NITRATE UR QL: NEGATIVE
NONHDLC SERPL-MCNC: 119 MG/DL
PH UR STRIP: 6 [PH] (ref 5–7)
PLATELET # BLD AUTO: 177 10E3/UL (ref 150–450)
POTASSIUM BLD-SCNC: 4.1 MMOL/L (ref 3.4–5.3)
POTASSIUM BLD-SCNC: 4.1 MMOL/L (ref 3.4–5.3)
POTASSIUM BLD-SCNC: 4.3 MMOL/L (ref 3.4–5.3)
POTASSIUM SERPL-SCNC: 3.6 MMOL/L (ref 3.4–5.3)
PROT SERPL-MCNC: 7.7 G/DL (ref 6.8–8.8)
PROT SERPL-MCNC: 8.2 G/DL (ref 6.8–8.8)
PTH-INTACT SERPL-MCNC: 25 PG/ML (ref 18–80)
RBC # BLD AUTO: 4.48 10E6/UL (ref 3.8–5.2)
RBC #/AREA URNS AUTO: ABNORMAL /HPF
RETINOPATHY: POSITIVE
SODIUM SERPL-SCNC: 130 MMOL/L (ref 133–144)
SODIUM SERPL-SCNC: 132 MMOL/L (ref 133–144)
SODIUM SERPL-SCNC: 133 MMOL/L (ref 133–144)
SODIUM SERPL-SCNC: 135 MMOL/L (ref 133–144)
SP GR UR STRIP: 1.01 (ref 1–1.03)
SQUAMOUS #/AREA URNS AUTO: ABNORMAL /LPF
TRIGL SERPL-MCNC: 180 MG/DL
UROBILINOGEN UR STRIP-ACNC: 0.2 E.U./DL
WBC # BLD AUTO: 8.2 10E3/UL (ref 4–11)
WBC #/AREA URNS AUTO: >100 /HPF
WBC CLUMPS #/AREA URNS HPF: PRESENT /HPF

## 2021-01-01 PROCEDURE — 0001A PR COVID VAC PFIZER DIL RECON 30 MCG/0.3 ML IM: CPT

## 2021-01-01 PROCEDURE — 80048 BASIC METABOLIC PNL TOTAL CA: CPT

## 2021-01-01 PROCEDURE — 0002A PR COVID VAC PFIZER DIL RECON 30 MCG/0.3 ML IM: CPT

## 2021-01-01 PROCEDURE — 91300 PR COVID VAC PFIZER DIL RECON 30 MCG/0.3 ML IM: CPT

## 2021-01-01 PROCEDURE — 82306 VITAMIN D 25 HYDROXY: CPT

## 2021-01-01 PROCEDURE — 80061 LIPID PANEL: CPT | Performed by: PHYSICIAN ASSISTANT

## 2021-01-01 PROCEDURE — 80053 COMPREHEN METABOLIC PANEL: CPT | Performed by: PHYSICIAN ASSISTANT

## 2021-01-01 PROCEDURE — 82043 UR ALBUMIN QUANTITATIVE: CPT | Performed by: PHYSICIAN ASSISTANT

## 2021-01-01 PROCEDURE — 36415 COLL VENOUS BLD VENIPUNCTURE: CPT

## 2021-01-01 PROCEDURE — 92004 COMPRE OPH EXAM NEW PT 1/>: CPT | Performed by: OPTOMETRIST

## 2021-01-01 PROCEDURE — 85025 COMPLETE CBC W/AUTO DIFF WBC: CPT | Performed by: PHYSICIAN ASSISTANT

## 2021-01-01 PROCEDURE — 99214 OFFICE O/P EST MOD 30 MIN: CPT | Performed by: PHYSICIAN ASSISTANT

## 2021-01-01 PROCEDURE — 80053 COMPREHEN METABOLIC PANEL: CPT

## 2021-01-01 PROCEDURE — 92341 FIT SPECTACLES BIFOCAL: CPT | Performed by: OPTOMETRIST

## 2021-01-01 PROCEDURE — 82728 ASSAY OF FERRITIN: CPT | Performed by: PHYSICIAN ASSISTANT

## 2021-01-01 PROCEDURE — 36415 COLL VENOUS BLD VENIPUNCTURE: CPT | Performed by: PHYSICIAN ASSISTANT

## 2021-01-01 PROCEDURE — 83036 HEMOGLOBIN GLYCOSYLATED A1C: CPT | Performed by: PHYSICIAN ASSISTANT

## 2021-01-01 PROCEDURE — 80048 BASIC METABOLIC PNL TOTAL CA: CPT | Performed by: PHYSICIAN ASSISTANT

## 2021-01-01 PROCEDURE — 81001 URINALYSIS AUTO W/SCOPE: CPT | Performed by: PHYSICIAN ASSISTANT

## 2021-01-01 PROCEDURE — 87186 SC STD MICRODIL/AGAR DIL: CPT | Performed by: PHYSICIAN ASSISTANT

## 2021-01-01 PROCEDURE — 87086 URINE CULTURE/COLONY COUNT: CPT | Performed by: PHYSICIAN ASSISTANT

## 2021-01-01 PROCEDURE — 83970 ASSAY OF PARATHORMONE: CPT

## 2021-01-01 RX ORDER — ATORVASTATIN CALCIUM 40 MG/1
TABLET, FILM COATED ORAL
Qty: 90 TABLET | Refills: 1 | Status: SHIPPED | OUTPATIENT
Start: 2021-01-01

## 2021-01-01 RX ORDER — ALBUTEROL SULFATE 90 UG/1
1-2 AEROSOL, METERED RESPIRATORY (INHALATION) EVERY 4 HOURS PRN
Qty: 18 G | Refills: 11 | Status: SHIPPED | OUTPATIENT
Start: 2021-01-01

## 2021-01-01 RX ORDER — FLUTICASONE PROPIONATE 110 UG/1
1 AEROSOL, METERED RESPIRATORY (INHALATION) 2 TIMES DAILY
Qty: 46 G | Refills: 3 | Status: SHIPPED | OUTPATIENT
Start: 2021-01-01

## 2021-01-01 RX ORDER — METOPROLOL SUCCINATE 50 MG/1
50 TABLET, EXTENDED RELEASE ORAL DAILY
Qty: 90 TABLET | Refills: 3 | Status: SHIPPED | OUTPATIENT
Start: 2021-01-01

## 2021-01-01 RX ORDER — FEXOFENADINE HCL 180 MG/1
180 TABLET ORAL DAILY
Qty: 90 TABLET | Refills: 1 | Status: SHIPPED | OUTPATIENT
Start: 2021-01-01

## 2021-01-01 RX ORDER — GABAPENTIN 300 MG/1
300 CAPSULE ORAL AT BEDTIME
Qty: 90 CAPSULE | Refills: 3 | Status: SHIPPED | OUTPATIENT
Start: 2021-01-01

## 2021-01-01 RX ORDER — FLASH GLUCOSE SENSOR
KIT MISCELLANEOUS
Qty: 2 EACH | Refills: 9 | Status: SHIPPED | OUTPATIENT
Start: 2021-01-01

## 2021-01-01 RX ORDER — EMPAGLIFLOZIN 10 MG/1
TABLET, FILM COATED ORAL
Qty: 30 TABLET | Refills: 3 | Status: SHIPPED | OUTPATIENT
Start: 2021-01-01 | End: 2021-01-01

## 2021-01-01 RX ORDER — LOSARTAN POTASSIUM 100 MG/1
100 TABLET ORAL DAILY
Qty: 90 TABLET | Refills: 3 | Status: SHIPPED | OUTPATIENT
Start: 2021-01-01

## 2021-01-01 RX ORDER — FLURBIPROFEN SODIUM 0.3 MG/ML
SOLUTION/ DROPS OPHTHALMIC
Qty: 300 EACH | Refills: 3 | Status: SHIPPED | OUTPATIENT
Start: 2021-01-01

## 2021-01-01 RX ORDER — CIPROFLOXACIN 500 MG/1
500 TABLET, FILM COATED ORAL 2 TIMES DAILY
Qty: 10 TABLET | Refills: 0 | Status: SHIPPED | OUTPATIENT
Start: 2021-01-01 | End: 2021-01-01

## 2021-01-01 RX ORDER — EPINEPHRINE 0.3 MG/.3ML
INJECTION SUBCUTANEOUS
Qty: 2 EACH | Refills: 1 | Status: SHIPPED | OUTPATIENT
Start: 2021-01-01

## 2021-01-01 RX ORDER — CYCLOBENZAPRINE HCL 10 MG
10 TABLET ORAL 3 TIMES DAILY PRN
Qty: 20 TABLET | Refills: 1 | Status: SHIPPED | OUTPATIENT
Start: 2021-01-01

## 2021-01-01 ASSESSMENT — CONF VISUAL FIELD
OS_NORMAL: 1
METHOD: COUNTING FINGERS
OD_NORMAL: 1

## 2021-01-01 ASSESSMENT — VISUAL ACUITY
OD_CC+: -1
OS_CC+: -2
METHOD: SNELLEN - LINEAR
OS_CC: 20/25
OS_CC: J1
CORRECTION_TYPE: GLASSES
OD_CC: J2
OD_CC: 20/30

## 2021-01-01 ASSESSMENT — REFRACTION_WEARINGRX
OS_CYLINDER: +1.75
SPECS_TYPE: BIFOCAL
OS_SPHERE: -1.25
OS_AXIS: 072
OD_CYLINDER: +2.00
OD_AXIS: 095
OD_SPHERE: -2.25

## 2021-01-01 ASSESSMENT — REFRACTION_MANIFEST
OD_AXIS: 098
OS_SPHERE: -2.00
OD_SPHERE: -2.50
OS_AXIS: 068
OS_ADD: +2.75
OD_ADD: +2.75
OS_CYLINDER: +2.00
OD_CYLINDER: +2.00

## 2021-01-01 ASSESSMENT — EXTERNAL EXAM - LEFT EYE: OS_EXAM: NORMAL

## 2021-01-01 ASSESSMENT — TONOMETRY
OD_IOP_MMHG: 15
OS_IOP_MMHG: 17
IOP_METHOD: APPLANATION

## 2021-01-01 ASSESSMENT — CUP TO DISC RATIO
OS_RATIO: 0.35
OD_RATIO: 0.3

## 2021-01-01 ASSESSMENT — SLIT LAMP EXAM - LIDS
COMMENTS: NORMAL
COMMENTS: NORMAL

## 2021-01-01 ASSESSMENT — ASTHMA QUESTIONNAIRES: ACT_TOTALSCORE: 13

## 2021-01-01 ASSESSMENT — MIFFLIN-ST. JEOR: SCORE: 995.82

## 2021-01-01 ASSESSMENT — EXTERNAL EXAM - RIGHT EYE: OD_EXAM: NORMAL

## 2021-02-09 NOTE — PROGRESS NOTES
Chief Complaint   Patient presents with     Diabetic Eye Exam     Accompanied by daughter  Hemoglobin A1C   Date Value Ref Range Status   10/22/2020 10.4 (H) 0 - 5.6 % Final     Comment:     Normal <5.7% Prediabetes 5.7-6.4%  Diabetes 6.5% or higher - adopted from ADA   consensus guidelines.     03/03/2020 13.3 (H) 0 - 5.6 % Final     Comment:     Normal <5.7% Prediabetes 5.7-6.4%  Diabetes 6.5% or higher - adopted from ADA   consensus guidelines.     10/02/2019 11.4 (H) 0 - 5.6 % Final     Comment:     Normal <5.7% Prediabetes 5.7-6.4%  Diabetes 6.5% or higher - adopted from ADA   consensus guidelines.            Last Eye Exam: 01/2020 [facility] in [place], [place]  Dilated Previously: Yes     What are you currently using to see?  glasses     Distance Vision Acuity: Noticed gradual change in both eyes     Near Vision Acuity: Not satisfied with vision while reading  with glasses     Eye Comfort: dry  Do you use eye drops? : No  Occupation or Hobbies: reading, [employer], garden, [employer] 2/9/2021 2:47 PM    Medical, surgical and family histories reviewed and updated 2/9/2021.        OBJECTIVE: See Ophthalmology exam     ASSESSMENT:    ICD-10-CM    1. Encounter for examination of eyes and vision with abnormal findings  Z01.01    2. Mild nonproliferative diabetic retinopathy of both eyes without macular edema associated with type 2 diabetes mellitus (H)  E11.3293    3. Pseudophakia of both eyes  Z96.1    4. Myopia of both eyes  H52.13    5. Regular astigmatism of both eyes  H52.223    6. Presbyopia  H52.4       PLAN:     Nathalie Harp aware  eye exam results will be sent to Sapna Anand  Patient Instructions   Patient educated on importance of good blood sugar control.  Letter sent to primary care provider with diabetic eye exam report.     I recommend using artificial tears when your eyes feel dry. There are over the counter drops that work well and may be used up to 4x daily (Systane ,  "Refresh, Thera Tears)- avoid \"get the red out\" drops. If you need more than 4 drops daily, use a preservative free product which come in individual vials and may be used for 24 hours until finished and discarded.    Nathalie was advised of today's exam findings.  Copy of glasses Rx provided today.    Return in 1 year for eye exam, or sooner if needed.    The effects of the dilating drops last for 4- 6 hours.  You will be more sensitive to light and vision will be blurry up close.  Mydriatic sunglasses were given if needed.      Daren Rivera O.D.  [address]  [place]  [id number] [phone]         "

## 2021-02-09 NOTE — PATIENT INSTRUCTIONS
"Patient educated on importance of good blood sugar control.  Letter sent to primary care provider with diabetic eye exam report.     I recommend using artificial tears when your eyes feel dry. There are over the counter drops that work well and may be used up to 4x daily (Systane , Refresh, Thera Tears)- avoid \"get the red out\" drops. If you need more than 4 drops daily, use a preservative free product which come in individual vials and may be used for 24 hours until finished and discarded.    Nathalie was advised of today's exam findings.  Copy of glasses Rx provided today.    Return in 1 year for eye exam, or sooner if needed.    The effects of the dilating drops last for 4- 6 hours.  You will be more sensitive to light and vision will be blurry up close.  Mydriatic sunglasses were given if needed.      Daren Rivera O.D.  [address] [place]  [address]    [phone]    "

## 2021-02-09 NOTE — LETTER
2/9/2021         RE: Nathalie Harp  [address]  [facility] [address]        Dear Colleague,    Thank you for referring your patient, Nathalie Harp, to the [facility]. Please see a copy of my visit note below.    Chief Complaint   Patient presents with     Diabetic Eye Exam     Accompanied by daughter  Hemoglobin A1C   Date Value Ref Range Status   10/22/2020 10.4 (H) 0 - 5.6 % Final     Comment:     Normal <5.7% Prediabetes 5.7-6.4%  Diabetes 6.5% or higher - adopted from ADA   consensus guidelines.     03/03/2020 13.3 (H) 0 - 5.6 % Final     Comment:     Normal <5.7% Prediabetes 5.7-6.4%  Diabetes 6.5% or higher - adopted from ADA   consensus guidelines.     10/02/2019 11.4 (H) 0 - 5.6 % Final     Comment:     Normal <5.7% Prediabetes 5.7-6.4%  Diabetes 6.5% or higher - adopted from ADA   consensus guidelines.            Last Eye Exam: 01/2020 [facility] in [place], [place]  Dilated Previously: Yes     What are you currently using to see?  glasses     Distance Vision Acuity: Noticed gradual change in both eyes     Near Vision Acuity: Not satisfied with vision while reading  with glasses     Eye Comfort: dry  Do you use eye drops? : No  Occupation or Hobbies: reading, [employer], garden, [employer] 2/9/2021 2:47 PM    Medical, surgical and family histories reviewed and updated 2/9/2021.        OBJECTIVE: See Ophthalmology exam     ASSESSMENT:    ICD-10-CM    1. Encounter for examination of eyes and vision with abnormal findings  Z01.01    2. Mild nonproliferative diabetic retinopathy of both eyes without macular edema associated with type 2 diabetes mellitus (H)  E11.3293    3. Pseudophakia of both eyes  Z96.1    4. Myopia of both eyes  H52.13    5. Regular astigmatism of both eyes  H52.223    6. Presbyopia  H52.4       PLAN:     Nathalie KATHERINE Harp aware  eye exam results will be sent to Sapna Anand  Patient Instructions   Patient educated on  "importance of good blood sugar control.  Letter sent to primary care provider with diabetic eye exam report.     I recommend using artificial tears when your eyes feel dry. There are over the counter drops that work well and may be used up to 4x daily (Systane , Refresh, Thera Tears)- avoid \"get the red out\" drops. If you need more than 4 drops daily, use a preservative free product which come in individual vials and may be used for 24 hours until finished and discarded.    Nathalie was advised of today's exam findings.  Copy of glasses Rx provided today.    Return in 1 year for eye exam, or sooner if needed.    The effects of the dilating drops last for 4- 6 hours.  You will be more sensitive to light and vision will be blurry up close.  Mydriatic sunglasses were given if needed.      Daren Rivera O.D.  [address] [place]  [address]    [phone]             Again, thank you for allowing me to participate in the care of your patient.        Sincerely,        Daren Rivera, OD    "

## 2021-03-16 NOTE — TELEPHONE ENCOUNTER
Prior Authorization Retail Medication Request    Medication/Dose: novolog 70/30 flexpen   ICD code (if different than what is on RX): E11.21  Previously Tried and Failed:  no  Rationale:  Prior auth required    Insurance Name:  Med[employer] dual  Insurance ID:  [id number]  Phone # [phone]      Pharmacy Information (if different than what is on RX)  Name:  [facility] pharmacy  Phone:  [phone]

## 2021-03-17 NOTE — TELEPHONE ENCOUNTER
Central Prior Authorization Team  Phone: [phone]    PA Initiation    Medication: novolog 70/30 flexpen   Insurance Company: [employer] - Phone [phone] Fax [phone]  Pharmacy Filling the Rx: [place] [facility] - [address] [address] [facility]  Filling Pharmacy Phone: [phone]  Filling Pharmacy Fax:    Start Date: 3/17/2021

## 2021-03-17 NOTE — TELEPHONE ENCOUNTER
Prior Authorization Approval    Authorization Effective Date: 2/15/2021  Authorization Expiration Date: 3/17/2022  Medication: novolog 70/30 flexpen- APPROVED   Approved Dose/Quantity:   Reference #:     Insurance Company: [employer] - Phone [phone] Fax [phone]  Expected CoPay:       CoPay Card Available:      Foundation Assistance Needed:    Which Pharmacy is filling the prescription (Not needed for infusion/clinic administered): [facility] [place] - [address] [facility] [address]  Pharmacy Notified: Yes  Patient Notified:  **Instructed pharmacy to notify patient when script is ready to /ship.**

## 2021-03-22 NOTE — TELEPHONE ENCOUNTER
Called to schedule MTM appt, no answer, LVM.    Danita Canada, PharmD  Medication Therapy Management Pharmacist  [phone]  [facility]: [phone]

## 2021-04-16 NOTE — TELEPHONE ENCOUNTER
We have been unable to reach this patient for MTM follow-up after several attempts. We will stop reaching out to the patient at this time. Please let us know if we can assist in this patient's care in the future.     Routing to PCP as TIMUR Canada, PharmD  Medication Therapy Management Pharmacist  [phone]

## 2021-04-22 NOTE — TELEPHONE ENCOUNTER
Prior Authorization Retail Medication Request    Medication/Dose: jardiance 10mg  ICD code (if different than what is on RX):  e1140  Previously Tried and Failed:  unknown  Rationale:  Product not on formulary    Insurance Name:  Medica dual  Insurance ID:  [id number]  [phone]      Pharmacy Information (if different than what is on RX)  Name:  [facility]  pharmacy  Phone:  [phone]

## 2021-04-26 NOTE — TELEPHONE ENCOUNTER
Prior Authorization Approval    Authorization Effective Date: 3/27/2021  Authorization Expiration Date: 4/26/2022  Medication: jardiance 10mg-APPROVED  Approved Dose/Quantity:    Reference #:     Insurance Company: [employer] - Phone [phone] Fax [phone]  Expected CoPay:       CoPay Card Available:      Foundation Assistance Needed:    Which Pharmacy is filling the prescription (Not needed for infusion/clinic administered): [facility] [place], [place] - [address] [facility]  Pharmacy Notified: Yes  Patient Notified: Yes  **Instructed pharmacy to notify patient when script is ready to /ship.**

## 2021-04-26 NOTE — TELEPHONE ENCOUNTER
Central Prior Authorization Team   Phone: [phone]    PA Initiation    Medication: jardiance 10mg  Insurance Company: [employer] - Phone [phone] Fax [phone]  Pharmacy Filling the Rx: [place] [facility] - [address] [facility]  Filling Pharmacy Phone: [phone]  Filling Pharmacy Fax: [phone]  Start Date: 4/26/2021

## 2021-05-20 NOTE — TELEPHONE ENCOUNTER
Reason for call:  Form   Our goal is to have forms completed within 72 hours, however some forms may require a visit or additional information.     Who is the form from? Patient  Where did the form come from? Patient or family brought in     What clinic location was the form placed at? [facility]  Where was the form placed? [facility] Box/Folder  What number is listed as a contact on the form? [phone]    Phone call message - patient request for a letter, form or note:     Date needed: as soon as possible  Please mail to [address] [place] [address]  Has the patient signed a consent form for release of information? NO    Additional comments:     Type of letter, form or note: disability    Phone number to reach patient:  Home number on file There is no home phone number on file.    Best Time:  ANY    Can we leave a detailed message on this number?  YES    Travel screening: Negative

## 2021-05-26 NOTE — TELEPHONE ENCOUNTER
We have been unable to reach this patient for MTM follow-up after several attempts. We will stop reaching out to the patient at this time. Please let us know if we can assist in this patient's care in the future.    Routing to PCP as TIMUR Loo, Pharm.D., BCACP  Medication Therapy Management Pharmacist  [phone]

## 2021-06-09 NOTE — TELEPHONE ENCOUNTER
Last Written Prescription Date:  1/18/21  Last Fill Quantity: 30,  # refills: 3   Last office visit: 10/2/2019 with prescribing provider:  Cinda   Future Office Visit:   Next 5 appointments (look out 90 days)    Jun 24, 2021  9:00 AM  Office Visit with Sapna Anand PA-C  [facility] [address] [facility] [address]  [phone]         Routing refill request to provider for review/approval because:  Labs not current:  Due for fasting labs  Patient needs to be seen because:  Overdue for Diabetes follow up    Med pended with reminder to keep appt. Please advise.    Jaimie Whitmore, RN, BSN  [facility]

## 2021-06-09 NOTE — TELEPHONE ENCOUNTER
Spoke to patient and made appointment for 6/24/2021.  Mora CARRILLO CMA ([facility])

## 2021-06-24 NOTE — LETTER
My Asthma Action Plan    Name: Nathalie Harp   YOB: 1955  Date: 6/24/2021   My doctor: Sapna Anand PA-C   My clinic: [facility]        My Control Medicine: Fluticasone propionate (Flovent HFA) - 110 mcg 1 puff twice per day  My Rescue Medicine: Albuterol (Proair/Ventolin/Proventil HFA) 2-4 puffs EVERY 4 HOURS as needed. Use a spacer if recommended by your provider.   My Asthma Severity:   Moderate Persistent  Know your asthma triggers:                GREEN ZONE   Good Control    I feel good    No cough or wheeze    Can work, sleep and play without asthma symptoms       Take your asthma control medicine every day.     1. If exercise triggers your asthma, take your rescue medication    15 minutes before exercise or sports, and    During exercise if you have asthma symptoms  2. Spacer to use with inhaler: If you have a spacer, make sure to use it with your inhaler             YELLOW ZONE Getting Worse  I have ANY of these:    I do not feel good    Cough or wheeze    Chest feels tight    Wake up at night   1. Keep taking your Green Zone medications  2. Start taking your rescue medicine:    every 20 minutes for up to 1 hour. Then every 4 hours for 24-48 hours.  3. If you stay in the Yellow Zone for more than 12-24 hours, contact your doctor.  4. If you do not return to the Green Zone in 12-24 hours or you get worse, start taking your oral steroid medicine if prescribed by your provider.           RED ZONE Medical Alert - Get Help  I have ANY of these:    I feel awful    Medicine is not helping    Breathing getting harder    Trouble walking or talking    Nose opens wide to breathe       1. Take your rescue medicine NOW  2. If your provider has prescribed an oral steroid medicine, start taking it NOW  3. Call your doctor NOW  4. If you are still in the Red Zone after 20 minutes and you have not reached your doctor:    Take your rescue medicine again and    Call 911 or go to  the emergency room right away    See your regular doctor within 2 weeks of an Emergency Room or Urgent Care visit for follow-up treatment.          Annual Reminders:  Meet with Asthma Educator,  Flu Shot in the Fall, consider Pneumonia Vaccination for patients with asthma (aged 19 and older).    Pharmacy:    [id number] - [place], [place] - [address] [facility] - [place] - [address] [facility] [place], [place] - [address]  [id number] - [place], [place] - [address] AT [facility] [address]  [facility] - [place] - [address]    Electronically signed by Sapna Anand PA-C   Date: 06/24/21                      Asthma Triggers  How To Control Things That Make Your Asthma Worse    Triggers are things that make your asthma worse.  Look at the list below to help you find your triggers and what you can do about them.  You can help prevent asthma flare-ups by staying away from your triggers.      Trigger                                                          What you can do   Cigarette Smoke  Tobacco smoke can make asthma worse. Do not allow smoking in your home, car or around you.  Be sure no one smokes at a child s day care or school.  If you smoke, ask your health care provider for ways to help you quit.  Ask family members to quit too.  Ask your health care provider for a referral to Quit Plan to help you quit smoking, or call 2-131-438-PLAN.     Colds, Flu, Bronchitis  These are common triggers of asthma. Wash your hands often.  Don t touch your eyes, nose or mouth.  Get a flu shot every year.     Dust Mites  These are tiny bugs that live in cloth or carpet. They are too small to see. Wash sheets and blankets in hot water every week.   Encase pillows and mattress in dust mite proof covers.  Avoid having carpet if you can. If you have carpet, vacuum weekly.   Use a dust mask  and HEPA vacuum.   Pollen and Outdoor Mold  Some people are allergic to trees, grass, or weed pollen, or molds. Try to keep your windows closed.  Limit time out doors when pollen count is high.   Ask you health care provider about taking medicine during allergy season.     Animal Dander  Some people are allergic to skin flakes, urine or saliva from pets with fur or feathers. Keep pets with fur or feathers out of your home.    If you can t keep the pet outdoors, then keep the pet out of your bedroom.  Keep the bedroom door closed.  Keep pets off cloth furniture and away from stuffed toys.     Mice, Rats, and Cockroaches   Some people are allergic to the waste from these pests.   Cover food and garbage.  Clean up spills and food crumbs.  Store grease in the refrigerator.   Keep food out of the bedroom.   Indoor Mold  This can be a trigger if your home has high moisture. Fix leaking faucets, pipes, or other sources of water.   Clean moldy surfaces.  Dehumidify basement if it is damp and smelly.   Smoke, Strong Odors, and Sprays  These can reduce air quality. Stay away from strong odors and sprays, such as perfume, powder, hair spray, paints, smoke incense, paint, cleaning products, candles and new carpet.   Exercise or Sports  Some people with asthma have this trigger. Be active!  Ask your doctor about taking medicine before sports or exercise to prevent symptoms.    Warm up for 5-10 minutes before and after sports or exercise.     Other Triggers of Asthma  Cold air:  Cover your nose and mouth with a scarf.  Sometimes laughing or crying can be a trigger.  Some medicines and food can trigger asthma.

## 2021-06-24 NOTE — PATIENT INSTRUCTIONS
1. Please complete your stool test  2. Schedule a follow up appointment with Jovana [phone]  3. Start flovent 1 puff 2 times per day to help with asthma.   4. You can schedule your mammogram anytime after 7/13/21

## 2021-06-24 NOTE — LETTER
June 25, 2021      Nathalie Harp  [address]   [place] [address]        Dear ,    We are writing to inform you of your test results.    Your A1C has increased. Please make sure you are taking your insulin as directed and make your follow up appointment with Jovana Hicks   HEMOGLOBIN A1C   Result Value Ref Range    Hemoglobin A1C 12.2 (H) 0 - 5.6 %      Comment:      Normal <5.7% Prediabetes 5.7-6.4%  Diabetes 6.5% or higher - adopted from ADA   consensus guidelines.     BASIC METABOLIC PANEL   Result Value Ref Range    Sodium 135 133 - 144 mmol/L    Potassium 3.6 3.4 - 5.3 mmol/L    Chloride 100 94 - 109 mmol/L    Carbon Dioxide 31 20 - 32 mmol/L    Anion Gap 4 3 - 14 mmol/L    Glucose 101 (H) 70 - 99 mg/dL      Comment:      Non Fasting    Urea Nitrogen 33 (H) 7 - 30 mg/dL    Creatinine 0.98 0.52 - 1.04 mg/dL    GFR Estimate 59 (L) >60 mL/min/[1.73_m2]      Comment:      Non  GFR Calc  Starting 12/18/2018, serum creatinine based estimated GFR (eGFR) will be   calculated using the Chronic Kidney Disease Epidemiology Collaboration   (CKD-EPI) equation.      GFR Estimate If Black 69 >60 mL/min/[1.73_m2]      Comment:       GFR Calc  Starting 12/18/2018, serum creatinine based estimated GFR (eGFR) will be   calculated using the Chronic Kidney Disease Epidemiology Collaboration   (CKD-EPI) equation.      Calcium 10.9 (H) 8.5 - 10.1 mg/dL   Lipid panel reflex to direct LDL Fasting   Result Value Ref Range    Cholesterol 194 <200 mg/dL    Triglycerides 180 (H) <150 mg/dL      Comment:      Borderline high:  150-199 mg/dl  High:             200-499 mg/dl  Very high:       >499 mg/dl  Non Fasting      HDL Cholesterol 75 >49 mg/dL    LDL Cholesterol Calculated 83 <100 mg/dL      Comment:      Desirable:       <100 mg/dl    Non HDL Cholesterol 119 <130 mg/dL   Albumin Random Urine Quantitative with Creat Ratio   Result Value Ref Range    Creatinine  Urine 32 mg/dL    Albumin Urine mg/L 152 mg/L    Albumin Urine mg/g Cr 477.99 (H) 0 - 25 mg/g Cr       If you have any questions or concerns, please call the clinic at the number listed above.       Sincerely,      Sapna Anand PA-C/quintero

## 2021-06-24 NOTE — PROGRESS NOTES
"    Assessment & Plan     Type 2 diabetes mellitus with diabetic nephropathy, with long-term current use of insulin (H)  Uncontrolled. Advised patient to start with 40 units BID and take regularly. Patient encouraged to continue to use her karey monitor and follow up with MTM in the next several weeks.   - insulin aspart prot & aspart (NOVOLOG MIX 70/30 PEN) (70-30) 100 UNIT/ML pen; Inject 50 Units Subcutaneous 2 times daily With food. This replaces Basal/bolus insulin  - insulin pen needle (B-D U/F) 31G X 5 MM miscellaneous; USE TWO TIMES DAILY    Screen for colon cancer  - Fecal colorectal cancer screen FIT - Future (S+30); Future    Screening for hyperlipidemia    Mild intermittent asthma without complication  Uncontrolled. Adding an ICS and will recheck in 4 wekes.   - albuterol (PROAIR HFA/PROVENTIL HFA/VENTOLIN HFA) 108 (90 Base) MCG/ACT inhaler; Inhale 1-2 puffs into the lungs every 4 hours as needed for shortness of breath / dyspnea or wheezing  - fluticasone (FLOVENT HFA) 110 MCG/ACT inhaler; Inhale 1 puff into the lungs 2 times daily    Non-seasonal allergic rhinitis due to other allergic trigger  - fexofenadine (ALLEGRA) 180 MG tablet; Take 1 tablet (180 mg) by mouth daily    HTN, goal below 140/90  - metoprolol succinate ER (TOPROL-XL) 50 MG 24 hr tablet; Take 1 tablet (50 mg) by mouth daily    Polyneuropathy associated with underlying disease (H)  Stable.     Encounter for screening mammogram for malignant neoplasm of breast  - *MA Screening Digital Bilateral; Future             BMI:   Estimated body mass index is 30.04 kg/m  as calculated from the following:    Height as of this encounter: 1.422 m (4' 8\").    Weight as of this encounter: 60.8 kg (134 lb).           Return in about 5 weeks (around 7/29/2021) for asthma.    ESTELA Barber [facility] Zel[facility] is a 66 year old who presents for the following health issues     HPI     Diabetes Follow-up    How often " are you checking your blood sugar? Two times daily  Blood sugar testing frequency justification:  Uncontrolled diabetes  What time of day are you checking your blood sugars (select all that apply)?  Before and after meals  Have you had any blood sugars above 200?  Yes Have you had any blood sugars below 70?  Yes     What symptoms do you notice when your blood sugar is low?  Weak and nausea    What concerns do you have today about your diabetes? Blood sugar is often over 200 and Low blood sugar     Do you have any of these symptoms? (Select all that apply)  Blood sugar is often over 200 and Low blood sugar      BP Readings from Last 2 Encounters:   06/24/21 (!) 159/75   10/22/20 120/65     Hemoglobin A1C (%)   Date Value   10/22/2020 10.4 (H)   03/03/2020 13.3 (H)     LDL Cholesterol Calculated (mg/dL)   Date Value   03/03/2020     Cannot estimate LDL when triglyceride exceeds 400 mg/dL   03/18/2019 120 (H)     LDL Cholesterol Direct (mg/dL)   Date Value   03/03/2020 101 (H)                 How many servings of fruits and vegetables do you eat daily?  2-3    On average, how many sweetened beverages do you drink each day (Examples: soda, juice, sweet tea, etc.  Do NOT count diet or artificially sweetened beverages)?   0    How many days per week do you exercise enough to make your heart beat faster? 3 or less    How many minutes a day do you exercise enough to make your heart beat faster? 9 or less    How many days per week do you miss taking your medication? 0    Notes her asthma is affected by the heat a lot. She notes that she could probably use her albuterol more often. At home she likes to just use a fan and cool wash cloth. She is taking the singulair daily. She does not recall if she has ever been on a daily inhaler.     She has been using her freestyle Bernice meter. She checks her BS multiple times. She is averaging 300+ for her blood sugars.   She notes she is using 30-40 units in the morning and evening  "depending if she ate and what she ate. Has some food insecurity so food access can affect sugars.   She had no lows over the last 2 weeks on her meter.       Review of Systems   Constitutional, HEENT, cardiovascular, pulmonary, gi and gu systems are negative, except as otherwise noted.      Objective    BP (!) 159/75 (BP Location: Right arm, Patient Position: Chair, Cuff Size: Adult Regular)   Pulse 83   Temp 98  F (36.7  C) (Oral)   Ht 1.422 m (4' 8\")   Wt 60.8 kg (134 lb)   LMP  (LMP Unknown)   SpO2 97%   Breastfeeding No   BMI 30.04 kg/m    Body mass index is 30.04 kg/m .  Physical Exam   GENERAL: healthy, alert and no distress  Diabetic foot exam: normal DP and PT pulses, no trophic changes or ulcerative lesions and normal sensory exam                "

## 2021-06-25 NOTE — RESULT ENCOUNTER NOTE
Nathalie,     Your A1C has increased. Please make sure you are taking your insulin as directed and make your follow up appointment with Tsering Anand PA-C

## 2021-08-11 NOTE — PROGRESS NOTES
"    Assessment & Plan     Falls frequently  Had 2 falls with reported tripping, but then patient does not recall surrounding times, though states no LOC. Will get labs.   - Comprehensive metabolic panel; Future  - CBC with Platelets & Differential; Future  - Hemoglobin A1c; Future  - UA Macro with Reflex to Micro and Culture - lab collect; Future  - UA Macro with Reflex to Micro and Culture - lab collect  - Hemoglobin A1c  - CBC with Platelets & Differential  - Comprehensive metabolic panel  - Urine Microscopic  - Urine Culture    Type 2 diabetes mellitus with diabetic nephropathy, with long-term current use of insulin (H)  Likely high, A1C previously high and patient has not been consistently taking medications. Patient to make appointment with MTM to help titrate medications.   - Comprehensive metabolic panel; Future  - Hemoglobin A1c; Future  - Hemoglobin A1c  - Comprehensive metabolic panel    Iron deficiency anemia, unspecified iron deficiency anemia type  History of anemia, could be contributing to symptoms.     - CBC with Platelets & Differential; Future  - Ferritin; Future  - Ferritin  - CBC with Platelets & Differential             BMI:   Estimated body mass index is 29.06 kg/m  as calculated from the following:    Height as of 6/24/21: 1.422 m (4' 8\").    Weight as of this encounter: 58.8 kg (129 lb 9.6 oz).           No follow-ups on file.    Sapna Anand PA-C  [facility] [place] Zelaya is a 66 year old who presents for the following health issues     HPI     Diabetes Follow-up    How often are you checking your blood sugar? Continuous glucose monitor  What time of day are you checking your blood sugars (select all that apply)?  Before and after meals, At bedtime and right away in the morning  Have you had any blood sugars above 200?  Yes many  Have you had any blood sugars below 70?  No    What symptoms do you notice when your blood sugar is low?  None    What concerns do " you have today about your diabetes? Blood sugar is often over 200     Do you have any of these symptoms? (Select all that apply)  No numbness or tingling in feet.  No redness, sores or blisters on feet.  No complaints of excessive thirst.  No reports of blurry vision.  No significant changes to weight.      BP Readings from Last 2 Encounters:   08/12/21 105/66   06/24/21 (!) 160/82     Hemoglobin A1C (%)   Date Value   06/24/2021 12.2 (H)   10/22/2020 10.4 (H)     LDL Cholesterol Calculated (mg/dL)   Date Value   06/24/2021 83   03/03/2020     Cannot estimate LDL when triglyceride exceeds 400 mg/dL     LDL Cholesterol Direct (mg/dL)   Date Value   03/03/2020 101 (H)                   How many servings of fruits and vegetables do you eat daily?  0-1    On average, how many sweetened beverages do you drink each day (Examples: soda, juice, sweet tea, etc.  Do NOT count diet or artificially sweetened beverages)?   0    How many days per week do you exercise enough to make your heart beat faster? 3 or less    How many minutes a day do you exercise enough to make your heart beat faster? 9 or less    How many days per week do you miss taking your medication? 0    Pt stated that she has fallen a few times lately for the past few months. Pt stated that she was out from falling for 24 hours. Pt reports that she has not been having any s.o.b, chest pain, or numbness or tingling. Pt stated that she never went into the doctor, hospital, or er for this. Per pt.       Patient has not been eating as often since she fell. She is eating though and has access to food.     June 6th, patient thinks she had a heat stroke- she has 24 hours where she does not remember. June 15th she tripped over a cord. She notes she hurt her right ankle, knees and elbows. Did not get seen because she couldn't move. She notes she fell 2 weeks later.   Did not have LOC or hit her head.   BS have been running higher.   She thinks she has missed some insulin  "doses. She still took all her pills.   She has not been wearing her CGM.   She \"usually does the insulin 2 times per day\"    Review of Systems   Constitutional, HEENT, cardiovascular, pulmonary, gi and gu systems are negative, except as otherwise noted.      Objective    /66 (BP Location: Left arm, Patient Position: Chair, Cuff Size: Adult Regular)   Pulse 77   Temp 98.1  F (36.7  C) (Oral)   Wt 58.8 kg (129 lb 9.6 oz)   LMP  (LMP Unknown)   SpO2 97%   BMI 29.06 kg/m    Body mass index is 29.06 kg/m .  Physical Exam   GENERAL: healthy, alert and no distress  EYES: Eyes grossly normal to inspection, PERRL and conjunctivae and sclerae normal, extra occular movements intact   HENT: ear canals and TM's normal, nose and mouth without ulcers or lesions  NECK: no adenopathy,   RESP: lungs clear to auscultation - no rales, rhonchi or wheezes  CV: regular rate and rhythm, normal S1 S2, no S3 or S4, no murmur,   MS: no gross musculoskeletal defects noted, no edema- using cane to ambulate.   SKIN: no suspicious lesions or rashes  NEURO: Normal strength and tone, mentation intact and speech normal, CN II-XII intact. deep tendon reflexes intact.   PSYCH: mentation appears normal, affect normal/bright                "

## 2021-08-12 NOTE — RESULT ENCOUNTER NOTE
Patient asymptomatic, not treated at visit. But falling, if culture positive will need treatment.   Sapna Anand PA-C

## 2021-08-15 NOTE — TELEPHONE ENCOUNTER
Call patient.     Urine culture did show infection. As was discussed with patient in clinic (per Sapna Anand), suggest she start an antibiotic for 5 days. I sent cipro to her pharmacy.     Other labs look ok. Kidney labs indicate some stress to the kidney, so she should follow up in 2 weeks for a repeat lab. Watch glucose, continue taking medications as previously discussed, drink plenty of water. Please help schedule future lab appt.       Ninoska Alonzo PA-C   Covering for Sapna Anand PA-C

## 2021-08-18 NOTE — TELEPHONE ENCOUNTER
Called pt. Gave her provider's message as written. She has not been able to check her messages. She has not picked up medication but will today. Symptoms have remained the same. Lab only appt scheduled.    Mora Trevino RN  [facility]

## 2021-08-30 NOTE — TELEPHONE ENCOUNTER
Called and spoke with patient.  Informed her of the provider's recommendations.  Assisted with scheduling the lab only appointment 9/3/21 and follow-up virtual telephone appointment with PCP on 9/7/21.  Patient verbalized understanding and has no further questions or concerns at this time.

## 2021-08-30 NOTE — TELEPHONE ENCOUNTER
Please call patient. Her calcium level has increased significantly.   She needs to have this repeated this week along with additional labs.Her kidney function is stable at this time.  She needs to stop all calcium containing supplements and have a telephone visit with me next week.   Sapna Anand PA-C

## 2021-09-03 NOTE — TELEPHONE ENCOUNTER
On call PC from lab - glucose 520 - 1:30 pm today.      Review of chart indicates known type 2 diabetes with last A1C of 12.6  Has follow up appointment on Tuesday.    No immediate need for intervention.      FYI to PCP.      Whitney Tomlinson MD

## 2021-09-03 NOTE — LETTER
September 17, 2021      Nathalie Harp  [address]   [place] [address]        Dear ,    We are writing to inform you of your test results.    Please make a follow up appointment to discuss these labs.There are several urgent things we should discuss and have tried to call you to schedule an appointment        Resulted Orders   Parathyroid Hormone Intact   Result Value Ref Range    Parathyroid Hormone Intact 25 18 - 80 pg/mL   Vitamin D Deficiency   Result Value Ref Range    Vitamin D, Total (25-Hydroxy) 70 20 - 75 ug/L    Narrative    Season, race, dietary intake, and treatment affect the concentration of 25-hydroxy-Vitamin D. Values may decrease during winter months and increase during summer months. Values 20-29 ug/L may indicate Vitamin D insufficiency and values <20 ug/L may indicate Vitamin D deficiency.    Vitamin D determination is routinely performed by an immunoassay specific for 25 hydroxyvitamin D3.  If an individual is on vitamin D2(ergocalciferol) supplementation, please specify 25 OH vitamin D2 and D3 level determination by LCMSMS test VITD23.     Comprehensive metabolic panel   Result Value Ref Range    Sodium 130 (L) 133 - 144 mmol/L    Potassium 4.1 3.4 - 5.3 mmol/L    Chloride 94 94 - 109 mmol/L    Carbon Dioxide (CO2) 28 20 - 32 mmol/L    Anion Gap 8 3 - 14 mmol/L    Urea Nitrogen 37 (H) 7 - 30 mg/dL    Creatinine 1.11 (H) 0.52 - 1.04 mg/dL    Calcium 10.9 (H) 8.5 - 10.1 mg/dL    Glucose 520 (HH) 70 - 99 mg/dL    Alkaline Phosphatase 75 40 - 150 U/L    AST 22 0 - 45 U/L    ALT 35 0 - 50 U/L    Protein Total 7.7 6.8 - 8.8 g/dL    Albumin 3.9 3.4 - 5.0 g/dL    Bilirubin Total 0.5 0.2 - 1.3 mg/dL    GFR Estimate 51 (L) >60 mL/min/1.73m2      Comment:      As of July 11, 2021, eGFR is calculated by the CKD-EPI creatinine equation, without race adjustment. eGFR can be influenced by muscle mass, exercise, and diet. The reported eGFR is an estimation only and is only applicable  if the renal function is stable.       If you have any questions or concerns, please call the clinic at the number listed above.       Sincerely,      Sapna Anand PA-C/leon

## 2021-09-07 NOTE — TELEPHONE ENCOUNTER
Please call patient. She was scheduled for a telephone visit today 9/7/21 with me, but I am unexpectedly out of the clinic. I still do need to speak with her regarding these labs. Please double book her as a telephone visit this week.   Please also make sure patient is taking her insulin as directed as her blood sugar when her labs were checked was over 500. If any symptoms of hyperglycemia needs to be seen in the ED.   Sapna Anand PA-C

## 2021-09-07 NOTE — TELEPHONE ENCOUNTER
Left message on answering machine for patient to call back to the nurse at [phone].    Mora Trevino RN  [facility]

## 2021-09-14 NOTE — TELEPHONE ENCOUNTER
Reason for Call:  Other call back    Detailed comments: may be having a UTI or bladder infection. No appointments available until 9-27-21    Phone Number Patient can be reached at: Home number on file [phone] (home)    Best Time: any    Can we leave a detailed message on this number? YES    Call taken on 9/14/2021 at 10:43 AM by Aure Delgado

## 2021-09-14 NOTE — TELEPHONE ENCOUNTER
Reached out to patient to inquire further about UTI sx.    She stated that she just wants to get in sooner with either Sapna Anand PA-C or Dr. Perez. There are no openings soon enough, so patient will go to urgent care today to seek treatment.    Susana Bowden, LIZN RN  [facility] [place]

## 2021-09-16 NOTE — TELEPHONE ENCOUNTER
Spoke with pt. Reviewed provider's message below. Her BG has been 300-400. She had 1 500 reading. A couple of days ago had a reading of 140 in the am. Right now her reading is at 420 and hasn't eaten yet today. Has been taking insulin as prescribed. Is sore and stressed. Has excessive urination and thirst. Vision is not as good outside. Stomach feels queasy. Is feeling dizzy and has had this symptom for 3 days after going off of the antibiotic. Feels weak.     Reason for Disposition    Patient sounds very sick or weak to the triager    Additional Information    Negative: Vomiting lasting > 4 hours    Negative: Blood glucose > 240 mg/dL (13.3 mmol/L) AND vomiting AND unable to check for ketones (in blood or urine)    Negative: Blood glucose > 240 mg/dL (13.3 mmol/L) and blood ketones > 1.4 mmol/L    Negative: Blood glucose > 240 mg/dL (13.3 mmol/L) AND urine ketones moderate-large (or more than 1+)    Negative: Blood glucose > 500 mg/dL (27.8 mmol/L)    Negative: Vomiting and signs of dehydration (e.g., very dry mouth, lightheaded, dark urine)    Negative: Blood glucose > 240 mg/dL (13.3 mmol/L) and rapid breathing    Negative: Unconscious or difficult to awaken    Negative: Acting confused (e.g., disoriented, slurred speech)    Negative: Very weak (can't stand)    Negative: Sounds like a life-threatening emergency to the triager    Protocols used: DIABETES - HIGH BLOOD SUGAR-A-OH

## 2021-09-17 NOTE — RESULT ENCOUNTER NOTE
Nathalie,     Please make a follow up appointment to discuss these labs.There are several urgent things we should discuss and have tried to call you to schedule an appointment.   Sapna Anand PA-C

## 2021-10-25 NOTE — TELEPHONE ENCOUNTER
Routing refill request to provider for review/approval because:  Drug not on the [facility] refill protocol     Jovana Morgan RN

## 2022-10-23 NOTE — PROGRESS NOTES
ANTICOAGULATION FOLLOW-UP CLINIC VISIT    Patient Name:  Nathalie Harp  Date:  3/15/2019  Contact Type:  Face to Face    SUBJECTIVE: warfarin therapy complete.  Patient starting baby asa today, per Neurology.     Patient Findings     Positives:   Missed doses (warfarin stopped wednesday of this week.  she will start baby asa today for the rest of her life.), Change in medications (if INR < 2.0 stop warfarin therapy per neurology doctor.  )           OBJECTIVE    INR Protime   Date Value Ref Range Status   03/15/2019 1.2 (A) 0.86 - 1.14 Final       ASSESSMENT / PLAN  No question data found.  Anticoagulation Summary  As of 3/15/2019    INR goal:   2.0-3.0   TTR:   67.4 % (5.2 mo)   INR used for dosin.2! (3/15/2019)   Warfarin maintenance plan:   5 mg (2.5 mg x 2) every Tue, Thu, Sat; 2.5 mg (2.5 mg x 1) all other days   Full warfarin instructions:   5 mg every Tue, Thu, Sat; 2.5 mg all other days   Weekly warfarin total:   25 mg   Plan last modified:   Bijal Oswald, RN (2019)   Next INR check:      Target end date:   3/25/2019    Indications    Cerebral artery occlusion with cerebral infarction (H) [I63.50] [I63.50]             Anticoagulation Episode Summary     INR check location:       Preferred lab:       Resolved date:   3/15/2019    Resolved reason:   Therapy  Complete    Send INR reminders to:   [facility]    Comments:   Neurologist extending warfarin therapy 3 more months (- - 3-)      Anticoagulation Care Providers     Provider Role Specialty Phone number    Sapna Anand PA-C  Physician Assistant - Medical [phone]            See the Encounter Report to view Anticoagulation Flowsheet and Dosing Calendar (Go to Encounters tab in chart review, and find the Anticoagulation Therapy Visit)    Dosage adjustment made based on physician directed care plan.    Bijal Oswald RN                  Yes

## 2023-10-05 NOTE — TELEPHONE ENCOUNTER
Faxed back   Same Histology In Subsequent Stages Text: The pattern and morphology of the tumor is as described in the first stage.